# Patient Record
Sex: FEMALE | Race: WHITE | NOT HISPANIC OR LATINO | Employment: OTHER | URBAN - METROPOLITAN AREA
[De-identification: names, ages, dates, MRNs, and addresses within clinical notes are randomized per-mention and may not be internally consistent; named-entity substitution may affect disease eponyms.]

---

## 2024-08-25 ENCOUNTER — HOSPITAL ENCOUNTER (INPATIENT)
Facility: HOSPITAL | Age: 84
LOS: 5 days | Discharge: HOME | DRG: 643 | End: 2024-08-30
Attending: EMERGENCY MEDICINE | Admitting: ANESTHESIOLOGY
Payer: MEDICARE

## 2024-08-25 ENCOUNTER — APPOINTMENT (INPATIENT)
Dept: CT IMAGING | Facility: HOSPITAL | Age: 84
DRG: 643 | End: 2024-08-25
Payer: MEDICARE

## 2024-08-25 ENCOUNTER — APPOINTMENT (EMERGENCY)
Dept: RADIOLOGY | Facility: HOSPITAL | Age: 84
DRG: 643 | End: 2024-08-25
Payer: MEDICARE

## 2024-08-25 ENCOUNTER — APPOINTMENT (EMERGENCY)
Dept: CT IMAGING | Facility: HOSPITAL | Age: 84
DRG: 643 | End: 2024-08-25
Payer: MEDICARE

## 2024-08-25 DIAGNOSIS — M48.061 LUMBAR STENOSIS: ICD-10-CM

## 2024-08-25 DIAGNOSIS — E87.1 HYPONATREMIA: Primary | ICD-10-CM

## 2024-08-25 DIAGNOSIS — D64.9 ANEMIA: ICD-10-CM

## 2024-08-25 DIAGNOSIS — I10 HTN, GOAL BELOW 140/90: ICD-10-CM

## 2024-08-25 PROBLEM — I73.9 PVD (PERIPHERAL VASCULAR DISEASE) (HCC): Chronic | Status: ACTIVE | Noted: 2024-08-25

## 2024-08-25 PROBLEM — K21.9 GERD (GASTROESOPHAGEAL REFLUX DISEASE): Status: ACTIVE | Noted: 2024-08-25

## 2024-08-25 PROBLEM — R39.15 URGENCY OF URINATION: Status: ACTIVE | Noted: 2024-08-25

## 2024-08-25 PROBLEM — F41.9 ANXIETY: Status: ACTIVE | Noted: 2024-08-25

## 2024-08-25 PROBLEM — N39.0 RECURRENT UTI: Status: ACTIVE | Noted: 2024-08-25

## 2024-08-25 LAB
2HR DELTA HS TROPONIN: 0 NG/L
4HR DELTA HS TROPONIN: 0 NG/L
ALBUMIN SERPL BCG-MCNC: 3.7 G/DL (ref 3.5–5)
ALP SERPL-CCNC: 45 U/L (ref 34–104)
ALT SERPL W P-5'-P-CCNC: 11 U/L (ref 7–52)
ANION GAP SERPL CALCULATED.3IONS-SCNC: 8 MMOL/L (ref 4–13)
ANION GAP SERPL CALCULATED.3IONS-SCNC: 9 MMOL/L (ref 4–13)
ANION GAP SERPL CALCULATED.3IONS-SCNC: 9 MMOL/L (ref 4–13)
AST SERPL W P-5'-P-CCNC: 25 U/L (ref 13–39)
BACTERIA UR QL AUTO: ABNORMAL /HPF
BACTERIA UR QL AUTO: ABNORMAL /HPF
BASOPHILS # BLD AUTO: 0.02 THOUSANDS/ÂΜL (ref 0–0.1)
BASOPHILS NFR BLD AUTO: 1 % (ref 0–1)
BILIRUB SERPL-MCNC: 0.5 MG/DL (ref 0.2–1)
BILIRUB UR QL STRIP: NEGATIVE
BILIRUB UR QL STRIP: NEGATIVE
BNP SERPL-MCNC: 88 PG/ML (ref 0–100)
BUN SERPL-MCNC: 10 MG/DL (ref 5–25)
BUN SERPL-MCNC: 8 MG/DL (ref 5–25)
BUN SERPL-MCNC: 9 MG/DL (ref 5–25)
CALCIUM SERPL-MCNC: 8.6 MG/DL (ref 8.4–10.2)
CALCIUM SERPL-MCNC: 9 MG/DL (ref 8.4–10.2)
CALCIUM SERPL-MCNC: 9 MG/DL (ref 8.4–10.2)
CARDIAC TROPONIN I PNL SERPL HS: 4 NG/L
CHLORIDE SERPL-SCNC: 88 MMOL/L (ref 96–108)
CHLORIDE SERPL-SCNC: 89 MMOL/L (ref 96–108)
CHLORIDE SERPL-SCNC: 90 MMOL/L (ref 96–108)
CLARITY UR: ABNORMAL
CLARITY UR: CLEAR
CO2 SERPL-SCNC: 20 MMOL/L (ref 21–32)
CO2 SERPL-SCNC: 21 MMOL/L (ref 21–32)
CO2 SERPL-SCNC: 22 MMOL/L (ref 21–32)
COLOR UR: ABNORMAL
COLOR UR: COLORLESS
CREAT SERPL-MCNC: 0.6 MG/DL (ref 0.6–1.3)
CREAT SERPL-MCNC: 0.65 MG/DL (ref 0.6–1.3)
CREAT SERPL-MCNC: 0.65 MG/DL (ref 0.6–1.3)
EOSINOPHIL # BLD AUTO: 0.05 THOUSAND/ÂΜL (ref 0–0.61)
EOSINOPHIL NFR BLD AUTO: 1 % (ref 0–6)
ERYTHROCYTE [DISTWIDTH] IN BLOOD BY AUTOMATED COUNT: 13.1 % (ref 11.6–15.1)
FLUAV RNA RESP QL NAA+PROBE: NEGATIVE
FLUBV RNA RESP QL NAA+PROBE: NEGATIVE
GFR SERPL CREATININE-BSD FRML MDRD: 83 ML/MIN/1.73SQ M
GFR SERPL CREATININE-BSD FRML MDRD: 83 ML/MIN/1.73SQ M
GFR SERPL CREATININE-BSD FRML MDRD: 85 ML/MIN/1.73SQ M
GLUCOSE SERPL-MCNC: 101 MG/DL (ref 65–140)
GLUCOSE SERPL-MCNC: 107 MG/DL (ref 65–140)
GLUCOSE SERPL-MCNC: 118 MG/DL (ref 65–140)
GLUCOSE SERPL-MCNC: 143 MG/DL (ref 65–140)
GLUCOSE UR STRIP-MCNC: NEGATIVE MG/DL
GLUCOSE UR STRIP-MCNC: NEGATIVE MG/DL
HCT VFR BLD AUTO: 28.8 % (ref 34.8–46.1)
HGB BLD-MCNC: 10.1 G/DL (ref 11.5–15.4)
HGB UR QL STRIP.AUTO: NEGATIVE
HGB UR QL STRIP.AUTO: NEGATIVE
IMM GRANULOCYTES # BLD AUTO: 0.03 THOUSAND/UL (ref 0–0.2)
IMM GRANULOCYTES NFR BLD AUTO: 1 % (ref 0–2)
KETONES UR STRIP-MCNC: NEGATIVE MG/DL
KETONES UR STRIP-MCNC: NEGATIVE MG/DL
LACTATE SERPL-SCNC: 1.3 MMOL/L (ref 0.5–2)
LACTATE SERPL-SCNC: 2.1 MMOL/L (ref 0.5–2)
LEUKOCYTE ESTERASE UR QL STRIP: ABNORMAL
LEUKOCYTE ESTERASE UR QL STRIP: ABNORMAL
LYMPHOCYTES # BLD AUTO: 0.97 THOUSANDS/ÂΜL (ref 0.6–4.47)
LYMPHOCYTES NFR BLD AUTO: 27 % (ref 14–44)
MAGNESIUM SERPL-MCNC: 1.7 MG/DL (ref 1.9–2.7)
MCH RBC QN AUTO: 31.2 PG (ref 26.8–34.3)
MCHC RBC AUTO-ENTMCNC: 35.1 G/DL (ref 31.4–37.4)
MCV RBC AUTO: 89 FL (ref 82–98)
MONOCYTES # BLD AUTO: 0.46 THOUSAND/ÂΜL (ref 0.17–1.22)
MONOCYTES NFR BLD AUTO: 13 % (ref 4–12)
NEUTROPHILS # BLD AUTO: 2.09 THOUSANDS/ÂΜL (ref 1.85–7.62)
NEUTS SEG NFR BLD AUTO: 57 % (ref 43–75)
NITRITE UR QL STRIP: NEGATIVE
NITRITE UR QL STRIP: NEGATIVE
NON-SQ EPI CELLS URNS QL MICRO: ABNORMAL /HPF
NON-SQ EPI CELLS URNS QL MICRO: ABNORMAL /HPF
NRBC BLD AUTO-RTO: 0 /100 WBCS
OSMOLALITY UR: 282 MMOL/KG
PH UR STRIP.AUTO: 6 [PH]
PH UR STRIP.AUTO: 6.5 [PH]
PLATELET # BLD AUTO: 234 THOUSANDS/UL (ref 149–390)
PMV BLD AUTO: 10.4 FL (ref 8.9–12.7)
POTASSIUM SERPL-SCNC: 3.7 MMOL/L (ref 3.5–5.3)
POTASSIUM SERPL-SCNC: 4 MMOL/L (ref 3.5–5.3)
POTASSIUM SERPL-SCNC: 4.4 MMOL/L (ref 3.5–5.3)
PROCALCITONIN SERPL-MCNC: <0.05 NG/ML
PROT SERPL-MCNC: 6.1 G/DL (ref 6.4–8.4)
PROT UR STRIP-MCNC: NEGATIVE MG/DL
PROT UR STRIP-MCNC: NEGATIVE MG/DL
RBC # BLD AUTO: 3.24 MILLION/UL (ref 3.81–5.12)
RBC #/AREA URNS AUTO: ABNORMAL /HPF
RBC #/AREA URNS AUTO: ABNORMAL /HPF
RSV RNA RESP QL NAA+PROBE: NEGATIVE
SARS-COV-2 RNA RESP QL NAA+PROBE: NEGATIVE
SODIUM 24H UR-SCNC: 61 MOL/L
SODIUM SERPL-SCNC: 117 MMOL/L (ref 135–147)
SODIUM SERPL-SCNC: 119 MMOL/L (ref 135–147)
SODIUM SERPL-SCNC: 120 MMOL/L (ref 135–147)
SP GR UR STRIP.AUTO: 1.01 (ref 1–1.03)
SP GR UR STRIP.AUTO: 1.01 (ref 1–1.03)
TSH SERPL DL<=0.05 MIU/L-ACNC: 3.78 UIU/ML (ref 0.45–4.5)
URATE SERPL-MCNC: 2.6 MG/DL (ref 2–7.5)
UROBILINOGEN UR STRIP-ACNC: <2 MG/DL
UROBILINOGEN UR STRIP-ACNC: <2 MG/DL
WBC # BLD AUTO: 3.62 THOUSAND/UL (ref 4.31–10.16)
WBC #/AREA URNS AUTO: ABNORMAL /HPF
WBC #/AREA URNS AUTO: ABNORMAL /HPF

## 2024-08-25 PROCEDURE — 84145 PROCALCITONIN (PCT): CPT

## 2024-08-25 PROCEDURE — 83605 ASSAY OF LACTIC ACID: CPT

## 2024-08-25 PROCEDURE — 83935 ASSAY OF URINE OSMOLALITY: CPT

## 2024-08-25 PROCEDURE — 36415 COLL VENOUS BLD VENIPUNCTURE: CPT | Performed by: EMERGENCY MEDICINE

## 2024-08-25 PROCEDURE — 84443 ASSAY THYROID STIM HORMONE: CPT | Performed by: EMERGENCY MEDICINE

## 2024-08-25 PROCEDURE — 1124F ACP DISCUSS-NO DSCNMKR DOCD: CPT | Performed by: STUDENT IN AN ORGANIZED HEALTH CARE EDUCATION/TRAINING PROGRAM

## 2024-08-25 PROCEDURE — 72131 CT LUMBAR SPINE W/O DYE: CPT

## 2024-08-25 PROCEDURE — 84484 ASSAY OF TROPONIN QUANT: CPT | Performed by: EMERGENCY MEDICINE

## 2024-08-25 PROCEDURE — 83880 ASSAY OF NATRIURETIC PEPTIDE: CPT | Performed by: EMERGENCY MEDICINE

## 2024-08-25 PROCEDURE — 99245 OFF/OP CONSLTJ NEW/EST HI 55: CPT | Performed by: INTERNAL MEDICINE

## 2024-08-25 PROCEDURE — 84300 ASSAY OF URINE SODIUM: CPT

## 2024-08-25 PROCEDURE — 80048 BASIC METABOLIC PNL TOTAL CA: CPT

## 2024-08-25 PROCEDURE — 84484 ASSAY OF TROPONIN QUANT: CPT

## 2024-08-25 PROCEDURE — 70450 CT HEAD/BRAIN W/O DYE: CPT

## 2024-08-25 PROCEDURE — 82948 REAGENT STRIP/BLOOD GLUCOSE: CPT

## 2024-08-25 PROCEDURE — 99285 EMERGENCY DEPT VISIT HI MDM: CPT

## 2024-08-25 PROCEDURE — 80053 COMPREHEN METABOLIC PANEL: CPT | Performed by: EMERGENCY MEDICINE

## 2024-08-25 PROCEDURE — 99285 EMERGENCY DEPT VISIT HI MDM: CPT | Performed by: EMERGENCY MEDICINE

## 2024-08-25 PROCEDURE — 71046 X-RAY EXAM CHEST 2 VIEWS: CPT

## 2024-08-25 PROCEDURE — 99223 1ST HOSP IP/OBS HIGH 75: CPT | Performed by: ANESTHESIOLOGY

## 2024-08-25 PROCEDURE — 87086 URINE CULTURE/COLONY COUNT: CPT

## 2024-08-25 PROCEDURE — 84550 ASSAY OF BLOOD/URIC ACID: CPT

## 2024-08-25 PROCEDURE — 85025 COMPLETE CBC W/AUTO DIFF WBC: CPT | Performed by: EMERGENCY MEDICINE

## 2024-08-25 PROCEDURE — 81001 URINALYSIS AUTO W/SCOPE: CPT

## 2024-08-25 PROCEDURE — 96360 HYDRATION IV INFUSION INIT: CPT

## 2024-08-25 PROCEDURE — 0241U HB NFCT DS VIR RESP RNA 4 TRGT: CPT | Performed by: EMERGENCY MEDICINE

## 2024-08-25 PROCEDURE — 83735 ASSAY OF MAGNESIUM: CPT | Performed by: EMERGENCY MEDICINE

## 2024-08-25 PROCEDURE — 80048 BASIC METABOLIC PNL TOTAL CA: CPT | Performed by: NURSE PRACTITIONER

## 2024-08-25 PROCEDURE — 93005 ELECTROCARDIOGRAM TRACING: CPT

## 2024-08-25 RX ORDER — ATORVASTATIN CALCIUM 40 MG/1
40 TABLET, FILM COATED ORAL
Status: DISCONTINUED | OUTPATIENT
Start: 2024-08-25 | End: 2024-08-30 | Stop reason: HOSPADM

## 2024-08-25 RX ORDER — LISINOPRIL 20 MG/1
20 TABLET ORAL DAILY
Status: DISCONTINUED | OUTPATIENT
Start: 2024-08-25 | End: 2024-08-25

## 2024-08-25 RX ORDER — DROPERIDOL 2.5 MG/ML
1 INJECTION, SOLUTION INTRAMUSCULAR; INTRAVENOUS ONCE
Status: COMPLETED | OUTPATIENT
Start: 2024-08-25 | End: 2024-08-25

## 2024-08-25 RX ORDER — GABAPENTIN 100 MG/1
1 CAPSULE ORAL 3 TIMES DAILY
COMMUNITY
Start: 2024-07-31 | End: 2024-08-30

## 2024-08-25 RX ORDER — ALPRAZOLAM 0.25 MG
1 TABLET ORAL DAILY PRN
COMMUNITY
Start: 2024-08-02

## 2024-08-25 RX ORDER — ASPIRIN 81 MG/1
81 TABLET, CHEWABLE ORAL DAILY
Status: DISCONTINUED | OUTPATIENT
Start: 2024-08-25 | End: 2024-08-30 | Stop reason: HOSPADM

## 2024-08-25 RX ORDER — ONDANSETRON 2 MG/ML
4 INJECTION INTRAMUSCULAR; INTRAVENOUS EVERY 6 HOURS PRN
Status: DISCONTINUED | OUTPATIENT
Start: 2024-08-25 | End: 2024-08-30 | Stop reason: HOSPADM

## 2024-08-25 RX ORDER — LISINOPRIL 20 MG/1
20 TABLET ORAL DAILY
COMMUNITY
Start: 2024-07-09 | End: 2024-08-30

## 2024-08-25 RX ORDER — ATORVASTATIN CALCIUM 40 MG/1
40 TABLET, FILM COATED ORAL DAILY
COMMUNITY
Start: 2024-07-31

## 2024-08-25 RX ORDER — GABAPENTIN 100 MG/1
100 CAPSULE ORAL 3 TIMES DAILY
Status: DISCONTINUED | OUTPATIENT
Start: 2024-08-25 | End: 2024-08-26

## 2024-08-25 RX ORDER — ASPIRIN 81 MG/1
1 TABLET, CHEWABLE ORAL DAILY
COMMUNITY
Start: 2024-07-31

## 2024-08-25 RX ORDER — ONDANSETRON 4 MG/1
1 TABLET, FILM COATED ORAL 3 TIMES DAILY PRN
COMMUNITY
Start: 2024-05-15

## 2024-08-25 RX ORDER — ACETAMINOPHEN 325 MG/1
650 TABLET ORAL EVERY 6 HOURS PRN
Status: DISCONTINUED | OUTPATIENT
Start: 2024-08-25 | End: 2024-08-30 | Stop reason: HOSPADM

## 2024-08-25 RX ORDER — ENOXAPARIN SODIUM 100 MG/ML
40 INJECTION SUBCUTANEOUS DAILY
Status: DISCONTINUED | OUTPATIENT
Start: 2024-08-25 | End: 2024-08-30 | Stop reason: HOSPADM

## 2024-08-25 RX ORDER — CHLORHEXIDINE GLUCONATE ORAL RINSE 1.2 MG/ML
15 SOLUTION DENTAL EVERY 12 HOURS SCHEDULED
Status: DISCONTINUED | OUTPATIENT
Start: 2024-08-25 | End: 2024-08-26

## 2024-08-25 RX ORDER — CLOPIDOGREL BISULFATE 75 MG/1
75 TABLET ORAL DAILY
Status: DISCONTINUED | OUTPATIENT
Start: 2024-08-25 | End: 2024-08-30 | Stop reason: HOSPADM

## 2024-08-25 RX ORDER — HYDROXYZINE HYDROCHLORIDE 25 MG/1
25 TABLET, FILM COATED ORAL EVERY 6 HOURS PRN
Status: DISCONTINUED | OUTPATIENT
Start: 2024-08-25 | End: 2024-08-30 | Stop reason: HOSPADM

## 2024-08-25 RX ORDER — PANTOPRAZOLE SODIUM 40 MG/1
40 TABLET, DELAYED RELEASE ORAL
Status: DISCONTINUED | OUTPATIENT
Start: 2024-08-25 | End: 2024-08-30 | Stop reason: HOSPADM

## 2024-08-25 RX ORDER — CLOPIDOGREL BISULFATE 75 MG/1
75 TABLET ORAL DAILY
COMMUNITY
Start: 2024-07-31

## 2024-08-25 RX ORDER — HYDROXYZINE HYDROCHLORIDE 25 MG/1
1 TABLET, FILM COATED ORAL EVERY 6 HOURS PRN
COMMUNITY
Start: 2024-08-20

## 2024-08-25 RX ORDER — MAGNESIUM SULFATE HEPTAHYDRATE 40 MG/ML
2 INJECTION, SOLUTION INTRAVENOUS ONCE
Status: COMPLETED | OUTPATIENT
Start: 2024-08-25 | End: 2024-08-25

## 2024-08-25 RX ORDER — SODIUM CHLORIDE 1 G/1
3 TABLET ORAL
Status: DISCONTINUED | OUTPATIENT
Start: 2024-08-25 | End: 2024-08-28

## 2024-08-25 RX ADMIN — MAGNESIUM SULFATE HEPTAHYDRATE 2 G: 40 INJECTION, SOLUTION INTRAVENOUS at 11:17

## 2024-08-25 RX ADMIN — B-COMPLEX W/ C & FOLIC ACID TAB 1 TABLET: TAB at 12:13

## 2024-08-25 RX ADMIN — Medication 3 G: at 17:26

## 2024-08-25 RX ADMIN — CLOPIDOGREL 75 MG: 75 TABLET ORAL at 11:17

## 2024-08-25 RX ADMIN — PANTOPRAZOLE SODIUM 40 MG: 40 TABLET, DELAYED RELEASE ORAL at 11:35

## 2024-08-25 RX ADMIN — CHLORHEXIDINE GLUCONATE 0.12% ORAL RINSE 15 ML: 1.2 LIQUID ORAL at 12:13

## 2024-08-25 RX ADMIN — ENOXAPARIN SODIUM 40 MG: 40 INJECTION SUBCUTANEOUS at 12:13

## 2024-08-25 RX ADMIN — CHLORHEXIDINE GLUCONATE 0.12% ORAL RINSE 15 ML: 1.2 LIQUID ORAL at 21:07

## 2024-08-25 RX ADMIN — GABAPENTIN 100 MG: 100 CAPSULE ORAL at 17:23

## 2024-08-25 RX ADMIN — GABAPENTIN 100 MG: 100 CAPSULE ORAL at 21:07

## 2024-08-25 RX ADMIN — ASPIRIN 81 MG: 81 TABLET, CHEWABLE ORAL at 11:17

## 2024-08-25 RX ADMIN — ATORVASTATIN CALCIUM 40 MG: 40 TABLET, FILM COATED ORAL at 17:23

## 2024-08-25 RX ADMIN — SODIUM CHLORIDE 1000 ML: 0.9 INJECTION, SOLUTION INTRAVENOUS at 08:38

## 2024-08-25 RX ADMIN — LISINOPRIL 20 MG: 20 TABLET ORAL at 11:17

## 2024-08-25 RX ADMIN — GABAPENTIN 100 MG: 100 CAPSULE ORAL at 11:17

## 2024-08-25 NOTE — ASSESSMENT & PLAN NOTE
Per daughter patient has severe urgency despite having no evidence of UTI  When she attempts to void, scant to small amount of urine typically comes out  Previously evaluated by urology in [place] and found to have urinary retention  Check bladder scans and PVR  Scheduled for outpatient urology appointment  Consider inpatient urology consult if having high residuals

## 2024-08-25 NOTE — ASSESSMENT & PLAN NOTE
Assess gait/strength when patient more mobile  Consider MRI inpatient versus close outpatient follow-up

## 2024-08-25 NOTE — CASE MANAGEMENT
Case Management Assessment & Discharge Planning Note    Patient name Ofelia Aguilar  Location ICU 02/ICU 02 MRN [id number]  : 1940 Date 2024       Current Admission Date: 2024  Current Admission Diagnosis:Hyponatremia   Patient Active Problem List    Diagnosis Date Noted Date Diagnosed    PVD (peripheral vascular disease) ([facility]) 2024     HTN, goal below 140/90 2024     Anxiety 2024     GERD (gastroesophageal reflux disease) 2024     Hyponatremia 2024     Recurrent UTI 2024     Anemia 2024     Urgency of urination 2024       LOS (days): 0  Geometric Mean LOS (GMLOS) (days):   Days to GMLOS:     OBJECTIVE:    Risk of Unplanned Readmission Score: 12.39         Current admission status: Inpatient       Preferred Pharmacy:   [facility]/pharm[facility] #[id number] - [address] - [address]  [place] [id number]  Phone: [phone] Fax: [phone]    Primary Care Provider: No primary care provider on file.    Primary Insurance: MEDICARE MISC REPLACEMENT  Secondary Insurance:     ASSESSMENT:  Active Health Care Proxies    There are no active Health Care Proxies on file.       Advance Directives  Does patient have a Health Care POA?: No  Was patient offered paperwork?: Yes (CM supplied info)  Does patient currently have a Health Care decision maker?: Yes, please see Health Care Proxy section  Does patient have Advance Directives?: No  Was patient offered paperwork?: Yes (CM supplied info)  Primary Contact: daughter Tena         Readmission Root Cause  30 Day Readmission: No    Patient Information  Admitted from:: Home  Mental Status: Alert  During Assessment patient was accompanied by: Daughter (eunice Madsen)  Assessment information provided by:: Patient, Daughter  Primary Caregiver: Family  Caregiver's Name:: Tena  Caregiver's Relationship to Patient:: Family Member  Caregiver's Telephone Number:: [phone]  Support  Systems: Daughter  County of Residence: [place]  What city do you live in?: [place]  Home entry access options. Select all that apply.: Stairs  Number of steps to enter home.: 5  Do the steps have railings?: Yes  Type of Current Residence: [facility]  Living Arrangements: Lives w/ Daughter  Is patient a ?: No    Activities of Daily Living Prior to Admission  Functional Status: Assistance  Completes ADLs independently?: No  Level of ADL dependence: Assistance  Ambulates independently?: No  Level of ambulatory dependence: Assistance  Does patient use assisted devices?: Yes  Assisted Devices (DME) used: Walker  Does patient currently own DME?: Yes  What DME does the patient currently own?: Walker  Does patient have a history of Outpatient Therapy (PT/OT)?: No  Does the patient have a history of Short-Term Rehab?: No  Does patient have a history of HHC?: No  Does patient currently have HHC?: No         Patient Information Continued  Income Source: Pension/half-way  Does patient have prescription coverage?: Yes  Does patient receive dialysis treatments?: No  Does patient have a history of substance abuse?: No  Does patient have a history of Mental Health Diagnosis?: Yes (depression and anxiety)  Is patient receiving treatment for mental health?: Yes  Has patient received inpatient treatment related to mental health in the last 2 years?: No    PHQ 2/9 Screening   Reviewed PHQ 2/9 Depression Screening Score?: No    Means of Transportation  Means of Transport to Appts:: Family transport      Social Determinants of Health (SDOH)      Flowsheet Row Most Recent Value   Housing Stability    In the last 12 months, was there a time when you were not able to pay the mortgage or rent on time? N   In the past 12 months, how many times have you moved where you were living? 1   At any time in the past 12 months, were you homeless or living in a shelter (including now)? N   Transportation Needs    In the past 12 months, has lack  of transportation kept you from medical appointments or from getting medications? no   In the past 12 months, has lack of transportation kept you from meetings, work, or from getting things needed for daily living? No   Food Insecurity    Within the past 12 months, you worried that your food would run out before you got the money to buy more. Never true   Within the past 12 months, the food you bought just didn't last and you didn't have money to get more. Never true   Utilities    In the past 12 months has the electric, gas, oil, or water company threatened to shut off services in your home? No            DISCHARGE DETAILS:    Discharge planning discussed with:: patient and daughter Tena  Freedom of Choice: Yes  Comments - Freedom of Choice: Pt was living on her own in [place] until approx 2 wks ago when she moved in with her daughter Tena in [place].  Pt was becoming progressively weaker and was having more and more difficulty managing on her own.  Daughter Tnea now assists with ADL's.  CM discussed d/c needs including STR and HHC.  Both pt and daughter Tena are in agreement that STR not be considered, but will accept HHC for nursing/PT and OT.  Referrals placed and will review acceptances with pt and dgt.  CM contacted family/caregiver?: Yes  Were Treatment Team discharge recommendations reviewed with patient/caregiver?: Yes  Did patient/caregiver verbalize understanding of patient care needs?: Yes  Were patient/caregiver advised of the risks associated with not following Treatment Team discharge recommendations?: Yes    Contacts  Patient Contacts: Tena  Relationship to Patient:: Family  Contact Method: In Person  Reason/Outcome: Discharge Planning, Continuity of Care    Requested Home Health Care         Is the patient interested in HHC at discharge?: Yes  Home Health Discipline requested:: Nursing, Occupational Therapy, Physical Therapy  Home Health Follow-Up Provider:: PCP  Home Health Services  Needed:: Gait/ADL Training, Strengthening/Theraputic Exercises to Improve Function, Evaluate Functional Status and Safety  Homebound Criteria Met:: Requires the Assistance of Another Person for Safe Ambulation or to Leave the Home, Uses an Assist Device (i.e. cane, walker, etc)  Supporting Clincal Findings:: Fatigues Easliy in Short Distances, Limited Endurance    DME Referral Provided  Referral made for DME?: No    Other Referral/Resources/Interventions Provided:  Interventions: HHC  Referral Comments: HHC pended/Both pt and daughter Tena are in agreement that STR not be considered.  Two wks ago pt moved in with her daughter Tena and son in law Eamon.  Eamon is self employed and can provide assistance during the day if needed.    Would you like to participate in our [facility] service program?  : No - Declined    Treatment Team Recommendation: Home with Home Health Care  Discharge Destination Plan:: Home with Home Health Care  Transport at Discharge : Family

## 2024-08-25 NOTE — ASSESSMENT & PLAN NOTE
Previously taking xanax PRN  Recently started on zoloft and atarax PRN on 8/20  Zoloft now on hold due to concern for contributing to hyponatremia  Continue atarax

## 2024-08-25 NOTE — ED NOTES
"Pt unable to provide urine sample at this time. Pt's daughter states that pt has trouble urinating \"all the time.\"       Parris Sanchez  08/25/24 0849    "

## 2024-08-25 NOTE — H&P
[facility]  H&P  Name: Ofelia Aguilar 83 y.o. 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  Received 1 L normal saline in the ED prior to labs resulting  Hold further fluids for now  Monitor sodium Q4h  Consult nephrology  Send serum and urine osmolality, serum uric acid and urine sodium  Check UA for evidence of UTI  FR 1200 ml  Zoloft on hold    Urgency of urination  Assessment & Plan  Per daughter patient has severe urgency despite having no evidence of UTI  When she attempts to void, scant to small amount of urine typically comes out  Previously evaluated by urology in [place] and found to have urinary retention  Check bladder scans and PVR  Scheduled for outpatient urology appointment  Consider inpatient urology consult if having high residuals    Anemia  Assessment & Plan  Hemoglobin 10.1  MCV WNL  Would benefit from outpatient workup for anemia  Trend daily    Recurrent UTI  Assessment & Plan  Per daughter, patient has been treated for UTI multiple times with many different antibiotics since April  Recent urine culture from 8/20 without evidence of infection  Check procal, repeat UA    GERD (gastroesophageal reflux disease)  Assessment & Plan  Continue PPI    Anxiety  Assessment & Plan  Previously taking xanax PRN  Recently started on zoloft and atarax PRN on 8/20  Zoloft now on hold due to concern for contributing to hyponatremia  Continue atarax    HTN, goal below 140/90  Assessment & Plan  Continue lisinopril    PVD (peripheral vascular disease) ([facility])  Assessment & Plan  History of PVD with lower extremity bypass in [place]  Unable to view previous records  Continue DAPT, statin  Follow up with vascular  outpatient           History of Present Illness     HPI: Ofelia Aguilar is a 83 y.o. with PMH HTN, HLD, PVD s/p lower extremity bypass, anxiety, pain, tobacco abuse and multiple occurences of UTI and hyponatremia since April of this year who presents with generalized weakness, malaise, confusion, dizziness and nausea. Patient is from [place] and per her daughter, she has been treated with multiple antibiotics for recurrent UTI since April. She has also been admitted for hyponatremia several times of unclear etiology. Previous records are not available in care everywhere and the patient's daughter is unsure what previous workup has been done. Sodium was 117 on arrival today with recent outpatient labs showing sodium of 125. Daughter also endorses that in addition to the above issues, the patient has been experiencing urinary urgency for which a previous urology workup was unable to find an etiology. Recent urine culture without evidence of active infection, however patient continues to have the sensation of urgency. Patient is being referred to critical care for admission.    History obtained from child, chart review, and the patient.  Review of Systems: Review of Systems   Constitutional:  Positive for fatigue. Negative for chills and fever.        Generalized weakness   Respiratory: Negative.     Cardiovascular: Negative.    Gastrointestinal:  Positive for nausea. Negative for diarrhea and vomiting.   Genitourinary:  Positive for dysuria and urgency. Negative for hematuria.   Neurological:  Positive for dizziness. Negative for syncope.     Disposition: Stepdown Level 1  Historical Information   Past Medical History:  No date: Anxiety  No date: GERD (gastroesophageal reflux disease) Past Surgical History:  No date: APPENDECTOMY  No date:  SECTION   Current Outpatient Medications   Medication Instructions    ALPRAZolam (XANAX) 0.25 mg tablet 1 tablet, Oral, Daily PRN    aspirin 81 mg chewable tablet 1 tablet,  Oral, Daily    atorvastatin (LIPITOR) 40 mg, Oral, Daily    clopidogrel (PLAVIX) 75 mg, Oral, Daily    gabapentin (NEURONTIN) 100 mg capsule 1 capsule, Oral, 3 times daily    hydrOXYzine HCL (ATARAX) 25 mg tablet 1 tablet, Oral, Every 6 hours PRN    lisinopril (ZESTRIL) 20 mg, Oral, Daily    omeprazole (PRILOSEC) 20 mg, Oral, Daily    ondansetron (ZOFRAN) 4 mg tablet 1 tablet, Oral, 3 times daily PRN    sertraline (ZOLOFT) 50 mg, Oral, Daily    Allergies   Allergen Reactions    Penicillins Anaphylaxis        No family history on file.       Objective                            Vitals I/O      Most Recent Min/Max in 24hrs   Temp 97.8 °F (36.6 °C) Temp  Min: 97.8 °F (36.6 °C)  Max: 97.8 °F (36.6 °C)   Pulse 65 Pulse  Min: 58  Max: 65   Resp 18 Resp  Min: 18  Max: 18   BP (!) 173/77 BP  Min: 167/71  Max: 173/77   O2 Sat 98 % SpO2  Min: 98 %  Max: 100 %      Intake/Output Summary (Last 24 hours) at 8/25/2024 1059  Last data filed at 8/25/2024 1000  Gross per 24 hour   Intake 1000 ml   Output --   Net 1000 ml       Diet Cardiovascular; Cardiac; Fluid Restriction 1200 ML    Invasive Monitoring           Physical Exam   Physical Exam  Vitals reviewed.   Constitutional:       Appearance: She is ill-appearing.   Neurological:      Mental Status: She is alert.            Diagnostic Studies      EKG: SR with PACs and LBB  Imaging:  I have personally reviewed pertinent reports.       Medications:  Scheduled PRN   aspirin, 81 mg, Daily  atorvastatin, 40 mg, Daily With Dinner  chlorhexidine, 15 mL, Q12H SHANA  clopidogrel, 75 mg, Daily  enoxaparin, 40 mg, Daily  gabapentin, 100 mg, TID  magnesium sulfate, 2 g, Once  pantoprazole, 40 mg, Early Morning      acetaminophen, 650 mg, Q6H PRN  hydrOXYzine HCL, 25 mg, Q6H PRN  ondansetron, 4 mg, Q6H PRN       Continuous          Labs:    CBC    Recent Labs     08/25/24  0756   WBC 3.62*   HGB 10.1*   HCT 28.8*        BMP    Recent Labs     08/25/24  0756   SODIUM 117*   K 4.4   CL  88*   CO2 20*   AGAP 9   BUN 10   CREATININE 0.65   CALCIUM 8.6       Coags    No recent results     Additional Electrolytes  Recent Labs     08/25/24  0756   MG 1.7*          Blood Gas    No recent results  No recent results LFTs  Recent Labs     08/25/24  0756   ALT 11   AST 25   ALKPHOS 45   ALB 3.7   TBILI 0.50       Infectious  Recent Labs     08/25/24  0756   PROCALCITONI <0.05     Glucose  Recent Labs     08/25/24  0756   GLUC 107             Anticipated Length of Stay is > 2 midnights  SARAY Bryan

## 2024-08-25 NOTE — ASSESSMENT & PLAN NOTE
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

## 2024-08-25 NOTE — CONSULTS
NEPHROLOGY CONSULTATION NOTE    Patient: Ofelia Aguilar               Sex: female          DOA: 8/25/2024  7:31 AM   YOB: 1940        Age:  83 y.o.        LOS:  LOS: 0 days     REFERRING PHYSICIAN: Dr. Wetzel    REASON FOR THE REFERRAL / CONSULTATION: Hyponatremia    DATE OF CONSULTATION / SERVICE: 8/25/2024    ADMISSION DIAGNOSIS: Hyponatremia     CHIEF COMPLAINT     Weakness, dizziness    HPI     This is a 83 years old female with past medical history of hyponatremia, hypertension, depression, anxiety, recurrent urinary tract infection, peripheral vascular disease status post lower extremity bypass, urinary incontinence who presents to our facility with complaints listed above.  Patient usually resides in a state of [place] and was living there until recently.  Patient was brought here by her daughter.  Over the past several months patient had numerous hospitalization in [place] for hyponatremia as well as urinary tract infection.  Etiology of hyponatremia had not been explained to the patient or her daughter.  Furthermore patient has been treated for recurrent urinary tract infection with numerous antibiotics.  She was scheduled to have cystoscopy in [place] at the end of August as well.  Patient was seen by a provider in [facility] on Tuesday and lab work revealed sodium of 125 mEq/L.  During that visit the provider wanted the patient to be weaned off Xanax and hence Zoloft was initiated.  Labs obtained today revealed sodium level of 117 mEq/L and low.  In the ED, patient received 1 L of normal saline and nephrology consult was called due to hyponatremia.  Upon talking to the patient, she is noted to be on tea and toast diet while residing in [place] alone.  In the ED patient's blood pressure was noted to be elevated up to 163 mmHg systolic.     Currently patient denies nausea, vomiting, headache, dizziness, abdominal pain, constipation or rash.    PAST MEDICAL HISTORY     Past Medical History:    Diagnosis Date    Anxiety     GERD (gastroesophageal reflux disease)        PAST SURGICAL HISTORY     Past Surgical History:   Procedure Laterality Date    APPENDECTOMY       SECTION         ALLERGIES     Allergies   Allergen Reactions    Penicillins Anaphylaxis       SOCIAL HISTORY     Social History     Substance and Sexual Activity   Alcohol Use Not on file     Social History     Substance and Sexual Activity   Drug Use Not on file     Social History     Tobacco Use   Smoking Status Not on file   Smokeless Tobacco Not on file       FAMILY HISTORY     No family history on file.    CURRENT MEDICATIONS       Current Facility-Administered Medications:     acetaminophen (TYLENOL) tablet 650 mg, 650 mg, Oral, Q6H PRN, SARAY Bryan    aspirin chewable tablet 81 mg, 81 mg, Oral, Daily, SARAY Bryan    atorvastatin (LIPITOR) tablet 40 mg, 40 mg, Oral, Daily With Dinner, SARAY Bryan    chlorhexidine (PERIDEX) 0.12 % oral rinse 15 mL, 15 mL, Mouth/Throat, Q12H SHNAA, SARAY Bryan    clopidogrel (PLAVIX) tablet 75 mg, 75 mg, Oral, Daily, SARAY Bryan    enoxaparin (LOVENOX) subcutaneous injection 40 mg, 40 mg, Subcutaneous, Daily, SARAY Bryan    gabapentin (NEURONTIN) capsule 100 mg, 100 mg, Oral, TID, SARAY Bryan    hydrOXYzine HCL (ATARAX) tablet 25 mg, 25 mg, Oral, Q6H PRN, SARAY Bryan    lisinopril (ZESTRIL) tablet 20 mg, 20 mg, Oral, Daily, SARAY Bryan    magnesium sulfate 2 g/50 mL IVPB (premix) 2 g, 2 g, Intravenous, Once, SARAY Bryan    ondansetron (ZOFRAN) injection 4 mg, 4 mg, Intravenous, Q6H PRN, SARAY Bryan    pantoprazole (PROTONIX) EC tablet 40 mg, 40 mg, Oral, Early Morning, SARAY Bryan    Current Outpatient Medications:     ALPRAZolam (XANAX) 0.25 mg tablet, Take 1 tablet by mouth daily as needed, Disp: , Rfl:     aspirin 81 mg chewable tablet, Chew 1  tablet daily, Disp: , Rfl:     atorvastatin (LIPITOR) 40 mg tablet, Take 40 mg by mouth daily, Disp: , Rfl:     clopidogrel (PLAVIX) 75 mg tablet, Take 75 mg by mouth daily, Disp: , Rfl:     gabapentin (NEURONTIN) 100 mg capsule, Take 1 capsule by mouth 3 (three) times a day, Disp: , Rfl:     hydrOXYzine HCL (ATARAX) 25 mg tablet, Take 1 tablet by mouth every 6 (six) hours as needed, Disp: , Rfl:     lisinopril (ZESTRIL) 20 mg tablet, Take 20 mg by mouth daily, Disp: , Rfl:     omeprazole (PriLOSEC) 20 mg delayed release capsule, Take 20 mg by mouth daily, Disp: , Rfl:     ondansetron (ZOFRAN) 4 mg tablet, Take 1 tablet by mouth 3 (three) times a day as needed, Disp: , Rfl:     sertraline (ZOLOFT) 50 mg tablet, Take 50 mg by mouth daily, Disp: , Rfl:     REVIEW OF SYSTEMS     Review of Systems   Constitutional:  Positive for fatigue.   HENT: Negative.     Eyes: Negative.    Respiratory: Negative.     Cardiovascular: Negative.    Gastrointestinal: Negative.    Endocrine: Negative.    Genitourinary: Negative.    Musculoskeletal: Negative.    Skin: Negative.    Allergic/Immunologic: Negative.    Neurological:  Positive for dizziness, weakness and headaches.   Hematological: Negative.    All other systems reviewed and are negative.        OBJECTIVE     Current Weight: Weight - Scale: 50.6 kg (111 lb 8.8 oz)  Vitals:    08/25/24 0830   BP: (!) 173/77   Pulse: 65   Resp: 18   Temp:    SpO2: 98%     Body mass index is 19.15 kg/m².    Intake/Output Summary (Last 24 hours) at 8/25/2024 1105  Last data filed at 8/25/2024 1000  Gross per 24 hour   Intake 1000 ml   Output --   Net 1000 ml       PHYSICAL EXAMINATION     Physical Exam  Constitutional:       Appearance: She is well-developed. She is ill-appearing.   HENT:      Head: Normocephalic and atraumatic.   Eyes:      Pupils: Pupils are equal, round, and reactive to light.   Cardiovascular:      Rate and Rhythm: Normal rate and regular rhythm.      Heart sounds: Normal  heart sounds.   Pulmonary:      Effort: Pulmonary effort is normal.   Abdominal:      General: Bowel sounds are normal.      Palpations: Abdomen is soft.   Musculoskeletal:         General: Normal range of motion.      Cervical back: Neck supple.   Skin:     General: Skin is warm.   Neurological:      Mental Status: She is alert and oriented to person, place, and time.           LAB RESULTS        Results from last 7 days   Lab Units 08/25/24  0756 08/20/24  1006   WBC Thousand/uL 3.62*  --    HEMOGLOBIN g/dL 10.1*  --    HEMATOCRIT % 28.8*  --    PLATELETS Thousands/uL 234  --    POTASSIUM mmol/L 4.4 5.0   CHLORIDE mmol/L 88* 87*   CO2 mmol/L 20* 22   BUN mg/dL 10 11   CREATININE mg/dL 0.65 0.8   EGFR ml/min/1.73sq m 83 75   CALCIUM mg/dL 8.6 10.1   MAGNESIUM mg/dL 1.7*  --          RADIOLOGY RESULTS       IMPRESSION:     No evidence of acute intracranial process.     Chronic microangiopathy.  PLAN / RECOMMENDATIONS      83 years old female with past medical history of hypertension, anxiety, depression, hyponatremia, recurrent episodes of urinary tract infection, urinary incontinence who presents to our facility brought by daughter due to weakness and dizziness and found to have severely low sodium levels.    1.  Hyponatremia: Noted to have a sodium level of 117 meq/L and low.  This is a drop from outpatient labs on August 28 revealing sodium of 125 mEq/L  Numerous hospitalization in [place] for similar electrolyte abnormality with etiology unknown.  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  Current sodium likely worsened and low due to initiation of Zoloft earlier this week.  Workup including lab studies which include serum osmolality, your osmolality, urine sodium, serum uric acid sent and pending.  Will put patient on fluid restriction up to 1.2 L  Obtain sodium every 3 to every 4 hours  Target sodium of 125 mEq/L by a.m.  If  workup reveals SIADH, will administer tolvaptan 15 mg p.o. x 1 dose.  May consider full body CT scan to evaluate for any malignancy.    #2 recurrent urinary tract infection: Likely etiology could be underlying urine incontinence with probable urinary retention  Given infection can worsen and promote at SIADH, will attempt to find etiology of #2  Obtain bladder scan with PVR.    #3 hypertension: Blood pressure elevated on admission.  Hold off on ACE inhibitor due to anecdotal evidence of SIADH caused by the drug.    4.  Hypomagnesemia: Can order replacement with magnesium oxide 400 mg p.o. twice daily.    Thank you for the consultation to participate in patient's care. I have personally discussed my plan with the referring physician.     Mark Dorman MD    8/25/2024

## 2024-08-25 NOTE — ASSESSMENT & PLAN NOTE
History of PVD with lower extremity bypass in [place]  Unable to view previous records  Continue DAPT, statin  Follow up with vascular outpatient

## 2024-08-25 NOTE — ED PROVIDER NOTES
History  Chief Complaint   Patient presents with   • Weakness - Generalized     Pt arrived via ems from daughter's home (pt visiting from [place]) report of feeling lousy, weakness, HA and lightheadedness as of last night as per ems. +nausea 1.25 mg Droperidol given en route      Patient is an 83-year-old female past medical history of hypertension, hyperlipidemia, peripheral vascular disease presenting with generalized weakness.  Patient notes generalized weakness since yesterday though daughter states that it has been gradually worsening for the last 2 months.  She notes intermittent frontal headache since yesterday but denies any now and states she has not taken any medication for it.  Notes intermittent lightheadedness, nausea but no vomiting.  Denies any diarrhea, cough, respiratory symptoms, leg pain or swelling, chest pain, shortness of breath, rashes, dysuria, head injuries.  Notes decreased p.o. intake, body aches but denies fevers.  Received 1.25 of droperidol en route by EMS.        None       Past Medical History:   Diagnosis Date   • Anxiety    • GERD (gastroesophageal reflux disease)        Past Surgical History:   Procedure Laterality Date   • APPENDECTOMY     •  SECTION         No family history on file.  I have reviewed and agree with the history as documented.    E-Cigarette/Vaping     E-Cigarette/Vaping Substances          Review of Systems   All other systems reviewed and are negative.      Physical Exam  Physical Exam  Vitals reviewed.   Constitutional:       General: She is not in acute distress.     Appearance: Normal appearance. She is not ill-appearing.   HENT:      Mouth/Throat:      Mouth: Mucous membranes are moist.   Eyes:      Extraocular Movements: Extraocular movements intact.      Conjunctiva/sclera: Conjunctivae normal.      Pupils: Pupils are equal, round, and reactive to light.   Cardiovascular:      Rate and Rhythm: Normal rate and regular rhythm.      Pulses: Normal  pulses.      Heart sounds: Normal heart sounds.   Pulmonary:      Effort: Pulmonary effort is normal.      Comments: Small rales at the bases bilaterally  Abdominal:      General: Abdomen is flat.      Palpations: Abdomen is soft.      Tenderness: There is no abdominal tenderness.   Musculoskeletal:         General: No swelling. Normal range of motion.      Cervical back: Neck supple.      Right lower leg: No edema.      Left lower leg: No edema.   Skin:     General: Skin is warm and dry.   Neurological:      General: No focal deficit present.      Mental Status: She is alert.      Cranial Nerves: No cranial nerve deficit.      Motor: No weakness.      Coordination: Coordination normal.   Psychiatric:         Mood and Affect: Mood normal.         Vital Signs  ED Triage Vitals [08/25/24 0736]   Temperature Pulse Respirations Blood Pressure SpO2   97.8 °F (36.6 °C) 58 18 167/71 100 %      Temp Source Heart Rate Source Patient Position - Orthostatic VS BP Location FiO2 (%)   Oral Monitor Sitting Right arm --      Pain Score       --           Vitals:    08/25/24 0736   BP: 167/71   Pulse: 58   Patient Position - Orthostatic VS: Sitting         Visual Acuity      ED Medications  Medications   droperidol (FOR EMS ONLY) (INAPSINE) 2.5 mg/mL injection 2.5 mg (has no administration in time range)   sodium chloride 0.9 % bolus 1,000 mL (has no administration in time range)       Diagnostic Studies  Results Reviewed       None                   No orders to display              Procedures  ECG 12 Lead Documentation Only    Date/Time: 8/25/2024 7:53 AM    Performed by: Shira Edward DO  Authorized by: Shira Edward DO    Patient location:  ED  Previous ECG:     Previous ECG:  Unavailable  Interpretation:     Interpretation: abnormal    Rate:     ECG rate assessment: normal    Rhythm:     Rhythm: sinus rhythm    Ectopy:     Ectopy: PAC    QRS:     QRS axis:  Left    QRS intervals:  Wide  Conduction:      Conduction: abnormal      Abnormal conduction: complete LBBB    ST segments:     ST segments:  Non-specific  T waves:     T waves: normal             ED Course  ED Course as of 08/25/24 1336   Sun Aug 25, 2024   0907 Patient with severe hyponatremia, will discuss with ICU                                               Medical Decision Making  Patient is an 83-year-old female with past medical history of peripheral vascular disease, hypertension, hyperlipidemia presenting for generalized weakness.  Patient is well-appearing at bedside with stable vitals and in no acute distress.  She has no gross abnormalities on exam with the exception of mild pallor and generalized weakness.  Will obtain labs to assess for electrolyte abnormalities, anemia, EKG and troponin to assess for ACS, urinalysis to assess for UTI, chest x-ray to assess for pneumonia, pneumothorax, pulmonary edema as she does have scant rales at the bases and will give small fluids and continue to monitor.    Amount and/or Complexity of Data Reviewed  Labs: ordered.  Radiology: ordered.                 Disposition  Final diagnoses:   None     ED Disposition       None          Follow-up Information    None         Patient's Medications    No medications on file       No discharge procedures on file.    PDMP Review       None            ED Provider  Electronically Signed by             Shira Edward DO  08/25/24 1335

## 2024-08-25 NOTE — ASSESSMENT & PLAN NOTE
Sodium 117 on arrival  Recently 125 on 8/20  History of hyponatremia with hospitalization, unable to view records  Secondary to SIADH vs UTI  Received 1 L normal saline in the ED prior to labs resulting  Hold further fluids for now  Monitor sodium Q4h  Consult nephrology  Send serum and urine osmolality, serum uric acid and urine sodium  Check UA for evidence of UTI  FR 1200 ml  Zoloft on hold

## 2024-08-25 NOTE — ASSESSMENT & PLAN NOTE
Per daughter, patient has been treated for UTI multiple times with many different antibiotics since April  Recent urine culture from 8/20 without evidence of infection  Check procal, repeat UA

## 2024-08-26 ENCOUNTER — APPOINTMENT (INPATIENT)
Dept: RADIOLOGY | Facility: HOSPITAL | Age: 84
DRG: 643 | End: 2024-08-26
Payer: MEDICARE

## 2024-08-26 LAB
ALBUMIN SERPL BCG-MCNC: 3.6 G/DL (ref 3.5–5)
ALP SERPL-CCNC: 45 U/L (ref 34–104)
ALT SERPL W P-5'-P-CCNC: 11 U/L (ref 7–52)
ANION GAP SERPL CALCULATED.3IONS-SCNC: 5 MMOL/L (ref 4–13)
ANION GAP SERPL CALCULATED.3IONS-SCNC: 6 MMOL/L (ref 4–13)
ANION GAP SERPL CALCULATED.3IONS-SCNC: 8 MMOL/L (ref 4–13)
AST SERPL W P-5'-P-CCNC: 17 U/L (ref 13–39)
ATRIAL RATE: 66 BPM
BILIRUB DIRECT SERPL-MCNC: 0.06 MG/DL (ref 0–0.2)
BILIRUB SERPL-MCNC: 0.41 MG/DL (ref 0.2–1)
BUN SERPL-MCNC: 10 MG/DL (ref 5–25)
BUN SERPL-MCNC: 14 MG/DL (ref 5–25)
BUN SERPL-MCNC: 9 MG/DL (ref 5–25)
CA-I BLD-SCNC: 1.14 MMOL/L (ref 1.12–1.32)
CALCIUM SERPL-MCNC: 8.3 MG/DL (ref 8.4–10.2)
CALCIUM SERPL-MCNC: 8.3 MG/DL (ref 8.4–10.2)
CALCIUM SERPL-MCNC: 8.7 MG/DL (ref 8.4–10.2)
CHLORIDE SERPL-SCNC: 93 MMOL/L (ref 96–108)
CHLORIDE SERPL-SCNC: 93 MMOL/L (ref 96–108)
CHLORIDE SERPL-SCNC: 99 MMOL/L (ref 96–108)
CO2 SERPL-SCNC: 22 MMOL/L (ref 21–32)
CO2 SERPL-SCNC: 22 MMOL/L (ref 21–32)
CO2 SERPL-SCNC: 24 MMOL/L (ref 21–32)
CREAT SERPL-MCNC: 0.55 MG/DL (ref 0.6–1.3)
CREAT SERPL-MCNC: 0.64 MG/DL (ref 0.6–1.3)
CREAT SERPL-MCNC: 0.64 MG/DL (ref 0.6–1.3)
ERYTHROCYTE [DISTWIDTH] IN BLOOD BY AUTOMATED COUNT: 13.3 % (ref 11.6–15.1)
GFR SERPL CREATININE-BSD FRML MDRD: 83 ML/MIN/1.73SQ M
GFR SERPL CREATININE-BSD FRML MDRD: 83 ML/MIN/1.73SQ M
GFR SERPL CREATININE-BSD FRML MDRD: 88 ML/MIN/1.73SQ M
GLUCOSE SERPL-MCNC: 92 MG/DL (ref 65–140)
GLUCOSE SERPL-MCNC: 97 MG/DL (ref 65–140)
GLUCOSE SERPL-MCNC: 98 MG/DL (ref 65–140)
HCT VFR BLD AUTO: 26.5 % (ref 34.8–46.1)
HGB BLD-MCNC: 9.2 G/DL (ref 11.5–15.4)
MAGNESIUM SERPL-MCNC: 2.1 MG/DL (ref 1.9–2.7)
MCH RBC QN AUTO: 30.7 PG (ref 26.8–34.3)
MCHC RBC AUTO-ENTMCNC: 34.7 G/DL (ref 31.4–37.4)
MCV RBC AUTO: 88 FL (ref 82–98)
OSMOLALITY UR/SERPL-RTO: 257 MMOL/KG (ref 282–298)
P AXIS: 91 DEGREES
PHOSPHATE SERPL-MCNC: 2.9 MG/DL (ref 2.3–4.1)
PLATELET # BLD AUTO: 278 THOUSANDS/UL (ref 149–390)
PMV BLD AUTO: 9.2 FL (ref 8.9–12.7)
POTASSIUM SERPL-SCNC: 3.9 MMOL/L (ref 3.5–5.3)
POTASSIUM SERPL-SCNC: 3.9 MMOL/L (ref 3.5–5.3)
POTASSIUM SERPL-SCNC: 4.1 MMOL/L (ref 3.5–5.3)
PR INTERVAL: 142 MS
PROCALCITONIN SERPL-MCNC: <0.05 NG/ML
PROT SERPL-MCNC: 5.7 G/DL (ref 6.4–8.4)
QRS AXIS: 23 DEGREES
QRSD INTERVAL: 122 MS
QT INTERVAL: 464 MS
QTC INTERVAL: 486 MS
RBC # BLD AUTO: 3 MILLION/UL (ref 3.81–5.12)
SODIUM SERPL-SCNC: 123 MMOL/L (ref 135–147)
SODIUM SERPL-SCNC: 123 MMOL/L (ref 135–147)
SODIUM SERPL-SCNC: 126 MMOL/L (ref 135–147)
T WAVE AXIS: 101 DEGREES
VENTRICULAR RATE: 66 BPM
WBC # BLD AUTO: 4.54 THOUSAND/UL (ref 4.31–10.16)

## 2024-08-26 PROCEDURE — 82330 ASSAY OF CALCIUM: CPT

## 2024-08-26 PROCEDURE — 83930 ASSAY OF BLOOD OSMOLALITY: CPT

## 2024-08-26 PROCEDURE — 93010 ELECTROCARDIOGRAM REPORT: CPT | Performed by: STUDENT IN AN ORGANIZED HEALTH CARE EDUCATION/TRAINING PROGRAM

## 2024-08-26 PROCEDURE — 99232 SBSQ HOSP IP/OBS MODERATE 35: CPT | Performed by: INTERNAL MEDICINE

## 2024-08-26 PROCEDURE — 80048 BASIC METABOLIC PNL TOTAL CA: CPT | Performed by: NURSE PRACTITIONER

## 2024-08-26 PROCEDURE — 83735 ASSAY OF MAGNESIUM: CPT

## 2024-08-26 PROCEDURE — 97163 PT EVAL HIGH COMPLEX 45 MIN: CPT

## 2024-08-26 PROCEDURE — 85027 COMPLETE CBC AUTOMATED: CPT

## 2024-08-26 PROCEDURE — 97167 OT EVAL HIGH COMPLEX 60 MIN: CPT

## 2024-08-26 PROCEDURE — 80076 HEPATIC FUNCTION PANEL: CPT

## 2024-08-26 PROCEDURE — 74018 RADEX ABDOMEN 1 VIEW: CPT

## 2024-08-26 PROCEDURE — 84100 ASSAY OF PHOSPHORUS: CPT

## 2024-08-26 PROCEDURE — 99233 SBSQ HOSP IP/OBS HIGH 50: CPT | Performed by: STUDENT IN AN ORGANIZED HEALTH CARE EDUCATION/TRAINING PROGRAM

## 2024-08-26 PROCEDURE — 84145 PROCALCITONIN (PCT): CPT

## 2024-08-26 PROCEDURE — 97110 THERAPEUTIC EXERCISES: CPT

## 2024-08-26 RX ORDER — POLYETHYLENE GLYCOL 3350 17 G/17G
17 POWDER, FOR SOLUTION ORAL DAILY PRN
Status: DISCONTINUED | OUTPATIENT
Start: 2024-08-26 | End: 2024-08-30 | Stop reason: HOSPADM

## 2024-08-26 RX ORDER — KETOROLAC TROMETHAMINE 30 MG/ML
15 INJECTION, SOLUTION INTRAMUSCULAR; INTRAVENOUS EVERY 6 HOURS PRN
Status: COMPLETED | OUTPATIENT
Start: 2024-08-26 | End: 2024-08-28

## 2024-08-26 RX ORDER — HYDROMORPHONE HCL/PF 1 MG/ML
0.5 SYRINGE (ML) INJECTION ONCE
Status: COMPLETED | OUTPATIENT
Start: 2024-08-26 | End: 2024-08-27

## 2024-08-26 RX ORDER — AMOXICILLIN 250 MG
1 CAPSULE ORAL
Status: DISCONTINUED | OUTPATIENT
Start: 2024-08-26 | End: 2024-08-30 | Stop reason: HOSPADM

## 2024-08-26 RX ORDER — DOCUSATE SODIUM 100 MG/1
100 CAPSULE, LIQUID FILLED ORAL 2 TIMES DAILY
Status: DISCONTINUED | OUTPATIENT
Start: 2024-08-26 | End: 2024-08-26

## 2024-08-26 RX ORDER — LANOLIN ALCOHOL/MO/W.PET/CERES
3 CREAM (GRAM) TOPICAL
Status: DISCONTINUED | OUTPATIENT
Start: 2024-08-26 | End: 2024-08-30 | Stop reason: HOSPADM

## 2024-08-26 RX ORDER — POTASSIUM CHLORIDE 1500 MG/1
20 TABLET, EXTENDED RELEASE ORAL ONCE
Status: COMPLETED | OUTPATIENT
Start: 2024-08-26 | End: 2024-08-26

## 2024-08-26 RX ORDER — LANOLIN ALCOHOL/MO/W.PET/CERES
3 CREAM (GRAM) TOPICAL
Status: DISCONTINUED | OUTPATIENT
Start: 2024-08-26 | End: 2024-08-26

## 2024-08-26 RX ADMIN — ASPIRIN 81 MG: 81 TABLET, CHEWABLE ORAL at 08:32

## 2024-08-26 RX ADMIN — ONDANSETRON 4 MG: 2 INJECTION INTRAMUSCULAR; INTRAVENOUS at 17:56

## 2024-08-26 RX ADMIN — CLOPIDOGREL 75 MG: 75 TABLET ORAL at 08:32

## 2024-08-26 RX ADMIN — Medication 3 G: at 12:54

## 2024-08-26 RX ADMIN — Medication 3 G: at 17:51

## 2024-08-26 RX ADMIN — CHLORHEXIDINE GLUCONATE 0.12% ORAL RINSE 15 ML: 1.2 LIQUID ORAL at 08:33

## 2024-08-26 RX ADMIN — ATORVASTATIN CALCIUM 40 MG: 40 TABLET, FILM COATED ORAL at 17:51

## 2024-08-26 RX ADMIN — DOCUSATE SODIUM 100 MG: 100 CAPSULE, LIQUID FILLED ORAL at 21:06

## 2024-08-26 RX ADMIN — B-COMPLEX W/ C & FOLIC ACID TAB 1 TABLET: TAB at 08:32

## 2024-08-26 RX ADMIN — PANTOPRAZOLE SODIUM 40 MG: 40 TABLET, DELAYED RELEASE ORAL at 05:25

## 2024-08-26 RX ADMIN — ENOXAPARIN SODIUM 40 MG: 40 INJECTION SUBCUTANEOUS at 08:33

## 2024-08-26 RX ADMIN — Medication 3 G: at 08:32

## 2024-08-26 RX ADMIN — SENNOSIDES AND DOCUSATE SODIUM 1 TABLET: 50; 8.6 TABLET ORAL at 23:33

## 2024-08-26 RX ADMIN — POTASSIUM CHLORIDE 20 MEQ: 1500 TABLET, EXTENDED RELEASE ORAL at 08:32

## 2024-08-26 RX ADMIN — GABAPENTIN 100 MG: 100 CAPSULE ORAL at 08:32

## 2024-08-26 RX ADMIN — Medication 3 MG: at 21:06

## 2024-08-26 NOTE — OCCUPATIONAL THERAPY NOTE
Occupational Therapy Evaluation      Ofelia Aguilar    2024    Principal Problem:    Hyponatremia  Active Problems:    PVD (peripheral vascular disease) ([facility])    HTN, goal below 140/90    Anxiety    GERD (gastroesophageal reflux disease)    Anemia    Urgency of urination    Lumbar stenosis      Past Medical History:   Diagnosis Date    Anxiety     GERD (gastroesophageal reflux disease)        Past Surgical History:   Procedure Laterality Date    APPENDECTOMY       SECTION         24 1306   OT Last Visit   OT Visit Date 24   Note Type   Note type Evaluation   Pain Assessment   Pain Assessment Tool 0-10   Pain Score 7   Pain Location/Orientation Location: Leg   Restrictions/Precautions   Weight Bearing Precautions Per Order No   Other Precautions Chair Alarm;Bed Alarm;Fall Risk;Pain;Impulsive   Home Living   Type of Home House   Home Layout Performs ADLs on one level;Able to live on main level with bedroom/bathroom;Stairs to enter with rails;One level  ([employer])   Bathroom Shower/Tub Tub/shower unit   Bathroom Toilet Standard   Bathroom Equipment Shower chair   Bathroom Accessibility Accessible   Home Equipment Walker   Additional Comments Pt uses RW at baseline; Pt has a mobile home with [employer] in [place]   Prior Function   Level of [place] Independent with ADLs;Independent with functional mobility;Independent with IADLS   Lives With Alone  (currently in [place] with dtr; alone in [place])   Receives Help From Family;Friend(s)   IADLs Independent with medication management;Independent with meal prep;Independent with driving  (cleaning and laundry with Ind)   Falls in the last 6 months 0   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)   Lifestyle   Autonomy Pt reports PLOF was Ind with Ind with ADLs, IADLS, and (+) driving   Reciprocal Relationships dtr (in [place])   ADL   Eating Assistance 6  Modified independent   Eating Deficit Increased time to complete   Grooming Assistance 5  Supervision/Setup    Grooming Deficit Increased time to complete;Supervision/safety;Setup   UB Bathing Assistance 5  Supervision/Setup   UB Bathing Deficit Increased time to complete;Setup;Supervision/safety   LB Bathing Assistance 4  Minimal Assistance   LB Bathing Deficit Supervision/safety;Increased time to complete;Verbal cueing;Steadying;Setup   UB Dressing Assistance 5  Supervision/Setup   UB Dressing Deficit Increased time to complete;Setup;Supervision/safety   LB Dressing Assistance 4  Minimal Assistance   LB Dressing Deficit Supervision/safety;Increased time to complete;Verbal cueing;Steadying;Setup   Toileting Assistance  4  Minimal Assistance   Toileting Deficit Increased time to complete;Supervison/safety;Verbal cueing;Setup;Steadying   Functional Assistance 4  Minimal Assistance   Functional Deficit Supervision/safety;Increased time to complete;Verbal cueing;Steadying;Setup   Bed Mobility   Supine to Sit 4  Minimal assistance   Additional items Assist x 1;Bedrails;Increased time required;Verbal cues   Sit to Supine 4  Minimal assistance   Additional items Assist x 1;Bedrails;Increased time required;Verbal cues   Transfers   Sit to Stand 4  Minimal assistance   Additional items Assist x 1;Increased time required;Verbal cues;Armrests;Impulsive   Stand to Sit 4  Minimal assistance   Additional items Assist x 1;Increased time required;Verbal cues;Armrests;Impulsive   Functional Mobility   Functional Mobility 4  Minimal assistance   Additional items Rolling walker   Balance   Static Sitting Fair +   Dynamic Sitting Fair   Static Standing Fair -   Dynamic Standing Fair -   Ambulatory Poor +   Activity Tolerance   Activity Tolerance Patient limited by fatigue;Patient limited by pain   Medical Staff Made Aware Co-evaluation performed with PT Stephanie secondary to complex medical condition of patient, possible A of 2 required to achieve and maintain transitional movements, and pt's regression of functional status from baseline.  PT/OT goals were addressed separately.   Nurse Made Aware KEELY Blanc   RUE Assessment   RUE Assessment   (functionally assessed: ROM WFL/ MMT 4/5)   LUE Assessment   LUE Assessment   (functionally assessed: ROM WFL/ MMT 4/5)   Hand Function   Gross Motor Coordination Functional   Fine Motor Coordination Functional   Sensation   Light Touch No apparent deficits   Sharp/Dull No apparent deficits   Stereognosis No apparent deficits   Proprioception   Proprioception No apparent deficits   Vision-Basic Assessment   Current Vision Wears glasses all the time   Vision - Complex Assessment   Ocular Range of Motion Intact   Psychosocial   Psychosocial (WDL) WDL   Cognition   Overall Cognitive Status   (questionable)   Arousal/Participation Alert;Cooperative   Attention Within functional limits   Orientation Level Oriented X4   Memory Decreased short term memory   Following Commands Follows one step commands with increased time or repetition   Comments Pt was agreeable to OT eval   Assessment   Limitation Decreased ADL status;Decreased UE strength;Decreased Safe judgement during ADL;Decreased cognition;Decreased endurance;Decreased high-level ADLs;Decreased self-care trans   Prognosis Good   Assessment Pt is a 83 y.o. female seen for OT evaluation s/p admit to [facility] on 8/25/2024 w/ Hyponatremia. Comorbidities affecting pt's functional performance at time of assessment include:anxiety, anemia, HTN, and PVD  . Orders placed for OT evaluation and treatment.  Performed at least two patient identifiers during session including name and wristband. Personal factors affecting pt at time of IE include:steps to enter environment, behavioral pattern, difficulty performing ADLS, difficulty performing IADLS , limited insight into deficits, compliance, decreased initiation and engagement , financial barriers, health management , and environment. Prior to admission, pt reports Ind with ADLs, Ind with IADLs, and (+) driving.  Upon  evaluation: Pt requires S with UB ADLs, min A with LB ADLs, CGA/min A with xfers and min A with functional mobility 2* the following deficits impacting occupational performance: weakness, decreased strength, decreased dynamic sit/ stand balance, decreased activity tolerance, decreased standing tolerance time for self care and functional mobility, impulsivity, environmental deficits, decreased coping skills, decreased mobilty, and requiring external assistance to complete transitional movements. Pt to benefit from continued skilled OT tx while in the hospital to address deficits as defined above and maximize level of functional independence w ADL's and functional mobility. Occupational Performance areas to address include: bathing/shower, toilet hygiene, dressing, medication management, health maintenance, functional mobility, community mobility, clothing management, cleaning, meal prep, money management, and household maintenance. From OT standpoint, recommendation at time of d/c would be Level 2 (Mod Resource Intensity).   Plan   Treatment Interventions ADL retraining;Functional transfer training;UE strengthening/ROM;Endurance training;Patient/family training;Cognitive reorientation;Equipment evaluation/education;Compensatory technique education;Continued evaluation;Energy conservation;Activityengagement;Cardiac education   Goal Expiration Date 09/08/24   OT Frequency 3-5x/wk   Discharge Recommendation   Rehab Resource Intensity Level, OT II (Moderate Resource Intensity)   AM-PAC Daily Activity Inpatient   Lower Body Dressing 3   Bathing 3   Toileting 3   Upper Body Dressing 3   Grooming 3   Eating 4   Daily Activity Raw Score 19   Daily Activity Standardized Score (Calc for Raw Score >=11) 40.22   AM-PAC Applied Cognition Inpatient   Following a Speech/Presentation 4   Understanding Ordinary Conversation 4   Taking Medications 3   Remembering Where Things Are Placed or Put Away 3   Remembering List of 4-5 Errands  3   Taking Care of Complicated Tasks 3   Applied Cognition Raw Score 20   Applied Cognition Standardized Score 41.76     Occupational therapy Goals: In 5-7 days    Patient will verbalize and demonstrate use of energy conservation/ deep breathing technique and work simplification skills during functional activity with no verbal cues.    Patient will verbalize and demonstrate good body mechanics and joint protection techniques during ADLs/ IADLs with no verbal cues     Patient will increase OOB/ sitting tolerance to 4-6 hours per day for increased participation in self care and leisure tasks with no s/s of exertion.    Patient will identify s/s of exertion during ADL and functional mobility with no verbal cues.     Patient will verbalize/ demonstrate compensatory strategies to recover from exertion with no verbal cues.     Patient will increase standing tolerance time to 13-15 minutes with No UE support to complete sink level ADLs at modified independent level.    Patient will increase sitting tolerance at edge of bed to 30-45 minutes to complete UB ADLs at modified independent level.     Patient will demonstrate ability to safely perform LB ADLS with MI with long handled adaptive dressing equipment.    Patient/ Family will demonstrate competency with UE Home Exercise Program.       Gaviota Sawyer MS OTR/L

## 2024-08-26 NOTE — PHYSICAL THERAPY NOTE
Physical Therapy Evaluation     Patient's Name: Ofelia Aguilar    Admitting Diagnosis  Hyponatremia [E87.1]  Weakness [R53.1]    Problem List  Patient Active Problem List   Diagnosis    PVD (peripheral vascular disease) ([facility])    HTN, goal below 140/90    Anxiety    GERD (gastroesophageal reflux disease)    Hyponatremia    Recurrent UTI    Anemia    Urgency of urination    Lumbar stenosis       Past Medical History  Past Medical History:   Diagnosis Date    Anxiety     GERD (gastroesophageal reflux disease)        Past Surgical History  Past Surgical History:   Procedure Laterality Date    APPENDECTOMY       SECTION            24 1307   PT Last Visit   PT Visit Date 24   Note Type   Note type Evaluation   Pain Assessment   Pain Assessment Tool 0-10   Pain Score 7   Pain Location/Orientation Orientation: Left;Location: Leg   Restrictions/Precautions   Weight Bearing Precautions Per Order No   Braces or Orthoses   (pt may benefit from AFO for L Foot drop)   Other Precautions Chair Alarm;Bed Alarm;Fall Risk;Pain;Impulsive   Home Living   Type of Home House   Home Layout Performs ADLs on one level;Able to live on main level with bedroom/bathroom;Stairs to enter with rails;One level  (5 JUSTIN)   Bathroom Shower/Tub Tub/shower unit   Bathroom Toilet Standard   Bathroom Equipment Shower chair   Bathroom Accessibility Accessible   Home Equipment Walker   Additional Comments Pt uses RW at baseline; Pt has a mobile home with [employer] in [place], home s/u above is dtr's where she is currently staying   Prior Function   Level of [place] Independent with ADLs;Independent with functional mobility;Independent with IADLS   Lives With Alone  (currently in [place] with [facility]; alone in [place])   Receives Help From Family;Friend(s)   IADLs Independent with medication management;Independent with meal prep;Independent with driving  (cleaning and laundry with Ind)   Falls in the last 6 months 0   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)   General   Family/Caregiver Present Yes   Cognition   Arousal/Participation Alert   Orientation Level Oriented to person;Oriented to place;Oriented to time;Oriented to situation   Memory Decreased short term memory   Following Commands Follows one step commands with increased time or repetition   Comments pt agreeable to PT eval   RLE Assessment   RLE Assessment   (Grossly 4-/5 observed with functional mobility)   LLE Assessment   LLE Assessment   (Grossly 4-/5 observed with functional mobility, exception of L foot drop 3-/5)   Coordination   Movements are Fluid and Coordinated 1   Sensation X   Light Touch   RLE Light Touch Grossly intact   LLE Light Touch Impaired   LLE Light Touch Comments distal to knee, including lower leg and foot   Bed Mobility   Supine to Sit 4  Minimal assistance   Additional items Assist x 1;Bedrails;Increased time required;Verbal cues   Sit to Supine 4  Minimal assistance   Additional items Assist x 1;Bedrails;Increased time required;Verbal cues   Transfers   Sit to Stand 4  Minimal assistance   Additional items Assist x 1;Increased time required;Verbal cues;Armrests   Stand to Sit 4  Minimal assistance   Additional items Assist x 1;Increased time required;Verbal cues;Armrests   Ambulation/Elevation   Gait pattern Decreased foot clearance;L Foot drag;Short stride;Step to   Gait Assistance 4  Minimal assist   Additional items Assist x 1;Verbal cues   Assistive Device Rolling walker   Distance 20' x 2   Balance   Static Sitting Fair +   Dynamic Sitting Fair   Static Standing Fair -   Dynamic Standing Fair -   Ambulatory Poor +   Endurance Deficit   Endurance Deficit Yes   Activity Tolerance   Activity Tolerance Patient limited by fatigue;Patient limited by pain   Medical Staff Made Aware Pt seen as a co-eval with OT due to the patient's co-morbidities, clinically unstable presentation, and present impairments which are a regression from the patient's  baseline. pt cleared by Dr. Proctor for PT/OT evaluations   Nurse Made Aware RN Guanaco   Assessment   Prognosis Good   Problem List Decreased strength;Decreased endurance;Impaired balance;Decreased mobility;Decreased safety awareness;Impaired sensation;Pain   Assessment Pt is 83 y.o. female seen for PT evaluation on 8/26/2024 s/p admit to [facility] on 8/25/2024 w/ Hyponatremia. PT was consulted to assess pt's functional mobility and d/c needs. Order placed for PT eval and tx. PTA, pt resides with dtr currently in 1SH with 5 JUSTIN, ambulates with walker, + fall history. At time of eval, pt requiring min assist for all phases of mobility. Upon evaluation, pt presenting with impaired functional mobility d/t decreased strength, decreased endurance, impaired balance, decreased mobility, decreased safety awareness, impaired sensation, pain, and activity intolerance. Pertinent PMHx and current co-morbidities affecting pt's physical performance at time of assessment include: hyponatremia, PVD, HTN, anxiety, GERD, anemia, urgency of urination, lumbar stenosis, recurrent UTI. Personal factors affecting pt at time of eval include: inability to navigate community distances and positive fall history. The following objective measures performed on IE also reveal limitations: Barthel Index: 55/100, Modified Orin: 4 (moderate/severe disability), and AM-PAC 6-Clicks: 18/24. Pt's clinical presentation is currently unstable/unpredictable seen in pt's presentation of advanced age, abnormal lab value(s), need for input for task focus and mobility technique, and ongoing medical assessment. Overall, pt's rehab potential and prognosis to return to PLOF is good as impacted by objective findings, warranting pt to receive further skilled PT interventions to address identified impairments, activity limitation(s), and participation restriction(s). Pt to benefit from continued PT tx to address deficits as defined above and maximize level of  functional independent mobility. From PT/mobility standpoint, recommend level 2, moderate resource intensity in order to facilitate return to PLOF.   Barriers to Discharge Inaccessible home environment   Goals   Patient Goals to go home   STG Expiration Date 09/05/24   Short Term Goal #1 In 7-10 days: Increase bilateral LE strength 1/2 grade to facilitate independent mobility, Perform all bed mobility tasks modified independent to decrease caregiver burden, Perform all transfers modified independent to improve independence, Ambulate > 150 ft. with least restrictive assistive device modified independent w/o LOB and w/ normalized gait pattern 100% of the time, Navigate 5 stairs modified independent with unilateral handrail to facilitate return to previous living environment, Increase all balance 1/2 grade to decrease risk for falls, Improve Barthel Index score to 70 or greater to facilitate independence, and PT provider will perform functional balance assessment to determine fall risk   PT Treatment Day 1   Plan   Treatment/Interventions Functional transfer training;LE strengthening/ROM;Elevations;Therapeutic exercise;Endurance training;Patient/family training;Equipment eval/education;Bed mobility;Gait training;Spoke to nursing   PT Frequency 3-5x/wk   Discharge Recommendation   Rehab Resource Intensity Level, PT II (Moderate Resource Intensity)   Equipment Recommended Walker  (RW)   AM-PAC Basic Mobility Inpatient   Turning in Flat Bed Without Bedrails 3   Lying on Back to Sitting on Edge of Flat Bed Without Bedrails 3   Moving Bed to Chair 3   Standing Up From Chair Using Arms 3   Walk in Room 3   Climb 3-5 Stairs With Railing 3   Basic Mobility Inpatient Raw Score 18   Basic Mobility Standardized Score 41.05   [facility] Highest Level Of Mobility   -HLM Goal 6: Walk 10 steps or more   -HLM Achieved 7: Walk 25 feet or more   Modified Orin Scale   Modified Wilbraham Scale 4   Barthel Index   Feeding 10   Bathing  0   Grooming Score 5   Dressing Score 5   Bladder Score 10   Bowels Score 10   Toilet Use Score 5   Transfers (Bed/Chair) Score 10   Mobility (Level Surface) Score 0   Stairs Score 0   Barthel Index Score 55   Additional Treatment Session   Start Time 1320   End Time 1330   Treatment Assessment Pt seen for PT treatment session this date s/p PT eval, consisting of ther ex focused on strengthening. VC for technique. Current goals and POC remain appropriate, pt continues to have rehab potential and is making progress towards STGs. Pt prognosis for achieving goals is good, pending pt progress with hospitalization/medical status improvements, and indicated by Stimulability and ability to follow directions. PT recommends level 2, moderate resource intensity upon discharge. Pt continues to be functioning below baseline level, and remains limited 2* factors listed above. PT will continue to see pt during current hospitalization in order to address the deficits listed above and provide interventions consistent w/ POC in effort to achieve STGs.   Exercises   Heelslides Sitting;10 reps;AROM;Bilateral   Hip Abduction Sitting;10 reps;AROM;Bilateral   Knee AROM Long Arc Quad Sitting;10 reps;AROM;Bilateral   Ankle Pumps Sitting;10 reps;AROM;Bilateral  (decreased range on LLE)   Marching Sitting;10 reps;AROM;Bilateral   End of Consult   Patient Position at End of Consult Bedside chair;Bed/Chair alarm activated;All needs within reach         Stephanie Chino, PT

## 2024-08-26 NOTE — MALNUTRITION/BMI
This medical record reflects one or more clinical indicators suggestive of malnutrition.    Malnutrition Findings:   Adult Malnutrition type: Acute illness  Adult Degree of Malnutrition: Malnutrition of moderate degree  Malnutrition Characteristics: Muscle loss, Inadequate energy      360 Statement: Related to acute illness as evidenced by < 75% of estimated energy requirement for > 7 days and moderate muscle loss of clavicles. Treated with diet and PO supplement.    BMI Findings:  Adult BMI Classifications: Underweight < 18.5    Body mass index is 18.32 kg/m².     See Nutrition note dated 8/26/2024 for additional details.  Completed nutrition assessment is viewable in the nutrition documentation.

## 2024-08-26 NOTE — PROGRESS NOTES
NEPHROLOGY PROGRESS NOTE    Patient: Ofelia Aguilar               Sex: female          DOA: 8/25/2024  7:31 AM   YOB: 1940        Age:  83 y.o.        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    SUBJECTIVE     Overall feeling better    Quite asymptomatic    No chest pain no palpitation    No dizziness    CURRENT MEDICATIONS       Current Facility-Administered Medications:     acetaminophen (TYLENOL) tablet 650 mg, 650 mg, Oral, Q6H PRN, SARAY Carias    aspirin chewable tablet 81 mg, 81 mg, Oral, Daily, SARAY Carias, 81 mg at 08/26/24 0832    atorvastatin (LIPITOR) tablet 40 mg, 40 mg, Oral, Daily With Dinner, SARAY Carias, 40 mg at 08/25/24 1723    clopidogrel (PLAVIX) tablet 75 mg, 75 mg, Oral, Daily, SARAY Carias, 75 mg at 08/26/24 0832    enoxaparin (LOVENOX) subcutaneous injection 40 mg, 40 mg, Subcutaneous, Daily, SARAY Carias, 40 mg at 08/26/24 0833    hydrOXYzine HCL (ATARAX) tablet 25 mg, 25 mg, Oral, Q6H PRN, SARAY Carias    multivitamin stress formula tablet 1 tablet, 1 tablet, Oral, Daily, SARAY Carias, 1 tablet at 08/26/24 0832    ondansetron (ZOFRAN) injection 4 mg, 4 mg, Intravenous, Q6H PRN, SARAY Carias    pantoprazole (PROTONIX) EC tablet 40 mg, 40 mg, Oral, Early Morning, SARAY Carias, 40 mg at 08/26/24 0525    sodium chloride tablet 3 g, 3 g, Oral, TID With Meals, SARAY Carias, 3 g at 08/26/24 1254    OBJECTIVE     Current Weight: Weight - Scale: 48.4 kg (106 lb 11.2 oz)  Vitals:    08/26/24 0510   BP: 159/70   Pulse: 67   Resp: 22   Temp: 99 °F (37.2 °C)   SpO2: 94%       Intake/Output Summary (Last 24 hours) at 8/26/2024 1441  Last data filed at 8/26/2024 1117  Gross per 24 hour   Intake 200 ml   Output 1008 ml   Net -808 ml       PHYSICAL EXAMINATION     Physical Exam  Constitutional:       General: She is not in acute distress.      [facility] Discharge Summary      Attending physician:  Alberta Kowalski MD    Date of hospital admission  5/16/2024    Date of hospital discharge  5/24/2024    Diagnoses:  -- Traumatic left-sided subdural hematoma with midline shift and left-sided uncal herniation             -- S/p ruy hole for evacuation (5/17 Shilpa)             -- S/p MMA embolization (5/19 Avendaño)  -- Hypertension  -- Hx Seizure disorder  -- Tobacco use disorder  -- ETOH   -- Hx Substance abuse  -- Hyponatremia  -- Elevated transaminases      Consultants &/or Consulting Services   Neurosurgery  [facility]  Neuro Interventional  Physical Medicine/Rehab    Discharge condition  Slight disability.  Patient can care for self without daily help but cannot perform activities at the same level as prior to hospitalization.    Disposition  Rehab unit - inpatient hospital    Diet  general diet    Activity  As tolerated    Follow up  See [facility]    Hospital Course    Alissa Humphreys is a 53 year old female with history of seizure disorder, ETOH, tobacco abuse and substance abuse who presented to the ED 5/16/24 with c/o falls and weakness. Patient was reportedly found down by her landlord the day prior to admission, unclear how long she had been down for.  Patient endorsed hitting her head when she fell but is unsure if she lost consciousness. Labs in ED showed Na 126, , ALT 70, , ETOH negative. (Initial K 5.5 but thought to be hemolyzed, on repeat K 3.7.) CT head demonstrated probable subacute left-sided subdural hematoma with associated 15 mm left to right midline shift, some left-sided uncal herniation and left to right subfalcine herniation with effacement of the right lateral ventricle and mass effect on underlying right parenchyma. CT C-spine negative for acute fractures. Patient was loaded with 1 gm Keppra for seizure prophylaxis and started on nicardipine and subsequently transferred to [facility] for further evaluation.  Appearance: She is well-developed.   HENT:      Head: Normocephalic.      Mouth/Throat:      Mouth: Mucous membranes are moist.   Eyes:      General: No scleral icterus.     Conjunctiva/sclera: Conjunctivae normal.   Neck:      Vascular: No JVD.   Cardiovascular:      Rate and Rhythm: Normal rate.      Heart sounds: Normal heart sounds.   Pulmonary:      Effort: Pulmonary effort is normal.      Breath sounds: No wheezing.   Abdominal:      Palpations: Abdomen is soft.      Tenderness: There is no abdominal tenderness.   Musculoskeletal:         General: Normal range of motion.      Cervical back: Neck supple.   Skin:     General: Skin is warm.      Findings: No rash.   Neurological:      Mental Status: She is alert and oriented to person, place, and time.   Psychiatric:         Behavior: Behavior normal.          LAB RESULTS     Results from last 7 days   Lab Units 08/26/24  0508 08/25/24  2349 08/25/24  1735 08/25/24  1234 08/25/24  0756 08/20/24  1006   WBC Thousand/uL 4.54  --   --   --  3.62*  --    HEMOGLOBIN g/dL 9.2*  --   --   --  10.1*  --    HEMATOCRIT % 26.5*  --   --   --  28.8*  --    PLATELETS Thousands/uL 278  --   --   --  234  --    POTASSIUM mmol/L 3.9 3.9 3.7 4.0 4.4 5.0   CHLORIDE mmol/L 93* 93* 90* 89* 88* 87*   CO2 mmol/L 24 22 22 21 20* 22   BUN mg/dL 9 10 8 9 10 11   CREATININE mg/dL 0.64 0.64 0.65 0.60 0.65 0.8   EGFR ml/min/1.73sq m 83 83 83 85 83 75   CALCIUM mg/dL 8.7 8.3* 9.0 9.0 8.6 10.1   MAGNESIUM mg/dL 2.1  --   --   --  1.7*  --    PHOSPHORUS mg/dL 2.9  --   --   --   --   --        RADIOLOGY RESULTS      No results found for this or any previous visit.    Results for orders placed during the hospital encounter of 08/25/24    XR chest 2 views    Narrative  XR CHEST AP AND LATERAL    INDICATION: rales at the bases.    COMPARISON: None    FINDINGS:    Clear lungs. No pneumothorax or pleural effusion.    Normal cardiomediastinal silhouette.    Bones are unremarkable for age.    Normal  NSGY consulted. Underwent left sided ruy hole for evacuation of SDH and placement of subdural drain on 5/17. CT 5/18 with improving left subdural hematoma after ruy hole/drain placement; mass effect improving. Neuro IR consulted for consideration of embolization. On 5/19, patient underwent MMA embolization with Dr. Avendaño. Subdural drain was removed on 5/20. Follow-up CT head stable. Deemed stable for ICU transfer on 5/21. She completed 7 days of Keppra for seizure prophylaxis. No signs of ETOH withdrawal throughout hospitalization. Pain was well controlled. Tolerated oral diet. On 5/24, discharged to [facility] in stable condition.     FIGUEROA Lemus  [facility]    Time spent coordinating hospital discharge  < 30 minutes       "upper abdomen.    Impression  No acute cardiopulmonary disease.        Workstation performed: [id number]    No results found for this or any previous visit.    No results found for this or any previous visit.    No results found for this or any previous visit.    No results found for this or any previous visit.      PLAN / RECOMMENDATIONS      Hyponatremia: Stable at this point.  Dietary treatment with fluid restriction and high-protein diet discussed at length with the patient.  Will continue salt tablet for now.  Will also stop gabapentin as this may be contributing factor for hyponatremia    Hypertension: Reasonable control    Will continue to monitor    Bruno Foss MD  Nephrology  8/26/2024        Portions of the record may have been created with voice recognition software. Occasional wrong word or \"sound a like\" substitutions may have occurred due to the inherent limitations of voice recognition software. Read the chart carefully and recognize, using context, where substitutions have occurred.  "

## 2024-08-26 NOTE — CASE MANAGEMENT
Case Management Discharge Planning Note    Patient name Ofelia Aguilar  Location ICU 02/ICU 02 MRN [id number]  : 1940 Date 2024       Current Admission Date: 2024  Current Admission Diagnosis:Hyponatremia   Patient Active Problem List    Diagnosis Date Noted Date Diagnosed    PVD (peripheral vascular disease) ([facility]) 2024     HTN, goal below 140/90 2024     Anxiety 2024     GERD (gastroesophageal reflux disease) 2024     Hyponatremia 2024     Recurrent UTI 2024     Anemia 2024     Urgency of urination 2024     Lumbar stenosis 2024       LOS (days): 1  Geometric Mean LOS (GMLOS) (days): 3.6  Days to GMLOS:2.3     OBJECTIVE:  Risk of Unplanned Readmission Score: 13.41         Current admission status: Inpatient   Preferred Pharmacy:   [facility]/pharmacy #[id number] - [place] - [address]  [place] [id number]  Phone: [phone] Fax: [phone]    Primary Care Provider: No primary care provider on file.    Primary Insurance: MEDICARE MISC REPLACEMENT  Secondary Insurance:     DISCHARGE DETAILS:                                          Other Referral/Resources/Interventions Provided:  Referral Comments: No accepting home health agency as of yet.  Will need to discuss PCP w daughter.  Deadline extended.         Treatment Team Recommendation: Short Term Rehab, SNF, Home with Home Health Care  Discharge Destination Plan:: Home with Home Health Care  Transport at Discharge : Family

## 2024-08-26 NOTE — UTILIZATION REVIEW
Initial Clinical Review    Admission: Date/Time/Statement:   Admission Orders (From admission, onward)       Ordered        08/25/24 0943  INPATIENT ADMISSION  Once                          Orders Placed This Encounter   Procedures    INPATIENT ADMISSION     Standing Status:   Standing     Number of Occurrences:   1     Order Specific Question:   Level of Care     Answer:   Level 1 Stepdown [13]     Order Specific Question:   Estimated length of stay     Answer:   More than 2 Midnights     Order Specific Question:   Certification     Answer:   I certify that inpatient services are medically necessary for this patient for a duration of greater than two midnights. See H&P and MD Progress Notes for additional information about the patient's course of treatment.     ED Arrival Information       Expected   -    Arrival   8/25/2024 07:30    Acuity   Urgent              Means of arrival   Ambulance    Escorted by   [facility]    Service   Critical Care/ICU    Admission type   Emergency              Arrival complaint   -             Chief Complaint   Patient presents with    Weakness - Generalized     Pt arrived via ems from daughter's home (pt visiting from [place]) report of feeling lousy, weakness, HA and lightheadedness as of last night as per ems. +nausea 1.25 mg Droperidol given en route        Initial Presentation: 83 y.o. female to the ED from home via EMS  with complaints weakness, nausea.  Admitted to CRITICAL CARE Step down for hyponatremia, urgency of urination, recurrent UTi. H/o hypertension, hyperlipidemia, peripheral vascular disease .  Has had decreased po intake. On arrival, sodium 117.  H/O hyponatremia.  SIADH vs UTI. Given 1 liter of iv fluids in the ED.  Hold on further fluids. Sodium every 4 hours.  Nephrology consult. Zoloft on hold. GCS 15.     Nephrology:  Seen at another ED 8/20 with sodium 125.  ON arrival sodium 117.  Elevated bp, appearing ill. Start fluid restriction 1.2 liters,  check sodium every 4 hours. H/O recurrent UTI.  Check BLadder scan, PVR.         Date: 8/26   Day 2:  Sodium has improved to 120.  MOnitor sodium every 6 hours. COntinue with fluid restriction. HOld zoloft, lisinopril. Confused this morning.   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  Feeling better.  . Dietary treatment with fluid restriction and high-protein diet discussed at length with the patient. Will continue salt tablet for now.     Date: 8/27  Day 3: Has surpassed a 2nd midnight with active treatments and services. Initially admitted for hyponatremia. Has been on 1200 ml fluid restriction and salt tabs.  Nephrology following.  REpeat sodium 128. Has poor po intake. Complains of nausea.  Has not yet had bm.         ED Triage Vitals   Temperature Pulse Respirations Blood Pressure SpO2 Pain Score   08/25/24 0736 08/25/24 0736 08/25/24 0736 08/25/24 0736 08/25/24 0736 08/25/24 1301   97.8 °F (36.6 °C) 58 18 167/71 100 % No Pain     Weight (last 2 days)       Date/Time Weight    08/27/24 0600 46.3 (102.07)    08/26/24 0600 48.4 (106.7)    08/25/24 0736 50.6 (111.55)            Vital Signs (last 3 days)       Date/Time Temp Pulse Resp BP MAP (mmHg) SpO2 O2 Device Patient Position - Orthostatic VS [place] Coma Scale Score Pain    08/27/24 1315 -- -- -- -- -- -- -- -- -- No Pain    08/27/24 1300 -- 128 45 -- -- -- -- -- -- --    08/27/24 1213 -- -- -- -- -- -- -- -- -- 5    08/27/24 1100 -- -- -- -- -- -- None (Room air) Lying -- 6    08/27/24 0957 -- 83 20 134/62 89 97 % -- -- -- --    08/27/24 0815 -- 72 -- -- -- -- -- -- 15 --    08/27/24 0700 98.7 °F (37.1 °C) 69 17 137/72 98 95 % None (Room air) -- -- --    08/27/24 0040 -- -- -- -- -- -- -- -- -- No Pain    08/27/24 0015 -- -- -- -- -- -- -- -- -- 8    08/27/24 0000 -- -- -- -- -- -- -- -- 15 --    08/26/24 2015 -- -- -- -- -- -- -- -- 15 --    08/26/24 1307 -- -- -- -- -- -- -- -- -- 7    08/26/24 1306 -- -- -- -- -- -- --  -- -- 7    08/26/24 1158 -- -- -- -- -- -- -- -- 15 --    08/26/24 0800 -- -- -- -- -- -- -- -- 15 --    08/26/24 0514 -- -- -- -- -- -- -- -- 15 --    08/26/24 0510 99 °F (37.2 °C) 67 22 159/70 101 94 % None (Room air) Lying -- No Pain    08/26/24 0052 -- -- -- -- -- -- -- -- 15 --    08/26/24 0000 -- -- -- -- -- -- -- -- 15 --    08/25/24 2000 -- -- -- -- -- -- -- -- 15 --    08/25/24 1936 98.7 °F (37.1 °C) 68 20 117/57 82 95 % None (Room air) Lying -- No Pain    08/25/24 1630 97.8 °F (36.6 °C) 67 24 148/66 95 97 % -- Lying -- --    08/25/24 1600 -- -- -- -- -- -- -- -- 15 --    08/25/24 1301 -- -- -- -- -- -- -- -- -- No Pain    08/25/24 1200 -- -- -- -- -- -- -- -- 15 --    08/25/24 1115 98 °F (36.7 °C) 66 19 139/62 89 98 % -- -- -- --    08/25/24 0830 -- 65 18 173/77 110 98 % None (Room air) Sitting -- --    08/25/24 0804 -- -- -- -- -- -- None (Room air) -- -- --    08/25/24 0736 97.8 °F (36.6 °C) 58 18 167/71 -- 100 % None (Room air) Sitting -- --              Pertinent Labs/Diagnostic Test Results:   Radiology:  XR abdomen 1 view kub   Final Interpretation by Zuhair Mayorga MD (08/27 0903)   Metallic objects in the pelvis appear to be within the sigmoid colon on comparison lumbar spine CT. Recommend attention on follow-up KUB to verify passage.               Workstation performed: [id number]         CT spine lumbar wo contrast   Final Interpretation by Pallav N Shah, MD (08/25 0073)      No acute osseous abnormality.      Advanced degenerative discogenic disease and grade 1 spondylolisthesis with severe L4-5 spinal stenosis. If there is no contraindication, consider follow-up MRI imaging and consultation with the spine surgical service.      Additional degenerative changes as described above.      Extensive aortoiliac atherosclerotic disease. Consider MRA or CTA imaging of the aorta for additional characterization.      Workstation performed: [id number]         XR chest 2 views   ED Interpretation by  Shira Edward DO (08/25 0918)   NAD      Final Interpretation by Mer Rooney MD (08/25 1411)      No acute cardiopulmonary disease.            Workstation performed: [id number]         CT head without contrast   Final Interpretation by Sukhwinder Cotter MD (08/25 0819)      No evidence of acute intracranial process.      Chronic microangiopathy.                  Workstation performed: [id number]           Cardiology:  ECG 12 lead   Final Result by Alison Lloyd MD (08/26 1558)   Sinus rhythm with Premature atrial complexes   Left bundle branch block   Abnormal ECG   No previous ECGs available   Confirmed by Alison Lloyd ([id number]) on 8/26/2024 3:58:36 PM        GI:  No orders to display       Results from last 7 days   Lab Units 08/25/24  0756   SARS-COV-2  Negative     Results from last 7 days   Lab Units 08/27/24  0552 08/26/24  0508 08/25/24  0756   WBC Thousand/uL 4.77 4.54 3.62*   HEMOGLOBIN g/dL 8.4* 9.2* 10.1*   HEMATOCRIT % 24.9* 26.5* 28.8*   PLATELETS Thousands/uL 257 278 234   TOTAL NEUT ABS Thousands/µL 2.58  --  2.09         Results from last 7 days   Lab Units 08/27/24  0957 08/27/24  0552 08/26/24  2040 08/26/24  0508 08/25/24  2349 08/25/24  1234 08/25/24  0756   SODIUM mmol/L 128* 126* 126* 123* 123*   < > 117*   POTASSIUM mmol/L 3.7 4.2 4.1 3.9 3.9   < > 4.4   CHLORIDE mmol/L 99 98 99 93* 93*   < > 88*   CO2 mmol/L 22 23 22 24 22   < > 20*   ANION GAP mmol/L 7 5 5 6 8   < > 9   BUN mg/dL 12 13 14 9 10   < > 10   CREATININE mg/dL 0.62 0.59* 0.55* 0.64 0.64   < > 0.65   EGFR ml/min/1.73sq m 84 86 88 83 83   < > 83   CALCIUM mg/dL 9.0 8.6 8.3* 8.7 8.3*   < > 8.6   CALCIUM, IONIZED mmol/L  --  1.12  --  1.14  --   --   --    MAGNESIUM mg/dL  --  1.9  --  2.1  --   --  1.7*   PHOSPHORUS mg/dL  --  3.0  --  2.9  --   --   --     < > = values in this interval not displayed.     Results from last 7 days   Lab Units 08/26/24  0508 08/25/24  0756   AST U/L 17 25   ALT U/L 11 11    ALK PHOS U/L 45 45   TOTAL PROTEIN g/dL 5.7* 6.1*   ALBUMIN g/dL 3.6 3.7   TOTAL BILIRUBIN mg/dL 0.41 0.50   BILIRUBIN DIRECT mg/dL 0.06  --      Results from last 7 days   Lab Units 08/25/24  0754   POC GLUCOSE mg/dl 101     Results from last 7 days   Lab Units 08/27/24  0957 08/27/24  0552 08/26/24  2040 08/26/24  0508 08/25/24  2349 08/25/24  1735 08/25/24  1234 08/25/24  0756   GLUCOSE RANDOM mg/dL 114 93 92 97 98 143* 118 107     Results from last 7 days   Lab Units 08/26/24  0508   OSMOLALITY, SERUM mmol/*         Results from last 7 days   Lab Units 08/25/24  1234 08/25/24  0957 08/25/24  0756   HS TNI 0HR ng/L  --   --  4   HS TNI 2HR ng/L  --  4  --    HSTNI D2 ng/L  --  0  --    HS TNI 4HR ng/L 4  --   --    HSTNI D4 ng/L 0  --   --              Results from last 7 days   Lab Units 08/25/24  0756   TSH 3RD GENERATON uIU/mL 3.776     Results from last 7 days   Lab Units 08/26/24  0508 08/25/24  0756   PROCALCITONIN ng/ml <0.05 <0.05     Results from last 7 days   Lab Units 08/25/24  1234 08/25/24  0957   LACTIC ACID mmol/L 1.3 2.1*             Results from last 7 days   Lab Units 08/25/24  0756   BNP pg/mL 88     Results from last 7 days   Lab Units 08/26/24  0508 08/25/24  0951   OSMOLALITY, SERUM mmol/*  --    OSMO UR mmol/KG  --  282     Results from last 7 days   Lab Units 08/25/24  1400 08/25/24  0951 08/25/24  0950   CLARITY UA  Clear  --  Turbid   COLOR UA  Colorless  --  Light Yellow   SPEC GRAV UA  1.008  --  1.009   PH UA  6.0  --  6.5   GLUCOSE UA mg/dl Negative  --  Negative   KETONES UA mg/dl Negative  --  Negative   BLOOD UA  Negative  --  Negative   PROTEIN UA mg/dl Negative  --  Negative   NITRITE UA  Negative  --  Negative   BILIRUBIN UA  Negative  --  Negative   UROBILINOGEN UA (BE) mg/dl <2.0  --  <2.0   LEUKOCYTES UA  Small*  --  Moderate*   WBC UA /hpf 4-10*  --  10-20*   RBC UA /hpf 1-2  --  1-2   BACTERIA UA /hpf Occasional  --  Occasional   EPITHELIAL CELLS WET PREP  /hpf Occasional  --  Moderate*   SODIUM UR   --  61  --      Results from last 7 days   Lab Units 08/25/24  0756   INFLUENZA A PCR  Negative   INFLUENZA B PCR  Negative   RSV PCR  Negative       ED Treatment-Medication Administration from 08/25/2024 0730 to 08/25/2024 1155         Date/Time Order Dose Route Action     08/25/2024 0751 droperidol (FOR EMS ONLY) (INAPSINE) 2.5 mg/mL injection 2.5 mg 0 mg Does not apply Given to EMS     08/25/2024 0838 sodium chloride 0.9 % bolus 1,000 mL 1,000 mL Intravenous New Bag     08/25/2024 1117 magnesium sulfate 2 g/50 mL IVPB (premix) 2 g 2 g Intravenous New Bag     08/25/2024 1117 aspirin chewable tablet 81 mg 81 mg Oral Given     08/25/2024 1117 clopidogrel (PLAVIX) tablet 75 mg 75 mg Oral Given     08/25/2024 1135 pantoprazole (PROTONIX) EC tablet 40 mg 40 mg Oral Given     08/25/2024 1117 gabapentin (NEURONTIN) capsule 100 mg 100 mg Oral Given     08/25/2024 1117 lisinopril (ZESTRIL) tablet 20 mg 20 mg Oral Given            Past Medical History:   Diagnosis Date    Anxiety     GERD (gastroesophageal reflux disease)          Admitting Diagnosis: Hyponatremia [E87.1]  Weakness [R53.1]  Age/Sex: 83 y.o. female  Admission Orders:  Scheduled Medications:  aspirin, 81 mg, Oral, Daily  atorvastatin, 40 mg, Oral, Daily With Dinner  clopidogrel, 75 mg, Oral, Daily  enoxaparin, 40 mg, Subcutaneous, Daily  multivitamin stress formula, 1 tablet, Oral, Daily  pantoprazole, 40 mg, Oral, Early Morning  [START ON 8/28/2024] polyethylene glycol, 17 g, Oral, Daily  senna-docusate sodium, 1 tablet, Oral, HS  sodium chloride, 3 g, Oral, TID With Meals      Continuous IV Infusions:     PRN Meds:  acetaminophen, 650 mg, Oral, Q6H PRN  Diclofenac Sodium, 2 g, Topical, 4x Daily PRN  hydrOXYzine HCL, 25 mg, Oral, Q6H PRN  ketorolac, 15 mg, Intravenous, Q6H PRN  melatonin, 3 mg, Oral, HS PRN  ondansetron, 4 mg, Intravenous, Q6H PRN  polyethylene glycol, 17 g, Oral, Daily PRN        IP CONSULT TO  NEPHROLOGY  IP CONSULT TO CASE MANAGEMENT    Network Utilization Review Department  ATTENTION: Please call with any questions or concerns to [phone] and carefully listen to the prompts so that you are directed to the right person. All voicemails are confidential.   For Discharge needs, contact Care Management DC Support Team at [phone] opt. [phone]  Send all requests for admission clinical reviews, approved or denied determinations and any other requests to dedicated fax number below belonging to the campus where the patient is receiving treatment. List of dedicated fax numbers for the Facilities:  FACILITY NAME UR FAX NUMBER   ADMISSION DENIALS (Administrative/Medical Necessity) [phone]   DISCHARGE SUPPORT TEAM (NETWORK) [phone]   PARENT CHILD HEALTH (Maternity/NICU/Pediatrics) [phone]   [facility] [phone]   [facility] [phone]   [facility] [phone]   [facility] [phone]   [facility] [phone]   [facility] [phone]   [facility] [phone]   [facility] [phone]   [facility] [phone]   [facility] [phone]   [facility] [phone]   [facility] [phone]

## 2024-08-26 NOTE — PLAN OF CARE
Problem: OCCUPATIONAL THERAPY ADULT  Goal: Performs self-care activities at highest level of function for planned discharge setting.  See evaluation for individualized goals.  Description: Treatment Interventions: ADL retraining, Functional transfer training, UE strengthening/ROM, Endurance training, Patient/family training, Cognitive reorientation, Equipment evaluation/education, Compensatory technique education, Continued evaluation, Energy conservation, Activityengagement, Cardiac education          See flowsheet documentation for full assessment, interventions and recommendations.   Note: Limitation: Decreased ADL status, Decreased UE strength, Decreased Safe judgement during ADL, Decreased cognition, Decreased endurance, Decreased high-level ADLs, Decreased self-care trans  Prognosis: Good  Assessment: Pt is a 83 y.o. female seen for OT evaluation s/p admit to [facility] on 8/25/2024 w/ Hyponatremia. Comorbidities affecting pt's functional performance at time of assessment include:anxiety, anemia, HTN, and PVD  . Orders placed for OT evaluation and treatment.  Performed at least two patient identifiers during session including name and wristband. Personal factors affecting pt at time of IE include:steps to enter environment, behavioral pattern, difficulty performing ADLS, difficulty performing IADLS , limited insight into deficits, compliance, decreased initiation and engagement , financial barriers, health management , and environment. Prior to admission, pt reports Ind with ADLs, Ind with IADLs, and (+) driving.  Upon evaluation: Pt requires S with UB ADLs, min A with LB ADLs, CGA/min A with xfers and min A with functional mobility 2* the following deficits impacting occupational performance: weakness, decreased strength, decreased dynamic sit/ stand balance, decreased activity tolerance, decreased standing tolerance time for self care and functional mobility, impulsivity, environmental deficits,  decreased coping skills, decreased mobilty, and requiring external assistance to complete transitional movements. Pt to benefit from continued skilled OT tx while in the hospital to address deficits as defined above and maximize level of functional independence w ADL's and functional mobility. Occupational Performance areas to address include: bathing/shower, toilet hygiene, dressing, medication management, health maintenance, functional mobility, community mobility, clothing management, cleaning, meal prep, money management, and household maintenance. From OT standpoint, recommendation at time of d/c would be Level 2 (Mod Resource Intensity).     Rehab Resource Intensity Level, OT: II (Moderate Resource Intensity)

## 2024-08-26 NOTE — PLAN OF CARE
Problem: PHYSICAL THERAPY ADULT  Goal: Performs mobility at highest level of function for planned discharge setting.  See evaluation for individualized goals.  Description: Treatment/Interventions: Functional transfer training, LE strengthening/ROM, Elevations, Therapeutic exercise, Endurance training, Patient/family training, Equipment eval/education, Bed mobility, Gait training, Spoke to nursing  Equipment Recommended: Walker (RW)       See flowsheet documentation for full assessment, interventions and recommendations.  8/26/2024 1419 by Stephanie Chino PT  Note: Prognosis: Good  Problem List: Decreased strength, Decreased endurance, Impaired balance, Decreased mobility, Decreased safety awareness, Impaired sensation, Pain  Assessment: Pt is 83 y.o. female seen for PT evaluation on 8/26/2024 s/p admit to [facility] on 8/25/2024 w/ Hyponatremia. PT was consulted to assess pt's functional mobility and d/c needs. Order placed for PT eval and tx. PTA, pt resides with dtr currently in 1SH with 5 JUSTIN, ambulates with walker, + fall history. At time of eval, pt requiring min assist for all phases of mobility. Upon evaluation, pt presenting with impaired functional mobility d/t decreased strength, decreased endurance, impaired balance, decreased mobility, decreased safety awareness, impaired sensation, pain, and activity intolerance. Pertinent PMHx and current co-morbidities affecting pt's physical performance at time of assessment include: hyponatremia, PVD, HTN, anxiety, GERD, anemia, urgency of urination, lumbar stenosis, recurrent UTI. Personal factors affecting pt at time of eval include: inability to navigate community distances and positive fall history. The following objective measures performed on IE also reveal limitations: Barthel Index: 55/100, Modified [place]: 4 (moderate/severe disability), and AM-PAC 6-Clicks: 18/24. Pt's clinical presentation is currently unstable/unpredictable seen in pt's  presentation of advanced age, abnormal lab value(s), need for input for task focus and mobility technique, and ongoing medical assessment. Overall, pt's rehab potential and prognosis to return to PLOF is good as impacted by objective findings, warranting pt to receive further skilled PT interventions to address identified impairments, activity limitation(s), and participation restriction(s). Pt to benefit from continued PT tx to address deficits as defined above and maximize level of functional independent mobility. From PT/mobility standpoint, recommend level 2, moderate resource intensity in order to facilitate return to PLOF.  Barriers to Discharge: Inaccessible home environment     Rehab Resource Intensity Level, PT: II (Moderate Resource Intensity)    See flowsheet documentation for full assessment.     8/26/2024 1419 by Stephanie Chino PT  Note: Prognosis: Good  Problem List: Decreased strength, Decreased endurance, Impaired balance, Decreased mobility, Decreased safety awareness, Impaired sensation, Pain  Assessment: Pt is 83 y.o. female seen for PT evaluation on 8/26/2024 s/p admit to [facility] on 8/25/2024 w/ Hyponatremia. PT was consulted to assess pt's functional mobility and d/c needs. Order placed for PT eval and tx. PTA, pt resides with dtr currently in 1SH with 5 JUSTIN, ambulates with walker, + fall history. At time of eval, pt requiring min assist for all phases of mobility. Upon evaluation, pt presenting with impaired functional mobility d/t decreased strength, decreased endurance, impaired balance, decreased mobility, decreased safety awareness, impaired sensation, pain, and activity intolerance. Pertinent PMHx and current co-morbidities affecting pt's physical performance at time of assessment include: hyponatremia, PVD, HTN, anxiety, GERD, anemia, urgency of urination, lumbar stenosis, recurrent UTI. Personal factors affecting pt at time of eval include: inability to navigate community  distances and positive fall history. The following objective measures performed on IE also reveal limitations: Barthel Index: 55/100, Modified [place]: 4 (moderate/severe disability), and AM-PAC 6-Clicks: 18/24. Pt's clinical presentation is currently unstable/unpredictable seen in pt's presentation of advanced age, abnormal lab value(s), need for input for task focus and mobility technique, and ongoing medical assessment. Overall, pt's rehab potential and prognosis to return to PLOF is good as impacted by objective findings, warranting pt to receive further skilled PT interventions to address identified impairments, activity limitation(s), and participation restriction(s). Pt to benefit from continued PT tx to address deficits as defined above and maximize level of functional independent mobility. From PT/mobility standpoint, recommend level 2, moderate resource intensity in order to facilitate return to PLOF.  Barriers to Discharge: Inaccessible home environment     Rehab Resource Intensity Level, PT: II (Moderate Resource Intensity)    See flowsheet documentation for full assessment.

## 2024-08-26 NOTE — PROGRESS NOTES
[facility]  Progress Note  Name: Ofelia Aguilar I  MRN: [id number]  Unit/Bed#: ICU 02 I Date of Admission: 8/25/2024   Date of Service: 8/26/2024 I Hospital Day: 1    Assessment & Plan   * Hyponatremia  Assessment & Plan  Sodium 117 on arrival  120 after 12 hours  Recently 125 on 8/20  History of hyponatremia with hospitalization, unable to view records  Secondary to SIADH vs UTI  Received 1 L normal saline in th  Urinary studies obtained after fluid administration  Monitor sodium Q6h  Appreciate nephrology recommendations  FR 1200 ml  Home Zoloft and lisinopril on hold    Lumbar stenosis  Assessment & Plan  Assess gait/strength when patient more mobile  Consider MRI inpatient versus close outpatient follow-up    Urgency of urination  Assessment & Plan  Per daughter patient has severe urgency despite having no evidence of UTI  When she attempts to void, scant to small amount of urine typically comes out  Previously evaluated by urology in [place] and found to have urinary retention  Check bladder scans and PVR  Scheduled for outpatient urology appointment  Consider inpatient urology consult if having high residuals    Anemia  Assessment & Plan  Hemoglobin 10.1  MCV WNL  Would benefit from outpatient workup for anemia  Trend daily    GERD (gastroesophageal reflux disease)  Assessment & Plan  Continue PPI    Anxiety  Assessment & Plan  Previously taking xanax PRN  Recently started on zoloft and atarax PRN on 8/20  Zoloft now on hold due to concern for contributing to hyponatremia  Continue atarax    HTN, goal below 140/90  Assessment & Plan  Review home regimen  Holding home lisinopril    PVD (peripheral vascular disease) (Formerly [facility])  Assessment & Plan  History of PVD with lower extremity bypass in [place]  Unable to view previous records  Continue DAPT, statin  Follow up with vascular outpatient         Disposition: Improving    ICU Core Measures     A: Assess, Prevent, and Manage Pain Has pain been  assessed? Yes  Need for changes to pain regimen? No   B: Both SAT/SAT  N/A   C: Choice of Sedation RASS Goal: N/A patient not on sedation  Need for changes to sedation or analgesia regimen? NA   D: Delirium CAM-ICU: Negative   E: Early Mobility  Plan for early mobility? Yes   F: Family Engagement Plan for family engagement today? Yes         Prophylaxis:  VTE VTE covered by:  enoxaparin, Subcutaneous, 40 mg at 08/25/24 1213       Stress Ulcer  covered byomeprazole (PriLOSEC) 20 mg delayed release capsule [[id number]] (Long-Term Med), pantoprazole (PROTONIX) EC tablet 40 mg [[id number]]         Significant 24hr Events     24hr events: Patient with improving sodium overnight, CT lumbar spine notable for severe stenosis, patient confused this AM     Subjective   Review of Systems: Review of Systems   Unable to perform ROS: Mental status change (Patient answers no to all questions)   All other systems reviewed and are negative.       Objective                            Vitals I/O      Most Recent Min/Max in 24hrs   Temp 98.7 °F (37.1 °C) Temp  Min: 97.8 °F (36.6 °C)  Max: 98.7 °F (37.1 °C)   Pulse 68 Pulse  Min: 58  Max: 68   Resp 20 Resp  Min: 18  Max: 24   /57 BP  Min: 117/57  Max: 173/77   O2 Sat 95 % SpO2  Min: 95 %  Max: 100 %      Intake/Output Summary (Last 24 hours) at 8/26/2024 0340  Last data filed at 8/25/2024 2100  Gross per 24 hour   Intake 1000 ml   Output 1258 ml   Net -258 ml       Diet Cardiovascular; Cardiac; Fluid Restriction 1200 ML    Invasive Monitoring           Physical Exam   Physical Exam  Eyes:      Pupils: Pupils are equal, round, and reactive to light.   Skin:     General: Skin is warm and dry.   HENT:      Head: Normocephalic and atraumatic.      Mouth/Throat:      Mouth: Mucous membranes are dry.   Cardiovascular:      Rate and Rhythm: Normal rate and regular rhythm.      Pulses: Normal pulses.   Musculoskeletal:         General: No tenderness, deformity or signs of injury.      Right  lower leg: No edema.      Left lower leg: No edema.   Abdominal: General: There is no distension.      Palpations: Abdomen is soft.      Tenderness: There is no abdominal tenderness.   Constitutional:       General: She is not in acute distress.     Appearance: She is ill-appearing.   Pulmonary:      Effort: Pulmonary effort is normal.      Breath sounds: Normal breath sounds.   Neurological:      GCS: GCS eye subscore is 3. GCS verbal subscore is 4. GCS motor subscore is 6.      Cranial Nerves: Cranial nerves 2-12 are intact. No cranial nerve deficit.      Sensory: Sensation is intact.      Motor: No weakness.      Comments: Patient reports she doesn't know where she is, why she is here, the year is 1992 and the president is Markos            Diagnostic Studies      EKG: Normal sinus rhythm  Imaging:  I have personally reviewed pertinent reports.   and I have personally reviewed pertinent films in PACS     Medications:  Scheduled PRN   aspirin, 81 mg, Daily  atorvastatin, 40 mg, Daily With Dinner  chlorhexidine, 15 mL, Q12H SHANA  clopidogrel, 75 mg, Daily  enoxaparin, 40 mg, Daily  gabapentin, 100 mg, TID  multivitamin stress formula, 1 tablet, Daily  pantoprazole, 40 mg, Early Morning  sodium chloride, 3 g, TID With Meals      acetaminophen, 650 mg, Q6H PRN  hydrOXYzine HCL, 25 mg, Q6H PRN  ondansetron, 4 mg, Q6H PRN       Continuous          Labs:    CBC    Recent Labs     08/25/24  0756   WBC 3.62*   HGB 10.1*   HCT 28.8*        BMP    Recent Labs     08/25/24  1735 08/25/24  2349   SODIUM 120* 123*   K 3.7 3.9   CL 90* 93*   CO2 22 22   AGAP 8 8   BUN 8 10   CREATININE 0.65 0.64   CALCIUM 9.0 8.3*       Coags    No recent results     Additional Electrolytes  Recent Labs     08/25/24  0756   MG 1.7*          Blood Gas    No recent results  No recent results LFTs  Recent Labs     08/25/24  0756   ALT 11   AST 25   ALKPHOS 45   ALB 3.7   TBILI 0.50       Infectious  Recent Labs     08/25/24  0756    PROCALCITONI <0.05     Glucose  Recent Labs     08/25/24  0756 08/25/24  1234 08/25/24  1735 08/25/24  2349   GLUC 107 118 143* 98               SARAY Pearce

## 2024-08-27 PROBLEM — E44.0 MODERATE PROTEIN-CALORIE MALNUTRITION (HCC): Status: ACTIVE | Noted: 2024-08-27

## 2024-08-27 LAB
ANION GAP SERPL CALCULATED.3IONS-SCNC: 5 MMOL/L (ref 4–13)
ANION GAP SERPL CALCULATED.3IONS-SCNC: 7 MMOL/L (ref 4–13)
ANION GAP SERPL CALCULATED.3IONS-SCNC: 7 MMOL/L (ref 4–13)
BACTERIA UR CULT: NORMAL
BASOPHILS # BLD AUTO: 0.02 THOUSANDS/ÂΜL (ref 0–0.1)
BASOPHILS NFR BLD AUTO: 0 % (ref 0–1)
BUN SERPL-MCNC: 12 MG/DL (ref 5–25)
BUN SERPL-MCNC: 13 MG/DL (ref 5–25)
BUN SERPL-MCNC: 18 MG/DL (ref 5–25)
CA-I BLD-SCNC: 1.12 MMOL/L (ref 1.12–1.32)
CALCIUM SERPL-MCNC: 8.6 MG/DL (ref 8.4–10.2)
CALCIUM SERPL-MCNC: 8.7 MG/DL (ref 8.4–10.2)
CALCIUM SERPL-MCNC: 9 MG/DL (ref 8.4–10.2)
CHLORIDE SERPL-SCNC: 101 MMOL/L (ref 96–108)
CHLORIDE SERPL-SCNC: 98 MMOL/L (ref 96–108)
CHLORIDE SERPL-SCNC: 99 MMOL/L (ref 96–108)
CO2 SERPL-SCNC: 22 MMOL/L (ref 21–32)
CO2 SERPL-SCNC: 23 MMOL/L (ref 21–32)
CO2 SERPL-SCNC: 24 MMOL/L (ref 21–32)
CREAT SERPL-MCNC: 0.59 MG/DL (ref 0.6–1.3)
CREAT SERPL-MCNC: 0.62 MG/DL (ref 0.6–1.3)
CREAT SERPL-MCNC: 0.71 MG/DL (ref 0.6–1.3)
EOSINOPHIL # BLD AUTO: 0.02 THOUSAND/ÂΜL (ref 0–0.61)
EOSINOPHIL NFR BLD AUTO: 0 % (ref 0–6)
ERYTHROCYTE [DISTWIDTH] IN BLOOD BY AUTOMATED COUNT: 13.5 % (ref 11.6–15.1)
GFR SERPL CREATININE-BSD FRML MDRD: 80 ML/MIN/1.73SQ M
GFR SERPL CREATININE-BSD FRML MDRD: 84 ML/MIN/1.73SQ M
GFR SERPL CREATININE-BSD FRML MDRD: 86 ML/MIN/1.73SQ M
GLUCOSE SERPL-MCNC: 114 MG/DL (ref 65–140)
GLUCOSE SERPL-MCNC: 93 MG/DL (ref 65–140)
GLUCOSE SERPL-MCNC: 98 MG/DL (ref 65–140)
HCT VFR BLD AUTO: 24.9 % (ref 34.8–46.1)
HGB BLD-MCNC: 8.4 G/DL (ref 11.5–15.4)
IMM GRANULOCYTES # BLD AUTO: 0.02 THOUSAND/UL (ref 0–0.2)
IMM GRANULOCYTES NFR BLD AUTO: 0 % (ref 0–2)
LYMPHOCYTES # BLD AUTO: 1.47 THOUSANDS/ÂΜL (ref 0.6–4.47)
LYMPHOCYTES NFR BLD AUTO: 31 % (ref 14–44)
MAGNESIUM SERPL-MCNC: 1.9 MG/DL (ref 1.9–2.7)
MCH RBC QN AUTO: 30.2 PG (ref 26.8–34.3)
MCHC RBC AUTO-ENTMCNC: 33.7 G/DL (ref 31.4–37.4)
MCV RBC AUTO: 90 FL (ref 82–98)
MONOCYTES # BLD AUTO: 0.66 THOUSAND/ÂΜL (ref 0.17–1.22)
MONOCYTES NFR BLD AUTO: 14 % (ref 4–12)
NEUTROPHILS # BLD AUTO: 2.58 THOUSANDS/ÂΜL (ref 1.85–7.62)
NEUTS SEG NFR BLD AUTO: 55 % (ref 43–75)
NRBC BLD AUTO-RTO: 0 /100 WBCS
PHOSPHATE SERPL-MCNC: 3 MG/DL (ref 2.3–4.1)
PLATELET # BLD AUTO: 257 THOUSANDS/UL (ref 149–390)
PMV BLD AUTO: 9.3 FL (ref 8.9–12.7)
POTASSIUM SERPL-SCNC: 3.7 MMOL/L (ref 3.5–5.3)
POTASSIUM SERPL-SCNC: 4 MMOL/L (ref 3.5–5.3)
POTASSIUM SERPL-SCNC: 4.2 MMOL/L (ref 3.5–5.3)
RBC # BLD AUTO: 2.78 MILLION/UL (ref 3.81–5.12)
SODIUM SERPL-SCNC: 126 MMOL/L (ref 135–147)
SODIUM SERPL-SCNC: 128 MMOL/L (ref 135–147)
SODIUM SERPL-SCNC: 132 MMOL/L (ref 135–147)
WBC # BLD AUTO: 4.77 THOUSAND/UL (ref 4.31–10.16)

## 2024-08-27 PROCEDURE — 99232 SBSQ HOSP IP/OBS MODERATE 35: CPT | Performed by: STUDENT IN AN ORGANIZED HEALTH CARE EDUCATION/TRAINING PROGRAM

## 2024-08-27 PROCEDURE — 80048 BASIC METABOLIC PNL TOTAL CA: CPT | Performed by: NURSE PRACTITIONER

## 2024-08-27 PROCEDURE — 82330 ASSAY OF CALCIUM: CPT | Performed by: NURSE PRACTITIONER

## 2024-08-27 PROCEDURE — 83735 ASSAY OF MAGNESIUM: CPT | Performed by: NURSE PRACTITIONER

## 2024-08-27 PROCEDURE — 97116 GAIT TRAINING THERAPY: CPT

## 2024-08-27 PROCEDURE — 97110 THERAPEUTIC EXERCISES: CPT

## 2024-08-27 PROCEDURE — 99232 SBSQ HOSP IP/OBS MODERATE 35: CPT | Performed by: INTERNAL MEDICINE

## 2024-08-27 PROCEDURE — 85025 COMPLETE CBC W/AUTO DIFF WBC: CPT | Performed by: NURSE PRACTITIONER

## 2024-08-27 PROCEDURE — 84100 ASSAY OF PHOSPHORUS: CPT | Performed by: NURSE PRACTITIONER

## 2024-08-27 RX ORDER — POLYETHYLENE GLYCOL 3350 17 G/17G
17 POWDER, FOR SOLUTION ORAL DAILY
Status: DISCONTINUED | OUTPATIENT
Start: 2024-08-28 | End: 2024-08-30 | Stop reason: HOSPADM

## 2024-08-27 RX ADMIN — ONDANSETRON 4 MG: 2 INJECTION INTRAMUSCULAR; INTRAVENOUS at 07:50

## 2024-08-27 RX ADMIN — SENNOSIDES AND DOCUSATE SODIUM 1 TABLET: 50; 8.6 TABLET ORAL at 22:55

## 2024-08-27 RX ADMIN — Medication 3 MG: at 22:55

## 2024-08-27 RX ADMIN — ATORVASTATIN CALCIUM 40 MG: 40 TABLET, FILM COATED ORAL at 16:40

## 2024-08-27 RX ADMIN — ASPIRIN 81 MG: 81 TABLET, CHEWABLE ORAL at 09:03

## 2024-08-27 RX ADMIN — CLOPIDOGREL 75 MG: 75 TABLET ORAL at 09:02

## 2024-08-27 RX ADMIN — B-COMPLEX W/ C & FOLIC ACID TAB 1 TABLET: TAB at 09:03

## 2024-08-27 RX ADMIN — Medication 3 G: at 09:06

## 2024-08-27 RX ADMIN — ENOXAPARIN SODIUM 40 MG: 40 INJECTION SUBCUTANEOUS at 09:03

## 2024-08-27 RX ADMIN — KETOROLAC TROMETHAMINE 15 MG: 30 INJECTION, SOLUTION INTRAMUSCULAR; INTRAVENOUS at 20:12

## 2024-08-27 RX ADMIN — MAGNESIUM HYDROXIDE 30 ML: 2400 SUSPENSION ORAL at 11:29

## 2024-08-27 RX ADMIN — PANTOPRAZOLE SODIUM 40 MG: 40 TABLET, DELAYED RELEASE ORAL at 05:56

## 2024-08-27 RX ADMIN — KETOROLAC TROMETHAMINE 15 MG: 30 INJECTION, SOLUTION INTRAMUSCULAR; INTRAVENOUS at 12:13

## 2024-08-27 RX ADMIN — HYDROMORPHONE HYDROCHLORIDE 0.5 MG: 1 INJECTION, SOLUTION INTRAMUSCULAR; INTRAVENOUS; SUBCUTANEOUS at 00:15

## 2024-08-27 RX ADMIN — Medication 3 G: at 12:13

## 2024-08-27 RX ADMIN — Medication 3 G: at 16:40

## 2024-08-27 NOTE — PLAN OF CARE
Problem: PAIN - ADULT  Goal: Verbalizes/displays adequate comfort level or baseline comfort level  Description: Interventions:  - Encourage patient to monitor pain and request assistance  - Assess pain using appropriate pain scale  - Administer analgesics based on type and severity of pain and evaluate response  - Implement non-pharmacological measures as appropriate and evaluate response  - Consider cultural and social influences on pain and pain management  - Notify physician/advanced practitioner if interventions unsuccessful or patient reports new pain  Outcome: Progressing     Problem: SAFETY ADULT  Goal: Patient will remain free of falls  Description: INTERVENTIONS:  - Educate patient/family on patient safety including physical limitations  - Instruct patient to call for assistance with activity   - Consult OT/PT to assist with strengthening/mobility   - Keep Call bell within reach  - Keep bed low and locked with side rails adjusted as appropriate  - Keep care items and personal belongings within reach  - Initiate and maintain comfort rounds  - Make Fall Risk Sign visible to staff  - Offer Toileting every 2 Hours, in advance of need  - Initiate/Maintain bed/chair alarm  - Obtain necessary fall risk management equipment  - Apply yellow socks and bracelet for high fall risk patients  - Consider moving patient to room near nurses station  Outcome: Progressing     Problem: SAFETY ADULT  Goal: Maintain or return to baseline ADL function  Description: INTERVENTIONS:  -  Assess patient's ability to carry out ADLs; assess patient's baseline for ADL function and identify physical deficits which impact ability to perform ADLs (bathing, care of mouth/teeth, toileting, grooming, dressing, etc.)  - Assess/evaluate cause of self-care deficits   - Assess range of motion  - Assess patient's mobility; develop plan if impaired  - Assess patient's need for assistive devices and provide as appropriate  - Encourage maximum  independence but intervene and supervise when necessary  - Involve family in performance of ADLs  - Assess for home care needs following discharge   - Consider OT consult to assist with ADL evaluation and planning for discharge  - Provide patient education as appropriate  Outcome: Progressing     Problem: SAFETY ADULT  Goal: Maintains/Returns to pre admission functional level  Description: INTERVENTIONS:  - Perform AM-PAC 6 Click Basic Mobility/ Daily Activity assessment daily.  - Set and communicate daily mobility goal to care team and patient/family/caregiver.   - Collaborate with rehabilitation services on mobility goals if consulted  - Perform Range of Motion 3 times a day.  - Reposition patient every 2 hours.  - Dangle patient 1 times a day  - Stand patient 2 times a day  - Ambulate patient 2 times a day  - Out of bed to chair 3 times a day   - Out of bed for meals 3 times a day  - Out of bed for toileting  - Record patient progress and toleration of activity level   Outcome: Progressing     Problem: Nutrition/Hydration-ADULT  Goal: Nutrient/Hydration intake appropriate for improving, restoring or maintaining nutritional needs  Description: Monitor and assess patient's nutrition/hydration status for malnutrition. Collaborate with interdisciplinary team and initiate plan and interventions as ordered.  Monitor patient's weight and dietary intake as ordered or per policy. Utilize nutrition screening tool and intervene as necessary. Determine patient's food preferences and provide high-protein, high-caloric foods as appropriate.     INTERVENTIONS:  - Monitor oral intake, urinary output, labs, and treatment plans  - Assess nutrition and hydration status and recommend course of action  - Evaluate amount of meals eaten  - Assist patient with eating if necessary   - Allow adequate time for meals  - Recommend/ encourage appropriate diets, oral nutritional supplements, and vitamin/mineral supplements  - Order, calculate,  and assess calorie counts as needed  - Recommend, monitor, and adjust tube feedings and TPN/PPN based on assessed needs  - Assess need for intravenous fluids  - Provide specific nutrition/hydration education as appropriate  - Include patient/family/caregiver in decisions related to nutrition  Outcome: Progressing

## 2024-08-27 NOTE — ASSESSMENT & PLAN NOTE
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 sodium currently 128

## 2024-08-27 NOTE — ASSESSMENT & PLAN NOTE
CT imaging showing severe degenerative disc disease involving the lumbar spine with varying levels of mild bulging  Unable to obtain MRI imaging due to previous for metallic object in pelvis from prior surgery

## 2024-08-27 NOTE — PROGRESS NOTES
[facility]  Progress Note  Name: Ofelia Aguilar I  MRN: [id number]  Unit/Bed#: ICU 07 I Date of Admission: 8/25/2024   Date of Service: 8/27/2024 I Hospital Day: 2    Assessment & Plan   Moderate protein-calorie malnutrition (HCC)  Assessment & Plan  Malnutrition Findings:   Adult Malnutrition type: Acute illness  Adult Degree of Malnutrition: Malnutrition of moderate degree  Malnutrition Characteristics: Muscle loss, Inadequate energy                  360 Statement: Related to acute illness as evidenced by < 75% of estimated energy requirement for > 7 days and moderate muscle loss of clavicles. Treated with diet and PO supplement.    BMI Findings:  Adult BMI Classifications: Underweight < 18.5        Body mass index is 17.52 kg/m².       Lumbar stenosis  Assessment & Plan  CT imaging showing severe degenerative disc disease involving the lumbar spine with varying levels of mild bulging  Unable to obtain MRI imaging due to previous for metallic object in pelvis from prior surgery    Urgency of urination  Assessment & Plan  Cultures has been negative, monitor off antibiotics  May benefit from outpatient urology evaluation    GERD (gastroesophageal reflux disease)  Assessment & Plan  Continue PPI    Anxiety  Assessment & Plan  Continue sertraline    HTN, goal below 140/90  Assessment & Plan  Blood pressure currently controlled, at goal  Holding lisinopril    PVD (peripheral vascular disease) ([facility]ly [facility])  Assessment & Plan  Continue aspirin, statin and Plavix    * Hyponatremia  Assessment & Plan  Initially monitored in ICU with sodium of 117  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 sodium currently 128             VTE Pharmacologic Prophylaxis:   Pharmacologic: Enoxaparin (Lovenox)  Mechanical VTE Prophylaxis in Place: Yes    Current Length of Stay: 2 day(s)    Current Patient Status: Inpatient    Certification Statement: The patient will continue to require additional inpatient hospital stay due to monitor sodium, may need rehab    Discharge Plan: pending    Code Status: Level 1 - Full Code      Subjective:   No events overnight.  Has poor p.o. intake.  Does report some nausea, has not yet had a bowel movement.    Objective:     Vitals:   Temp (24hrs), Av.7 °F (37.1 °C), Min:98.7 °F (37.1 °C), Max:98.7 °F (37.1 °C)    Temp:  [98.7 °F (37.1 °C)] 98.7 °F (37.1 °C)  HR:  [] 128  Resp:  [17-45] 45  BP: (134-137)/(62-72) 134/62  SpO2:  [95 %-97 %] 97 %  Body mass index is 17.52 kg/m².     Input and Output Summary (last 24 hours):       Intake/Output Summary (Last 24 hours) at 2024 1517  Last data filed at 2024 1201  Gross per 24 hour   Intake 200 ml   Output 525 ml   Net -325 ml       Physical Exam:     Physical Exam  Vitals and nursing note reviewed.   Constitutional:       Comments: Thin, cachectic   HENT:      Head: Normocephalic.   Eyes:      Conjunctiva/sclera: Conjunctivae normal.   Cardiovascular:      Rate and Rhythm: Normal rate.   Pulmonary:      Effort: Pulmonary effort is normal.      Breath sounds: No wheezing.   Abdominal:      Palpations: Abdomen is soft.   Musculoskeletal:         General: No swelling.      Right lower leg: No edema.      Left lower leg: No edema.   Skin:     General: Skin is warm.      Coloration: Skin is pale.   Neurological:      General: No focal deficit present.      Mental Status: She is alert. Mental status is at baseline.           Additional Data:     Labs:    Results from last 7 days   Lab Units 24  0552   WBC Thousand/uL 4.77   HEMOGLOBIN g/dL 8.4*   HEMATOCRIT % 24.9*   PLATELETS Thousands/uL 257   SEGS PCT % 55   LYMPHO PCT % 31   MONO PCT % 14*   EOS PCT % 0     Results from last 7 days   Lab Units 24  0957 24  2040 24  0508   SODIUM mmol/L 128*   < > 123*   POTASSIUM mmol/L 3.7   < > 3.9   CHLORIDE mmol/L 99   < > 93*    CO2 mmol/L 22   < > 24   BUN mg/dL 12   < > 9   CREATININE mg/dL 0.62   < > 0.64   ANION GAP mmol/L 7   < > 6   CALCIUM mg/dL 9.0   < > 8.7   ALBUMIN g/dL  --   --  3.6   TOTAL BILIRUBIN mg/dL  --   --  0.41   ALK PHOS U/L  --   --  45   ALT U/L  --   --  11   AST U/L  --   --  17   GLUCOSE RANDOM mg/dL 114   < > 97    < > = values in this interval not displayed.         Results from last 7 days   Lab Units 08/25/24  0754   POC GLUCOSE mg/dl 101         Results from last 7 days   Lab Units 08/26/24  0508 08/25/24  1234 08/25/24  0957 08/25/24  0756   LACTIC ACID mmol/L  --  1.3 2.1*  --    PROCALCITONIN ng/ml <0.05  --   --  <0.05           * I Have Reviewed All Lab Data Listed Above.  * Additional Pertinent Lab Tests Reviewed: All Labs For Current Hospital Admission Reviewed    Mobility:  Basic Mobility Inpatient Raw Score: 18  -[facility] Goal: 6: Walk 10 steps or more  -[facility] Achieved: 7: Walk 25 feet or more    Lines:     Invasive Devices       Peripheral Intravenous Line  Duration             Peripheral IV 08/25/24 Dorsal (posterior);Right Hand 2 days                       Imaging:    Imaging Reports Reviewed Today Include:     XR abdomen 1 view kub    Result Date: 8/27/2024  Impression: Metallic objects in the pelvis appear to be within the sigmoid colon on comparison lumbar spine CT. Recommend attention on follow-up KUB to verify passage. Workstation performed: [id number]     CT spine lumbar wo contrast    Result Date: 8/25/2024  Impression: No acute osseous abnormality. Advanced degenerative discogenic disease and grade 1 spondylolisthesis with severe L4-5 spinal stenosis. If there is no contraindication, consider follow-up MRI imaging and consultation with the spine surgical service. Additional degenerative changes as described above. Extensive aortoiliac atherosclerotic disease. Consider MRA or CTA imaging of the aorta for additional characterization. Workstation performed: [id number]     XR chest 2  views    Result Date: 8/25/2024  Impression: No acute cardiopulmonary disease. Workstation performed: [id number]     CT head without contrast    Result Date: 8/25/2024  Impression: No evidence of acute intracranial process. Chronic microangiopathy. Workstation performed: [id number]        Recent Cultures (last 7 days):     Results from last 7 days   Lab Units 08/25/24  0950   URINE CULTURE  80,000-89,000 cfu/ml       Last 24 Hours Medication List:   Current Facility-Administered Medications   Medication Dose Route Frequency Provider Last Rate    acetaminophen  650 mg Oral Q6H PRN Apple Stark, BABATUNDENP      aspirin  81 mg Oral Daily Apple Stark, BABATUNDENP      atorvastatin  40 mg Oral Daily With Dinner Apple Stark, CRNP      clopidogrel  75 mg Oral Daily Apple Stark, SARAY      Diclofenac Sodium  2 g Topical 4x Daily PRN Enedina Longoria, SARAY      enoxaparin  40 mg Subcutaneous Daily Apple Stark, SARAY      hydrOXYzine HCL  25 mg Oral Q6H PRN Apple Stark, SARAY      ketorolac  15 mg Intravenous Q6H PRN Enedina Longoria, BABATUNDENP      melatonin  3 mg Oral HS PRN Enedina Longoria, SARAY      multivitamin stress formula  1 tablet Oral Daily Apple Stark, SARAY      ondansetron  4 mg Intravenous Q6H PRN Apple Stark, SARAY      pantoprazole  40 mg Oral Early Morning Apple Stark, SARAY      polyethylene glycol  17 g Oral Daily PRN SARAY Pineda      [START ON 8/28/2024] polyethylene glycol  17 g Oral Daily César Almeida MD      senna-docusate sodium  1 tablet Oral HS Enedina Longoria, SARAY      sodium chloride  3 g Oral TID With Meals SARAY Carias          Today, Patient Was Seen By: César Almeida MD    ** Please Note: Dictation voice to text software may have been used in the creation of this document. **

## 2024-08-27 NOTE — ASSESSMENT & PLAN NOTE
Cultures has been negative, monitor off antibiotics  May benefit from outpatient urology evaluation

## 2024-08-27 NOTE — ASSESSMENT & PLAN NOTE
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 Possible due to recent SSRI vs underlying malignancy  Continue fluid restriction of 1200 cc  Increase sodium tablets 2 g 3 times daily  Nephrology following  Repeat BMP in a.m. sodium currently 130

## 2024-08-27 NOTE — CASE MANAGEMENT
Case Management Discharge Planning Note    Patient name Ofelia Aguilar  Location ICU 07/ICU 07 MRN [id number]  : 1940 Date 2024       Current Admission Date: 2024  Current Admission Diagnosis:Hyponatremia   Patient Active Problem List    Diagnosis Date Noted Date Diagnosed    Moderate protein-calorie malnutrition (HCC) 2024     PVD (peripheral vascular disease) (HCC) 2024     HTN, goal below 140/90 2024     Anxiety 2024     GERD (gastroesophageal reflux disease) 2024     Hyponatremia 2024     Recurrent UTI 2024     Anemia 2024     Urgency of urination 2024     Lumbar stenosis 2024       LOS (days): 2  Geometric Mean LOS (GMLOS) (days): 3.6  Days to GMLOS:1.3     OBJECTIVE:  Risk of Unplanned Readmission Score: 10.78         Current admission status: Inpatient   Preferred Pharmacy:   [facility]pharm[facility] #[id number] - [place] - [address]  [address]  [place] [address]  Phone: [phone] Fax: [phone]    Primary Care Provider: No primary care provider on file.    Primary Insurance: MEDICARE MISC REPLACEMENT  Secondary Insurance:     DISCHARGE DETAILS:                                                    Treatment Team Recommendation: Short Term Rehab, SNF, Home with Home Health Care  Discharge Destination Plan:: Home with Home Health Care  Transport at Discharge : Family

## 2024-08-27 NOTE — PLAN OF CARE
Problem: PHYSICAL THERAPY ADULT  Goal: Performs mobility at highest level of function for planned discharge setting.  See evaluation for individualized goals.  Description: Treatment/Interventions: Functional transfer training, LE strengthening/ROM, Elevations, Therapeutic exercise, Endurance training, Patient/family training, Equipment eval/education, Bed mobility, Gait training, Spoke to nursing  Equipment Recommended: Walker (RW)       See flowsheet documentation for full assessment, interventions and recommendations.  Outcome: Progressing  Note: Prognosis: Good  Problem List: Decreased strength, Decreased endurance, Impaired balance, Decreased mobility, Decreased safety awareness, Impaired sensation, Pain  Assessment: Pt seen for PT treatment session this date, consisting of ther ex focused on strengthening and gt training on level surfaces to improve pt safety in household environment. Pt continues to require min a For all phases of mobility, use of RW for household distance gait trials, vc for technique/RW management/sequencing. Encouragement needed. Current goals and POC established on IE remain appropriate, pt continues to have rehab potential and is making good progress towards STGs. Pt prognosis for achieving goals is good, pending pt progress with hospitalization/medical status improvements, and indicated by Stimulability and ability to follow directions. Pt limited d/t self limiting. Pt continues to be functioning below baseline level, and remains limited 2* factors listed above. PT will continue to see pt during current hospitalization in order to address the deficits listed above and provide interventions consistent w/ POC in effort to achieve STGs. PT recommends level 2, moderate resource intensity upon discharge.  Barriers to Discharge: Inaccessible home environment     Rehab Resource Intensity Level, PT: II (Moderate Resource Intensity)    See flowsheet documentation for full assessment.

## 2024-08-27 NOTE — PHYSICAL THERAPY NOTE
"Physical Therapy Treatment Note       08/27/24 1315   PT Last Visit   PT Visit Date 08/27/24   Note Type   Note Type Treatment   Pain Assessment   Pain Assessment Tool 0-10   Pain Score No Pain   Restrictions/Precautions   Weight Bearing Precautions Per Order No   Braces or Orthoses Other (Comment)  (pt may benefit from AFO for L Foot drop)   Other Precautions Chair Alarm;Bed Alarm;Fall Risk;Pain  (back safety precautions for joint protection)   General   Chart Reviewed Yes   Response to Previous Treatment Patient with no complaints from previous session.   Family/Caregiver Present No   Cognition   Overall Cognitive Status   (questionable)   Arousal/Participation Alert;Cooperative   Attention Within functional limits   Orientation Level Oriented X4   Memory Decreased short term memory   Following Commands Follows one step commands with increased time or repetition   Comments pt agreeable to PT session   Subjective   Subjective \"I can get up\"   Bed Mobility   Supine to Sit 4  Minimal assistance   Additional items Assist x 1;HOB elevated;Increased time required;Verbal cues   Additional Comments log roll technique   Transfers   Sit to Stand 4  Minimal assistance   Additional items Assist x 1;Armrests;Increased time required;Verbal cues   Stand to Sit 4  Minimal assistance   Additional items Assist x 1;Armrests;Increased time required;Verbal cues   Toilet transfer 4  Minimal assistance   Additional items Assist x 1;Increased time required;Verbal cues;Standard toilet  (grab bar)   Ambulation/Elevation   Gait pattern Decreased foot clearance;L Foot drag;Short stride;Step to   Gait Assistance 4  Minimal assist   Additional items Assist x 1;Verbal cues   Assistive Device Rolling walker   Distance 20' x 2   Balance   Static Sitting Fair +   Dynamic Sitting Fair   Static Standing Fair -   Dynamic Standing Poor +   Ambulatory Poor +   Endurance Deficit   Endurance Deficit Yes   Activity Tolerance   Activity Tolerance Patient " limited by fatigue   Medical Staff Made Aware Dr. Almeida made aware of pt's LLE sx   Nurse Made Aware KEELY Sanchez   Exercises   Heelslides Sitting;10 reps;AROM;Bilateral   Hip Abduction Sitting;10 reps;AROM;Bilateral   Knee AROM Long Arc Quad Sitting;10 reps;AROM;Bilateral   Ankle Pumps Sitting;10 reps;AROM;Bilateral   Marching Sitting;10 reps;AROM;Bilateral   Assessment   Prognosis Good   Problem List Decreased strength;Decreased endurance;Impaired balance;Decreased mobility;Decreased safety awareness;Impaired sensation;Pain   Assessment Pt seen for PT treatment session this date, consisting of ther ex focused on strengthening and gt training on level surfaces to improve pt safety in household environment. Pt continues to require min a For all phases of mobility, use of RW for household distance gait trials, vc for technique/RW management/sequencing. Encouragement needed. Current goals and POC established on IE remain appropriate, pt continues to have rehab potential and is making good progress towards STGs. Pt prognosis for achieving goals is good, pending pt progress with hospitalization/medical status improvements, and indicated by Stimulability and ability to follow directions. Pt limited d/t self limiting. Pt continues to be functioning below baseline level, and remains limited 2* factors listed above. PT will continue to see pt during current hospitalization in order to address the deficits listed above and provide interventions consistent w/ POC in effort to achieve STGs. PT recommends level 2, moderate resource intensity upon discharge.   Barriers to Discharge Inaccessible home environment   Goals   Patient Goals to go home   STG Expiration Date 09/05/24   PT Treatment Day 2   Plan   Treatment/Interventions Functional transfer training;LE strengthening/ROM;Elevations;Therapeutic exercise;Endurance training;Patient/family training;Equipment eval/education;Bed mobility;Gait training;Spoke to nursing    Progress Progressing toward goals   PT Frequency 3-5x/wk   Discharge Recommendation   Rehab Resource Intensity Level, PT II (Moderate Resource Intensity)   AM-PAC Basic Mobility Inpatient   Turning in Flat Bed Without Bedrails 3   Lying on Back to Sitting on Edge of Flat Bed Without Bedrails 3   Moving Bed to Chair 3   Standing Up From Chair Using Arms 3   Walk in Room 3   Climb 3-5 Stairs With Railing 3   Basic Mobility Inpatient Raw Score 18   Basic Mobility Standardized Score 41.05   [facility] Level Of Mobility   -HL Goal 6: Walk 10 steps or more   -HLM Achieved 7: Walk 25 feet or more   Education   Education Provided Mobility training;Home exercise program;Assistive device   Patient Demonstrates acceptance/verbal understanding;Reinforcement needed   End of Consult   Patient Position at End of Consult Bedside chair;Bed/Chair alarm activated;All needs within reach       Stephanie Chino, PT, DPT

## 2024-08-27 NOTE — PROGRESS NOTES
NEPHROLOGY PROGRESS NOTE    Patient: Ofelia Aguilar               Sex: female          DOA: 8/25/2024  7:31 AM   YOB: 1940        Age:  83 y.o.        LOS:  LOS: 2 days       HPI     Patient with hyponatremia    SUBJECTIVE     Overall feeling better    Eating better    No acute complaint    CURRENT MEDICATIONS       Current Facility-Administered Medications:     acetaminophen (TYLENOL) tablet 650 mg, 650 mg, Oral, Q6H PRN, SARAY Carias    aspirin chewable tablet 81 mg, 81 mg, Oral, Daily, SARAY Carias, 81 mg at 08/27/24 0903    atorvastatin (LIPITOR) tablet 40 mg, 40 mg, Oral, Daily With Dinner, SARAY Carias, 40 mg at 08/26/24 1751    clopidogrel (PLAVIX) tablet 75 mg, 75 mg, Oral, Daily, SARAY Carias, 75 mg at 08/27/24 0902    Diclofenac Sodium (VOLTAREN) 1 % topical gel 2 g, 2 g, Topical, 4x Daily PRN, SARAY Pineda    enoxaparin (LOVENOX) subcutaneous injection 40 mg, 40 mg, Subcutaneous, Daily, SARAY Carias, 40 mg at 08/27/24 0903    hydrOXYzine HCL (ATARAX) tablet 25 mg, 25 mg, Oral, Q6H PRN, SARAY Carias    ketorolac (TORADOL) injection 15 mg, 15 mg, Intravenous, Q6H PRN, SARAY Pineda, 15 mg at 08/27/24 1213    melatonin tablet 3 mg, 3 mg, Oral, HS PRN, SARAY Pineda    multivitamin stress formula tablet 1 tablet, 1 tablet, Oral, Daily, SARAY Carias, 1 tablet at 08/27/24 0903    ondansetron (ZOFRAN) injection 4 mg, 4 mg, Intravenous, Q6H PRN, SARAY Carias, 4 mg at 08/27/24 0750    pantoprazole (PROTONIX) EC tablet 40 mg, 40 mg, Oral, Early Morning, SARAY Carias, 40 mg at 08/27/24 0556    polyethylene glycol (MIRALAX) packet 17 g, 17 g, Oral, Daily PRN, SARAY Pineda    [START ON 8/28/2024] polyethylene glycol (MIRALAX) packet 17 g, 17 g, Oral, Daily, César Almeida MD    senna-docusate sodium (SENOKOT S) 8.6-50 mg per tablet 1 tablet, 1 tablet, Oral, HS, SARAY Pineda, 1 tablet at 08/26/24 3262     sodium chloride tablet 3 g, 3 g, Oral, TID With Meals, SARAY Carias, 3 g at 08/27/24 1213    OBJECTIVE     Current Weight: Weight - Scale: 46.3 kg (102 lb 1.2 oz)  Vitals:    08/27/24 1300   BP:    Pulse: (!) 128   Resp: (!) 45   Temp:    SpO2:        Intake/Output Summary (Last 24 hours) at 8/27/2024 1357  Last data filed at 8/27/2024 1201  Gross per 24 hour   Intake 200 ml   Output 525 ml   Net -325 ml       PHYSICAL EXAMINATION     Physical Exam  Constitutional:       General: She is not in acute distress.     Appearance: She is well-developed.   HENT:      Head: Normocephalic.      Mouth/Throat:      Mouth: Mucous membranes are moist.   Eyes:      General: No scleral icterus.     Conjunctiva/sclera: Conjunctivae normal.   Neck:      Vascular: No JVD.   Cardiovascular:      Rate and Rhythm: Normal rate.      Heart sounds: Normal heart sounds.   Pulmonary:      Effort: Pulmonary effort is normal.      Breath sounds: No wheezing.   Abdominal:      Palpations: Abdomen is soft.      Tenderness: There is no abdominal tenderness.   Musculoskeletal:         General: Normal range of motion.      Cervical back: Neck supple.   Skin:     General: Skin is warm.      Findings: No rash.   Neurological:      Mental Status: She is alert and oriented to person, place, and time.   Psychiatric:         Behavior: Behavior normal.          LAB RESULTS     Results from last 7 days   Lab Units 08/27/24  0957 08/27/24  0552 08/26/24  2040 08/26/24  0508 08/25/24  2349 08/25/24  1735 08/25/24  1234 08/25/24  0756   WBC Thousand/uL  --  4.77  --  4.54  --   --   --  3.62*   HEMOGLOBIN g/dL  --  8.4*  --  9.2*  --   --   --  10.1*   HEMATOCRIT %  --  24.9*  --  26.5*  --   --   --  28.8*   PLATELETS Thousands/uL  --  257  --  278  --   --   --  234   POTASSIUM mmol/L 3.7 4.2 4.1 3.9 3.9 3.7 4.0 4.4   CHLORIDE mmol/L 99 98 99 93* 93* 90* 89* 88*   CO2 mmol/L 22 23 22 24 22 22 21 20*   BUN mg/dL 12 13 14 9 10 8 9 10   CREATININE  "mg/dL 0.62 0.59* 0.55* 0.64 0.64 0.65 0.60 0.65   EGFR ml/min/1.73sq m 84 86 88 83 83 83 85 83   CALCIUM mg/dL 9.0 8.6 8.3* 8.7 8.3* 9.0 9.0 8.6   MAGNESIUM mg/dL  --  1.9  --  2.1  --   --   --  1.7*   PHOSPHORUS mg/dL  --  3.0  --  2.9  --   --   --   --        RADIOLOGY RESULTS      No results found for this or any previous visit.    Results for orders placed during the hospital encounter of 08/25/24    XR chest 2 views    Narrative  XR CHEST AP AND LATERAL    INDICATION: rales at the bases.    COMPARISON: None    FINDINGS:    Clear lungs. No pneumothorax or pleural effusion.    Normal cardiomediastinal silhouette.    Bones are unremarkable for age.    Normal upper abdomen.    Impression  No acute cardiopulmonary disease.        Workstation performed: [id number]    No results found for this or any previous visit.    No results found for this or any previous visit.    No results found for this or any previous visit.    No results found for this or any previous visit.      PLAN / RECOMMENDATIONS      Hyponatremia: Improving with fluid restriction and salt tablets    Hypertension: Reasonably well-controlled    Advise continued diet.  Will monitor sodium closely once he get better we reduce the salt tablet    Bruno Foss MD  Nephrology  8/27/2024        Portions of the record may have been created with voice recognition software. Occasional wrong word or \"sound a like\" substitutions may have occurred due to the inherent limitations of voice recognition software. Read the chart carefully and recognize, using context, where substitutions have occurred.  "

## 2024-08-28 LAB
ANION GAP SERPL CALCULATED.3IONS-SCNC: 5 MMOL/L (ref 4–13)
ANION GAP SERPL CALCULATED.3IONS-SCNC: 7 MMOL/L (ref 4–13)
BUN SERPL-MCNC: 16 MG/DL (ref 5–25)
BUN SERPL-MCNC: 16 MG/DL (ref 5–25)
CALCIUM SERPL-MCNC: 8.4 MG/DL (ref 8.4–10.2)
CALCIUM SERPL-MCNC: 8.9 MG/DL (ref 8.4–10.2)
CHLORIDE SERPL-SCNC: 100 MMOL/L (ref 96–108)
CHLORIDE SERPL-SCNC: 99 MMOL/L (ref 96–108)
CO2 SERPL-SCNC: 24 MMOL/L (ref 21–32)
CO2 SERPL-SCNC: 24 MMOL/L (ref 21–32)
CREAT SERPL-MCNC: 0.59 MG/DL (ref 0.6–1.3)
CREAT SERPL-MCNC: 0.69 MG/DL (ref 0.6–1.3)
ERYTHROCYTE [DISTWIDTH] IN BLOOD BY AUTOMATED COUNT: 13.9 % (ref 11.6–15.1)
GFR SERPL CREATININE-BSD FRML MDRD: 81 ML/MIN/1.73SQ M
GFR SERPL CREATININE-BSD FRML MDRD: 86 ML/MIN/1.73SQ M
GLUCOSE SERPL-MCNC: 88 MG/DL (ref 65–140)
GLUCOSE SERPL-MCNC: 96 MG/DL (ref 65–140)
GLUCOSE SERPL-MCNC: 97 MG/DL (ref 65–140)
HCT VFR BLD AUTO: 24.7 % (ref 34.8–46.1)
HGB BLD-MCNC: 8.4 G/DL (ref 11.5–15.4)
MCH RBC QN AUTO: 30.5 PG (ref 26.8–34.3)
MCHC RBC AUTO-ENTMCNC: 34 G/DL (ref 31.4–37.4)
MCV RBC AUTO: 90 FL (ref 82–98)
PLATELET # BLD AUTO: 268 THOUSANDS/UL (ref 149–390)
PMV BLD AUTO: 9.2 FL (ref 8.9–12.7)
POTASSIUM SERPL-SCNC: 3.8 MMOL/L (ref 3.5–5.3)
POTASSIUM SERPL-SCNC: 3.9 MMOL/L (ref 3.5–5.3)
RBC # BLD AUTO: 2.75 MILLION/UL (ref 3.81–5.12)
SODIUM SERPL-SCNC: 129 MMOL/L (ref 135–147)
SODIUM SERPL-SCNC: 130 MMOL/L (ref 135–147)
WBC # BLD AUTO: 6.34 THOUSAND/UL (ref 4.31–10.16)

## 2024-08-28 PROCEDURE — 99232 SBSQ HOSP IP/OBS MODERATE 35: CPT | Performed by: STUDENT IN AN ORGANIZED HEALTH CARE EDUCATION/TRAINING PROGRAM

## 2024-08-28 PROCEDURE — 80048 BASIC METABOLIC PNL TOTAL CA: CPT | Performed by: NURSE PRACTITIONER

## 2024-08-28 PROCEDURE — 97110 THERAPEUTIC EXERCISES: CPT

## 2024-08-28 PROCEDURE — 99232 SBSQ HOSP IP/OBS MODERATE 35: CPT | Performed by: INTERNAL MEDICINE

## 2024-08-28 PROCEDURE — 82948 REAGENT STRIP/BLOOD GLUCOSE: CPT

## 2024-08-28 PROCEDURE — 85027 COMPLETE CBC AUTOMATED: CPT | Performed by: STUDENT IN AN ORGANIZED HEALTH CARE EDUCATION/TRAINING PROGRAM

## 2024-08-28 PROCEDURE — 97116 GAIT TRAINING THERAPY: CPT

## 2024-08-28 RX ORDER — SODIUM CHLORIDE 1 G/1
2 TABLET ORAL
Status: DISCONTINUED | OUTPATIENT
Start: 2024-08-28 | End: 2024-08-30 | Stop reason: HOSPADM

## 2024-08-28 RX ADMIN — Medication 2 G: at 18:46

## 2024-08-28 RX ADMIN — ACETAMINOPHEN 650 MG: 325 TABLET, FILM COATED ORAL at 20:52

## 2024-08-28 RX ADMIN — ENOXAPARIN SODIUM 40 MG: 40 INJECTION SUBCUTANEOUS at 09:06

## 2024-08-28 RX ADMIN — ASPIRIN 81 MG: 81 TABLET, CHEWABLE ORAL at 09:06

## 2024-08-28 RX ADMIN — B-COMPLEX W/ C & FOLIC ACID TAB 1 TABLET: TAB at 09:06

## 2024-08-28 RX ADMIN — PANTOPRAZOLE SODIUM 40 MG: 40 TABLET, DELAYED RELEASE ORAL at 06:47

## 2024-08-28 RX ADMIN — KETOROLAC TROMETHAMINE 15 MG: 30 INJECTION, SOLUTION INTRAMUSCULAR; INTRAVENOUS at 22:01

## 2024-08-28 RX ADMIN — ATORVASTATIN CALCIUM 40 MG: 40 TABLET, FILM COATED ORAL at 15:59

## 2024-08-28 RX ADMIN — Medication 2 G: at 13:55

## 2024-08-28 RX ADMIN — ONDANSETRON 4 MG: 2 INJECTION INTRAMUSCULAR; INTRAVENOUS at 06:57

## 2024-08-28 RX ADMIN — POLYETHYLENE GLYCOL 3350 17 G: 17 POWDER, FOR SOLUTION ORAL at 09:06

## 2024-08-28 RX ADMIN — KETOROLAC TROMETHAMINE 15 MG: 30 INJECTION, SOLUTION INTRAMUSCULAR; INTRAVENOUS at 15:59

## 2024-08-28 RX ADMIN — CLOPIDOGREL 75 MG: 75 TABLET ORAL at 09:06

## 2024-08-28 NOTE — PROGRESS NOTES
[facility]  Progress Note  Name: Ofelia Aguilar I  MRN: [id number]  Unit/Bed#: ICU 07 I Date of Admission: 8/25/2024   Date of Service: 8/28/2024 I Hospital Day: 3    Assessment & Plan   Moderate protein-calorie malnutrition (HCC)  Assessment & Plan  Malnutrition Findings:   Adult Malnutrition type: Acute illness  Adult Degree of Malnutrition: Malnutrition of moderate degree  Malnutrition Characteristics: Muscle loss, Inadequate energy                  360 Statement: Related to acute illness as evidenced by < 75% of estimated energy requirement for > 7 days and moderate muscle loss of clavicles. Treated with diet and PO supplement.    BMI Findings:  Adult BMI Classifications: Underweight < 18.5        Body mass index is 16.65 kg/m².       Lumbar stenosis  Assessment & Plan  CT imaging showing severe degenerative disc disease involving the lumbar spine with varying levels of mild bulging  Unable to obtain MRI imaging due to previous for metallic object in pelvis from prior surgery    Urgency of urination  Assessment & Plan  Cultures has been negative, monitor off antibiotics  May benefit from outpatient urology evaluation    Anemia  Assessment & Plan  No evidence of bleeding  Check anemia labs, b12, folate, tsh and iron panel    GERD (gastroesophageal reflux disease)  Assessment & Plan  Continue PPI    Anxiety  Assessment & Plan  Continue sertraline    HTN, goal below 140/90  Assessment & Plan  Blood pressure currently controlled, at goal  Holding lisinopril    PVD (peripheral vascular disease) ([facility])  Assessment & Plan  Continue aspirin, statin and Plavix    * Hyponatremia  Assessment & Plan  Initially monitored in ICU with sodium of 117  Suspect SIADH with urine sodium 61. Possible due to recent SSRI vs underlying malignancy  Continue fluid restriction of 1200 cc  Increase sodium tablets 2 g 3 times daily  Nephrology following  Repeat BMP in a.m. sodium currently 130             VTE  Pharmacologic Prophylaxis:   Pharmacologic: Enoxaparin (Lovenox)  Mechanical VTE Prophylaxis in Place: Yes    Current Length of Stay: 3 day(s)    Current Patient Status: Inpatient   Certification Statement: The patient will continue to require additional inpatient hospital stay due to monitor sodium levels    Discharge Plan: 24 hours    Code Status: Level 1 - Full Code      Subjective:   Overall feeling well. Tolerating diet. Nausea improving.     Objective:     Vitals:   Temp (24hrs), Av.5 °F (36.9 °C), Min:97.8 °F (36.6 °C), Max:98.8 °F (37.1 °C)    Temp:  [97.8 °F (36.6 °C)-98.8 °F (37.1 °C)] 98.8 °F (37.1 °C)  HR:  [68-89] 81  Resp:  [15-18] 15  BP: (145-160)/(66-98) 146/68  SpO2:  [95 %-98 %] 96 %  Body mass index is 16.65 kg/m².     Input and Output Summary (last 24 hours):       Intake/Output Summary (Last 24 hours) at 2024 1357  Last data filed at 2024 1330  Gross per 24 hour   Intake --   Output 900 ml   Net -900 ml       Physical Exam:     Physical Exam  Vitals and nursing note reviewed.   Constitutional:       Comments: Thin, cachectic   HENT:      Head: Normocephalic.   Eyes:      Conjunctiva/sclera: Conjunctivae normal.   Cardiovascular:      Rate and Rhythm: Normal rate.   Pulmonary:      Effort: Pulmonary effort is normal.      Breath sounds: No wheezing.   Abdominal:      Palpations: Abdomen is soft.   Musculoskeletal:         General: No swelling.      Right lower leg: No edema.      Left lower leg: No edema.   Skin:     General: Skin is warm.      Coloration: Skin is pale.   Neurological:      General: No focal deficit present.      Mental Status: She is alert. Mental status is at baseline.         Additional Data:     Labs:    Results from last 7 days   Lab Units 24  0500 24  0552   WBC Thousand/uL 6.34 4.77   HEMOGLOBIN g/dL 8.4* 8.4*   HEMATOCRIT % 24.7* 24.9*   PLATELETS Thousands/uL 268 257   SEGS PCT %  --  55   LYMPHO PCT %  --  31   MONO PCT %  --  14*   EOS PCT %   --  0     Results from last 7 days   Lab Units 08/28/24  0943 08/26/24  2040 08/26/24  0508   SODIUM mmol/L 130*   < > 123*   POTASSIUM mmol/L 3.9   < > 3.9   CHLORIDE mmol/L 99   < > 93*   CO2 mmol/L 24   < > 24   BUN mg/dL 16   < > 9   CREATININE mg/dL 0.69   < > 0.64   ANION GAP mmol/L 7   < > 6   CALCIUM mg/dL 8.9   < > 8.7   ALBUMIN g/dL  --   --  3.6   TOTAL BILIRUBIN mg/dL  --   --  0.41   ALK PHOS U/L  --   --  45   ALT U/L  --   --  11   AST U/L  --   --  17   GLUCOSE RANDOM mg/dL 96   < > 97    < > = values in this interval not displayed.         Results from last 7 days   Lab Units 08/28/24  1141 08/25/24  0754   POC GLUCOSE mg/dl 97 101         Results from last 7 days   Lab Units 08/26/24  0508 08/25/24  1234 08/25/24  0957 08/25/24  0756   LACTIC ACID mmol/L  --  1.3 2.1*  --    PROCALCITONIN ng/ml <0.05  --   --  <0.05           * I Have Reviewed All Lab Data Listed Above.  * Additional Pertinent Lab Tests Reviewed: All Labs For Current Hospital Admission Reviewed    Mobility:  Basic Mobility Inpatient Raw Score: 18  -HL Goal: 6: Walk 10 steps or more  -HL Achieved: 7: Walk 25 feet or more    Lines:     Invasive Devices       Peripheral Intravenous Line  Duration             Peripheral IV 08/25/24 Dorsal (posterior);Right Hand 3 days                       Imaging:    Imaging Reports Reviewed Today Include:     XR abdomen 1 view kub    Result Date: 8/27/2024  Impression: Metallic objects in the pelvis appear to be within the sigmoid colon on comparison lumbar spine CT. Recommend attention on follow-up KUB to verify passage. Workstation performed: [id number]     CT spine lumbar wo contrast    Result Date: 8/25/2024  Impression: No acute osseous abnormality. Advanced degenerative discogenic disease and grade 1 spondylolisthesis with severe L4-5 spinal stenosis. If there is no contraindication, consider follow-up MRI imaging and consultation with the spine surgical service. Additional degenerative  changes as described above. Extensive aortoiliac atherosclerotic disease. Consider MRA or CTA imaging of the aorta for additional characterization. Workstation performed: [id number]     XR chest 2 views    Result Date: 8/25/2024  Impression: No acute cardiopulmonary disease. Workstation performed: [id number]     CT head without contrast    Result Date: 8/25/2024  Impression: No evidence of acute intracranial process. Chronic microangiopathy. Workstation performed: [id number]        Recent Cultures (last 7 days):     Results from last 7 days   Lab Units 08/25/24  0950   URINE CULTURE  80,000-89,000 cfu/ml       Last 24 Hours Medication List:   Current Facility-Administered Medications   Medication Dose Route Frequency Provider Last Rate    acetaminophen  650 mg Oral Q6H PRN Apple Stark, CRNP      aspirin  81 mg Oral Daily Apple Stark, CRNP      atorvastatin  40 mg Oral Daily With Dinner Apple Stark, CRNP      clopidogrel  75 mg Oral Daily Apple Stark, CRNP      Diclofenac Sodium  2 g Topical 4x Daily PRN Enedina Longoria, CRNP      enoxaparin  40 mg Subcutaneous Daily Apple Stark, CRNP      hydrOXYzine HCL  25 mg Oral Q6H PRN Apple Stark, CRNP      ketorolac  15 mg Intravenous Q6H PRN Enedina Longoria, CRNP      melatonin  3 mg Oral HS PRN Enedina Longoria, CRNP      multivitamin stress formula  1 tablet Oral Daily Apple Stark, CRNP      ondansetron  4 mg Intravenous Q6H PRN Apple Stark, CRNP      pantoprazole  40 mg Oral Early Morning Apple Stark, CRNP      polyethylene glycol  17 g Oral Daily PRN Enedina Longoria, CRNP      polyethylene glycol  17 g Oral Daily César Almeida MD      senna-docusate sodium  1 tablet Oral HS Enedina Longoria, CRNP      sodium chloride  2 g Oral TID With Meals Bruno Foss MD          Today, Patient Was Seen By: César Almeida MD    ** Please Note: Dictation voice to text software may have been used in the creation of this document. **

## 2024-08-28 NOTE — PROGRESS NOTES
NEPHROLOGY PROGRESS NOTE    Patient: Ofelia Aguilar               Sex: female          DOA: 8/25/2024  7:31 AM   YOB: 1940        Age:  83 y.o.        LOS:  LOS: 3 days       HPI     Patient with hyponatremia    SUBJECTIVE     Overall feeling better    No acute complaint    No chest pain no palpitation    CURRENT MEDICATIONS       Current Facility-Administered Medications:     acetaminophen (TYLENOL) tablet 650 mg, 650 mg, Oral, Q6H PRN, SARAY Carias    aspirin chewable tablet 81 mg, 81 mg, Oral, Daily, SARAY Carias, 81 mg at 08/28/24 0906    atorvastatin (LIPITOR) tablet 40 mg, 40 mg, Oral, Daily With Dinner, SARAY Carias, 40 mg at 08/27/24 1640    clopidogrel (PLAVIX) tablet 75 mg, 75 mg, Oral, Daily, SARAY Carias, 75 mg at 08/28/24 0906    Diclofenac Sodium (VOLTAREN) 1 % topical gel 2 g, 2 g, Topical, 4x Daily PRN, SARAY Pineda    enoxaparin (LOVENOX) subcutaneous injection 40 mg, 40 mg, Subcutaneous, Daily, SARAY Carias, 40 mg at 08/28/24 0906    hydrOXYzine HCL (ATARAX) tablet 25 mg, 25 mg, Oral, Q6H PRN, SARAY Carias    ketorolac (TORADOL) injection 15 mg, 15 mg, Intravenous, Q6H PRN, SARAY Pineda, 15 mg at 08/27/24 2012    melatonin tablet 3 mg, 3 mg, Oral, HS PRN, SARAY Pineda, 3 mg at 08/27/24 2255    multivitamin stress formula tablet 1 tablet, 1 tablet, Oral, Daily, SARAY Carias, 1 tablet at 08/28/24 0906    ondansetron (ZOFRAN) injection 4 mg, 4 mg, Intravenous, Q6H PRN, SARAY Carias, 4 mg at 08/28/24 0657    pantoprazole (PROTONIX) EC tablet 40 mg, 40 mg, Oral, Early Morning, SARAY Carias, 40 mg at 08/28/24 0647    polyethylene glycol (MIRALAX) packet 17 g, 17 g, Oral, Daily PRN, SARAY Pineda    polyethylene glycol (MIRALAX) packet 17 g, 17 g, Oral, Daily, César Almeida MD, 17 g at 08/28/24 0906    senna-docusate sodium (SENOKOT S) 8.6-50 mg per tablet 1 tablet, 1 tablet, Oral, Enedina MARR  SARAY Longoria, 1 tablet at 08/27/24 4208    sodium chloride tablet 2 g, 2 g, Oral, TID With Meals, Bruno Foss MD    OBJECTIVE     Current Weight: Weight - Scale: 44 kg (97 lb)  Vitals:    08/28/24 1100   BP: 146/68   Pulse: 81   Resp: 15   Temp: 98.8 °F (37.1 °C)   SpO2: 96%       Intake/Output Summary (Last 24 hours) at 8/28/2024 1416  Last data filed at 8/28/2024 1330  Gross per 24 hour   Intake --   Output 900 ml   Net -900 ml       PHYSICAL EXAMINATION     Physical Exam  Constitutional:       General: She is not in acute distress.     Appearance: She is well-developed.   HENT:      Head: Normocephalic.      Mouth/Throat:      Mouth: Mucous membranes are moist.   Eyes:      General: No scleral icterus.     Conjunctiva/sclera: Conjunctivae normal.   Neck:      Vascular: No JVD.   Cardiovascular:      Rate and Rhythm: Normal rate.      Heart sounds: Normal heart sounds.   Pulmonary:      Effort: Pulmonary effort is normal.      Breath sounds: No wheezing.   Abdominal:      Palpations: Abdomen is soft.      Tenderness: There is no abdominal tenderness.   Musculoskeletal:         General: Normal range of motion.      Cervical back: Neck supple.   Skin:     General: Skin is warm.      Findings: No rash.   Neurological:      Mental Status: She is alert and oriented to person, place, and time.   Psychiatric:         Behavior: Behavior normal.          LAB RESULTS     Results from last 7 days   Lab Units 08/28/24  0943 08/28/24  0500 08/27/24  2059 08/27/24  0957 08/27/24  0552 08/26/24  2040 08/26/24  0508 08/25/24  2349 08/25/24  1234 08/25/24  0756   WBC Thousand/uL  --  6.34  --   --  4.77  --  4.54  --   --  3.62*   HEMOGLOBIN g/dL  --  8.4*  --   --  8.4*  --  9.2*  --   --  10.1*   HEMATOCRIT %  --  24.7*  --   --  24.9*  --  26.5*  --   --  28.8*   PLATELETS Thousands/uL  --  268  --   --  257  --  278  --   --  234   POTASSIUM mmol/L 3.9  --  4.0 3.7 4.2 4.1 3.9 3.9   < > 4.4   CHLORIDE mmol/L 99  --  101 99 98  "99 93* 93*   < > 88*   CO2 mmol/L 24  --  24 22 23 22 24 22   < > 20*   BUN mg/dL 16  --  18 12 13 14 9 10   < > 10   CREATININE mg/dL 0.69  --  0.71 0.62 0.59* 0.55* 0.64 0.64   < > 0.65   EGFR ml/min/1.73sq m 81  --  80 84 86 88 83 83   < > 83   CALCIUM mg/dL 8.9  --  8.7 9.0 8.6 8.3* 8.7 8.3*   < > 8.6   MAGNESIUM mg/dL  --   --   --   --  1.9  --  2.1  --   --  1.7*   PHOSPHORUS mg/dL  --   --   --   --  3.0  --  2.9  --   --   --     < > = values in this interval not displayed.       RADIOLOGY RESULTS      No results found for this or any previous visit.    Results for orders placed during the hospital encounter of 08/25/24    XR chest 2 views    Narrative  XR CHEST AP AND LATERAL    INDICATION: rales at the bases.    COMPARISON: None    FINDINGS:    Clear lungs. No pneumothorax or pleural effusion.    Normal cardiomediastinal silhouette.    Bones are unremarkable for age.    Normal upper abdomen.    Impression  No acute cardiopulmonary disease.        Workstation performed: [id number]    No results found for this or any previous visit.    No results found for this or any previous visit.    No results found for this or any previous visit.    No results found for this or any previous visit.      PLAN / RECOMMENDATIONS      Hyponatremia: Improving with salt tablet and off medication which can cause increased ADH secretion.  Patient also on fluid restriction    Hypertension: Reasonable control.    Will reduce salt tablet to 2  3 times a day and if the sodium continues get better we will change to 1  3 times a day    Bruno Foss MD  Nephrology  8/28/2024        Portions of the record may have been created with voice recognition software. Occasional wrong word or \"sound a like\" substitutions may have occurred due to the inherent limitations of voice recognition software. Read the chart carefully and recognize, using context, where substitutions have occurred.  "

## 2024-08-28 NOTE — ASSESSMENT & PLAN NOTE
Malnutrition Findings:   Adult Malnutrition type: Acute illness  Adult Degree of Malnutrition: Malnutrition of moderate degree  Malnutrition Characteristics: Muscle loss, Inadequate energy                  360 Statement: Related to acute illness as evidenced by < 75% of estimated energy requirement for > 7 days and moderate muscle loss of clavicles. Treated with diet and PO supplement.    BMI Findings:  Adult BMI Classifications: Underweight < 18.5        Body mass index is 16.65 kg/m².

## 2024-08-28 NOTE — PLAN OF CARE
Problem: PAIN - ADULT  Goal: Verbalizes/displays adequate comfort level or baseline comfort level  Description: Interventions:  - Encourage patient to monitor pain and request assistance  - Assess pain using appropriate pain scale  - Administer analgesics based on type and severity of pain and evaluate response  - Implement non-pharmacological measures as appropriate and evaluate response  - Consider cultural and social influences on pain and pain management  - Notify physician/advanced practitioner if interventions unsuccessful or patient reports new pain  Outcome: Progressing     Problem: INFECTION - ADULT  Goal: Absence or prevention of progression during hospitalization  Description: INTERVENTIONS:  - Assess and monitor for signs and symptoms of infection  - Monitor lab/diagnostic results  - Monitor all insertion sites, i.e. indwelling lines, tubes, and drains  - Monitor endotracheal if appropriate and nasal secretions for changes in amount and color  - [place] appropriate cooling/warming therapies per order  - Administer medications as ordered  - Instruct and encourage patient and family to use good hand hygiene technique  - Identify and instruct in appropriate isolation precautions for identified infection/condition  Outcome: Progressing  Goal: Absence of fever/infection during neutropenic period  Description: INTERVENTIONS:  - Monitor WBC    Outcome: Progressing     Problem: SAFETY ADULT  Goal: Patient will remain free of falls  Description: INTERVENTIONS:  - Educate patient/family on patient safety including physical limitations  - Instruct patient to call for assistance with activity   - Consult OT/PT to assist with strengthening/mobility   - Keep Call bell within reach  - Keep bed low and locked with side rails adjusted as appropriate  - Keep care items and personal belongings within reach  - Initiate and maintain comfort rounds  - Make Fall Risk Sign visible to staff  - Offer Toileting every  Hours,  in advance of need  - Initiate/Maintain alarm  - Obtain necessary fall risk management equipment:   - Apply yellow socks and bracelet for high fall risk patients  - Consider moving patient to room near nurses station  Outcome: Progressing  Goal: Maintain or return to baseline ADL function  Description: INTERVENTIONS:  -  Assess patient's ability to carry out ADLs; assess patient's baseline for ADL function and identify physical deficits which impact ability to perform ADLs (bathing, care of mouth/teeth, toileting, grooming, dressing, etc.)  - Assess/evaluate cause of self-care deficits   - Assess range of motion  - Assess patient's mobility; develop plan if impaired  - Assess patient's need for assistive devices and provide as appropriate  - Encourage maximum independence but intervene and supervise when necessary  - Involve family in performance of ADLs  - Assess for home care needs following discharge   - Consider OT consult to assist with ADL evaluation and planning for discharge  - Provide patient education as appropriate  Outcome: Progressing  Goal: Maintains/Returns to pre admission functional level  Description: INTERVENTIONS:  - Perform AM-PAC 6 Click Basic Mobility/ Daily Activity assessment daily.  - Set and communicate daily mobility goal to care team and patient/family/caregiver.   - Collaborate with rehabilitation services on mobility goals if consulted  - Perform Range of Motion  times a day.  - Reposition patient every  hours.  - Dangle patient  times a day  - Stand patient  times a day  - Ambulate patient  times a day  - Out of bed to chair  times a day   - Out of bed for meals  times a day  - Out of bed for toileting  - Record patient progress and toleration of activity level   Outcome: Progressing     Problem: DISCHARGE PLANNING  Goal: Discharge to home or other facility with appropriate resources  Description: INTERVENTIONS:  - Identify barriers to discharge w/patient and caregiver  - Arrange for  needed discharge resources and transportation as appropriate  - Identify discharge learning needs (meds, wound care, etc.)  - Arrange for interpretive services to assist at discharge as needed  - Refer to Case Management Department for coordinating discharge planning if the patient needs post-hospital services based on physician/advanced practitioner order or complex needs related to functional status, cognitive ability, or social support system  Outcome: Progressing     Problem: Knowledge Deficit  Goal: Patient/family/caregiver demonstrates understanding of disease process, treatment plan, medications, and discharge instructions  Description: Complete learning assessment and assess knowledge base.  Interventions:  - Provide teaching at level of understanding  - Provide teaching via preferred learning methods  Outcome: Progressing     Problem: Prexisting or High Potential for Compromised Skin Integrity  Goal: Skin integrity is maintained or improved  Description: INTERVENTIONS:  - Identify patients at risk for skin breakdown  - Assess and monitor skin integrity  - Assess and monitor nutrition and hydration status  - Monitor labs   - Assess for incontinence   - Turn and reposition patient  - Assist with mobility/ambulation  - Relieve pressure over bony prominences  - Avoid friction and shearing  - Provide appropriate hygiene as needed including keeping skin clean and dry  - Evaluate need for skin moisturizer/barrier cream  - Collaborate with interdisciplinary team   - Patient/family teaching  - Consider wound care consult   Outcome: Progressing     Problem: Nutrition/Hydration-ADULT  Goal: Nutrient/Hydration intake appropriate for improving, restoring or maintaining nutritional needs  Description: Monitor and assess patient's nutrition/hydration status for malnutrition. Collaborate with interdisciplinary team and initiate plan and interventions as ordered.  Monitor patient's weight and dietary intake as ordered or  per policy. Utilize nutrition screening tool and intervene as necessary. Determine patient's food preferences and provide high-protein, high-caloric foods as appropriate.     INTERVENTIONS:  - Monitor oral intake, urinary output, labs, and treatment plans  - Assess nutrition and hydration status and recommend course of action  - Evaluate amount of meals eaten  - Assist patient with eating if necessary   - Allow adequate time for meals  - Recommend/ encourage appropriate diets, oral nutritional supplements, and vitamin/mineral supplements  - Order, calculate, and assess calorie counts as needed  - Recommend, monitor, and adjust tube feedings and TPN/PPN based on assessed needs  - Assess need for intravenous fluids  - Provide specific nutrition/hydration education as appropriate  - Include patient/family/caregiver in decisions related to nutrition  Outcome: Progressing

## 2024-08-28 NOTE — PHYSICAL THERAPY NOTE
PHYSICAL THERAPY NOTE          Patient Name: Ofelia Aguilar  Today's Date: 8/28/2024 08/28/24 1426   PT Last Visit   PT Visit Date 08/28/24   Note Type   Note Type Treatment   Pain Assessment   Pain Assessment Tool 0-10   Pain Score 6   Pain Location/Orientation Orientation: Left;Location: Leg  (foot)   Patient's Stated Pain Goal No pain   Hospital Pain Intervention(s) Repositioned;Ambulation/increased activity   Restrictions/Precautions   Weight Bearing Precautions Per Order No   Other Precautions Fall Risk;Pain;Bed Alarm;Chair Alarm   General   Chart Reviewed Yes   Response to Previous Treatment Patient with no complaints from previous session.   Family/Caregiver Present No   Cognition   Overall Cognitive Status   (questionable)   Arousal/Participation Alert;Cooperative   Attention Within functional limits   Orientation Level Oriented X4   Following Commands Follows multistep commands without difficulty   Comments Pt agreeable to PT session   Bed Mobility   Supine to Sit 5  Supervision   Additional items Bedrails;Increased time required   Sit to Supine 5  Supervision   Additional items Bedrails;Increased time required   Transfers   Sit to Stand   (CGA)   Additional items Assist x 1;Increased time required;Verbal cues   Stand to Sit   (CGA)   Additional items Assist x 1;Armrests;Increased time required   Toilet transfer 6  Modified independent   Additional items Increased time required;Standard toilet   Additional Comments with RW, pt denied any lightheadedness/dizziness with position change   Ambulation/Elevation   Gait pattern L Foot drag;Decreased foot clearance;Step through pattern;Short stride   Gait Assistance   (CGA)   Additional items Assist x 1;Verbal cues   Assistive Device Rolling walker   Distance 40', 10' ,10'   Balance   Static Sitting Fair +   Dynamic Sitting Fair   Static Standing Fair   Dynamic Standing Fair -    Ambulatory Fair -   Endurance Deficit   Endurance Deficit Yes   Activity Tolerance   Activity Tolerance Patient limited by fatigue   Nurse Made Aware Spoke to RN   Exercises   Quad Sets Sitting;20 reps;AROM;Bilateral   Glute Sets Sitting;20 reps;AROM;Bilateral   Hip Abduction Sitting;20 reps;AROM;Bilateral   Hip Adduction Sitting;20 reps;AROM;Bilateral   Knee AROM Long Arc Quad Sitting;20 reps;AROM;Bilateral   Ankle Pumps Sitting;20 reps;AROM;Bilateral   Marching Sitting;20 reps;AROM;Bilateral   Assessment   Prognosis Good   Problem List Decreased strength;Decreased endurance;Impaired balance;Decreased mobility;Pain;Impaired sensation   Assessment Chart review and two person identifiers were completed. Pt seen for PT treatment session this date, consisting of ther ex focused on strengthening and gt training on level surfaces to improve pt safety in household environment. Since previous session, pt has made good progress in terms of increased distance ambulated. Pt greeted supine in bed and agreeable to PT session. Pt completed supine to sit with supervision. Pt completed STS with CGA and RW and ambulated 40' with no LOB. Pt completed stand to sit with CGA and completed sitting LE exercises well. Pt completed STS from recliner and ambulated 10' with RW and CGA. Pt completed toilet transfers and bathroom hygiene at a mod I level. Pt ambulated 10' with RW and CGA to bed. Pt completed stand to sit and sit to supine with supervision. Pt ended session seated in recliner with alarm activated and all needs within reach. Spoke to RN about session outcomes. Pertinent barriers during this session include pain. Current goals and POC established on IE remain appropriate. Pt prognosis for achieving goals is good, pending pt progress with hospitalization/medical status improvements, and indicated by ability to follow directions, responsive to cues/strategies, and previous response to intervention. Pt limited d/t the presence of  intractable pain. Pt continues to be functioning below baseline level, and remains limited due to factors listed above. PT will continue to see pt during current hospitalization in order to address the deficits listed above and provide interventions consistent w/ POC in effort to achieve STGs. PT recommends level 2, moderate resource intensity upon discharge.   Barriers to Discharge Inaccessible home environment   Goals   Patient Goals to go home   STG Expiration Date 09/05/24   PT Treatment Day 3   Plan   Treatment/Interventions Functional transfer training;LE strengthening/ROM;Elevations;Therapeutic exercise;Endurance training;Patient/family training;Equipment eval/education;Bed mobility;Gait training;Continued evaluation;OT   Progress Progressing toward goals   PT Frequency 3-5x/wk   Discharge Recommendation   Rehab Resource Intensity Level, PT II (Moderate Resource Intensity)   Equipment Recommended Walker   AM-PAC Basic Mobility Inpatient   Turning in Flat Bed Without Bedrails 3   Lying on Back to Sitting on Edge of Flat Bed Without Bedrails 3   Moving Bed to Chair 3   Standing Up From Chair Using Arms 3   Walk in Room 3   Climb 3-5 Stairs With Railing 2   Basic Mobility Inpatient Raw Score 17   Basic Mobility Standardized Score 39.67   [facility] Highest Level Of Mobility   -HLM Goal 5: Stand one or more mins   -HLM Achieved 7: Walk 25 feet or more   Education   Education Provided Mobility training;Home exercise program;Assistive device   Patient Demonstrates acceptance/verbal understanding;Reinforcement needed   End of Consult   Patient Position at End of Consult Supine;Bed/Chair alarm activated;All needs within reach     Ronnie Bunch, PT, DPT

## 2024-08-28 NOTE — PLAN OF CARE
Problem: PHYSICAL THERAPY ADULT  Goal: Performs mobility at highest level of function for planned discharge setting.  See evaluation for individualized goals.  Description: Treatment/Interventions: Functional transfer training, LE strengthening/ROM, Elevations, Therapeutic exercise, Endurance training, Patient/family training, Equipment eval/education, Bed mobility, Gait training, Spoke to nursing  Equipment Recommended: Walker (RW)       See flowsheet documentation for full assessment, interventions and recommendations.  Outcome: Progressing  Note: Prognosis: Good  Problem List: Decreased strength, Decreased endurance, Impaired balance, Decreased mobility, Pain, Impaired sensation  Assessment: Chart review and two person identifiers were completed. Pt seen for PT treatment session this date, consisting of ther ex focused on strengthening and gt training on level surfaces to improve pt safety in household environment. Since previous session, pt has made good progress in terms of increased distance ambulated. Pt greeted supine in bed and agreeable to PT session. Pt completed supine to sit with supervision. Pt completed STS with CGA and RW and ambulated 40' with no LOB. Pt completed stand to sit with CGA and completed sitting LE exercises well. Pt completed STS from recliner and ambulated 10' with RW and CGA. Pt completed toilet transfers and bathroom hygiene at a mod I level. Pt ambulated 10' with RW and CGA to bed. Pt completed stand to sit and sit to supine with supervision. Pt ended session seated in recliner with alarm activated and all needs within reach. Spoke to RN about session outcomes. Pertinent barriers during this session include pain. Current goals and POC established on IE remain appropriate. Pt prognosis for achieving goals is good, pending pt progress with hospitalization/medical status improvements, and indicated by ability to follow directions, responsive to cues/strategies, and previous response  to intervention. Pt limited d/t the presence of intractable pain. Pt continues to be functioning below baseline level, and remains limited due to factors listed above. PT will continue to see pt during current hospitalization in order to address the deficits listed above and provide interventions consistent w/ POC in effort to achieve STGs. PT recommends level 2, moderate resource intensity upon discharge.  Barriers to Discharge: Inaccessible home environment     Rehab Resource Intensity Level, PT: II (Moderate Resource Intensity)    See flowsheet documentation for full assessment.

## 2024-08-28 NOTE — UTILIZATION REVIEW
NOTIFICATION OF INPATIENT ADMISSION   AUTHORIZATION REQUEST   SERVICING FACILITY:   [place] [address]  Tax ID: [id number]  NPI: [id number] ATTENDING PROVIDER:  Attending Name and NPI#: César Almeida Md [[id number]]  Address: [address] [place] [address]  Phone: [phone]     ADMISSION INFORMATION:  Place of Service: Inpatient [facility]  Place of Service Code: 21  Inpatient Admission Date/Time: 8/25/24  9:36 AM  Discharge Date/Time: No discharge date for patient encounter.  Admitting Diagnosis Code/Description:  Hyponatremia [E87.1]  Weakness [R53.1]     UTILIZATION REVIEW CONTACT:  Mellissa Alvarez Utilization   Network Utilization Review Department  Phone: [phone]  Fax [phone]  Email: [email or web address]  Contact for approvals/pending authorizations, clinical reviews, and discharge.     PHYSICIAN ADVISORY SERVICES:  Medical Necessity Denial & Wzbs-jz-Ajra Review  Phone: [phone]  Fax: [phone]  Email: [email or web address]     DISCHARGE SUPPORT TEAM:  For Patients Discharge Needs & Updates  Phone: [phone] opt. 2 Fax: [phone]  Email: [email or web address]

## 2024-08-28 NOTE — CASE MANAGEMENT
Case Management Discharge Planning Note    Patient name Ofelia Aguilar  Location ICU 07/ICU 07 MRN [id number]  : 1940 Date 2024       Current Admission Date: 2024  Current Admission Diagnosis:Hyponatremia   Patient Active Problem List    Diagnosis Date Noted Date Diagnosed    Moderate protein-calorie malnutrition (HCC) 2024     PVD (peripheral vascular disease) (HCC) 2024     HTN, goal below 140/90 2024     Anxiety 2024     GERD (gastroesophageal reflux disease) 2024     Hyponatremia 2024     Recurrent UTI 2024     Anemia 2024     Urgency of urination 2024     Lumbar stenosis 2024       LOS (days): 3  Geometric Mean LOS (GMLOS) (days): 3.6  Days to GMLOS:0.2     OBJECTIVE:  Risk of Unplanned Readmission Score: 10.71         Current admission status: Inpatient   Preferred Pharmacy:   [facility]/pharmacy #[id number] - [place] - [address]  [place] [address]  Phone: [phone] Fax: [phone]    Primary Care Provider: No primary care provider on file.    Primary Insurance: MEDICARE MISC REPLACEMENT  Secondary Insurance:     DISCHARGE DETAILS:                                          Other Referral/Resources/Interventions Provided:  Referral Comments: S/W daughter re: d/c plan.  Daughter reports that pt does have a PCP in the area, a Dr. Hernandez at [facility].  All home health referrals made aware, also sent copies of insurance cards.  Awaiting any accepting providers.  Daughter reports she has not spoken w any physician during pt's hospital stay;  text sent to , asking him to call daughter.         Treatment Team Recommendation: Home with Home Health Care, Short Term Rehab, SNF  Discharge Destination Plan:: Home with Home Health Care  Transport at Discharge : Family

## 2024-08-29 LAB
ANION GAP SERPL CALCULATED.3IONS-SCNC: 5 MMOL/L (ref 4–13)
ANION GAP SERPL CALCULATED.3IONS-SCNC: 8 MMOL/L (ref 4–13)
BUN SERPL-MCNC: 15 MG/DL (ref 5–25)
BUN SERPL-MCNC: 18 MG/DL (ref 5–25)
CALCIUM SERPL-MCNC: 8.2 MG/DL (ref 8.4–10.2)
CALCIUM SERPL-MCNC: 8.4 MG/DL (ref 8.4–10.2)
CHLORIDE SERPL-SCNC: 101 MMOL/L (ref 96–108)
CHLORIDE SERPL-SCNC: 98 MMOL/L (ref 96–108)
CO2 SERPL-SCNC: 20 MMOL/L (ref 21–32)
CO2 SERPL-SCNC: 24 MMOL/L (ref 21–32)
CREAT SERPL-MCNC: 0.67 MG/DL (ref 0.6–1.3)
CREAT SERPL-MCNC: 0.83 MG/DL (ref 0.6–1.3)
ERYTHROCYTE [DISTWIDTH] IN BLOOD BY AUTOMATED COUNT: 14 % (ref 11.6–15.1)
FERRITIN SERPL-MCNC: 138 NG/ML (ref 11–307)
FOLATE SERPL-MCNC: 12.5 NG/ML
GFR SERPL CREATININE-BSD FRML MDRD: 66 ML/MIN/1.73SQ M
GFR SERPL CREATININE-BSD FRML MDRD: 82 ML/MIN/1.73SQ M
GLUCOSE SERPL-MCNC: 96 MG/DL (ref 65–140)
GLUCOSE SERPL-MCNC: 96 MG/DL (ref 65–140)
HCT VFR BLD AUTO: 23.2 % (ref 34.8–46.1)
HGB BLD-MCNC: 8.1 G/DL (ref 11.5–15.4)
IRON SATN MFR SERPL: 24 % (ref 15–50)
IRON SERPL-MCNC: 51 UG/DL (ref 50–212)
MCH RBC QN AUTO: 31.3 PG (ref 26.8–34.3)
MCHC RBC AUTO-ENTMCNC: 34.9 G/DL (ref 31.4–37.4)
MCV RBC AUTO: 90 FL (ref 82–98)
PLATELET # BLD AUTO: 240 THOUSANDS/UL (ref 149–390)
PMV BLD AUTO: 9.1 FL (ref 8.9–12.7)
POTASSIUM SERPL-SCNC: 3.8 MMOL/L (ref 3.5–5.3)
POTASSIUM SERPL-SCNC: 3.9 MMOL/L (ref 3.5–5.3)
RBC # BLD AUTO: 2.59 MILLION/UL (ref 3.81–5.12)
SODIUM SERPL-SCNC: 126 MMOL/L (ref 135–147)
SODIUM SERPL-SCNC: 130 MMOL/L (ref 135–147)
TIBC SERPL-MCNC: 217 UG/DL (ref 250–450)
TSH SERPL DL<=0.05 MIU/L-ACNC: 2.03 UIU/ML (ref 0.45–4.5)
UIBC SERPL-MCNC: 166 UG/DL (ref 155–355)
VIT B12 SERPL-MCNC: 237 PG/ML (ref 180–914)
WBC # BLD AUTO: 7.23 THOUSAND/UL (ref 4.31–10.16)

## 2024-08-29 PROCEDURE — 99232 SBSQ HOSP IP/OBS MODERATE 35: CPT | Performed by: INTERNAL MEDICINE

## 2024-08-29 PROCEDURE — 83540 ASSAY OF IRON: CPT | Performed by: STUDENT IN AN ORGANIZED HEALTH CARE EDUCATION/TRAINING PROGRAM

## 2024-08-29 PROCEDURE — 82746 ASSAY OF FOLIC ACID SERUM: CPT | Performed by: STUDENT IN AN ORGANIZED HEALTH CARE EDUCATION/TRAINING PROGRAM

## 2024-08-29 PROCEDURE — 83550 IRON BINDING TEST: CPT | Performed by: STUDENT IN AN ORGANIZED HEALTH CARE EDUCATION/TRAINING PROGRAM

## 2024-08-29 PROCEDURE — 85027 COMPLETE CBC AUTOMATED: CPT | Performed by: STUDENT IN AN ORGANIZED HEALTH CARE EDUCATION/TRAINING PROGRAM

## 2024-08-29 PROCEDURE — 84443 ASSAY THYROID STIM HORMONE: CPT | Performed by: STUDENT IN AN ORGANIZED HEALTH CARE EDUCATION/TRAINING PROGRAM

## 2024-08-29 PROCEDURE — 82728 ASSAY OF FERRITIN: CPT | Performed by: STUDENT IN AN ORGANIZED HEALTH CARE EDUCATION/TRAINING PROGRAM

## 2024-08-29 PROCEDURE — 99232 SBSQ HOSP IP/OBS MODERATE 35: CPT | Performed by: STUDENT IN AN ORGANIZED HEALTH CARE EDUCATION/TRAINING PROGRAM

## 2024-08-29 PROCEDURE — 80048 BASIC METABOLIC PNL TOTAL CA: CPT | Performed by: NURSE PRACTITIONER

## 2024-08-29 PROCEDURE — 82607 VITAMIN B-12: CPT | Performed by: STUDENT IN AN ORGANIZED HEALTH CARE EDUCATION/TRAINING PROGRAM

## 2024-08-29 RX ORDER — KETOROLAC TROMETHAMINE 30 MG/ML
15 INJECTION, SOLUTION INTRAMUSCULAR; INTRAVENOUS EVERY 6 HOURS PRN
Status: COMPLETED | OUTPATIENT
Start: 2024-08-29 | End: 2024-08-30

## 2024-08-29 RX ADMIN — Medication 2 G: at 17:16

## 2024-08-29 RX ADMIN — ENOXAPARIN SODIUM 40 MG: 40 INJECTION SUBCUTANEOUS at 09:06

## 2024-08-29 RX ADMIN — ASPIRIN 81 MG: 81 TABLET, CHEWABLE ORAL at 09:06

## 2024-08-29 RX ADMIN — KETOROLAC TROMETHAMINE 15 MG: 30 INJECTION, SOLUTION INTRAMUSCULAR at 18:02

## 2024-08-29 RX ADMIN — PANTOPRAZOLE SODIUM 40 MG: 40 TABLET, DELAYED RELEASE ORAL at 05:50

## 2024-08-29 RX ADMIN — ATORVASTATIN CALCIUM 40 MG: 40 TABLET, FILM COATED ORAL at 17:16

## 2024-08-29 RX ADMIN — Medication 2 G: at 09:07

## 2024-08-29 RX ADMIN — KETOROLAC TROMETHAMINE 15 MG: 30 INJECTION, SOLUTION INTRAMUSCULAR at 11:49

## 2024-08-29 RX ADMIN — Medication 2 G: at 13:10

## 2024-08-29 RX ADMIN — Medication 7.5 MG: at 17:16

## 2024-08-29 RX ADMIN — CLOPIDOGREL 75 MG: 75 TABLET ORAL at 09:06

## 2024-08-29 RX ADMIN — HYDROXYZINE HYDROCHLORIDE 25 MG: 25 TABLET ORAL at 09:06

## 2024-08-29 RX ADMIN — ACETAMINOPHEN 650 MG: 325 TABLET, FILM COATED ORAL at 06:04

## 2024-08-29 RX ADMIN — B-COMPLEX W/ C & FOLIC ACID TAB 1 TABLET: TAB at 09:06

## 2024-08-29 RX ADMIN — ACETAMINOPHEN 650 MG: 325 TABLET, FILM COATED ORAL at 21:45

## 2024-08-29 NOTE — PROGRESS NOTES
[facility]  Progress Note  Name: Ofelia Aguilar I  MRN: [id number]  Unit/Bed#: ICU 07 I Date of Admission: 8/25/2024   Date of Service: 8/29/2024 I Hospital Day: 4    Assessment & Plan   Moderate protein-calorie malnutrition (HCC)  Assessment & Plan  Malnutrition Findings:   Adult Malnutrition type: Acute illness  Adult Degree of Malnutrition: Malnutrition of moderate degree  Malnutrition Characteristics: Muscle loss, Inadequate energy                  360 Statement: Related to acute illness as evidenced by < 75% of estimated energy requirement for > 7 days and moderate muscle loss of clavicles. Treated with diet and PO supplement.    BMI Findings:  Adult BMI Classifications: Underweight < 18.5        Body mass index is 17.26 kg/m².       Lumbar stenosis  Assessment & Plan  CT imaging showing severe degenerative disc disease involving the lumbar spine with varying levels of mild bulging  Unable to obtain MRI imaging due to previous for metallic object in pelvis from prior surgery  PT and OT recommending rehab, patient and family declining    Urgency of urination  Assessment & Plan  Cultures has been negative, monitor off antibiotics  Patient does have urology referral from PCP    Anemia  Assessment & Plan  No evidence of bleeding  Folate WNL, B12 subtherapeutic, Iron panel pending and TSH WNL  Give b12 shot    GERD (gastroesophageal reflux disease)  Assessment & Plan  Continue PPI    Anxiety  Assessment & Plan  Continue sertraline    HTN, goal below 140/90  Assessment & Plan  Blood pressure currently controlled, at goal  Holding lisinopril    PVD (peripheral vascular disease) ([facility])  Assessment & Plan  Continue aspirin, statin and Plavix    * Hyponatremia  Assessment & Plan  Initially monitored in ICU with sodium of 117  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 Possible due to recent SSRI and gapabentin  Continue fluid restriction of 1200 cc  Continue sodium tablets 2 g 3 times daily  Nephrology  following  Discuss with nephrology, with sodium going from 129 to 126, likely due to poor po intake, will plan to give tolvaptan x1  Monitor BMP             VTE Pharmacologic Prophylaxis:   Pharmacologic: Enoxaparin (Lovenox)  Mechanical VTE Prophylaxis in Place: Yes    Current Length of Stay: 4 day(s)    Current Patient Status: Inpatient   Certification Statement: The patient will continue to require additional inpatient hospital stay due to monitor sodium levels    Discharge Plan: 24 hours    Code Status: Level 1 - Full Code      Subjective:   No events overnight. Reports doing well. Some nausea with sodium tabs. Reports po intake. Denies fevers, chills, CP or SOB.    Objective:     Vitals:   Temp (24hrs), Av.2 °F (36.8 °C), Min:98.1 °F (36.7 °C), Max:98.3 °F (36.8 °C)    Temp:  [98.1 °F (36.7 °C)-98.3 °F (36.8 °C)] 98.1 °F (36.7 °C)  HR:  [63-82] 63  Resp:  [16-18] 16  BP: (122-162)/(56-71) 162/71  SpO2:  [96 %-97 %] 96 %  Body mass index is 17.26 kg/m².     Input and Output Summary (last 24 hours):       Intake/Output Summary (Last 24 hours) at 2024 1320  Last data filed at 2024 0625  Gross per 24 hour   Intake 240 ml   Output 1150 ml   Net -910 ml       Physical Exam:     Physical Exam  Vitals and nursing note reviewed.   Constitutional:       Comments: Thin, cachectic   HENT:      Head: Normocephalic.   Eyes:      Conjunctiva/sclera: Conjunctivae normal.   Cardiovascular:      Rate and Rhythm: Normal rate.   Pulmonary:      Effort: Pulmonary effort is normal.      Breath sounds: No wheezing.   Abdominal:      Palpations: Abdomen is soft.   Musculoskeletal:         General: No swelling.      Right lower leg: No edema.      Left lower leg: No edema.   Skin:     General: Skin is warm.      Coloration: Skin is pale.   Neurological:      General: No focal deficit present.      Mental Status: She is alert. Mental status is at baseline.         Additional Data:     Labs:    Results from last 7 days    Lab Units 08/29/24  0558 08/28/24  0500 08/27/24  0552   WBC Thousand/uL 7.23   < > 4.77   HEMOGLOBIN g/dL 8.1*   < > 8.4*   HEMATOCRIT % 23.2*   < > 24.9*   PLATELETS Thousands/uL 240   < > 257   SEGS PCT %  --   --  55   LYMPHO PCT %  --   --  31   MONO PCT %  --   --  14*   EOS PCT %  --   --  0    < > = values in this interval not displayed.     Results from last 7 days   Lab Units 08/29/24  0807 08/26/24  2040 08/26/24  0508   SODIUM mmol/L 126*   < > 123*   POTASSIUM mmol/L 3.8   < > 3.9   CHLORIDE mmol/L 98   < > 93*   CO2 mmol/L 20*   < > 24   BUN mg/dL 15   < > 9   CREATININE mg/dL 0.67   < > 0.64   ANION GAP mmol/L 8   < > 6   CALCIUM mg/dL 8.4   < > 8.7   ALBUMIN g/dL  --   --  3.6   TOTAL BILIRUBIN mg/dL  --   --  0.41   ALK PHOS U/L  --   --  45   ALT U/L  --   --  11   AST U/L  --   --  17   GLUCOSE RANDOM mg/dL 96   < > 97    < > = values in this interval not displayed.         Results from last 7 days   Lab Units 08/28/24  1141 08/25/24  0754   POC GLUCOSE mg/dl 97 101         Results from last 7 days   Lab Units 08/26/24  0508 08/25/24  1234 08/25/24  0957 08/25/24  0756   LACTIC ACID mmol/L  --  1.3 2.1*  --    PROCALCITONIN ng/ml <0.05  --   --  <0.05           * I Have Reviewed All Lab Data Listed Above.  * Additional Pertinent Lab Tests Reviewed: All Labs For Current Hospital Admission Reviewed    Mobility:  Basic Mobility Inpatient Raw Score: 17  [facility] Goal: 5: Stand one or more mins  [facility] Achieved: 7: Walk 25 feet or more    Lines:     Invasive Devices       Peripheral Intravenous Line  Duration             Peripheral IV 08/29/24 Dorsal (posterior);Right Forearm <1 day                       Imaging:    Imaging Reports Reviewed Today Include:     XR abdomen 1 view kub    Result Date: 8/27/2024  Impression: Metallic objects in the pelvis appear to be within the sigmoid colon on comparison lumbar spine CT. Recommend attention on follow-up KUB to verify passage. Workstation performed:  [id number]     CT spine lumbar wo contrast    Result Date: 8/25/2024  Impression: No acute osseous abnormality. Advanced degenerative discogenic disease and grade 1 spondylolisthesis with severe L4-5 spinal stenosis. If there is no contraindication, consider follow-up MRI imaging and consultation with the spine surgical service. Additional degenerative changes as described above. Extensive aortoiliac atherosclerotic disease. Consider MRA or CTA imaging of the aorta for additional characterization. Workstation performed: [id number]     XR chest 2 views    Result Date: 8/25/2024  Impression: No acute cardiopulmonary disease. Workstation performed: [id number]     CT head without contrast    Result Date: 8/25/2024  Impression: No evidence of acute intracranial process. Chronic microangiopathy. Workstation performed: [id number]        Recent Cultures (last 7 days):     Results from last 7 days   Lab Units 08/25/24  0950   URINE CULTURE  80,000-89,000 cfu/ml       Last 24 Hours Medication List:   Current Facility-Administered Medications   Medication Dose Route Frequency Provider Last Rate    acetaminophen  650 mg Oral Q6H PRN SARAY Carias      aspirin  81 mg Oral Daily SARAY Carias      atorvastatin  40 mg Oral Daily With Dinner SARAY Carias      clopidogrel  75 mg Oral Daily SARAY Carias      Diclofenac Sodium  2 g Topical 4x Daily PRN SARAY Pineda      enoxaparin  40 mg Subcutaneous Daily SARAY Carias      hydrOXYzine HCL  25 mg Oral Q6H PRN SARAY Carias      ketorolac  15 mg Intravenous Q6H PRN César Almeida MD      melatonin  3 mg Oral HS PRN SARAY Pineda      multivitamin stress formula  1 tablet Oral Daily SARAY Carias      ondansetron  4 mg Intravenous Q6H PRN SARAY Carias      pantoprazole  40 mg Oral Early Morning SARAY Carias      polyethylene glycol  17 g Oral Daily PRN SAARY Pineda      polyethylene glycol  17 g Oral  Daily César Almeida MD      senna-docusate sodium  1 tablet Oral HS SARAY Pineda      sodium chloride  2 g Oral TID With Meals Bruno Foss MD      tolvaptan  7.5 mg Oral Once Bruno Foss MD          Today, Patient Was Seen By: César Almeida MD    ** Please Note: Dictation voice to text software may have been used in the creation of this document. **

## 2024-08-29 NOTE — ASSESSMENT & PLAN NOTE
No evidence of bleeding  Folate WNL, B12 subtherapeutic, Iron panel pending and TSH WNL  Give b12 shot

## 2024-08-29 NOTE — PROGRESS NOTES
NEPHROLOGY PROGRESS NOTE    Patient: Ofelia Aguilar               Sex: female          DOA: 8/25/2024  7:31 AM   YOB: 1940        Age:  83 y.o.        LOS:  LOS: 4 days       HPI     Patient with hyponatremia    SUBJECTIVE     Overall feeling well    No acute complaint    Still not eating well    No chest pain no palpitation    Continue with her nausea    CURRENT MEDICATIONS       Current Facility-Administered Medications:     acetaminophen (TYLENOL) tablet 650 mg, 650 mg, Oral, Q6H PRN, SARAY Carias, 650 mg at 08/29/24 0604    aspirin chewable tablet 81 mg, 81 mg, Oral, Daily, SARAY Carias, 81 mg at 08/29/24 0906    atorvastatin (LIPITOR) tablet 40 mg, 40 mg, Oral, Daily With Dinner, SARAY Carias, 40 mg at 08/28/24 1559    clopidogrel (PLAVIX) tablet 75 mg, 75 mg, Oral, Daily, SARAY Carias, 75 mg at 08/29/24 0906    Diclofenac Sodium (VOLTAREN) 1 % topical gel 2 g, 2 g, Topical, 4x Daily PRN, SARAY Pineda    enoxaparin (LOVENOX) subcutaneous injection 40 mg, 40 mg, Subcutaneous, Daily, SARAY Carias, 40 mg at 08/29/24 0906    hydrOXYzine HCL (ATARAX) tablet 25 mg, 25 mg, Oral, Q6H PRN, SARAY Carias, 25 mg at 08/29/24 0906    ketorolac (TORADOL) injection 15 mg, 15 mg, Intravenous, Q6H PRN, César Almeida MD, 15 mg at 08/29/24 1149    melatonin tablet 3 mg, 3 mg, Oral, HS PRN, SARAY Pineda, 3 mg at 08/27/24 2255    multivitamin stress formula tablet 1 tablet, 1 tablet, Oral, Daily, SARAY Carias, 1 tablet at 08/29/24 0906    ondansetron (ZOFRAN) injection 4 mg, 4 mg, Intravenous, Q6H PRN, SARAY Carias, 4 mg at 08/28/24 0657    pantoprazole (PROTONIX) EC tablet 40 mg, 40 mg, Oral, Early Morning, SARAY Carias, 40 mg at 08/29/24 0550    polyethylene glycol (MIRALAX) packet 17 g, 17 g, Oral, Daily PRN, SARAY Pineda    polyethylene glycol (MIRALAX) packet 17 g, 17 g, Oral, Daily, César Almeida MD, 17 g at 08/28/24  0906    senna-docusate sodium (SENOKOT S) 8.6-50 mg per tablet 1 tablet, 1 tablet, Oral, HS, SARAY Pineda, 1 tablet at 08/27/24 2255    sodium chloride tablet 2 g, 2 g, Oral, TID With Meals, Bruno Foss MD, 2 g at 08/29/24 0907    tolvaptan (Samsca) split tablet 7.5 mg, 7.5 mg, Oral, Once, Bruno Foss MD    OBJECTIVE     Current Weight: Weight - Scale: 45.6 kg (100 lb 8.5 oz)  Vitals:    08/29/24 0700   BP: 162/71   Pulse: 63   Resp: 16   Temp: 98.1 °F (36.7 °C)   SpO2: 96%       Intake/Output Summary (Last 24 hours) at 8/29/2024 1440  Last data filed at 8/29/2024 0625  Gross per 24 hour   Intake 240 ml   Output 750 ml   Net -510 ml       PHYSICAL EXAMINATION     Physical Exam  Constitutional:       General: She is not in acute distress.     Appearance: She is well-developed.   HENT:      Head: Normocephalic.      Mouth/Throat:      Mouth: Mucous membranes are moist.   Eyes:      General: No scleral icterus.     Conjunctiva/sclera: Conjunctivae normal.   Neck:      Vascular: No JVD.   Cardiovascular:      Rate and Rhythm: Normal rate.      Heart sounds: Normal heart sounds.   Pulmonary:      Effort: Pulmonary effort is normal.      Breath sounds: No wheezing.   Abdominal:      Palpations: Abdomen is soft.      Tenderness: There is no abdominal tenderness.   Musculoskeletal:         General: Normal range of motion.      Cervical back: Neck supple.   Skin:     General: Skin is warm.      Findings: No rash.   Neurological:      Mental Status: She is alert and oriented to person, place, and time.   Psychiatric:         Behavior: Behavior normal.          LAB RESULTS     Results from last 7 days   Lab Units 08/29/24  0807 08/29/24  0558 08/28/24  2000 08/28/24  0943 08/28/24  0500 08/27/24  2059 08/27/24  0957 08/27/24  0552 08/26/24  2040 08/26/24  0508 08/25/24  1234 08/25/24  0756   WBC Thousand/uL  --  7.23  --   --  6.34  --   --  4.77  --  4.54  --  3.62*   HEMOGLOBIN g/dL  --  8.1*  --   --  8.4*  --   --   8.4*  --  9.2*  --  10.1*   HEMATOCRIT %  --  23.2*  --   --  24.7*  --   --  24.9*  --  26.5*  --  28.8*   PLATELETS Thousands/uL  --  240  --   --  268  --   --  257  --  278  --  234   POTASSIUM mmol/L 3.8  --  3.8 3.9  --  4.0 3.7 4.2 4.1 3.9   < > 4.4   CHLORIDE mmol/L 98  --  100 99  --  101 99 98 99 93*   < > 88*   CO2 mmol/L 20*  --  24 24  --  24 22 23 22 24   < > 20*   BUN mg/dL 15  --  16 16  --  18 12 13 14 9   < > 10   CREATININE mg/dL 0.67  --  0.59* 0.69  --  0.71 0.62 0.59* 0.55* 0.64   < > 0.65   EGFR ml/min/1.73sq m 82  --  86 81  --  80 84 86 88 83   < > 83   CALCIUM mg/dL 8.4  --  8.4 8.9  --  8.7 9.0 8.6 8.3* 8.7   < > 8.6   MAGNESIUM mg/dL  --   --   --   --   --   --   --  1.9  --  2.1  --  1.7*   PHOSPHORUS mg/dL  --   --   --   --   --   --   --  3.0  --  2.9  --   --     < > = values in this interval not displayed.       RADIOLOGY RESULTS      No results found for this or any previous visit.    Results for orders placed during the hospital encounter of 08/25/24    XR chest 2 views    Narrative  XR CHEST AP AND LATERAL    INDICATION: rales at the bases.    COMPARISON: None    FINDINGS:    Clear lungs. No pneumothorax or pleural effusion.    Normal cardiomediastinal silhouette.    Bones are unremarkable for age.    Normal upper abdomen.    Impression  No acute cardiopulmonary disease.        Workstation performed: [id number]    No results found for this or any previous visit.    No results found for this or any previous visit.    No results found for this or any previous visit.    No results found for this or any previous visit.      PLAN / RECOMMENDATIONS      Hyponatremia: Fluctuating sodium.  I reduce salt tablet to 2 tablets 3 times a day because of persistent nausea.  Patient not eating well.  I will try to use tolvaptan as part of the treatment and monitor sodium closely    Hypertension: Reasonable well-controlled    Will monitor    Bruno Foss MD  Nephrology  8/29/2024        Portions  "of the record may have been created with voice recognition software. Occasional wrong word or \"sound a like\" substitutions may have occurred due to the inherent limitations of voice recognition software. Read the chart carefully and recognize, using context, where substitutions have occurred.  "

## 2024-08-29 NOTE — ASSESSMENT & PLAN NOTE
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 Possible due to recent SSRI and gapabentin  Continue fluid restriction of 1200 cc  Continue sodium tablets 2 g 3 times daily  Nephrology following  Discuss with nephrology, with sodium going from 129 to 126, likely due to poor po intake, will plan to give tolvaptan x1  Monitor BMP

## 2024-08-29 NOTE — ASSESSMENT & PLAN NOTE
Malnutrition Findings:   Adult Malnutrition type: Acute illness  Adult Degree of Malnutrition: Malnutrition of moderate degree  Malnutrition Characteristics: Muscle loss, Inadequate energy                  360 Statement: Related to acute illness as evidenced by < 75% of estimated energy requirement for > 7 days and moderate muscle loss of clavicles. Treated with diet and PO supplement.    BMI Findings:  Adult BMI Classifications: Underweight < 18.5        Body mass index is 17.26 kg/m².

## 2024-08-29 NOTE — CASE MANAGEMENT
"   Case Management Discharge Planning Note    Patient name Ofelia Aguilar  Location ICU 07/ICU 07 MRN [id number]  : 1940 Date 2024       Current Admission Date: 2024  Current Admission Diagnosis:Hyponatremia   Patient Active Problem List    Diagnosis Date Noted Date Diagnosed    Moderate protein-calorie malnutrition (HCC) 2024     PVD (peripheral vascular disease) (HCC) 2024     HTN, goal below 140/90 2024     Anxiety 2024     GERD (gastroesophageal reflux disease) 2024     Hyponatremia 2024     Recurrent UTI 2024     Anemia 2024     Urgency of urination 2024     Lumbar stenosis 2024       LOS (days): 4  Geometric Mean LOS (GMLOS) (days): 5.1  Days to GMLOS:0.8     OBJECTIVE:  Risk of Unplanned Readmission Score: 11.12         Current admission status: Inpatient   Preferred Pharmacy:   [facility]/pharmacy #[id number] - [place] - [address]  [place] [id number]  Phone: [phone] Fax: [phone]    Primary Care Provider: No primary care provider on file.    Primary Insurance: MEDICARE MISC REPLACEMENT  Secondary Insurance:     DISCHARGE DETAILS:    Discharge planning discussed with:: patient at bedside and patient's daughter over the phone  Freedom of Choice: Yes  Comments - Freedom of Choice: CM maintained freedom of choice as it pertains to discharge planning. Patient continues to refuse STR. CM advised of no available C agencies. Patient states \"I'm not going to worry about that.\" Patient reports she plans to go home w her daughter at discharge and reports she may participate in outpatient PT. Patient reports her daughter will pick her up at discharge. Patient's daughter corroborated these plans. Patient's daughter reports they would be agreeable to an outpatient order for PT at discharge.  CM contacted family/caregiver?: Yes  Were Treatment Team discharge recommendations reviewed with " patient/caregiver?: Yes  Did patient/caregiver verbalize understanding of patient care needs?: Yes  Were patient/caregiver advised of the risks associated with not following Treatment Team discharge recommendations?: Yes    Contacts  Patient Contacts: Tena  Relationship to Patient:: Family  Contact Method: Phone  Phone Number: [phone]  Reason/Outcome: Continuity of Care, Emergency Contact, Discharge Planning    Requested Home Health Care         Is the patient interested in HHC at discharge?: No    DME Referral Provided  Referral made for DME?: No    Other Referral/Resources/Interventions Provided:  Interventions: HHC  Referral Comments: HHC referral expanded today due to no accepting providers, still no accepting providers available.    Would you like to participate in our [facility] service program?  : No - Declined    Treatment Team Recommendation: Short Term Rehab  Discharge Destination Plan:: Home  Transport at Discharge : Family    IMM Given (Date):: 08/29/24  IMM Given to:: Family     Additional Comments: CM reviewed IMM with patient at bedside and patient's daughter over the phone who verbalized understanding of rights and provided verbal acknowledgement of IMM. Copy of IMM left with patient at bedside. IMM returned to medical records.

## 2024-08-29 NOTE — ASSESSMENT & PLAN NOTE
CT imaging showing severe degenerative disc disease involving the lumbar spine with varying levels of mild bulging  Unable to obtain MRI imaging due to previous for metallic object in pelvis from prior surgery  PT and OT recommending rehab, patient and family declining

## 2024-08-29 NOTE — PLAN OF CARE
Problem: PAIN - ADULT  Goal: Verbalizes/displays adequate comfort level or baseline comfort level  Description: Interventions:  - Encourage patient to monitor pain and request assistance  - Assess pain using appropriate pain scale  - Administer analgesics based on type and severity of pain and evaluate response  - Implement non-pharmacological measures as appropriate and evaluate response  - Consider cultural and social influences on pain and pain management  - Notify physician/advanced practitioner if interventions unsuccessful or patient reports new pain  Outcome: Progressing     Problem: INFECTION - ADULT  Goal: Absence or prevention of progression during hospitalization  Description: INTERVENTIONS:  - Assess and monitor for signs and symptoms of infection  - Monitor lab/diagnostic results  - Monitor all insertion sites, i.e. indwelling lines, tubes, and drains  - Monitor endotracheal if appropriate and nasal secretions for changes in amount and color  - [place] appropriate cooling/warming therapies per order  - Administer medications as ordered  - Instruct and encourage patient and family to use good hand hygiene technique  - Identify and instruct in appropriate isolation precautions for identified infection/condition  Outcome: Progressing  Goal: Absence of fever/infection during neutropenic period  Description: INTERVENTIONS:  - Monitor WBC    Outcome: Progressing     Problem: SAFETY ADULT  Goal: Patient will remain free of falls  Description: INTERVENTIONS:  - Educate patient/family on patient safety including physical limitations  - Instruct patient to call for assistance with activity   - Consult OT/PT to assist with strengthening/mobility   - Keep Call bell within reach  - Keep bed low and locked with side rails adjusted as appropriate  - Keep care items and personal belongings within reach  - Initiate and maintain comfort rounds  - Make Fall Risk Sign visible to staff  - Apply yellow socks and bracelet  for high fall risk patients  - Consider moving patient to room near nurses station  Outcome: Progressing  Goal: Maintain or return to baseline ADL function  Description: INTERVENTIONS:  -  Assess patient's ability to carry out ADLs; assess patient's baseline for ADL function and identify physical deficits which impact ability to perform ADLs (bathing, care of mouth/teeth, toileting, grooming, dressing, etc.)  - Assess/evaluate cause of self-care deficits   - Assess range of motion  - Assess patient's mobility; develop plan if impaired  - Assess patient's need for assistive devices and provide as appropriate  - Encourage maximum independence but intervene and supervise when necessary  - Involve family in performance of ADLs  - Assess for home care needs following discharge   - Consider OT consult to assist with ADL evaluation and planning for discharge  - Provide patient education as appropriate  Outcome: Progressing  Goal: Maintains/Returns to pre admission functional level  Description: INTERVENTIONS:  - Perform AM-PAC 6 Click Basic Mobility/ Daily Activity assessment daily.  - Set and communicate daily mobility goal to care team and patient/family/caregiver.   - Collaborate with rehabilitation services on mobility goals if consulted  - Out of bed for toileting  - Record patient progress and toleration of activity level   Outcome: Progressing     Problem: DISCHARGE PLANNING  Goal: Discharge to home or other facility with appropriate resources  Description: INTERVENTIONS:  - Identify barriers to discharge w/patient and caregiver  - Arrange for needed discharge resources and transportation as appropriate  - Identify discharge learning needs (meds, wound care, etc.)  - Arrange for interpretive services to assist at discharge as needed  - Refer to Case Management Department for coordinating discharge planning if the patient needs post-hospital services based on physician/advanced practitioner order or complex needs  related to functional status, cognitive ability, or social support system  Outcome: Progressing     Problem: Knowledge Deficit  Goal: Patient/family/caregiver demonstrates understanding of disease process, treatment plan, medications, and discharge instructions  Description: Complete learning assessment and assess knowledge base.  Interventions:  - Provide teaching at level of understanding  - Provide teaching via preferred learning methods  Outcome: Progressing     Problem: Prexisting or High Potential for Compromised Skin Integrity  Goal: Skin integrity is maintained or improved  Description: INTERVENTIONS:  - Identify patients at risk for skin breakdown  - Assess and monitor skin integrity  - Assess and monitor nutrition and hydration status  - Monitor labs   - Assess for incontinence   - Turn and reposition patient  - Assist with mobility/ambulation  - Relieve pressure over bony prominences  - Avoid friction and shearing  - Provide appropriate hygiene as needed including keeping skin clean and dry  - Evaluate need for skin moisturizer/barrier cream  - Collaborate with interdisciplinary team   - Patient/family teaching  - Consider wound care consult   Outcome: Progressing     Problem: Nutrition/Hydration-ADULT  Goal: Nutrient/Hydration intake appropriate for improving, restoring or maintaining nutritional needs  Description: Monitor and assess patient's nutrition/hydration status for malnutrition. Collaborate with interdisciplinary team and initiate plan and interventions as ordered.  Monitor patient's weight and dietary intake as ordered or per policy. Utilize nutrition screening tool and intervene as necessary. Determine patient's food preferences and provide high-protein, high-caloric foods as appropriate.     INTERVENTIONS:  - Monitor oral intake, urinary output, labs, and treatment plans  - Assess nutrition and hydration status and recommend course of action  - Evaluate amount of meals eaten  - Assist  patient with eating if necessary   - Allow adequate time for meals  - Recommend/ encourage appropriate diets, oral nutritional supplements, and vitamin/mineral supplements  - Order, calculate, and assess calorie counts as needed  - Recommend, monitor, and adjust tube feedings and TPN/PPN based on assessed needs  - Assess need for intravenous fluids  - Provide specific nutrition/hydration education as appropriate  - Include patient/family/caregiver in decisions related to nutrition  Outcome: Progressing

## 2024-08-29 NOTE — UTILIZATION REVIEW
Continued Stay Review    Date: 8/29/24 for  8/28/24                           Current Patient Class: Inpatient  Current Level of Care: Critical care / ICU     HPI:83 y.o. female initially admitted on 8/25/24      Assessment/Plan: 8/28/24 Hyponatremia of 117 on admission . 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 Possible due to recent SSRI vs underlying malignancy . Pt on FR 1200 ml . Decreased  na tabs to 2 G TID  from 3 G TID today. Current na 130. . Per nephrology, if the sodium continues get better , will change salt tabs to 1 G  3 times a day  Overall feeling well. Tolerating diet. Nausea improving.   Will continue to require additional inpatient hospital stay due to monitor sodium levels . Anemia . Hgb stable 8.4 No evidence bleeding. Check anemia labs, b12, folate, tsh and iron panel .   PT-Rehab Resource Intensity Level: II (Moderate ) . Pt completed supine to sit with supervision. Pt completed STS with CGA and RW and ambulated 40' with no LOB. Pt completed stand to sit with CGA and completed sitting LE exercises well. Pt completed STS from recliner and ambulated 10' with RW and CGA. Pt completed toilet transfers and bathroom hygiene at a mod I level. Pt ambulated 10' with RW and CGA to bed. Pt completed stand to sit and sit to supine with supervision     Vital Signs (last 3 days)       Date/Time Temp Pulse Resp BP MAP (mmHg) SpO2 O2 Device Patient Position - Orthostatic VS [place] Com[place] Scale Score Pain    08/29/24 1149 -- -- -- -- -- -- -- -- -- 9    08/29/24 0800 -- -- -- -- -- -- -- -- 15 3    08/29/24 0700 98.1 °F (36.7 °C) 63 16 162/71 102 96 % -- Lying -- --    08/29/24 0604 -- -- -- -- -- -- -- -- -- 8 08/28/24 2201 -- -- -- -- -- -- -- -- -- 8    08/28/24 2052 -- -- -- -- -- -- -- -- -- 7    08/28/24 2000 -- -- -- -- -- -- -- -- 15 No Pain    08/28/24 1840 98.2 °F (36.8 °C) 78 18 122/56 81 96 % None (Room air) Lying -- 8 08/28/24 1659 -- -- -- -- -- -- -- -- -- 2    08/28/24 1559 -- -- -- -- --  -- -- -- -- 9    08/28/24 1515 98.3 °F (36.8 °C) 82 17 140/62 89 97 % None (Room air) Lying -- 6    08/28/24 1426 -- -- -- -- -- -- -- -- -- 6    08/28/24 1100 98.8 °F (37.1 °C) 81 15 146/68 98 96 % None (Room air) Lying -- --    08/28/24 0842 -- 87 -- 148/98 117 98 % -- -- -- --    08/28/24 0800 -- -- -- -- -- -- -- -- 15 3    08/28/24 0700 98.2 °F (36.8 °C) 72 16 160/74 107 97 % -- Lying -- --    08/28/24 0221 97.8 °F (36.6 °C) 69 16 148/66 95 96 % None (Room air) Lying -- --    08/28/24 0203 -- 68 -- -- -- 95 % -- -- -- --    08/27/24 2300 98.8 °F (37.1 °C) 70 18 149/66 95 97 % None (Room air) Lying -- --    08/27/24 2100 -- 89 -- -- -- 95 % -- -- -- 3    08/27/24 2012 -- -- -- -- -- -- -- -- -- 7    08/27/24 1900 98.7 °F (37.1 °C) 81 15 145/67 97 98 % -- Lying -- --    08/27/24 1315 -- -- -- -- -- -- -- -- -- No Pain    08/27/24 1300 -- 128 45 -- -- -- -- -- -- --    08/27/24 1213 -- -- -- -- -- -- -- -- -- 5    08/27/24 1100 -- -- -- -- -- -- None (Room air) Lying -- 6    08/27/24 0957 -- 83 20 134/62 89 97 % -- -- -- --    08/27/24 0815 -- 72 -- -- -- -- -- -- 15 No Pain    08/27/24 0700 98.7 °F (37.1 °C) 69 17 137/72 98 95 % None (Room air) -- -- --    08/27/24 0040 -- -- -- -- -- -- -- -- -- No Pain    08/27/24 0015 -- -- -- -- -- -- -- -- -- 8    08/27/24 0000 -- -- -- -- -- -- -- -- 15 --    08/26/24 2015 -- -- -- -- -- -- -- -- 15 --    08/26/24 1307 -- -- -- -- -- -- -- -- -- 7    08/26/24 1306 -- -- -- -- -- -- -- -- -- 7    08/26/24 1158 -- -- -- -- -- -- -- -- 15 --    08/26/24 0800 -- -- -- -- -- -- -- -- 15 --    08/26/24 0514 -- -- -- -- -- -- -- -- 15 --    08/26/24 0510 99 °F (37.2 °C) 67 22 159/70 101 94 % None (Room air) Lying -- No Pain    08/26/24 0052 -- -- -- -- -- -- -- -- 15 --    08/26/24 0000 -- -- -- -- -- -- -- -- 15 --          Weight (last 2 days)       Date/Time Weight    08/29/24 0600 45.6 (100.53)    08/28/24 0600 44 (97)    08/27/24 0600 46.3 (102.07)              Pertinent  Labs/Diagnostic Results:   Radiology:  XR abdomen 1 view kub   Final Interpretation by Zuhair Mayorga MD (08/27 0903)   Metallic objects in the pelvis appear to be within the sigmoid colon on comparison lumbar spine CT. Recommend attention on follow-up KUB to verify passage.               Workstation performed: [id number]         CT spine lumbar wo contrast   Final Interpretation by Pallav N Shah, MD (08/25 1453)      No acute osseous abnormality.      Advanced degenerative discogenic disease and grade 1 spondylolisthesis with severe L4-5 spinal stenosis. If there is no contraindication, consider follow-up MRI imaging and consultation with the spine surgical service.      Additional degenerative changes as described above.      Extensive aortoiliac atherosclerotic disease. Consider MRA or CTA imaging of the aorta for additional characterization.      Workstation performed: [id number]         XR chest 2 views   ED Interpretation by Shira Edward DO (08/25 0918)   NAD      Final Interpretation by Mer Rooney MD (08/25 1411)      No acute cardiopulmonary disease.            Workstation performed: [id number]         CT head without contrast   Final Interpretation by Sukhwinder Cotter MD (08/25 0819)      No evidence of acute intracranial process.      Chronic microangiopathy.                  Workstation performed: [id number]           Cardiology:  ECG 12 lead   Final Result by Alison Lloyd MD (08/26 0089)   Sinus rhythm with Premature atrial complexes   Left bundle branch block   Abnormal ECG   No previous ECGs available   Confirmed by Alison Lloyd ([id number]) on 8/26/2024 3:58:36 PM        GI:  No orders to display       Results from last 7 days   Lab Units 08/25/24  0756   SARS-COV-2  Negative     Results from last 7 days   Lab Units 08/29/24  0558 08/28/24  0500 08/27/24  0552 08/26/24  0508 08/25/24  0756   WBC Thousand/uL 7.23 6.34 4.77 4.54 3.62*   HEMOGLOBIN g/dL 8.1* 8.4*  8.4* 9.2* 10.1*   HEMATOCRIT % 23.2* 24.7* 24.9* 26.5* 28.8*   PLATELETS Thousands/uL 240 268 257 278 234   TOTAL NEUT ABS Thousands/µL  --   --  2.58  --  2.09         Results from last 7 days   Lab Units 08/29/24  0807 08/28/24 2000 08/28/24  0943 08/27/24 2059 08/27/24  0957 08/27/24  0552 08/26/24 2040 08/26/24  0508 08/25/24  1234 08/25/24  0756   SODIUM mmol/L 126* 129* 130* 132* 128* 126*   < > 123*   < > 117*   POTASSIUM mmol/L 3.8 3.8 3.9 4.0 3.7 4.2   < > 3.9   < > 4.4   CHLORIDE mmol/L 98 100 99 101 99 98   < > 93*   < > 88*   CO2 mmol/L 20* 24 24 24 22 23   < > 24   < > 20*   ANION GAP mmol/L 8 5 7 7 7 5   < > 6   < > 9   BUN mg/dL 15 16 16 18 12 13   < > 9   < > 10   CREATININE mg/dL 0.67 0.59* 0.69 0.71 0.62 0.59*   < > 0.64   < > 0.65   EGFR ml/min/1.73sq m 82 86 81 80 84 86   < > 83   < > 83   CALCIUM mg/dL 8.4 8.4 8.9 8.7 9.0 8.6   < > 8.7   < > 8.6   CALCIUM, IONIZED mmol/L  --   --   --   --   --  1.12  --  1.14  --   --    MAGNESIUM mg/dL  --   --   --   --   --  1.9  --  2.1  --  1.7*   PHOSPHORUS mg/dL  --   --   --   --   --  3.0  --  2.9  --   --     < > = values in this interval not displayed.     Results from last 7 days   Lab Units 08/26/24 0508 08/25/24  0756   AST U/L 17 25   ALT U/L 11 11   ALK PHOS U/L 45 45   TOTAL PROTEIN g/dL 5.7* 6.1*   ALBUMIN g/dL 3.6 3.7   TOTAL BILIRUBIN mg/dL 0.41 0.50   BILIRUBIN DIRECT mg/dL 0.06  --      Results from last 7 days   Lab Units 08/28/24  1141 08/25/24  0754   POC GLUCOSE mg/dl 97 101     Results from last 7 days   Lab Units 08/29/24  0807 08/28/24  2000 08/28/24  0943 08/27/24  2059 08/27/24  0957 08/27/24  0552 08/26/24  2040 08/26/24  0508 08/25/24  2349 08/25/24  1735 08/25/24  1234 08/25/24  0756   GLUCOSE RANDOM mg/dL 96 88 96 98 114 93 92 97 98 143* 118 107     Results from last 7 days   Lab Units 08/26/24  0508   OSMOLALITY, SERUM mmol/*           Results from last 7 days   Lab Units 08/25/24  1234 08/25/24  0957 08/25/24  0756    HS TNI 0HR ng/L  --   --  4   HS TNI 2HR ng/L  --  4  --    HSTNI D2 ng/L  --  0  --    HS TNI 4HR ng/L 4  --   --    HSTNI D4 ng/L 0  --   --              Results from last 7 days   Lab Units 08/29/24  0558 08/25/24  0756   TSH 3RD GENERATON uIU/mL 2.028 3.776     Results from last 7 days   Lab Units 08/26/24  0508 08/25/24  0756   PROCALCITONIN ng/ml <0.05 <0.05     Results from last 7 days   Lab Units 08/25/24  1234 08/25/24  0957   LACTIC ACID mmol/L 1.3 2.1*             Results from last 7 days   Lab Units 08/25/24  0756   BNP pg/mL 88     Results from last 7 days   Lab Units 08/29/24  0558   FERRITIN ng/mL 138           Results from last 7 days   Lab Units 08/26/24  0508 08/25/24  0951   OSMOLALITY, SERUM mmol/*  --    OSMO UR mmol/KG  --  282     Results from last 7 days   Lab Units 08/25/24  1400 08/25/24  0951 08/25/24  0950   CLARITY UA  Clear  --  Turbid   COLOR UA  Colorless  --  Light Yellow   SPEC GRAV UA  1.008  --  1.009   PH UA  6.0  --  6.5   GLUCOSE UA mg/dl Negative  --  Negative   KETONES UA mg/dl Negative  --  Negative   BLOOD UA  Negative  --  Negative   PROTEIN UA mg/dl Negative  --  Negative   NITRITE UA  Negative  --  Negative   BILIRUBIN UA  Negative  --  Negative   UROBILINOGEN UA (BE) mg/dl <2.0  --  <2.0   LEUKOCYTES UA  Small*  --  Moderate*   WBC UA /hpf 4-10*  --  10-20*   RBC UA /hpf 1-2  --  1-2   BACTERIA UA /hpf Occasional  --  Occasional   EPITHELIAL CELLS WET PREP /hpf Occasional  --  Moderate*   SODIUM UR   --  61  --      Results from last 7 days   Lab Units 08/25/24  0756   INFLUENZA A PCR  Negative   INFLUENZA B PCR  Negative   RSV PCR  Negative           Results from last 7 days   Lab Units 08/25/24  0950   URINE CULTURE  80,000-89,000 cfu/ml                   Medications:   Scheduled Medications:  aspirin, 81 mg, Oral, Daily  atorvastatin, 40 mg, Oral, Daily With Dinner  clopidogrel, 75 mg, Oral, Daily  enoxaparin, 40 mg, Subcutaneous, Daily  multivitamin stress  formula, 1 tablet, Oral, Daily  pantoprazole, 40 mg, Oral, Early Morning  polyethylene glycol, 17 g, Oral, Daily  senna-docusate sodium, 1 tablet, Oral, HS  sodium chloride, 2 g, Oral, TID With Meals  sodium chloride tablet 3 g  Dose: 3 g  Freq: 3 times daily with meals Route: PO  Start: 08/25/24 1630 End: 08/28/24 0831      Continuous IV Infusions:     PRN Meds:  acetaminophen, 650 mg, Oral, Q6H PRN x1 8/28, x1 8/29   Diclofenac Sodium, 2 g, Topical, 4x Daily PRN  hydrOXYzine HCL, 25 mg, Oral, Q6H PRN x1 8/29   ketorolac, 15 mg, Intravenous, Q6H PRN x2 8/28, x1 8/29   melatonin, 3 mg, Oral, HS PRN  ondansetron, 4 mg, Intravenous, Q6H PRN x1 8/28   polyethylene glycol, 17 g, Oral, Daily PRN        Discharge Plan: TBD    Network Utilization Review Department  ATTENTION: Please call with any questions or concerns to [phone] and carefully listen to the prompts so that you are directed to the right person. All voicemails are confidential.   For Discharge needs, contact Care Management DC Support Team at [phone] opt. [phone]  Send all requests for admission clinical reviews, approved or denied determinations and any other requests to dedicated fax number below belonging to the campus where the patient is receiving treatment. List of dedicated fax numbers for the Facilities:  FACILITY NAME UR FAX NUMBER   ADMISSION DENIALS (Administrative/Medical Necessity) [phone]   DISCHARGE SUPPORT TEAM (NETWORK) [phone]   PARENT CHILD HEALTH (Maternity/NICU/Pediatrics) [phone]   [facility] [phone]   [facility] [phone]   [facility] [phone]   [facility] [phone]   [facility] [phone]   [facility] [phone]   [facility] [phone]   [facility] [phone]   [facility]  [facility] [phone]   [facility] [phone]   [facility] [phone]   [facility] [phone]

## 2024-08-30 VITALS
SYSTOLIC BLOOD PRESSURE: 157 MMHG | BODY MASS INDEX: 17.28 KG/M2 | TEMPERATURE: 98.4 F | WEIGHT: 101.19 LBS | HEIGHT: 64 IN | OXYGEN SATURATION: 95 % | RESPIRATION RATE: 18 BRPM | DIASTOLIC BLOOD PRESSURE: 71 MMHG | HEART RATE: 65 BPM

## 2024-08-30 LAB
ANION GAP SERPL CALCULATED.3IONS-SCNC: 8 MMOL/L (ref 4–13)
BUN SERPL-MCNC: 14 MG/DL (ref 5–25)
CALCIUM SERPL-MCNC: 8.9 MG/DL (ref 8.4–10.2)
CHLORIDE SERPL-SCNC: 101 MMOL/L (ref 96–108)
CO2 SERPL-SCNC: 25 MMOL/L (ref 21–32)
CREAT SERPL-MCNC: 0.84 MG/DL (ref 0.6–1.3)
GFR SERPL CREATININE-BSD FRML MDRD: 65 ML/MIN/1.73SQ M
GLUCOSE SERPL-MCNC: 84 MG/DL (ref 65–140)
POTASSIUM SERPL-SCNC: 3.6 MMOL/L (ref 3.5–5.3)
SODIUM SERPL-SCNC: 134 MMOL/L (ref 135–147)

## 2024-08-30 PROCEDURE — 99239 HOSP IP/OBS DSCHRG MGMT >30: CPT | Performed by: STUDENT IN AN ORGANIZED HEALTH CARE EDUCATION/TRAINING PROGRAM

## 2024-08-30 PROCEDURE — 99232 SBSQ HOSP IP/OBS MODERATE 35: CPT | Performed by: INTERNAL MEDICINE

## 2024-08-30 PROCEDURE — 80048 BASIC METABOLIC PNL TOTAL CA: CPT | Performed by: NURSE PRACTITIONER

## 2024-08-30 RX ORDER — KETOROLAC TROMETHAMINE 10 MG/1
10 TABLET, FILM COATED ORAL 2 TIMES DAILY PRN
Qty: 10 TABLET | Refills: 0 | Status: SHIPPED | OUTPATIENT
Start: 2024-08-30

## 2024-08-30 RX ORDER — KETOROLAC TROMETHAMINE 10 MG/1
10 TABLET, FILM COATED ORAL 2 TIMES DAILY PRN
Qty: 60 TABLET | Refills: 0 | Status: SHIPPED | OUTPATIENT
Start: 2024-08-30 | End: 2024-08-30

## 2024-08-30 RX ORDER — SODIUM CHLORIDE 1 G/1
2 TABLET ORAL
Qty: 180 TABLET | Refills: 0 | Status: SHIPPED | OUTPATIENT
Start: 2024-08-30 | End: 2024-09-29

## 2024-08-30 RX ORDER — CYANOCOBALAMIN 1000 UG/ML
1000 INJECTION, SOLUTION INTRAMUSCULAR; SUBCUTANEOUS
Status: DISCONTINUED | OUTPATIENT
Start: 2024-08-30 | End: 2024-08-30 | Stop reason: HOSPADM

## 2024-08-30 RX ORDER — AMLODIPINE BESYLATE 5 MG/1
5 TABLET ORAL DAILY
Status: DISCONTINUED | OUTPATIENT
Start: 2024-08-30 | End: 2024-08-30 | Stop reason: HOSPADM

## 2024-08-30 RX ORDER — LANOLIN ALCOHOL/MO/W.PET/CERES
1000 CREAM (GRAM) TOPICAL DAILY
Qty: 30 TABLET | Refills: 0 | Status: SHIPPED | OUTPATIENT
Start: 2024-08-30

## 2024-08-30 RX ORDER — AMLODIPINE BESYLATE 5 MG/1
5 TABLET ORAL DAILY
Qty: 30 TABLET | Refills: 0 | Status: SHIPPED | OUTPATIENT
Start: 2024-08-30 | End: 2024-09-29

## 2024-08-30 RX ADMIN — KETOROLAC TROMETHAMINE 15 MG: 30 INJECTION, SOLUTION INTRAMUSCULAR at 00:03

## 2024-08-30 RX ADMIN — CYANOCOBALAMIN 1000 MCG: 1000 INJECTION, SOLUTION INTRAMUSCULAR; SUBCUTANEOUS at 12:02

## 2024-08-30 RX ADMIN — ENOXAPARIN SODIUM 40 MG: 40 INJECTION SUBCUTANEOUS at 08:30

## 2024-08-30 RX ADMIN — AMLODIPINE BESYLATE 5 MG: 5 TABLET ORAL at 12:02

## 2024-08-30 RX ADMIN — Medication 2 G: at 12:02

## 2024-08-30 RX ADMIN — Medication 2 G: at 08:30

## 2024-08-30 RX ADMIN — Medication 2 G: at 06:05

## 2024-08-30 RX ADMIN — PANTOPRAZOLE SODIUM 40 MG: 40 TABLET, DELAYED RELEASE ORAL at 06:04

## 2024-08-30 RX ADMIN — KETOROLAC TROMETHAMINE 15 MG: 30 INJECTION, SOLUTION INTRAMUSCULAR at 06:06

## 2024-08-30 RX ADMIN — B-COMPLEX W/ C & FOLIC ACID TAB 1 TABLET: TAB at 08:30

## 2024-08-30 RX ADMIN — ASPIRIN 81 MG: 81 TABLET, CHEWABLE ORAL at 08:30

## 2024-08-30 RX ADMIN — CLOPIDOGREL 75 MG: 75 TABLET ORAL at 08:30

## 2024-08-30 NOTE — DISCHARGE SUMMARY
Formerly [facility]  Discharge- Ofelia Aguilar 1940, 83 y.o. female MRN: [id number]  Unit/Bed#: ICU 07 Encounter: [id number]  Primary Care Provider: No primary care provider on file.   Date and time admitted to hospital: 8/25/2024  7:31 AM    Moderate protein-calorie malnutrition (HCC)  Assessment & Plan  Malnutrition Findings:   Adult Malnutrition type: Acute illness  Adult Degree of Malnutrition: Malnutrition of moderate degree  Malnutrition Characteristics: Muscle loss, Inadequate energy                  360 Statement: Related to acute illness as evidenced by < 75% of estimated energy requirement for > 7 days and moderate muscle loss of clavicles. Treated with diet and PO supplement.    BMI Findings:  Adult BMI Classifications: Underweight < 18.5        Body mass index is 17.37 kg/m².       Lumbar stenosis  Assessment & Plan  CT imaging showing severe degenerative disc disease involving the lumbar spine with varying levels of mild bulging  Unable to obtain MRI imaging due to previous for metallic object in pelvis from prior surgery  PT and OT recommending rehab, patient and family declining  Does report lower extremity pain, involving her left foot.  Had improvement with Toradol.  Recommend short course of NSAIDs.  Caution with history of PAD on aspirin and Plavix    Urgency of urination  Assessment & Plan  Cultures has been negative, monitor off antibiotics  Patient does have urology referral from PCP    Anemia  Assessment & Plan  No evidence of bleeding  Folate WNL, B12 subtherapeutic, Iron panel WNL and TSH WNL  Give b12 shot, then recommend 1000mcg daily thereafter  Check B12 levels in 3 months    GERD (gastroesophageal reflux disease)  Assessment & Plan  Continue PPI    Anxiety  Assessment & Plan  Continue sertraline    HTN, goal below 140/90  Assessment & Plan  Blood pressure currently controlled, at goal  Discontinue lisinopril and added amlodipine 5 mg once daily    PVD (peripheral  vascular disease) (HCC)  Assessment & Plan  Continue aspirin, statin and Plavix    * Hyponatremia  Assessment & Plan  Initially monitored in ICU with sodium of 117  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 Possible due to recent SSRI and gapabentin  Fluid restriction of 1200 cc  Continue sodium tablets 2 g 3 times daily  S/p tolvaptan x 1  Sodium improved to 134 on discharge.  Nephrology recommending sodium tablets 2 g 3 times daily  Follow-up with nephrology in 3 weeks with repeat BMP        Discharging Physician / Practitioner: César Almeida MD  PCP: No primary care provider on file.  Admission Date:   Admission Orders (From admission, onward)       Ordered        08/25/24 0943  INPATIENT ADMISSION  Once                          Discharge Date: 08/30/24    Medical Problems       Resolved Problems  Date Reviewed: 8/29/2024   None         Consultations During Hospital Stay:  Nephrology    Procedures Performed:   none    Significant Findings / Test Results:   XR abdomen 1 view kub    Result Date: 8/27/2024  Impression: Metallic objects in the pelvis appear to be within the sigmoid colon on comparison lumbar spine CT. Recommend attention on follow-up KUB to verify passage. Workstation performed: [id number]     CT spine lumbar wo contrast    Result Date: 8/25/2024  Impression: No acute osseous abnormality. Advanced degenerative discogenic disease and grade 1 spondylolisthesis with severe L4-5 spinal stenosis. If there is no contraindication, consider follow-up MRI imaging and consultation with the spine surgical service. Additional degenerative changes as described above. Extensive aortoiliac atherosclerotic disease. Consider MRA or CTA imaging of the aorta for additional characterization. Workstation performed: [id number]     XR chest 2 views    Result Date: 8/25/2024  Impression: No acute cardiopulmonary disease. Workstation performed: [id number]     CT head without contrast    Result Date: 8/25/2024  Impression: No  "evidence of acute intracranial process. Chronic microangiopathy. Workstation performed: [id number]        Incidental Findings:   none     Test Results Pending at Discharge (will require follow up):   none     Outpatient Tests Requested:  Repeat BMP in 2-3 weeks, follow up with nephrology    Complications:  none    Reason for Admission: Hyponatremia    Hospital Course:     Ofelia Aguilar is a 83 y.o. female patient who originally presented to the hospital on 8/25/2024 due to generalized weakness.  She was initially admitted to the ICU where she was monitored due to severe hyponatremia with 117 on admission.  She had symptoms including fatigue, confusion, dizziness and persistent nausea.  She was placed on fluid restriction, and given fluids.  Her urine sodium was elevated at 61 suggesting SIADH.  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  Patient denies drinking excessive amounts of fluids.  Her sodium did improve, though she did not tolerate sodium tablets while at high doses.  Nephrology did recommend giving her a dose of tolvaptan which her sodium improving from 126-134 the following day.  As patient symptomatically improved, she was discharged home to follow-up with nephrology outpatient in 3 weeks with repeat BMP in 2 weeks.    Please see above list of diagnoses and related plan for additional information.     Condition at Discharge: fair     Discharge Day Visit / Exam:     Subjective:    Reports doing well today.  Denies any dizziness, nausea, chest pain or shortness of breath.  Ambulating without difficulty.  Has tolerated diet, with improved appetite.  Vitals: Blood Pressure: 157/71 (08/30/24 0700)  Pulse: 65 (08/30/24 0700)  Temperature: 98.4 °F (36.9 °C) (08/30/24 0700)  Temp Source: Oral (08/30/24 0700)  Respirations: 18 (08/30/24 0700)  Height: 5' 4\" (162.6 cm) (08/26/24 0600)  Weight - Scale: 45.9 kg (101 lb 3.1 oz) (08/30/24 0550)  SpO2: 95 % (08/30/24 0700)  Exam: "   Physical Exam  Vitals and nursing note reviewed.   Constitutional:       Comments: Thin, cachectic   HENT:      Head: Normocephalic.   Eyes:      Conjunctiva/sclera: Conjunctivae normal.   Cardiovascular:      Rate and Rhythm: Normal rate.   Pulmonary:      Effort: Pulmonary effort is normal.      Breath sounds: No wheezing.   Abdominal:      Palpations: Abdomen is soft.   Musculoskeletal:         General: No swelling.      Right lower leg: No edema.      Left lower leg: No edema.   Skin:     General: Skin is warm.      Coloration: Skin is pale.   Neurological:      General: No focal deficit present.      Mental Status: She is alert. Mental status is at baseline.         Discharge instructions/Information to patient and family:   See after visit summary for information provided to patient and family.      Provisions for Follow-Up Care:  See after visit summary for information related to follow-up care and any pertinent home health orders.      Disposition:     Home     Discharge Statement:  I spent 40 minutes discharging the patient. This time was spent on the day of discharge. I had direct contact with the patient on the day of discharge. Greater than 50% of the total time was spent examining patient, answering all patient questions, arranging and discussing plan of care with patient as well as directly providing post-discharge instructions.  Additional time then spent on discharge activities.    Discharge Medications:  See after visit summary for reconciled discharge medications provided to patient and family.      ** Please Note: This note has been constructed using a voice recognition system **

## 2024-08-30 NOTE — ASSESSMENT & PLAN NOTE
No evidence of bleeding  Folate WNL, B12 subtherapeutic, Iron panel WNL and TSH WNL  Give b12 shot, then recommend 1000mcg daily thereafter  Check B12 levels in 3 months

## 2024-08-30 NOTE — UTILIZATION REVIEW
NOTIFICATION OF ADMISSION DISCHARGE   This is a Notification of Discharge from [facility]. Please be advised that this patient has been discharge from our facility. Below you will find the admission and discharge date and time including the patient’s disposition.   UTILIZATION REVIEW CONTACT:  Jasmina Alvarez  Utilization   Network Utilization Review Department  Phone: [phone] x carefully listen to the prompts. All voicemails are confidential.  Email: [email or web address]     ADMISSION INFORMATION  PRESENTATION DATE: 8/25/2024  7:31 AM  OBERVATION ADMISSION DATE: N/A  INPATIENT ADMISSION DATE: 8/25/24  9:36 AM   DISCHARGE DATE: 8/30/2024 12:28 PM   DISPOSITION:Home/Self Care    Network Utilization Review Department  ATTENTION: Please call with any questions or concerns to [phone] and carefully listen to the prompts so that you are directed to the right person. All voicemails are confidential.   For Discharge needs, contact Care Management DC Support Team at [phone] opt. 2  Send all requests for admission clinical reviews, approved or denied determinations and any other requests to dedicated fax number below belonging to the campus where the patient is receiving treatment. List of dedicated fax numbers for the Facilities:  FACILITY NAME UR FAX NUMBER   ADMISSION DENIALS (Administrative/Medical Necessity) [phone]   DISCHARGE SUPPORT TEAM ([facility]) [phone]   PARENT CHILD HEALTH (Maternity/NICU/Pediatrics) [phone]   [facility] [phone]   [facility] [phone]   [facility] [phone]   [facility] [phone]   [facility] [phone]   [facility] [phone]   [facility] [phone]   [facility]  [phone]   [facility] [phone]   [facility] [phone]   [facility] [phone]   [facility] [phone]

## 2024-08-30 NOTE — ASSESSMENT & PLAN NOTE
CT imaging showing severe degenerative disc disease involving the lumbar spine with varying levels of mild bulging  Unable to obtain MRI imaging due to previous for metallic object in pelvis from prior surgery  PT and OT recommending rehab, patient and family declining  Does report lower extremity pain, involving her left foot.  Had improvement with Toradol.  Recommend short course of NSAIDs.  Caution with history of PAD on aspirin and Plavix

## 2024-08-30 NOTE — ASSESSMENT & PLAN NOTE
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 Possible due to recent SSRI and gapabentin  Fluid restriction of 1200 cc  Continue sodium tablets 2 g 3 times daily  S/p tolvaptan x 1  Sodium improved to 134 on discharge.  Nephrology recommending sodium tablets 2 g 3 times daily  Follow-up with nephrology in 3 weeks with repeat BMP

## 2024-08-30 NOTE — ASSESSMENT & PLAN NOTE
Malnutrition Findings:   Adult Malnutrition type: Acute illness  Adult Degree of Malnutrition: Malnutrition of moderate degree  Malnutrition Characteristics: Muscle loss, Inadequate energy                  360 Statement: Related to acute illness as evidenced by < 75% of estimated energy requirement for > 7 days and moderate muscle loss of clavicles. Treated with diet and PO supplement.    BMI Findings:  Adult BMI Classifications: Underweight < 18.5        Body mass index is 17.37 kg/m².

## 2024-08-30 NOTE — PROGRESS NOTES
NEPHROLOGY PROGRESS NOTE    Patient: Ofelia Aguilar               Sex: female          DOA: 8/25/2024  7:31 AM   YOB: 1940        Age:  83 y.o.        LOS:  LOS: 5 days   8/30/2024    REASON FOR THE CONSULTATION:      Hyponatremia    SUBJECTIVE     Patient sitting up in ICU and feels well.  Denies any dizziness, nausea, vomiting, diarrhea    CURRENT MEDICATIONS       Current Facility-Administered Medications:     acetaminophen (TYLENOL) tablet 650 mg, 650 mg, Oral, Q6H PRN, SARAY Carias, 650 mg at 08/29/24 2145    aspirin chewable tablet 81 mg, 81 mg, Oral, Daily, SARAY Carias, 81 mg at 08/30/24 0830    atorvastatin (LIPITOR) tablet 40 mg, 40 mg, Oral, Daily With Dinner, SARAY Carias, 40 mg at 08/29/24 1716    clopidogrel (PLAVIX) tablet 75 mg, 75 mg, Oral, Daily, SARAY Carias, 75 mg at 08/30/24 0830    Diclofenac Sodium (VOLTAREN) 1 % topical gel 2 g, 2 g, Topical, 4x Daily PRN, SARAY Pineda, 2 g at 08/30/24 0605    enoxaparin (LOVENOX) subcutaneous injection 40 mg, 40 mg, Subcutaneous, Daily, SARAY Carias, 40 mg at 08/30/24 0830    hydrOXYzine HCL (ATARAX) tablet 25 mg, 25 mg, Oral, Q6H PRN, SARAY Carias, 25 mg at 08/29/24 0906    melatonin tablet 3 mg, 3 mg, Oral, HS PRN, SARAY Pineda, 3 mg at 08/27/24 2255    multivitamin stress formula tablet 1 tablet, 1 tablet, Oral, Daily, SARAY Carias, 1 tablet at 08/30/24 0830    ondansetron (ZOFRAN) injection 4 mg, 4 mg, Intravenous, Q6H PRN, SARAY Carias, 4 mg at 08/28/24 0657    pantoprazole (PROTONIX) EC tablet 40 mg, 40 mg, Oral, Early Morning, SARAY Carias, 40 mg at 08/30/24 0604    polyethylene glycol (MIRALAX) packet 17 g, 17 g, Oral, Daily PRN, SARAY Pineda    polyethylene glycol (MIRALAX) packet 17 g, 17 g, Oral, Daily, César Almeida MD, 17 g at 08/28/24 0906    senna-docusate sodium (SENOKOT S) 8.6-50 mg per tablet 1 tablet, 1 tablet, Oral, HS, Enedina Longoria,  BABATUNDENP, 1 tablet at 08/27/24 7228    sodium chloride tablet 2 g, 2 g, Oral, TID With Meals, Bruno Foss MD, 2 g at 08/30/24 0830    REVIEW OF SYSTEMS     Review of Systems   Constitutional: Negative.    HENT: Negative.     Eyes: Negative.    Respiratory: Negative.     Cardiovascular: Negative.    Gastrointestinal: Negative.    Endocrine: Negative.    Genitourinary: Negative.    Musculoskeletal: Negative.    Skin: Negative.    Allergic/Immunologic: Negative.    Neurological: Negative.    Hematological: Negative.    All other systems reviewed and are negative.      OBJECTIVE     Current Weight: Weight - Scale: 45.9 kg (101 lb 3.1 oz)  Vitals:    08/30/24 0700   BP: 157/71   Pulse: 65   Resp: 18   Temp: 98.4 °F (36.9 °C)   SpO2: 95%     Body mass index is 17.37 kg/m².    Intake/Output Summary (Last 24 hours) at 8/30/2024 1106  Last data filed at 8/29/2024 1200  Gross per 24 hour   Intake 240 ml   Output --   Net 240 ml       PHYSICAL EXAMINATION     Physical Exam  Constitutional:       Appearance: She is well-developed.   HENT:      Head: Normocephalic and atraumatic.   Eyes:      Pupils: Pupils are equal, round, and reactive to light.   Cardiovascular:      Rate and Rhythm: Normal rate and regular rhythm.      Heart sounds: Murmur heard.   Pulmonary:      Effort: Pulmonary effort is normal.   Abdominal:      General: Bowel sounds are normal.      Palpations: Abdomen is soft.   Musculoskeletal:         General: Normal range of motion.      Cervical back: Neck supple.   Skin:     General: Skin is warm.   Neurological:      Mental Status: She is alert and oriented to person, place, and time.           LAB RESULTS     Results from last 7 days   Lab Units 08/30/24  0835 08/29/24  2139 08/29/24  0807 08/29/24  0558 08/28/24  2000 08/28/24  0943 08/28/24  0500 08/27/24  2059 08/27/24  0957 08/27/24  0552 08/26/24  2040 08/26/24  0508 08/25/24  1234 08/25/24  0756   WBC Thousand/uL  --   --   --  7.23  --   --  6.34  --   --   4.77  --  4.54  --  3.62*   HEMOGLOBIN g/dL  --   --   --  8.1*  --   --  8.4*  --   --  8.4*  --  9.2*  --  10.1*   HEMATOCRIT %  --   --   --  23.2*  --   --  24.7*  --   --  24.9*  --  26.5*  --  28.8*   PLATELETS Thousands/uL  --   --   --  240  --   --  268  --   --  257  --  278  --  234   POTASSIUM mmol/L 3.6 3.9 3.8  --  3.8 3.9  --  4.0 3.7 4.2   < > 3.9   < > 4.4   CHLORIDE mmol/L 101 101 98  --  100 99  --  101 99 98   < > 93*   < > 88*   CO2 mmol/L 25 24 20*  --  24 24  --  24 22 23   < > 24   < > 20*   BUN mg/dL 14 18 15  --  16 16  --  18 12 13   < > 9   < > 10   CREATININE mg/dL 0.84 0.83 0.67  --  0.59* 0.69  --  0.71 0.62 0.59*   < > 0.64   < > 0.65   EGFR ml/min/1.73sq m 65 66 82  --  86 81  --  80 84 86   < > 83   < > 83   CALCIUM mg/dL 8.9 8.2* 8.4  --  8.4 8.9  --  8.7 9.0 8.6   < > 8.7   < > 8.6   MAGNESIUM mg/dL  --   --   --   --   --   --   --   --   --  1.9  --  2.1  --  1.7*   PHOSPHORUS mg/dL  --   --   --   --   --   --   --   --   --  3.0  --  2.9  --   --     < > = values in this interval not displayed.           RADIOLOGY RESULTS        IMPRESSION:  Metallic objects in the pelvis appear to be within the sigmoid colon on comparison lumbar spine CT. Recommend attention on follow-up KUB to verify passage.       ASSESSMENT/PLAN     83 years old female with past medical history of hyponatremia, hypertension, depression, anxiety, recurrent urinary tract infection, peripheral vascular disease status post lower extremity bypass, urinary incontinence who presents to our facility with weakness, dizziness and found to have a severely low sodium levels.    1.  Hyponatremia: Etiology thought to be secondary to SIADH per workup.  Initiated on salt tablets.  Sodium had dropped to 126 mEq/L and hence tolvaptan 7.5 mg was given  Sodium has improved to 130 mEq/L  Etiology of SIADH remains unknown however patient will need salt tablet at a dose of 2 g p.o. 3 times daily to sustain relatively normal  sodium levels.  Recommended high-protein meals 3 times daily.    2.  Hypertension: Patient was on lisinopril upon arrival to hospital and which now has been discontinued.  May introduce patient on amlodipine 5 mg daily as blood pressure started to rise              Mark Dorman MD  Nephrology  8/30/2024

## 2024-08-30 NOTE — ASSESSMENT & PLAN NOTE
Blood pressure currently controlled, at goal  Discontinue lisinopril and added amlodipine 5 mg once daily

## 2024-09-13 ENCOUNTER — TELEPHONE (OUTPATIENT)
Dept: NEPHROLOGY | Facility: CLINIC | Age: 84
End: 2024-09-13

## 2024-09-24 PROBLEM — N39.0 RECURRENT UTI: Status: RESOLVED | Noted: 2024-08-25 | Resolved: 2024-09-24

## 2024-10-07 ENCOUNTER — OFFICE VISIT (OUTPATIENT)
Dept: FAMILY MEDICINE CLINIC | Facility: CLINIC | Age: 84
End: 2024-10-07
Payer: COMMERCIAL

## 2024-10-07 ENCOUNTER — HOSPITAL ENCOUNTER (EMERGENCY)
Facility: HOSPITAL | Age: 84
Discharge: HOME | End: 2024-10-07
Attending: EMERGENCY MEDICINE
Payer: COMMERCIAL

## 2024-10-07 ENCOUNTER — APPOINTMENT (EMERGENCY)
Dept: CT IMAGING | Facility: HOSPITAL | Age: 84
End: 2024-10-07
Payer: COMMERCIAL

## 2024-10-07 VITALS
WEIGHT: 96.8 LBS | OXYGEN SATURATION: 98 % | DIASTOLIC BLOOD PRESSURE: 70 MMHG | BODY MASS INDEX: 16.53 KG/M2 | HEART RATE: 94 BPM | SYSTOLIC BLOOD PRESSURE: 138 MMHG | HEIGHT: 64 IN | TEMPERATURE: 101.4 F

## 2024-10-07 VITALS
SYSTOLIC BLOOD PRESSURE: 130 MMHG | RESPIRATION RATE: 20 BRPM | TEMPERATURE: 99.5 F | DIASTOLIC BLOOD PRESSURE: 64 MMHG | HEART RATE: 90 BPM | OXYGEN SATURATION: 97 %

## 2024-10-07 DIAGNOSIS — R63.4 UNINTENTIONAL WEIGHT LOSS: ICD-10-CM

## 2024-10-07 DIAGNOSIS — M48.061 SPINAL STENOSIS OF LUMBAR REGION, UNSPECIFIED WHETHER NEUROGENIC CLAUDICATION PRESENT: ICD-10-CM

## 2024-10-07 DIAGNOSIS — I73.9 PVD (PERIPHERAL VASCULAR DISEASE) (HCC): Chronic | ICD-10-CM

## 2024-10-07 DIAGNOSIS — K57.92 DIVERTICULITIS: Primary | ICD-10-CM

## 2024-10-07 DIAGNOSIS — M79.604 PAIN IN BOTH LOWER EXTREMITIES: ICD-10-CM

## 2024-10-07 DIAGNOSIS — R10.9 INTRACTABLE ABDOMINAL PAIN: Primary | ICD-10-CM

## 2024-10-07 DIAGNOSIS — E78.5 HYPERLIPIDEMIA, UNSPECIFIED HYPERLIPIDEMIA TYPE: ICD-10-CM

## 2024-10-07 DIAGNOSIS — G47.00 INSOMNIA, UNSPECIFIED TYPE: ICD-10-CM

## 2024-10-07 DIAGNOSIS — E87.1 HYPONATREMIA: ICD-10-CM

## 2024-10-07 DIAGNOSIS — R50.9 FEVER AND CHILLS: ICD-10-CM

## 2024-10-07 DIAGNOSIS — M79.605 PAIN IN BOTH LOWER EXTREMITIES: ICD-10-CM

## 2024-10-07 DIAGNOSIS — E22.2 SIADH (SYNDROME OF INAPPROPRIATE ADH PRODUCTION) (HCC): ICD-10-CM

## 2024-10-07 DIAGNOSIS — I10 HTN, GOAL BELOW 140/90: ICD-10-CM

## 2024-10-07 DIAGNOSIS — D50.9 IRON DEFICIENCY ANEMIA, UNSPECIFIED IRON DEFICIENCY ANEMIA TYPE: ICD-10-CM

## 2024-10-07 DIAGNOSIS — R11.2 NAUSEA AND VOMITING, UNSPECIFIED VOMITING TYPE: ICD-10-CM

## 2024-10-07 DIAGNOSIS — Z11.59 NEED FOR HEPATITIS C SCREENING TEST: ICD-10-CM

## 2024-10-07 DIAGNOSIS — Z00.00 MEDICARE ANNUAL WELLNESS VISIT, INITIAL: ICD-10-CM

## 2024-10-07 DIAGNOSIS — F32.A DEPRESSION, UNSPECIFIED DEPRESSION TYPE: ICD-10-CM

## 2024-10-07 LAB
ALBUMIN SERPL BCG-MCNC: 4 G/DL (ref 3.5–5)
ALP SERPL-CCNC: 57 U/L (ref 34–104)
ALT SERPL W P-5'-P-CCNC: 11 U/L (ref 7–52)
ANION GAP SERPL CALCULATED.3IONS-SCNC: 11 MMOL/L (ref 4–13)
AST SERPL W P-5'-P-CCNC: 17 U/L (ref 13–39)
BACTERIA UR QL AUTO: ABNORMAL /HPF
BASOPHILS # BLD AUTO: 0.03 THOUSANDS/ΜL (ref 0–0.1)
BASOPHILS NFR BLD AUTO: 0 % (ref 0–1)
BILIRUB SERPL-MCNC: 0.63 MG/DL (ref 0.2–1)
BILIRUB UR QL STRIP: NEGATIVE
BUDDING YEAST: PRESENT
BUN SERPL-MCNC: 19 MG/DL (ref 5–25)
CALCIUM SERPL-MCNC: 9.1 MG/DL (ref 8.4–10.2)
CHLORIDE SERPL-SCNC: 99 MMOL/L (ref 96–108)
CLARITY UR: CLEAR
CO2 SERPL-SCNC: 23 MMOL/L (ref 21–32)
COLOR UR: YELLOW
CREAT SERPL-MCNC: 0.65 MG/DL (ref 0.6–1.3)
EOSINOPHIL # BLD AUTO: 0.01 THOUSAND/ΜL (ref 0–0.61)
EOSINOPHIL NFR BLD AUTO: 0 % (ref 0–6)
ERYTHROCYTE [DISTWIDTH] IN BLOOD BY AUTOMATED COUNT: 14.4 % (ref 11.6–15.1)
GFR SERPL CREATININE-BSD FRML MDRD: 83 ML/MIN/1.73SQ M
GLUCOSE SERPL-MCNC: 117 MG/DL (ref 65–140)
GLUCOSE UR STRIP-MCNC: NEGATIVE MG/DL
HCT VFR BLD AUTO: 33 % (ref 34.8–46.1)
HGB BLD-MCNC: 11.1 G/DL (ref 11.5–15.4)
HGB UR QL STRIP.AUTO: NEGATIVE
HYPHAE YEAST: PRESENT
IMM GRANULOCYTES # BLD AUTO: 0.1 THOUSAND/UL (ref 0–0.2)
IMM GRANULOCYTES NFR BLD AUTO: 1 % (ref 0–2)
KETONES UR STRIP-MCNC: NEGATIVE MG/DL
LACTATE SERPL-SCNC: 1.6 MMOL/L (ref 0.5–2)
LEUKOCYTE ESTERASE UR QL STRIP: ABNORMAL
LIPASE SERPL-CCNC: 15 U/L (ref 11–82)
LYMPHOCYTES # BLD AUTO: 0.6 THOUSANDS/ΜL (ref 0.6–4.47)
LYMPHOCYTES NFR BLD AUTO: 3 % (ref 14–44)
MCH RBC QN AUTO: 31.4 PG (ref 26.8–34.3)
MCHC RBC AUTO-ENTMCNC: 33.6 G/DL (ref 31.4–37.4)
MCV RBC AUTO: 93 FL (ref 82–98)
MONOCYTES # BLD AUTO: 0.75 THOUSAND/ΜL (ref 0.17–1.22)
MONOCYTES NFR BLD AUTO: 4 % (ref 4–12)
MUCOUS THREADS UR QL AUTO: ABNORMAL
NEUTROPHILS # BLD AUTO: 18.03 THOUSANDS/ΜL (ref 1.85–7.62)
NEUTS SEG NFR BLD AUTO: 92 % (ref 43–75)
NITRITE UR QL STRIP: NEGATIVE
NON-SQ EPI CELLS URNS QL MICRO: ABNORMAL /HPF
NRBC BLD AUTO-RTO: 0 /100 WBCS
PH UR STRIP.AUTO: 5 [PH]
PLATELET # BLD AUTO: 319 THOUSANDS/UL (ref 149–390)
PMV BLD AUTO: 9.5 FL (ref 8.9–12.7)
POTASSIUM SERPL-SCNC: 4.3 MMOL/L (ref 3.5–5.3)
PROCALCITONIN SERPL-MCNC: 0.21 NG/ML
PROT SERPL-MCNC: 6.7 G/DL (ref 6.4–8.4)
PROT UR STRIP-MCNC: ABNORMAL MG/DL
RBC # BLD AUTO: 3.54 MILLION/UL (ref 3.81–5.12)
RBC #/AREA URNS AUTO: ABNORMAL /HPF
SODIUM SERPL-SCNC: 133 MMOL/L (ref 135–147)
SP GR UR STRIP.AUTO: 1.03 (ref 1–1.03)
UROBILINOGEN UR STRIP-ACNC: <2 MG/DL
WBC # BLD AUTO: 19.52 THOUSAND/UL (ref 4.31–10.16)
WBC #/AREA URNS AUTO: ABNORMAL /HPF

## 2024-10-07 PROCEDURE — 85025 COMPLETE CBC W/AUTO DIFF WBC: CPT | Performed by: EMERGENCY MEDICINE

## 2024-10-07 PROCEDURE — 87040 BLOOD CULTURE FOR BACTERIA: CPT | Performed by: EMERGENCY MEDICINE

## 2024-10-07 PROCEDURE — 96375 TX/PRO/DX INJ NEW DRUG ADDON: CPT

## 2024-10-07 PROCEDURE — 99284 EMERGENCY DEPT VISIT MOD MDM: CPT

## 2024-10-07 PROCEDURE — G0438 PPPS, INITIAL VISIT: HCPCS

## 2024-10-07 PROCEDURE — 99214 OFFICE O/P EST MOD 30 MIN: CPT

## 2024-10-07 PROCEDURE — 80053 COMPREHEN METABOLIC PANEL: CPT | Performed by: EMERGENCY MEDICINE

## 2024-10-07 PROCEDURE — 96365 THER/PROPH/DIAG IV INF INIT: CPT

## 2024-10-07 PROCEDURE — 81001 URINALYSIS AUTO W/SCOPE: CPT | Performed by: EMERGENCY MEDICINE

## 2024-10-07 PROCEDURE — 99285 EMERGENCY DEPT VISIT HI MDM: CPT | Performed by: EMERGENCY MEDICINE

## 2024-10-07 PROCEDURE — 83605 ASSAY OF LACTIC ACID: CPT | Performed by: EMERGENCY MEDICINE

## 2024-10-07 PROCEDURE — 84145 PROCALCITONIN (PCT): CPT | Performed by: EMERGENCY MEDICINE

## 2024-10-07 PROCEDURE — 74177 CT ABD & PELVIS W/CONTRAST: CPT

## 2024-10-07 PROCEDURE — 36415 COLL VENOUS BLD VENIPUNCTURE: CPT | Performed by: EMERGENCY MEDICINE

## 2024-10-07 PROCEDURE — 83690 ASSAY OF LIPASE: CPT | Performed by: EMERGENCY MEDICINE

## 2024-10-07 RX ORDER — MIRTAZAPINE 7.5 MG/1
7.5 TABLET, FILM COATED ORAL
Qty: 30 TABLET | Refills: 0 | Status: SHIPPED | OUTPATIENT
Start: 2024-10-07

## 2024-10-07 RX ORDER — SODIUM CHLORIDE 1 G/1
2 TABLET ORAL
Qty: 180 TABLET | Refills: 0 | Status: SHIPPED | OUTPATIENT
Start: 2024-10-07 | End: 2024-11-06

## 2024-10-07 RX ORDER — CIPROFLOXACIN 500 MG/1
500 TABLET, FILM COATED ORAL ONCE
Status: COMPLETED | OUTPATIENT
Start: 2024-10-07 | End: 2024-10-07

## 2024-10-07 RX ORDER — MULTIVITAMIN
1 TABLET ORAL DAILY
COMMUNITY

## 2024-10-07 RX ORDER — ONDANSETRON 4 MG/1
4 TABLET, FILM COATED ORAL 3 TIMES DAILY PRN
Qty: 20 TABLET | Refills: 0 | Status: SHIPPED | OUTPATIENT
Start: 2024-10-07

## 2024-10-07 RX ORDER — METRONIDAZOLE 500 MG/1
500 TABLET ORAL EVERY 8 HOURS SCHEDULED
Qty: 21 TABLET | Refills: 0 | Status: SHIPPED | OUTPATIENT
Start: 2024-10-07 | End: 2024-10-14

## 2024-10-07 RX ORDER — CIPROFLOXACIN 500 MG/1
500 TABLET, FILM COATED ORAL 2 TIMES DAILY
Qty: 14 TABLET | Refills: 0 | Status: SHIPPED | OUTPATIENT
Start: 2024-10-07 | End: 2024-10-14

## 2024-10-07 RX ORDER — METRONIDAZOLE 500 MG/1
500 TABLET ORAL ONCE
Status: COMPLETED | OUTPATIENT
Start: 2024-10-07 | End: 2024-10-07

## 2024-10-07 RX ORDER — SODIUM CHLORIDE, SODIUM GLUCONATE, SODIUM ACETATE, POTASSIUM CHLORIDE, MAGNESIUM CHLORIDE, SODIUM PHOSPHATE, DIBASIC, AND POTASSIUM PHOSPHATE .53; .5; .37; .037; .03; .012; .00082 G/100ML; G/100ML; G/100ML; G/100ML; G/100ML; G/100ML; G/100ML
1000 INJECTION, SOLUTION INTRAVENOUS ONCE
Status: COMPLETED | OUTPATIENT
Start: 2024-10-07 | End: 2024-10-07

## 2024-10-07 RX ADMIN — IOHEXOL 100 ML: 350 INJECTION, SOLUTION INTRAVENOUS at 17:48

## 2024-10-07 RX ADMIN — SODIUM CHLORIDE, SODIUM GLUCONATE, SODIUM ACETATE, POTASSIUM CHLORIDE, MAGNESIUM CHLORIDE, SODIUM PHOSPHATE, DIBASIC, AND POTASSIUM PHOSPHATE 1000 ML: .53; .5; .37; .037; .03; .012; .00082 INJECTION, SOLUTION INTRAVENOUS at 16:13

## 2024-10-07 RX ADMIN — METRONIDAZOLE 500 MG: 500 TABLET ORAL at 19:20

## 2024-10-07 RX ADMIN — Medication 660 MG: at 16:51

## 2024-10-07 RX ADMIN — CIPROFLOXACIN 500 MG: 500 TABLET ORAL at 19:20

## 2024-10-07 NOTE — ASSESSMENT & PLAN NOTE
- daughter reports that patient has severe PAD with hx of bypass, no prior record to review  - placed referral to vascular for further evaluation   - suspect it is contributing to pain   - continue current statin and ASA    Orders:    Ambulatory Referral to Vascular Surgery; Future

## 2024-10-07 NOTE — ASSESSMENT & PLAN NOTE
- see plan for depression, prescribing Remeron which I suspect will help with sleep     Orders:    mirtazapine (REMERON) 7.5 MG tablet; Take 1 tablet (7.5 mg total) by mouth daily at bedtime

## 2024-10-07 NOTE — ASSESSMENT & PLAN NOTE
- check iron panel and CBC     Orders:    CBC and differential; Future    Iron Panel (Includes Ferritin, Iron Sat%, Iron, and TIBC); Future

## 2024-10-07 NOTE — ED PROVIDER NOTES
Final diagnoses:   None     ED Disposition       None          Assessment & Plan   {Hyperlinks  Risk Stratification - NIHSS - HEART SCORE - Fill out sepsis note and make sure you call [id number] if severe or septic shock:[id number]}    Medical Decision Making  Amount and/or Complexity of Data Reviewed  Labs: ordered. Decision-making details documented in ED Course.  Radiology: ordered.    Risk  Prescription drug management.        ED Course as of 10/07/24 1627   Mon Oct 07, 2024   1616 WBC(!): 19.52       Medications   acetaminophen (Ofirmev) IVPB 660 mg (has no administration in time range)   multi-electrolyte (ISOLYTE-S PH 7.4) bolus 1,000 mL (has no administration in time range)       ED Risk Strat Scores                           SBIRT 22yo+      Flowsheet Row Most Recent Value   Initial Alcohol Screen: US AUDIT-C     1. How often do you have a drink containing alcohol? 0 Filed at: 10/07/2024 1534   2. How many drinks containing alcohol do you have on a typical day you are drinking?  0 Filed at: 10/07/2024 1534   3a. Male UNDER 65: How often do you have five or more drinks on one occasion? 0 Filed at: 10/07/2024 1534   3b. FEMALE Any Age, or MALE 65+: How often do you have 4 or more drinks on one occassion? 0 Filed at: 10/07/2024 1534   Audit-C Score 0 Filed at: 10/07/2024 1534   YINA: How many times in the past year have you...    Used an illegal drug or used a prescription medication for non-medical reasons? Never Filed at: 10/07/2024 1533                            History of Present Illness   {Hyperlinks  History (Med, Surg, Fam, Social) - Current Medications - Allergies  :[id number]}    Chief Complaint   Patient presents with    Abdominal Pain     Pt c/o diffuse abdominal pain and nausea that started today. Pt went to PCP's office and had a fever, referred to the ED.        Past Medical History:   Diagnosis Date    Anxiety     GERD (gastroesophageal reflux disease)       Past Surgical History:   Procedure  Laterality Date    APPENDECTOMY       SECTION        History reviewed. No pertinent family history.   Social History     Tobacco Use    Smoking status: Former     Current packs/day: 1.00     Average packs/day: 1 pack/day for 20.0 years (20.0 ttl pk-yrs)     Types: Cigarettes    Smokeless tobacco: Never   Substance Use Topics    Alcohol use: Never      E-Cigarette/Vaping      E-Cigarette/Vaping Substances      I have reviewed and agree with the history as documented.     83-year-old female with a past medical history of hypertension, hyperlipidemia, PAD, GERD, and recent issues with hyponatremia presents for evaluation with lower abdominal pain.  Patient states that pain began earlier today.  She endorses associated nausea, but denies any episodes of vomiting.  She was seen by her PCP earlier today and was found to be febrile, so she was sent in for further evaluation.  Per patient's daughter, the patient has been having progressive weight loss over the past few months despite her trying to eat more.  Patient denies any chest pain, shortness of breath, or other concerning symptoms at this time.        Review of Systems   Constitutional:  Positive for fever and unexpected weight change.   Respiratory:  Negative for shortness of breath.    Cardiovascular:  Negative for chest pain.   Gastrointestinal:  Positive for abdominal pain and nausea. Negative for vomiting.   All other systems reviewed and are negative.          Objective   {Hyperlinks  Historical Vitals - Historical Labs - Chart Review/Microbiology - Last Echo - Code Status  :[id number]}    ED Triage Vitals [10/07/24 1531]   Temperature Pulse Blood Pressure Respirations SpO2 Patient Position - Orthostatic VS   100 °F (37.8 °C) (!) 107 144/65 16 99 % Sitting      Temp Source Heart Rate Source BP Location FiO2 (%) Pain Score    Oral Monitor Left arm -- --      Vitals      Date and Time Temp Pulse SpO2 Resp BP Pain Score FACES Pain Rating User   10/07/24  1531 100 °F (37.8 °C) 107 99 % 16 144/65 -- -- ASM            Physical Exam  Vitals and nursing note reviewed.   Constitutional:       General: She is awake. She is not in acute distress.     Appearance: She is underweight. She is ill-appearing. She is not toxic-appearing.   HENT:      Head: Normocephalic and atraumatic.      Mouth/Throat:      Lips: Pink.      Mouth: Mucous membranes are moist.   Eyes:      General: Vision grossly intact. Gaze aligned appropriately.   Cardiovascular:      Rate and Rhythm: Regular rhythm. Tachycardia present.      Heart sounds: Normal heart sounds.   Pulmonary:      Effort: Pulmonary effort is normal. No respiratory distress.      Breath sounds: Normal breath sounds.   Abdominal:      General: There is no distension.      Palpations: Abdomen is soft.      Tenderness: There is abdominal tenderness (lower abdomen). There is guarding. There is no rebound.   Musculoskeletal:      Cervical back: Full passive range of motion without pain and neck supple.   Skin:     General: Skin is warm and dry.   Neurological:      General: No focal deficit present.      Mental Status: She is alert and oriented to person, place, and time.         Results Reviewed       None            No orders to display       Procedures    ED Medication and Procedure Management   Prior to Admission Medications   Prescriptions Last Dose Informant Patient Reported? Taking?   Multiple Vitamin (multivitamin) tablet   Yes No   Sig: Take 1 tablet by mouth daily   amLODIPine (NORVASC) 5 mg tablet   No No   Sig: Take 1 tablet (5 mg total) by mouth daily   aspirin 81 mg chewable tablet   Yes No   Sig: Chew 1 tablet daily   atorvastatin (LIPITOR) 40 mg tablet   Yes No   Sig: Take 40 mg by mouth daily   clopidogrel (PLAVIX) 75 mg tablet   Yes No   Sig: Take 75 mg by mouth daily   hydrOXYzine HCL (ATARAX) 25 mg tablet   Yes No   Sig: Take 1 tablet by mouth every 6 (six) hours as needed   mirtazapine (REMERON) 7.5 MG tablet   No  No   Sig: Take 1 tablet (7.5 mg total) by mouth daily at bedtime   ondansetron (ZOFRAN) 4 mg tablet   No No   Sig: Take 1 tablet (4 mg total) by mouth 3 (three) times a day as needed for nausea or vomiting   sodium chloride 1 g tablet   No No   Sig: Take 2 tablets (2 g total) by mouth 3 (three) times a day with meals      Facility-Administered Medications: None     Patient's Medications   Discharge Prescriptions    No medications on file     No discharge procedures on file.  ED SEPSIS DOCUMENTATION          Specimen: Blood from Arm, Left Updated: 10/07/24 1732     Procalcitonin 0.21 ng/ml     Lipase [[id number]]  (Normal) Collected: 10/07/24 1650    Lab Status: Final result Specimen: Blood from Arm, Right Updated: 10/07/24 1720     Lipase 15 u/L     Comprehensive metabolic panel [[id number]]  (Abnormal) Collected: 10/07/24 1650    Lab Status: Final result Specimen: Blood from Arm, Right Updated: 10/07/24 1720     Sodium 133 mmol/L      Potassium 4.3 mmol/L      Chloride 99 mmol/L      CO2 23 mmol/L      ANION GAP 11 mmol/L      BUN 19 mg/dL      Creatinine 0.65 mg/dL      Glucose 117 mg/dL      Calcium 9.1 mg/dL      AST 17 U/L      ALT 11 U/L      Alkaline Phosphatase 57 U/L      Total Protein 6.7 g/dL      Albumin 4.0 g/dL      Total Bilirubin 0.63 mg/dL      eGFR 83 ml/min/1.73sq m     Narrative:      [employer] guidelines for Chronic Kidney Disease (CKD):     Stage 1 with normal or high GFR (GFR > 90 mL/min/1.73 square meters)    Stage 2 Mild CKD (GFR = 60-89 mL/min/1.73 square meters)    Stage 3A Moderate CKD (GFR = 45-59 mL/min/1.73 square meters)    Stage 3B Moderate CKD (GFR = 30-44 mL/min/1.73 square meters)    Stage 4 Severe CKD (GFR = 15-29 mL/min/1.73 square meters)    Stage 5 End Stage CKD (GFR <15 mL/min/1.73 square meters)  Note: GFR calculation is accurate only with a steady state creatinine    Lactic acid, plasma (w/reflex if result > 2.0) [[id number]]  (Normal) Collected: 10/07/24 1650    Lab Status: Final result Specimen: Blood from Arm, Left Updated: 10/07/24 1719     LACTIC ACID 1.6 mmol/L     Narrative:      Result may be elevated if tourniquet was used during collection.    Urine Microscopic [[id number]]  (Abnormal) Collected: 10/07/24 1657    Lab Status: Final result Specimen: Urine, Other Updated: 10/07/24 1710     RBC, UA 2-4 /hpf      WBC, UA 4-10 /hpf      Epithelial Cells Occasional /hpf      Bacteria, UA None Seen /hpf      MUCUS THREADS Occasional     Budding Yeast  Present     Hyphae Yeast Present    UA w Reflex to Microscopic w Reflex to Culture [[id number]]  (Abnormal) Collected: 10/07/24 1657    Lab Status: Final result Specimen: Urine, Other Updated: 10/07/24 1707     Color, UA Yellow     Clarity, UA Clear     Specific Gravity, UA 1.026     pH, UA 5.0     Leukocytes, UA Moderate     Nitrite, UA Negative     Protein, UA Trace mg/dl      Glucose, UA Negative mg/dl      Ketones, UA Negative mg/dl      Urobilinogen, UA <2.0 mg/dl      Bilirubin, UA Negative     Occult Blood, UA Negative    CBC and differential [[id number]]  (Abnormal) Collected: 10/07/24 1557    Lab Status: Final result Specimen: Blood from Arm, Right Updated: 10/07/24 1633     WBC 19.52 Thousand/uL      RBC 3.54 Million/uL      Hemoglobin 11.1 g/dL      Hematocrit 33.0 %      MCV 93 fL      MCH 31.4 pg      MCHC 33.6 g/dL      RDW 14.4 %      MPV 9.5 fL      Platelets 319 Thousands/uL      nRBC 0 /100 WBCs      Segmented % 92 %      Immature Grans % 1 %      Lymphocytes % 3 %      Monocytes % 4 %      Eosinophils Relative 0 %      Basophils Relative 0 %      Absolute Neutrophils 18.03 Thousands/µL      Absolute Immature Grans 0.10 Thousand/uL      Absolute Lymphocytes 0.60 Thousands/µL      Absolute Monocytes 0.75 Thousand/µL      Eosinophils Absolute 0.01 Thousand/µL      Basophils Absolute 0.03 Thousands/µL     Narrative:      This is an appended report.  These results have been appended to a previously verified report.            CT abdomen pelvis with contrast   Final Interpretation by Aneesh Arizmendi MD (10/07 1856)      1.  There is extensive sigmoid diverticulosis, with a large amount of stool throughout this region. There is associated wall thickening, which may be due to chronic muscular wall hypertrophy. However, there is possible surrounding stranding which could    suggest mild acute diverticulitis.   2.  Extensive atherosclerotic disease involving the distal abdominal aorta, and iliac  arteries. 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 These findings are incompletely assessed, by non-CTA protocol.   3.  Nonspecific 9 mm enhancing focus within the right hepatic lobe. This may represent a flash filling hemangioma, which could be followed up by nonemergent MRI of the abdomen, with intravenous gadolinium.      4.  The study was marked in EPIC for significant notification.         Workstation performed: [id number]             Procedures    ED Medication and Procedure Management   Prior to Admission Medications   Prescriptions Last Dose Informant Patient Reported? Taking?   Multiple Vitamin (multivitamin) tablet  Self, Child Yes No   Sig: Take 1 tablet by mouth daily   aspirin 81 mg chewable tablet  Self, Child Yes No   Sig: Chew 1 tablet daily   clopidogrel (PLAVIX) 75 mg tablet  Self, Child Yes No   Sig: Take 75 mg by mouth daily   hydrOXYzine HCL (ATARAX) 25 mg tablet  Self, Child Yes No   Sig: Take 1 tablet by mouth every 6 (six) hours as needed   Patient not taking: Reported on 10/29/2024   ondansetron (ZOFRAN) 4 mg tablet  Self, Child No No   Sig: Take 1 tablet (4 mg total) by mouth 3 (three) times a day as needed for nausea or vomiting   sodium chloride 1 g tablet  Self, Child No No   Sig: Take 2 tablets (2 g total) by mouth 3 (three) times a day with meals      Facility-Administered Medications: None     Discharge Medication List as of 10/7/2024  7:20 PM        START taking these medications    Details   ciprofloxacin (CIPRO) 500 mg tablet Take 1 tablet (500 mg total) by mouth 2 (two) times a day for 7 days, Starting Mon 10/7/2024, Until Mon 10/14/2024, Normal      metroNIDAZOLE (FLAGYL) 500 mg tablet Take 1 tablet (500 mg total) by mouth every 8 (eight) hours for 7 days, Starting Mon 10/7/2024, Until Mon 10/14/2024, Normal           CONTINUE these medications which have NOT CHANGED    Details   aspirin 81 mg chewable tablet Chew 1 tablet  daily, Starting Wed 7/31/2024, Historical Med      clopidogrel (PLAVIX) 75 mg tablet Take 75 mg by mouth daily, Starting Wed 7/31/2024, Historical Med      hydrOXYzine HCL (ATARAX) 25 mg tablet Take 1 tablet by mouth every 6 (six) hours as needed, Starting Tue 8/20/2024, Historical Med      Multiple Vitamin (multivitamin) tablet Take 1 tablet by mouth daily, Historical Med      ondansetron (ZOFRAN) 4 mg tablet Take 1 tablet (4 mg total) by mouth 3 (three) times a day as needed for nausea or vomiting, Starting Mon 10/7/2024, Normal      sodium chloride 1 g tablet Take 2 tablets (2 g total) by mouth 3 (three) times a day with meals, Starting Mon 10/7/2024, Until Wed 11/6/2024, Normal      amLODIPine (NORVASC) 5 mg tablet Take 1 tablet (5 mg total) by mouth daily, Starting Fri 8/30/2024, Until Sun 9/29/2024, Normal      atorvastatin (LIPITOR) 40 mg tablet Take 40 mg by mouth daily, Starting Wed 7/31/2024, Historical Med      mirtazapine (REMERON) 7.5 MG tablet Take 1 tablet (7.5 mg total) by mouth daily at bedtime, Starting Mon 10/7/2024, Normal           No discharge procedures on file.  ED SEPSIS DOCUMENTATION   Time reflects when diagnosis was documented in both MDM as applicable and the Disposition within this note       Time User Action Codes Description Comment    10/7/2024  7:05 PM Lucila Cohen [K57.92] Diverticulitis                  Lucila Cohen, DO  11/03/24 0297

## 2024-10-07 NOTE — PROGRESS NOTES
Ambulatory Visit  Name: Ofelia Aguilar      : 1940      MRN: [id number]  Encounter Provider: Madalyn Zendejas PA-C  Encounter Date: 10/7/2024   Encounter department: [facility]    Assessment & Plan  Intractable abdominal pain  - patient reports 10/10 severe abdominal pain that started this morning, unable to eat/drink due to pain and associated with chills/sweats with a 101.4F temperature in office today   - when asked, pt reports she does have a hx of diverticulitis and complicated UTI   - given high fever and intractable pain along with her being high risk due to her age, HTN, HLD, and SIADH did recommend she go to ER for further evaluation and to get stat labs, imaging, and urine testing (did offer to complete UA in office today however they report they will wait and get it completed in ED)   - patient and daughter are agreeable; daughter reports she will take her to [place] ED now -- ADT order placed. They decline EMS.     Orders:    Transfer to other facility    Fever and chills  - see plan for abdominal pain; going to ER for stat labs to rule out sepsis    Orders:    Transfer to other facility    Unintentional weight loss  - ongoing for the last few months, daughter reports pt is unable to maintain weight regardless of how much she eats  - patient presents today with 10/10 intractable abdominal pain with fever of 101.4F and chills/sweats -- given high risk patient with red flag symptoms, did send to ER for further evaluation   - will follow up with me outpatient, if nothing found to explain her symptoms consider CT chest/abdomen/pelvis for further evaluation     Orders:    mirtazapine (REMERON) 7.5 MG tablet; Take 1 tablet (7.5 mg total) by mouth daily at bedtime    Transfer to other facility    Depression, unspecified depression type  Depression Screening Follow-up Plan: Patient's depression screening was positive with a PHQ-2 score of 5. Their PHQ-9 score was 14. Patient assessed  "for underlying major depression. They have no active suicidal ideations. Brief counseling provided and recommend additional follow-up/re-evaluation next office visit.  - ongoing depression, daughter reports pt is \"not herself\" and is very depressed, no energy and experiencing weight loss as well   - we will trial remeron 7.5 mg HS to help with insomnia, depression, and weight loss -- discussed side effects of medication   - remeron is also one of the anti-depressant medications that shows the least effect on sodium levels which is important given recent dx of SIADH   - we will follow up in two weeks for medication check, to call sooner with any questions or concerns     Orders:    mirtazapine (REMERON) 7.5 MG tablet; Take 1 tablet (7.5 mg total) by mouth daily at bedtime    Insomnia, unspecified type  - see plan for depression, prescribing Remeron which I suspect will help with sleep     Orders:    mirtazapine (REMERON) 7.5 MG tablet; Take 1 tablet (7.5 mg total) by mouth daily at bedtime    Hyperlipidemia, unspecified hyperlipidemia type  - check lipid panel   - continue current atorvastatin 40 mg QD    Orders:    Lipid panel; Future    HTN, goal below 140/90  - BP at goal today, 138/70 mmHg  - continue current amlodipine 5 mg QD which she has been tolerating well     Orders:    CBC and differential; Future    Comprehensive metabolic panel; Future    PVD (peripheral vascular disease) (HCC)  - daughter reports that patient has severe PAD with hx of bypass, no prior record to review  - placed referral to vascular for further evaluation   - suspect it is contributing to pain   - continue current statin and ASA    Orders:    Ambulatory Referral to Vascular Surgery; Future    Iron deficiency anemia, unspecified iron deficiency anemia type  - check iron panel and CBC     Orders:    CBC and differential; Future    Iron Panel (Includes Ferritin, Iron Sat%, Iron, and TIBC); Future    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)  - recent admission to [facility] for hyponatremia secondary to zoloft and gabapentin   - both medications have been discontinued, she is on sodium chloride tablets 1 g TID through nephrology   - she saw outpatient nephrology through [facility] on 9/8 -- last sodium on 9/14 was stable at 133  - will recheck CMP       Hyponatremia  - see plan for SIADH above    Orders:    sodium chloride 1 g tablet; Take 2 tablets (2 g total) by mouth 3 (three) times a day with meals    Spinal stenosis of lumbar region, unspecified whether neurogenic claudication present  - chronic spinal stenosis which causes radicular lower back pain, no relief with PRN oxycodone or gabapentin   - referral to spine/pain management placed for further treatment of her pain     Orders:    Ambulatory referral to Spine & Pain Management; Future    Need for hepatitis C screening test    Orders:    Hepatitis C antibody; Future    Nausea and vomiting, unspecified vomiting type  - takes zofran PRN which helps, refilled    Orders:    ondansetron (ZOFRAN) 4 mg tablet; Take 1 tablet (4 mg total) by mouth 3 (three) times a day as needed for nausea or vomiting    Pain in both lower extremities  - see plan for spinal stenosis, suspect LE pain is radicular from spinal stenosis and severe PVD likely contributing as well  - needs to see spine management and vascular for further evaluation     Orders:    Ambulatory referral to Spine & Pain Management; Future    Medicare annual wellness visit, initial  - medicare wellness visit completed today   - ordered routine labs to be completed once patient is feeling better  - she declines DXA scan and lung cancer screening; not interested in screenings at this time  - she will follow up in two weeks           Preventive health issues were discussed with patient, and age appropriate screening tests were ordered as noted in patient's After Visit Summary. Personalized health advice and appropriate referrals for  "health education or preventive services given if needed, as noted in patient's After Visit Summary.    History of Present Illness     CC: establish care, medicare wellness, 10/10 abdominal pain     Patient presents today to establish care. She is accompanied by her daughter. Per her daughter, patient has started to significantly decline within the last year. She was living in [facility] on her own, however daughter moved her here so she can take care of her.     Patient was recently admitted at [facility] from 8/25-8/30/34 for hyponatremia which they believed was secondary to her zoloft and gabapentin. Both medications were stopped however daughter reports patient has been very depressed and not herself. She is also not sleeping at night and losing weight unintentionally despite eating. She did see nephrology through [facility] outpatient on 9/9 who recommended continuing NaCl tablets TID and close monitoring. Patient's last labs on 9/14 showed sodium at 133.     Patient has spinal stenosis which she reports causes \"very bad leg pain\" she has been taking left over oxycodone she got from the hospital however it has not been helping. She was previously on gabapentin however felt that did not help either. They are interested in seeing spine and pain management for further treatment.     She has HTN -- when in hospital was switched from lisinopril to amlodipine. She has been doing well on it.     Patient reports she has 10/10 abdominal pain that started this morning - has not been able to eat or drink anything all day due to the extreme pain and nausea. She reports chills and sweats and feels like she has a fever. Does report one bowel movement today which was normal. Patient is hunched over in her wheel chair due to abdominal pain. She does have a fever in office, 101.7F -- rechecked about 10 mins after arrival and was 101.4F.   She denies any urinary symptoms. No dysuria, frequency, urgency.     Depression  Associated " symptoms include abdominal pain (severe, 10/10), chills, diaphoresis, fatigue, a fever and nausea. Pertinent negatives include no chest pain, congestion, coughing, headaches, sore throat or vomiting.      Patient Care Team:  Madalyn Zendejas PA-C as PCP - General (Family Medicine)    Review of Systems   Constitutional:  Positive for appetite change, chills, diaphoresis, fatigue, fever and unexpected weight change.   HENT:  Negative for congestion, ear pain, rhinorrhea, sinus pressure, sinus pain and sore throat.    Respiratory:  Negative for cough, chest tightness, shortness of breath and wheezing.    Cardiovascular:  Negative for chest pain and palpitations.   Gastrointestinal:  Positive for abdominal pain (severe, 10/10) and nausea. Negative for blood in stool, constipation, diarrhea and vomiting.   Genitourinary:  Negative for difficulty urinating, dysuria, flank pain, frequency, hematuria and urgency.   Neurological:  Negative for dizziness, light-headedness and headaches.   Psychiatric/Behavioral:  Positive for depression. Negative for self-injury and suicidal ideas.      Medical History Reviewed by provider this encounter:       Annual Wellness Visit Questionnaire   Ofelia is here for her Initial Wellness visit.     Health Risk Assessment:   Patient rates overall health as poor. Patient feels that their physical health rating is slightly worse. Patient is satisfied with their life. Eyesight was rated as slightly worse. Hearing was rated as slightly worse. Patient feels that their emotional and mental health rating is much worse. Patients states they are never, rarely angry. Patient states they are always unusually tired/fatigued. Pain experienced in the last 7 days has been a lot. Patient's pain rating has been 10/10. Patient states that she has experienced weight loss or gain in last 6 months.     Depression Screening:   PHQ-2 Score: 5  PHQ-9 Score: 14      Fall Risk Screening:   In the past year, patient has  experienced: no history of falling in past year      Urinary Incontinence Screening:   Patient has not leaked urine accidently in the last six months.     Home Safety:  Patient has trouble with stairs inside or outside of their home. Patient has working smoke alarms and has working carbon monoxide detector. Home safety hazards include: none.     Nutrition:   Current diet is Regular and No Added Salt.     Medications:   Patient is currently taking over-the-counter supplements. OTC medications include: see medication list. Patient is not able to manage medications.     Activities of Daily Living (ADLs)/Instrumental Activities of Daily Living (IADLs):   Walk and transfer into and out of bed and chair?: Yes  Dress and groom yourself?: Yes    Bathe or shower yourself?: No    Feed yourself? No  Do your laundry/housekeeping?: No  Manage your money, pay your bills and track your expenses?: No  Make your own meals?: No    Do your own shopping?: No    Previous Hospitalizations:   Any hospitalizations or ED visits within the last 12 months?: Yes    How many hospitalizations have you had in the last year?: 3-4    Advance Care Planning:   Living will: No    Durable POA for healthcare: No    Advanced directive: No    Advanced directive counseling given: Yes    ACP document given: Yes    Patient declined ACP directive: No      Cognitive Screening:   Provider or family/friend/caregiver concerned regarding cognition?: No    PREVENTIVE SCREENINGS      Cardiovascular Screening:    General: Risks and Benefits Discussed    Due for: Lipid Panel      Diabetes Screening:     General: Screening Current      Colorectal Cancer Screening:     General: Risks and Benefits Discussed and Screening Not Indicated      Breast Cancer Screening:     General: Risks and Benefits Discussed and Screening Not Indicated      Cervical Cancer Screening:    General: Screening Not Indicated and Risks and Benefits Discussed      Osteoporosis Screening:     General: Risks and Benefits Discussed and Patient Declines      Abdominal Aortic Aneurysm (AAA) Screening:        General: Risks and Benefits Discussed and Screening Not Indicated      Lung Cancer Screening:     General: Patient Declines and Risks and Benefits Discussed      Hepatitis C Screening:    General: Risks and Benefits Discussed and Screening Current    Hep C Screening Accepted: Yes      Screening, Brief Intervention, and Referral to Treatment (SBIRT)    Screening  Typical number of drinks in a day: 0  Typical number of drinks in a week: 0  Interpretation: Low risk drinking behavior.    Single Item Drug Screening:  How often have you used an illegal drug (including marijuana) or a prescription medication for non-medical reasons in the past year? never    Single Item Drug Screen Score: 0  Interpretation: Negative screen for possible drug use disorder    SDOH Risk Assessment  Social determinants of health (SDOH) risk assesment tool was completed. The tool at a minimum covered housing stability, food insecurity, transportation needs, and utility difficulty. Patient had at risk responses for the following SDOH domains: housing stability.     Social Determinants of Health     Food Insecurity: No Food Insecurity (10/7/2024)    Hunger Vital Sign     Worried About Running Out of Food in the Last Year: Never true     Ran Out of Food in the Last Year: Never true   Transportation Needs: No Transportation Needs (10/7/2024)    PRAPARE - Transportation     Lack of Transportation (Medical): No     Lack of Transportation (Non-Medical): No   Housing Stability: High Risk (10/7/2024)    Housing Stability Vital Sign     Unable to Pay for Housing in the Last Year: No     Number of Times Moved in the Last Year: 2     Homeless in the Last Year: No   Utilities: Not At Risk (10/7/2024)    [facility] Utilities     Threatened with loss of utilities: No     No results found.    Objective     /70   Pulse 94   Temp (!) 101.4 °F (38.6  "°C)   Ht 5' 4\" (1.626 m)   Wt 43.9 kg (96 lb 12.8 oz)   LMP  (LMP Unknown)   SpO2 98%   BMI 16.62 kg/m²     Physical Exam  Vitals reviewed.   Constitutional:       General: She is not in acute distress.     Appearance: Normal appearance. She is well-developed. She is ill-appearing (hunched over in wheelchair holding abdomen). She is not diaphoretic.   HENT:      Head: Normocephalic and atraumatic.      Right Ear: Tympanic membrane, ear canal and external ear normal. There is no impacted cerumen.      Left Ear: Tympanic membrane, ear canal and external ear normal. There is no impacted cerumen.      Nose: Nose normal. No congestion or rhinorrhea.      Mouth/Throat:      Mouth: Mucous membranes are moist.      Pharynx: Oropharynx is clear. No oropharyngeal exudate.   Eyes:      General:         Right eye: No discharge.         Left eye: No discharge.      Conjunctiva/sclera: Conjunctivae normal.   Cardiovascular:      Rate and Rhythm: Normal rate and regular rhythm.      Pulses: Normal pulses.      Heart sounds: Normal heart sounds. No murmur heard.  Pulmonary:      Effort: Pulmonary effort is normal. No respiratory distress.      Breath sounds: Normal breath sounds. No wheezing, rhonchi or rales.   Abdominal:      General: Bowel sounds are normal. There is no distension.      Palpations: Abdomen is soft.      Tenderness: There is abdominal tenderness (severe pain to palpation of lower abdominal region).   Musculoskeletal:      Cervical back: Normal range of motion and neck supple.      Right lower leg: No edema.      Left lower leg: No edema.   Skin:     General: Skin is warm.   Neurological:      General: No focal deficit present.      Mental Status: She is alert.      Gait: Gait abnormal (in wheelchair).   Psychiatric:         Mood and Affect: Mood is depressed.         "

## 2024-10-07 NOTE — ASSESSMENT & PLAN NOTE
- recent admission to [facility] for hyponatremia secondary to zoloft and gabapentin   - both medications have been discontinued, she is on sodium chloride tablets 1 g TID through nephrology   - she saw outpatient nephrology through [facility] on 9/8 -- last sodium on 9/14 was stable at 133  - will recheck CMP

## 2024-10-07 NOTE — ASSESSMENT & PLAN NOTE
- chronic spinal stenosis which causes radicular lower back pain, no relief with PRN oxycodone or gabapentin   - referral to spine/pain management placed for further treatment of her pain     Orders:    Ambulatory referral to Spine & Pain Management; Future

## 2024-10-07 NOTE — ASSESSMENT & PLAN NOTE
- see plan for SIADH above    Orders:    sodium chloride 1 g tablet; Take 2 tablets (2 g total) by mouth 3 (three) times a day with meals

## 2024-10-07 NOTE — ASSESSMENT & PLAN NOTE
- check lipid panel   - continue current atorvastatin 40 mg QD    Orders:    Lipid panel; Future

## 2024-10-07 NOTE — ASSESSMENT & PLAN NOTE
"Depression Screening Follow-up Plan: Patient's depression screening was positive with a PHQ-2 score of 5. Their PHQ-9 score was 14. Patient assessed for underlying major depression. They have no active suicidal ideations. Brief counseling provided and recommend additional follow-up/re-evaluation next office visit.  - ongoing depression, daughter reports pt is \"not herself\" and is very depressed, no energy and experiencing weight loss as well   - we will trial remeron 7.5 mg HS to help with insomnia, depression, and weight loss -- discussed side effects of medication   - remeron is also one of the anti-depressant medications that shows the least effect on sodium levels which is important given recent dx of SIADH   - we will follow up in two weeks for medication check, to call sooner with any questions or concerns     Orders:    mirtazapine (REMERON) 7.5 MG tablet; Take 1 tablet (7.5 mg total) by mouth daily at bedtime    "

## 2024-10-07 NOTE — ASSESSMENT & PLAN NOTE
- see plan for spinal stenosis, suspect LE pain is radicular from spinal stenosis and severe PVD likely contributing as well  - needs to see spine management and vascular for further evaluation     Orders:    Ambulatory referral to Spine & Pain Management; Future

## 2024-10-07 NOTE — PATIENT INSTRUCTIONS
Medicare Preventive Visit Patient Instructions  Thank you for completing your Welcome to Medicare Visit or Medicare Annual Wellness Visit today. Your next wellness visit will be due in one year (10/8/2025).  The screening/preventive services that you may require over the next 5-10 years are detailed below. Some tests may not apply to you based off risk factors and/or age. Screening tests ordered at today's visit but not completed yet may show as past due. Also, please note that scanned in results may not display below.  Preventive Screenings:  Service Recommendations Previous Testing/Comments   Colorectal Cancer Screening  * Colonoscopy    * Fecal Occult Blood Test (FOBT)/Fecal Immunochemical Test (FIT)  * Fecal DNA/Cologuard Test  * Flexible Sigmoidoscopy Age: 45-75 years old   Colonoscopy: every 10 years (may be performed more frequently if at higher risk)  OR  FOBT/FIT: every 1 year  OR  Cologuard: every 3 years  OR  Sigmoidoscopy: every 5 years  Screening may be recommended earlier than age 45 if at higher risk for colorectal cancer. Also, an individualized decision between you and your healthcare provider will decide whether screening between the ages of 76-85 would be appropriate. Colonoscopy: Not on file  FOBT/FIT: Not on file  Cologuard: Not on file  Sigmoidoscopy: Not on file          Breast Cancer Screening Age: 40+ years old  Frequency: every 1-2 years  Not required if history of left and right mastectomy Mammogram: Not on file        Cervical Cancer Screening Between the ages of 21-29, pap smear recommended once every 3 years.   Between the ages of 30-65, can perform pap smear with HPV co-testing every 5 years.   Recommendations may differ for women with a history of total hysterectomy, cervical cancer, or abnormal pap smears in past. Pap Smear: Not on file    Screening Not Indicated   Hepatitis C Screening Once for adults born between 1945 and 1965  More frequently in patients at high risk for Hepatitis  C Hep C Antibody: Not on file        Diabetes Screening 1-2 times per year if you're at risk for diabetes or have pre-diabetes Fasting glucose: No results in last 5 years (No results in last 5 years)  A1C: No results in last 5 years (No results in last 5 years)  Screening Current   Cholesterol Screening Once every 5 years if you don't have a lipid disorder. May order more often based on risk factors. Lipid panel: Not on file          Other Preventive Screenings Covered by Medicare:  Abdominal Aortic Aneurysm (AAA) Screening: covered once if your at risk. You're considered to be at risk if you have a family history of AAA.  Lung Cancer Screening: covers low dose CT scan once per year if you meet all of the following conditions: (1) Age 55-77; (2) No signs or symptoms of lung cancer; (3) Current smoker or have quit smoking within the last 15 years; (4) You have a tobacco smoking history of at least 20 pack years (packs per day multiplied by number of years you smoked); (5) You get a written order from a healthcare provider.  Glaucoma Screening: covered annually if you're considered high risk: (1) You have diabetes OR (2) Family history of glaucoma OR (3)  aged 50 and older OR (4)  American aged 65 and older  Osteoporosis Screening: covered every 2 years if you meet one of the following conditions: (1) You're estrogen deficient and at risk for osteoporosis based off medical history and other findings; (2) Have a vertebral abnormality; (3) On glucocorticoid therapy for more than 3 months; (4) Have primary hyperparathyroidism; (5) On osteoporosis medications and need to assess response to drug therapy.   Last bone density test (DXA Scan): Not on file.  HIV Screening: covered annually if you're between the age of 15-65. Also covered annually if you are younger than 15 and older than 65 with risk factors for HIV infection. For pregnant patients, it is covered up to 3 times per  pregnancy.    Immunizations:  Immunization Recommendations   Influenza Vaccine Annual influenza vaccination during flu season is recommended for all persons aged >= 6 months who do not have contraindications   Pneumococcal Vaccine   * Pneumococcal conjugate vaccine = PCV13 (Prevnar 13), PCV15 (Vaxneuvance), PCV20 (Prevnar 20)  * Pneumococcal polysaccharide vaccine = PPSV23 (Pneumovax) Adults 19-65 yo with certain risk factors or if 65+ yo  If never received any pneumonia vaccine: recommend Prevnar 20 (PCV20)  Give PCV20 if previously received 1 dose of PCV13 or PPSV23   Hepatitis B Vaccine 3 dose series if at intermediate or high risk (ex: diabetes, end stage renal disease, liver disease)   Respiratory syncytial virus (RSV) Vaccine - COVERED BY MEDICARE PART D  * RSVPreF3 (Arexvy) CDC recommends that adults 60 years of age and older may receive a single dose of RSV vaccine using shared clinical decision-making (SCDM)   Tetanus (Td) Vaccine - COST NOT COVERED BY MEDICARE PART B Following completion of primary series, a booster dose should be given every 10 years to maintain immunity against tetanus. Td may also be given as tetanus wound prophylaxis.   Tdap Vaccine - COST NOT COVERED BY MEDICARE PART B Recommended at least once for all adults. For pregnant patients, recommended with each pregnancy.   Shingles Vaccine (Shingrix) - COST NOT COVERED BY MEDICARE PART B  2 shot series recommended in those 19 years and older who have or will have weakened immune systems or those 50 years and older     Health Maintenance Due:  There are no preventive care reminders to display for this patient.  Immunizations Due:      Topic Date Due   • Pneumococcal Vaccine: 65+ Years (1 of 1 - PCV) Never done   • Influenza Vaccine (1) Never done   • COVID-19 Vaccine (1 - 2023-24 season) Never done     Advance Directives   What are advance directives?  Advance directives are legal documents that state your wishes and plans for medical care.  These plans are made ahead of time in case you lose your ability to make decisions for yourself. Advance directives can apply to any medical decision, such as the treatments you want, and if you want to donate organs.   What are the types of advance directives?  There are many types of advance directives, and each state has rules about how to use them. You may choose a combination of any of the following:  Living will:  This is a written record of the treatment you want. You can also choose which treatments you do not want, which to limit, and which to stop at a certain time. This includes surgery, medicine, IV fluid, and tube feedings.   Durable power of  for healthcare (DPAHC):  This is a written record that states who you want to make healthcare choices for you when you are unable to make them for yourself. This person, called a proxy, is usually a family member or a friend. You may choose more than 1 proxy.  Do not resuscitate (DNR) order:  A DNR order is used in case your heart stops beating or you stop breathing. It is a request not to have certain forms of treatment, such as CPR. A DNR order may be included in other types of advance directives.  Medical directive:  This covers the care that you want if you are in a coma, near death, or unable to make decisions for yourself. You can list the treatments you want for each condition. Treatment may include pain medicine, surgery, blood transfusions, dialysis, IV or tube feedings, and a ventilator (breathing machine).  Values history:  This document has questions about your views, beliefs, and how you feel and think about life. This information can help others choose the care that you would choose.  Why are advance directives important?  An advance directive helps you control your care. Although spoken wishes may be used, it is better to have your wishes written down. Spoken wishes can be misunderstood, or not followed. Treatments may be given even if you  do not want them. An advance directive may make it easier for your family to make difficult choices about your care.   Depression   Depression  is a medical condition that causes feelings of sadness or hopelessness that do not go away. Depression may cause you to lose interest in things you used to enjoy. These feelings may interfere with your daily life.  Call your local emergency number (911 in the ) if:   You think about harming yourself or someone else.  You have done something on purpose to hurt yourself.  The following resources are available at any time to help you, if needed:   National Suicide Prevention Lifeline: 1-390.751.7291 (9-760-326-TALK)   Suicide Hotline: 1-767.785.9924 (2-117-CJQAVLN)   For a list of international numbers: https://save.org/find-help/international-resources/  Treatment for depression may include medicine to relieve depression. Medicine is often used together with therapy. Therapy is a way for you to talk about your feelings and anything that may be causing depression. Therapy can be done alone or in a group. It may also be done with family members or a significant other.  Get regular physical activity.    Create a regular sleep schedule.    Eat a variety of healthy foods.    Do not drink alcohol or use drugs.     Underweight  Underweight is defined as having a body mass index (BMI) of less than 18.5 kg/m2   Anorexia  is a loss of appetite, decreased food intake, or both. Your appetite naturally decreases as you get older. You also get full faster than you used to. This occurs because your body needs less energy. Other body changes can also lead to a decreased appetite. Even though some appetite loss is normal, you still need to get enough calories and nutrients to keep you healthy. You can start to lose too much weight if you do not eat as much food as your body needs. Unwanted weight loss can cause health problems, or worsen health problems you already have. You can also become  dehydrated if you do not drink enough liquid.  How to eat healthy and get enough nutrients:   Choose healthy foods.  Eat a variety of fruits, vegetables, whole grains, low-fat dairy foods, lean meats, and other protein foods. Limit foods high in fat, sugar, and salt. Limit or avoid alcohol as directed. Work with a dietitian to help you plan your meals if you need to follow a special diet. A dietitian can also teach you how to modify foods if you have trouble chewing or swallowing.   Snack on healthy foods between meals  if you only eat a small amount during meals. Snacks provide extra healthy nutrients and calories between meals. Examples include fruit, cheese, and whole grain crackers.   Drink liquids as directed  to avoid dehydration. Drink liquids between meals if they cause you to get full too quickly during meals. Ask how much liquid to drink each day and which liquids are best for you.   Use herbs, spices, and flavor enhancers to add flavor to foods.  Avoid using herbs and spice blends that also contain sodium. Ask your healthcare provider or dietitian about flavor enhancers. Flavor enhancers with ham, natural moreira, and roast beef flavors can also be sprinkled on food to add flavor.   Share meals with others as often as you can.  Eating with others may help you to eat better during meal time. Ask family members, neighbors, or friends to join you for lunch. There are also senior centers where you can meet people, and share meals with them.   Ask family and friends for help  with shopping or preparing foods. Ask for a ride to the grocery store, if needed.       © Copyright [employer] 2018 Information is for End User's use only and may not be sold, redistributed or otherwise used for commercial purposes. All illustrations and images included in CareNotes® are the copyrighted property of [employer] or [employer]

## 2024-10-07 NOTE — ASSESSMENT & PLAN NOTE
- BP at goal today, 138/70 mmHg  - continue current amlodipine 5 mg QD which she has been tolerating well     Orders:    CBC and differential; Future    Comprehensive metabolic panel; Future

## 2024-10-07 NOTE — DISCHARGE INSTRUCTIONS
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  Also showed a nonspecific area in your liver which may be a hemangioma, it is recommended that you follow-up with an MRI of the abdomen with contrast.  Please follow-up with your vascular surgeon as well as with your PCP regarding these findings.    May take ibuprofen and Tylenol as needed for your abdominal pain.  Return to the emergency department for any new or worsening symptoms including worsening pain, bloody bowel movements, persistent fever, or other concerning symptoms.

## 2024-10-09 DIAGNOSIS — I10 HTN, GOAL BELOW 140/90: ICD-10-CM

## 2024-10-10 DIAGNOSIS — E78.5 HYPERLIPIDEMIA, UNSPECIFIED HYPERLIPIDEMIA TYPE: Primary | ICD-10-CM

## 2024-10-10 DIAGNOSIS — K57.92 DIVERTICULITIS: Primary | ICD-10-CM

## 2024-10-10 RX ORDER — SENNOSIDES 8.6 MG
650 CAPSULE ORAL EVERY 8 HOURS PRN
Qty: 30 TABLET | Refills: 0 | Status: SHIPPED | OUTPATIENT
Start: 2024-10-10

## 2024-10-10 RX ORDER — AMLODIPINE BESYLATE 5 MG/1
5 TABLET ORAL DAILY
Qty: 30 TABLET | Refills: 0 | Status: SHIPPED | OUTPATIENT
Start: 2024-10-10 | End: 2024-11-09

## 2024-10-10 RX ORDER — ATORVASTATIN CALCIUM 40 MG/1
40 TABLET, FILM COATED ORAL DAILY
Qty: 90 TABLET | Refills: 0 | Status: SHIPPED | OUTPATIENT
Start: 2024-10-10

## 2024-10-10 NOTE — TELEPHONE ENCOUNTER
Medication: Atorvastatin 40mg    Dose/Frequency: 1 tablet by mouth daily    Quantity: Historical Provider    Pharmacy: [facility]    Office:   [x] PCP/Provider -   [] Speciality/Provider -     Does the patient have enough for 3 days?   [x] Yes   [] No - Send as HP to POD

## 2024-10-11 RX ORDER — ATORVASTATIN CALCIUM 40 MG/1
40 TABLET, FILM COATED ORAL DAILY
OUTPATIENT
Start: 2024-10-11

## 2024-10-13 LAB
BACTERIA BLD CULT: NORMAL
BACTERIA BLD CULT: NORMAL

## 2024-10-14 ENCOUNTER — CONSULT (OUTPATIENT)
Dept: PAIN MEDICINE | Facility: CLINIC | Age: 84
End: 2024-10-14
Payer: COMMERCIAL

## 2024-10-14 VITALS
HEART RATE: 93 BPM | SYSTOLIC BLOOD PRESSURE: 150 MMHG | DIASTOLIC BLOOD PRESSURE: 70 MMHG | HEIGHT: 64 IN | WEIGHT: 96.78 LBS | BODY MASS INDEX: 16.52 KG/M2

## 2024-10-14 DIAGNOSIS — I73.9 PVD (PERIPHERAL VASCULAR DISEASE) (HCC): Chronic | ICD-10-CM

## 2024-10-14 DIAGNOSIS — M79.605 PAIN IN BOTH LOWER EXTREMITIES: ICD-10-CM

## 2024-10-14 DIAGNOSIS — M79.604 PAIN IN BOTH LOWER EXTREMITIES: ICD-10-CM

## 2024-10-14 DIAGNOSIS — M48.061 SPINAL STENOSIS OF LUMBAR REGION, UNSPECIFIED WHETHER NEUROGENIC CLAUDICATION PRESENT: ICD-10-CM

## 2024-10-14 DIAGNOSIS — Z95.828 STATUS POST FEMOROFEMORAL BYPASS SURGERY: Primary | ICD-10-CM

## 2024-10-14 LAB
BACTERIA BLD CULT: NORMAL
BACTERIA BLD CULT: NORMAL

## 2024-10-14 PROCEDURE — 99204 OFFICE O/P NEW MOD 45 MIN: CPT | Performed by: STUDENT IN AN ORGANIZED HEALTH CARE EDUCATION/TRAINING PROGRAM

## 2024-10-14 RX ORDER — PREGABALIN 25 MG/1
25 CAPSULE ORAL 2 TIMES DAILY
Qty: 60 CAPSULE | Refills: 0 | Status: SHIPPED | OUTPATIENT
Start: 2024-10-14

## 2024-10-14 NOTE — PATIENT INSTRUCTIONS
Patient Education     Pregabalin (pre RAMÓN a lisa)   Brand Names: [place] Lyrica; Lyrica CR   Brand Names: [place] ACH-Pregabalin; AG-Pregabalin; APO-Pregabalin; Auro-Pregabalin; DOM-Pregabalin; JAMP-Pregabalin; Lyrica; M-Pregabalin; Mar-Pregabalin; MINT-Pregabalin; MOON-Pregabalin; NRA-Pregabalin; PMS-Pregabalin; JOSÉ-Pregabalin; SANDOZ Pregabalin; TARO-Pregabalin; TEVA-Pregabalin   What is this drug used for?   It is used to help control certain kinds of seizures.  It is used to treat painful nerve diseases.  It is used to treat fibromyalgia.  It may be given to you for other reasons. Talk with the doctor.  What do I need to tell my doctor BEFORE I take this drug?   If you are allergic to this drug; any part of this drug; or any other drugs, foods, or substances. Tell your doctor about the allergy and what signs you had.  If you have kidney disease.  If you are breast-feeding. Do not breast-feed while you take this drug.  This is not a list of all drugs or health problems that interact with this drug.  Tell your doctor and pharmacist about all of your drugs (prescription or OTC, natural products, vitamins) and health problems. You must check to make sure that it is safe for you to take this drug with all of your drugs and health problems. Do not start, stop, or change the dose of any drug without checking with your doctor.  What are some things I need to know or do while I take this drug?   Tell all of your health care providers that you take this drug. This includes your doctors, nurses, pharmacists, and dentists.  Avoid driving and doing other tasks or actions that call for you to be alert or have clear eyesight until you see how this drug affects you.  If seizures are different or worse after starting this drug, talk with the doctor.  Do not stop taking this drug all of a sudden without calling your doctor. You may have a greater risk of side effects. If you need to stop this drug, you will want to slowly stop it as  ordered by your doctor.  Avoid drinking alcohol while taking this drug.  Talk with your doctor before you use marijuana, other forms of cannabis, or prescription or OTC drugs that may slow your actions.  A very bad reaction called angioedema has happened with this drug. Sometimes, this may be life-threatening. Signs may include swelling of the hands, face, lips, eyes, tongue, or throat; trouble breathing; trouble swallowing; or unusual hoarseness. Get medical help right away if you have any of these signs.  Severe breathing problems have happened with this drug in people taking certain other drugs (like opioid pain drugs). This has also happened in people who already have lung or breathing problems. The risk may also be greater in people who are older than 65. Sometimes, breathing problems have been deadly. If you have questions, talk with the doctor.  If you are 65 or older, use this drug with care. You could have more side effects.  Talk with your doctor if you plan to father a child. This drug made male animals less fertile and caused sperm changes. Birth defects also happened in the young of male animals treated with this drug. It is not known if these problems happen in humans.  Tell your doctor if you are pregnant or plan on getting pregnant. You will need to talk about the benefits and risks of using this drug while you are pregnant.  What are some side effects that I need to call my doctor about right away?   WARNING/CAUTION: Even though it may be rare, some people may have very bad and sometimes deadly side effects when taking a drug. Tell your doctor or get medical help right away if you have any of the following signs or symptoms that may be related to a very bad side effect:  Signs of an allergic reaction, like rash; hives; itching; red, swollen, blistered, or peeling skin with or without fever; wheezing; tightness in the chest or throat; trouble breathing, swallowing, or talking; unusual hoarseness; or  swelling of the mouth, face, lips, tongue, or throat.  Change in eyesight.  Muscle pain or weakness.  Change in balance.  Feeling confused.  Shakiness.  Trouble breathing, slow breathing, or shallow breathing.  Blue or gray color of the skin, lips, nail beds, fingers, or toes.  Memory problems or loss.  Shortness of breath, a big weight gain, or swelling in the arms or legs.  Fast or abnormal heartbeat.  Fever, chills, or sore throat.  Skin sores.  Any skin change.  Trouble speaking.  Trouble sleeping.  Trouble walking.  Feeling high (easy laughing and feeling good).  Twitching.  Get medical help right away if you feel very sleepy, very dizzy, or if you pass out. Caregivers or others need to get medical help right away if the patient does not respond, does not answer or react like normal, or will not wake up.  Like other drugs that may be used for seizures, this drug may rarely raise the risk of suicidal thoughts or actions. The risk may be higher in people who have had suicidal thoughts or actions in the past. Call the doctor right away about any new or worse signs like depression; feeling nervous, restless, or grouchy; panic attacks; or other changes in mood or behavior. Call the doctor right away if any suicidal thoughts or actions occur.  Low platelet counts have rarely happened with this drug. This may lead to a higher chance of bleeding. Call your doctor right away if you have any unexplained bruising or bleeding.  What are some other side effects of this drug?   All drugs may cause side effects. However, many people have no side effects or only have minor side effects. Call your doctor or get medical help if any of these side effects or any other side effects bother you or do not go away:  Feeling dizzy, sleepy, tired, or weak.  Weight gain.  Not able to focus.  Headache.  Dry mouth.  Constipation.  Increased appetite.  Upset stomach.  Joint pain.  Nose or throat irritation.  These are not all of the side  effects that may occur. If you have questions about side effects, call your doctor. Call your doctor for medical advice about side effects.  You may report side effects to your national health agency.  You may report side effects to the FDA at 1-139.565.7294. You may also report side effects at https://www.fda.gov/medwatch.  How is this drug best taken?   Use this drug as ordered by your doctor. Read all information given to you. Follow all instructions closely.  Extended-release tablets:   Take after the evening meal if taking once daily.  Swallow whole. Do not chew, break, or crush.  Keep taking this drug as you have been told by your doctor or other health care provider, even if you feel well.  Capsules and oral solution:   Take with or without food.  Keep taking this drug as you have been told by your doctor or other health care provider, even if you feel well.  Oral solution:   Measure liquid doses carefully. Use the measuring device that comes with this drug. If there is none, ask the pharmacist for a device to measure this drug.  What do I do if I miss a dose?   Extended-release tablets:   Take a missed dose just before bedtime after eating a snack or after the next day's morning meal.  If you miss taking the missed dose after the next day's morning meal, skip the missed dose and go back to your normal time.  Do not take 2 doses at the same time or extra doses.  Capsules and oral solution:   Take a missed dose as soon as you think about it.  If it is close to the time for your next dose, skip the missed dose and go back to your normal time.  Do not take 2 doses at the same time or extra doses.  How do I store and/or throw out this drug?   Store in the original container at room temperature.  Store in a dry place. Do not store in a bathroom.  Store this drug in a safe place where children cannot see or reach it, and where other people cannot get to it. A locked box or area may help keep this drug safe. Keep  all drugs away from pets.  Throw away unused or  drugs. Do not flush down a toilet or pour down a drain unless you are told to do so. Check with your pharmacist if you have questions about the best way to throw out drugs. There may be drug take-back programs in your area.  General drug facts   If your symptoms or health problems do not get better or if they become worse, call your doctor.  Do not share your drugs with others and do not take anyone else's drugs.  Some drugs may have another patient information leaflet. If you have any questions about this drug, please talk with your doctor, nurse, pharmacist, or other health care provider.  This drug comes with an extra patient fact sheet called a Medication Guide. Read it with care. Read it again each time this drug is refilled. If you have any questions about this drug, please talk with the doctor, pharmacist, or other health care provider.  If you think there has been an overdose, call your poison control center or get medical care right away. Be ready to tell or show what was taken, how much, and when it happened.  Consumer Information Use and Disclaimer   This generalized information is a limited summary of diagnosis, treatment, and/or medication information. It is not meant to be comprehensive and should be used as a tool to help the user understand and/or assess potential diagnostic and treatment options. It does NOT include all information about conditions, treatments, medications, side effects, or risks that may apply to a specific patient. It is not intended to be medical advice or a substitute for the medical advice, diagnosis, or treatment of a health care provider based on the health care provider's examination and assessment of a patient's specific and unique circumstances. Patients must speak with a health care provider for complete information about their health, medical questions, and treatment options, including any risks or benefits  "regarding use of medications. This information does not endorse any treatments or medications as safe, effective, or approved for treating a specific patient. Up[employer] Inc[employer] and its affiliates disclaim any warranty or liability relating to this information or the use thereof. The use of this information is governed by the Terms of Use, available at https://www.[employer].com/en/know/clinical-effectiveness-terms.  Last Reviewed Date   2023-12-21  Copyright   © 2024 UpTo[employer], Inc. and its affiliates and/or licensors. All rights reserved.    Patient Education     Epidural injection   The Basics   Written by the doctors and editors at [employer]   What is an epidural injection? -- An epidural injection can be used to treat a condition called \"radiculopathy.\" This is the medical term for the pain, weakness, numbness, or tingling that happens when nerves coming from the spinal cord get pinched or damaged.  The doctor injects medicines into the space outside the covering of the spinal cord (figure 1). This is similar to an \"epidural\" that is used for pain relief during labor and childbirth.  Epidural injections can be given into different parts of your back:   Cervical epidural injection - Used to help with pain in the head or arms.   Thoracic epidural injection - Used to help with pain in the upper or middle back.   Lumbar epidural injection - Used to help with pain in the lower back or legs.  How do I prepare for an epidural injection? -- The doctor or nurse will tell you if you need to do anything special to prepare. Before your procedure, your doctor will do an exam. They might send you to get tests, such as:   X-ray, ultrasound, or other imaging tests - Imaging tests create pictures of the inside of the body.  Your doctor will also ask you about your \"health history.\" This involves asking you questions about any health problems you have or had in the past, past surgeries, and any medicines you take. Tell them " "about:   Any medicines you are taking - This includes any prescription or \"over-the-counter\" medicines you use, plus any herbal supplements you take. It helps to write down and bring a list of any medicines you take, or bring a bag with all of your medicines with you.   Any allergies you have   Any bleeding problems you have - Certain medicines, including some herbs and supplements, can increase the risk of bleeding. Some health conditions also increase this risk.  You will also get information about:   Eating and drinking before your procedure - In some cases, you might need to \"fast\" before surgery. This means not eating or drinking anything for a period of time. In other cases, you might be allowed to have liquids until a short time before the procedure. Whether you need to fast, and for how long, depends on the procedure you are having.   What help you will need when you go home - For example, you might need to have someone else bring you home or stay with you for some time while you recover.  Ask the doctor or nurse if you have questions or if there is anything you do not understand.  What happens during an epidural injection? -- When it is time for the procedure:   You might get an \"IV,\" which is a thin tube that goes into a vein. This can be used to give you fluids and medicines.   You will get anesthesia medicines to numb the area where the doctor will give the injection. This is to make sure that you do not feel pain during the procedure. You might also get medicines to make you relax and feel sleepy, called \"sedatives.\"   The doctors and nurses will monitor your breathing, blood pressure, and heart rate during the procedure.   The doctor might use a continuous X-ray called \"fluoroscopy.\" This is to help make sure that the medicines are injected into the right place. The doctor might also inject a dye to see where to give the medicine.   The doctor will place a needle through your skin and inject the " medicine into a space near your spine. Then, they will remove the needle and cover the area with a clean bandage.   The procedure takes 15 to 30 minutes.  What happens after an epidural injection? -- After your procedure, the staff will watch you closely for a short time. It might take a few days before you feel the effects of the epidural injection.  Before you go home, make sure that you know what problems to look out for and when to call the doctor. Make sure that you understand your doctor's or nurse's instructions. Ask questions about anything you do not understand.  For the rest of the day after your procedure:   Try to rest. Limit activities like exercise or driving.   The doctor might recommend an over-the-counter pain medicine. These include acetaminophen (sample brand name: Tylenol), ibuprofen (sample brand names: Advil, Motrin), and naproxen (sample brand name: Aleve).   Ice can help with pain and swelling. Put a cold gel pack, bag of ice, or bag of frozen vegetables on the injection site area every 1 to 2 hours, for 15 minutes each time. Put a thin towel between the ice (or other cold object) and the skin.  What are the risks of an epidural injection? -- Your doctor will talk to you about all of the possible risks, and answer your questions. Possible risks include:   Bleeding   Infection   Headache   Nerve injury  When should I call the doctor? -- Call for emergency help right away (in the [place] and [place], call 9-1-1) if:   You can't move your arms or legs.  Call for advice if:   You have a fever of 100.4°F (38°C) or higher, or chills.   You have redness or swelling around the injection site.   You have a headache.   Your arms or legs are numb, weak, or tingly.  All topics are updated as new evidence becomes available and our peer review process is complete.  This topic retrieved from [facility] on: May 15, 2024.  Topic 741928 Version 1.0  Release: 32.4.3 - C32.134  © 2024 UpToDate, Inc. and/or its  "affiliates. All rights reserved.  figure 1: Epidural injection     Duringan epidural injection, the doctor inserts a needle between 2 of the bones thatmake up the spine. Then, they inject medicines into the area around the spinalcord. This is an illustration of a \"lumbar\" epidural injection, which is given into the low back. The doctor can place the needle in other areas to treat other types of pain.  Graphic 279766 Version 1.0  Consumer Information Use and Disclaimer   Disclaimer: This generalized information is a limited summary of diagnosis, treatment, and/or medication information. It is not meant to be comprehensive and should be used as a tool to help the user understand and/or assess potential diagnostic and treatment options. It does NOT include all information about conditions, treatments, medications, side effects, or risks that may apply to a specific patient. It is not intended to be medical advice or a substitute for the medical advice, diagnosis, or treatment of a health care provider based on the health care provider's examination and assessment of a patient's specific and unique circumstances. Patients must speak with a health care provider for complete information about their health, medical questions, and treatment options, including any risks or benefits regarding use of medications. This information does not endorse any treatments or medications as safe, effective, or approved for treating a specific patient. Up[employer], Inc. and its affiliates disclaim any warranty or liability relating to this information or the use thereof.The use of this information is governed by the Terms of Use, available at https://www.[employer].com/en/know/clinical-effectiveness-terms. 2024© UpToDate, Inc. and its affiliates and/or licensors. All rights reserved.  Copyright   © 2024 Up[employer]Date, Inc. and/or its affiliates. All rights reserved.    "

## 2024-10-14 NOTE — PROGRESS NOTES
Assessment:  1. Status post femorofemoral bypass surgery    2. Spinal stenosis of lumbar region, unspecified whether neurogenic claudication present    3. Pain in both lower extremities    4. PVD (peripheral vascular disease) ([facility])        Plan:  No orders of the defined types were placed in this encounter.      New Medications Ordered This Visit   Medications    pregabalin (LYRICA) 25 mg capsule     Sig: Take 1 capsule (25 mg total) by mouth 2 (two) times a day Start by taking 1 pill at bedtime for 2 days. Then increase to 1 pill twice a day thereafter.     Dispense:  60 capsule     Refill:  0       My impressions and treatment recommendations were discussed in detail with the patient, who verbalized understanding and had no further questions.    83-year-old female presents her office chief complaint of left leg pain.  More specifically, most of her complaints are in the left foot where she reports numbness and electrical type pain.  She denies significant back pain.  She does not have any symptoms in the right lower extremity.  On exam, there is diffuse weakness in left lower extremity.  The leg is warm to touch.  Pulses are difficult to palpate in the dorsalis pedis.  She reports pain with sitting and at rest no significant exacerbation with ambulation, though she is relatively wheelchair-bound.    Differential diagnosis does include lumbar radiculopathy, though most of her stenosis at the L4-5 level appears to be central in nature and she does not have symptoms bilaterally.  We discussed left L5-S1 lumbar epidural steroid injection under fluoroscopic guidance similar symptoms.  This would be diagnostic and potentially therapeutic measure to help with her symptoms.  Patient would like to hold off for now until she sees how she does with new medication.    She was previously on gabapentin zoloft which was stopped due to being a possible cause for SIADH. Zoloft has been stopped so we will try low dose Lyrica as an  "alternative at 25mg BID on a dose titration schedule.    [place] Prescription Drug Monitoring Program report was reviewed and was appropriate     Complete risks and benefits including bleeding, infection, tissue reaction, nerve injury and allergic reaction were discussed. The approach was demonstrated using models and literature was provided. Verbal and written consent was obtained.    Discharge instructions were provided. I personally saw and examined the patient and I agree with the above discussed plan of care.    History of Present Illness:    Ofelia Aguilar is a 83 y.o. female who presents to [facility] for initial evaluation of the above stated pain complaints. The patient has a past medical and chronic pain history as outlined in the assessment section. She was referred by Madalyn Zendejas PA-C  [address]   [address]  [place]  [place] [id number] .    Patient is here with chief complaint of left leg pain for over 4 months.  Severe over the past 1.  10 out of 10.  Constant.  Burning, cramping, shooting, numbness, sharp, pins-and-needles, pressure-like, throbbing nature.  There is subjective weakness of lower extremities.  Denies bowel bladder incontinence or saddle anesthesia.    Pain is increased with coughing, sneezing.  No change with standing, bending, sitting, walking, relaxation.    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    Currently using acetaminophen.  In the past does use naproxen and Toradol.    Patient reports vast majority of pain In the entire left foot. Numbness and pain like she \"stepped on a live wire\". Denies increased pain with ambulation.  Pain is constant with both sitting and walking.  Not exacerbated with ambulation.  Denies significant back pain. She also reports increased weakness in the left leg.     eview " of Systems:    Review of Systems   Eyes:  Positive for visual disturbance.   Gastrointestinal:  Positive for nausea.   Neurological:  Positive for numbness.   Psychiatric/Behavioral:  Positive for decreased concentration. The patient is nervous/anxious.         Depression           Past Medical History:   Diagnosis Date    Anxiety     GERD (gastroesophageal reflux disease)        Past Surgical History:   Procedure Laterality Date    APPENDECTOMY       SECTION         History reviewed. No pertinent family history.    Social History     Occupational History    Not on file   Tobacco Use    Smoking status: Former     Current packs/day: 1.00     Average packs/day: 1 pack/day for 20.0 years (20.0 ttl pk-yrs)     Types: Cigarettes    Smokeless tobacco: Never   Substance and Sexual Activity    Alcohol use: Never    Drug use: Not on file    Sexual activity: Not on file         Current Outpatient Medications:     acetaminophen (TYLENOL) 650 mg CR tablet, Take 1 tablet (650 mg total) by mouth every 8 (eight) hours as needed for mild pain, Disp: 30 tablet, Rfl: 0    amLODIPine (NORVASC) 5 mg tablet, Take 1 tablet (5 mg total) by mouth daily, Disp: 30 tablet, Rfl: 0    aspirin 81 mg chewable tablet, Chew 1 tablet daily, Disp: , Rfl:     atorvastatin (LIPITOR) 40 mg tablet, Take 1 tablet (40 mg total) by mouth daily, Disp: 90 tablet, Rfl: 0    ciprofloxacin (CIPRO) 500 mg tablet, Take 1 tablet (500 mg total) by mouth 2 (two) times a day for 7 days, Disp: 14 tablet, Rfl: 0    clopidogrel (PLAVIX) 75 mg tablet, Take 75 mg by mouth daily, Disp: , Rfl:     hydrOXYzine HCL (ATARAX) 25 mg tablet, Take 1 tablet by mouth every 6 (six) hours as needed, Disp: , Rfl:     metroNIDAZOLE (FLAGYL) 500 mg tablet, Take 1 tablet (500 mg total) by mouth every 8 (eight) hours for 7 days, Disp: 21 tablet, Rfl: 0    mirtazapine (REMERON) 7.5 MG tablet, Take 1 tablet (7.5 mg total) by mouth daily at bedtime, Disp: 30 tablet, Rfl: 0    Multiple  "Vitamin (multivitamin) tablet, Take 1 tablet by mouth daily, Disp: , Rfl:     ondansetron (ZOFRAN) 4 mg tablet, Take 1 tablet (4 mg total) by mouth 3 (three) times a day as needed for nausea or vomiting, Disp: 20 tablet, Rfl: 0    pregabalin (LYRICA) 25 mg capsule, Take 1 capsule (25 mg total) by mouth 2 (two) times a day Start by taking 1 pill at bedtime for 2 days. Then increase to 1 pill twice a day thereafter., Disp: 60 capsule, Rfl: 0    sodium chloride 1 g tablet, Take 2 tablets (2 g total) by mouth 3 (three) times a day with meals, Disp: 180 tablet, Rfl: 0    Allergies   Allergen Reactions    Penicillins Anaphylaxis    Aspirin GI Intolerance       Physical Exam:    /70   Pulse 93   Ht 5' 4\" (1.626 m)   Wt 43.9 kg (96 lb 12.5 oz)   LMP  (LMP Unknown)   BMI 16.61 kg/m²     Constitutional: normal, well developed, well nourished, alert, in no distress and non-toxic and no overt pain behavior.  Eyes: anicteric  HEENT: grossly intact  Neck: supple, symmetric, trachea midline and no masses   Pulmonary:even and unlabored  Cardiovascular:No edema or pitting edema present left leg warm to touch. Delayed cap refill on left. DP pulse difficult to palpate.   Skin:Normal without rashes or lesions and well hydrated  Psychiatric:Mood and affect appropriate  Neurologic:Cranial Nerves II-XII grossly intact  Musculoskeletal:normal    Lumbar Spine Exam    Appearance:  Normal lordosis  Palpation/Tenderness:  no tenderness or spasm  Sensory:  no sensory deficits noted  Motor Strength:  Left hip flexion:  4/5  Left hip extension:  5/5  Right hip flexion:  5/5  Right hip extension:  5/5  Left knee flexion:  4/5  Left knee extension:  4/5  Right knee flexion:  5/5  Right knee extension:  5/5  Left foot dorsiflexion:  4/5  Left foot plantar flexion:  4/5  Right foot dorsiflexion:  5/5  Right foot plantar flexion:  5/5  Reflexes:  Left Patellar:  2+   Right Patellar:  2+   Left Achilles:  2+   Right Achilles:  2+   Special " Tests:  Seated slump Test left:  positive  Seated slump  Test right:  negative        Imaging    CT LUMBAR SPINE WITHOUT CONTRAST  8/25/24     INDICATION:   left leg numbness.     COMPARISON: None.     TECHNIQUE:  Contiguous axial images through the lumbar spine were obtained. Sagittal and coronal reconstructions were performed.     IV Contrast: None     Radiation dose length product (DLP) for this visit:  558 mGy-cm .  This examination, like all CT scans performed in the [facility], was performed utilizing techniques to minimize radiation dose exposure, including the use of iterative   reconstruction and automated exposure control.     IMAGE QUALITY:  Diagnostic.     FINDINGS:     ALIGNMENT:  There are 5 lumbar type vertebral bodies. Mild levoscoliotic curvature of the lumbar spine. Grade 1 anterolisthesis of L4 and L5.     VERTEBRAE: Suggestion of osteopenia/osteoporosis. No acute fracture.     DEGENERATIVE CHANGES:     Lower Thoracic spine:  Schmorl's node deformities at the T11-12 endplates. No compressive degenerative discogenic disease in the visualized lower thoracic spine.     Lumbar Spine:  L1-2: No central canal stenosis or foraminal stenosis.     L2-3: Circumferential disc bulge, slightly eccentric to the right. Centrally, on the disc bulges partially contained by the PLL.  Mild right greater than left subarticular recess encroachment. Mild disc height loss and endplate degenerative changes with   mild anterior marginal osteophytosis. Tiny Schmorl's node deformity in the superior endplate of L3. No foraminal stenosis.     L3-4: Disc height loss and mild endplate sclerosis with small Schmorl's node deformities and osteophytosis. Circumferential disc bulge, eccentric to the right with mild bilateral facet arthropathy and buckling of the ligamentum flavum. Mild spinal   stenosis. No foraminal nerve root impingement     L4-5: Severe disc height loss with a small amount of intravertebral  degenerative discal gas. Grade 1 anterolisthesis with uncovering of the posterior disc margin. Disc bulge, slightly more pronounced on the right than the left with severe bilateral facet   arthropathy and buckling of the partially calcified ligamentum flavum. Together these findings result in severe spinal stenosis and mild bilateral inferior foraminal encroachment without nerve root impingement.     L5-S1: Mild disc bulge without spinal canal or foraminal stenosis. Moderate bilateral facet arthropathy.     PARASPINAL SOFT TISSUES: No prevertebral or paravertebral soft tissue edema. Dorsal paraspinal muscular atrophy, most notably in the lower lumbar and upper sacral regions.     OTHER: Heavily calcified aorta and iliac vasculature with severe multifocal atherosclerotic luminal narrowing. Additional finding of a punctate nonobstructing 2 mm calculus within the left kidney.     IMPRESSION:     No acute osseous abnormality.     Advanced degenerative discogenic disease and grade 1 spondylolisthesis with severe L4-5 spinal stenosis. If there is no contraindication, consider follow-up MRI imaging and consultation with the spine surgical service.     Additional degenerative changes as described above.     Extensive aortoiliac atherosclerotic disease. Consider MRA or CTA imaging of the aorta for additional characterization.  No orders to display       No orders of the defined types were placed in this encounter.

## 2024-10-15 ENCOUNTER — RA CDI HCC (OUTPATIENT)
Dept: OTHER | Facility: HOSPITAL | Age: 84
End: 2024-10-15

## 2024-10-15 NOTE — PROGRESS NOTES
HCC coding opportunities       Chart reviewed, no opportunity found: CHART REVIEWED, NO OPPORTUNITY FOUND        Patients Insurance        Commercial Insurance: [employer] Commercial

## 2024-10-22 ENCOUNTER — OFFICE VISIT (OUTPATIENT)
Dept: FAMILY MEDICINE CLINIC | Facility: CLINIC | Age: 84
End: 2024-10-22
Payer: COMMERCIAL

## 2024-10-22 VITALS
TEMPERATURE: 98.9 F | SYSTOLIC BLOOD PRESSURE: 134 MMHG | HEIGHT: 64 IN | BODY MASS INDEX: 16.9 KG/M2 | OXYGEN SATURATION: 98 % | DIASTOLIC BLOOD PRESSURE: 86 MMHG | WEIGHT: 99 LBS | HEART RATE: 101 BPM

## 2024-10-22 DIAGNOSIS — I10 HTN, GOAL BELOW 140/90: ICD-10-CM

## 2024-10-22 DIAGNOSIS — E87.1 HYPONATREMIA: ICD-10-CM

## 2024-10-22 DIAGNOSIS — G47.00 INSOMNIA, UNSPECIFIED TYPE: Primary | ICD-10-CM

## 2024-10-22 DIAGNOSIS — K76.9 LIVER LESION, RIGHT LOBE: ICD-10-CM

## 2024-10-22 DIAGNOSIS — B37.0 ORAL THRUSH: ICD-10-CM

## 2024-10-22 DIAGNOSIS — F32.A DEPRESSION, UNSPECIFIED DEPRESSION TYPE: ICD-10-CM

## 2024-10-22 DIAGNOSIS — I73.9 PVD (PERIPHERAL VASCULAR DISEASE) (HCC): Chronic | ICD-10-CM

## 2024-10-22 DIAGNOSIS — E44.0 MODERATE PROTEIN-CALORIE MALNUTRITION (HCC): ICD-10-CM

## 2024-10-22 PROCEDURE — 99214 OFFICE O/P EST MOD 30 MIN: CPT

## 2024-10-22 RX ORDER — NYSTATIN 100000 [USP'U]/ML
500000 SUSPENSION ORAL 4 TIMES DAILY
Qty: 140 ML | Refills: 0 | Status: SHIPPED | OUTPATIENT
Start: 2024-10-22 | End: 2024-10-29

## 2024-10-22 RX ORDER — MIRTAZAPINE 15 MG/1
15 TABLET, FILM COATED ORAL
Qty: 30 TABLET | Refills: 0 | Status: SHIPPED | OUTPATIENT
Start: 2024-10-22

## 2024-10-22 NOTE — PROGRESS NOTES
Ambulatory Visit  Name: Ofelia Aguilar      : 1940      MRN: [id number]  Encounter Provider: Madalyn Zendejas PA-C  Encounter Date: 10/22/2024   Encounter department: [facility]    Assessment & Plan  Insomnia, unspecified type  - improvement since starting remeron however still having issues waking up throughout the night; doing very well on the remeron with no side effects - will increase to 15 mg HS; re-discussed potential side effects  - will follow up for med check in four weeks, to call sooner with any questions/concerns    Orders:    mirtazapine (REMERON) 15 mg tablet; Take 1 tablet (15 mg total) by mouth daily at bedtime    Depression, unspecified depression type  - significant improvement in mood and anxiety since starting the remeron, both patient and her daughter have noticed positive improvement  - will increase remeron to 15 mg HS as she is tolerating the medication well with no side effects  - follow up in four weeks for medication check, sooner as needed  - no current thoughts/plans to harm self or others    Orders:    mirtazapine (REMERON) 15 mg tablet; Take 1 tablet (15 mg total) by mouth daily at bedtime    Oral thrush  - evidence of oral thrush on exam, white film coating tongue -- likely related to recent antibiotic use for treatment of diverticulitis  - will treat with nystatin mouthwash  - advised to call if symptoms persist despite treatment    Orders:    nystatin (MYCOSTATIN) 500,000 units/5 mL suspension; Apply 5 mL (500,000 Units total) to the mouth or throat 4 (four) times a day for 7 days    Liver lesion, right lobe  - incidental 9 mm liver lesion found on recent CT abdomen, pt reports she is unable to get MRIs so will get liver US for further evaluation of lesion     Orders:    US right upper quadrant; Future    PVD (peripheral vascular disease) (HCC)  - has appointment with vascular next week to establish, 10/29  - continue current atorvastatin 40 mg QD and  "daily ASA       HTN, goal below 140/90  - BP within goal today, 134/86 mmHg  - continue current amlodipine 5 mg QD       Hyponatremia  - 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 Patient was started on Remeron 7.5 mg HS at last visit for depression, insomnia, and inability to gain weight. Patient reports she has been doing very well on medication -- feels happier, less anxious and has gained 3 lbs since our last visit. Daughter reports she has noticed positive improvement in patient's mood as well and her appetite has been better. However, she is still having trouble sleeping. They are wondering if they can increase dose. She is tolerating it well with  no side effects.     Patient was sent to ED at our last visit two weeks ago due to high fever and severe abdominal pain. Septic work up was negative, however CT revealed acute diverticulitis. She was put on cipro and flagyl which she has finished. Reports abdominal pain has resolved completely, feels back to normal and no longer having a fever.   Incidentally, CT also showed \"Extensive atherosclerotic disease involving the distal abdominal aorta, and iliac arteries. 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 These findings are incompletely assessed, by non-CTA protocol\" and  \"3.  Nonspecific 9 mm enhancing focus within the right hepatic lobe. This may represent a flash filling hemangioma, which " "could be followed up by nonemergent MRI of the abdomen, with intravenous gadolinium.\"    Patient reports she was aware of the occlusions of both of her iliac arteries; diagnosed when she lived in [place]. She has apt to establish with  Vascular next week, 10/29.     Since our last visit, she established with Dr. Mejia with pain management for chronic pain; he started her on Lyrica. She has been on it about a week and reports her pain is significantly improved already. Has follow up with him coming up.    Patient reports yesterday noticed \"white film\" on her tongue as well.         History obtained from : patient  Review of Systems   Constitutional:  Negative for chills, diaphoresis and fever.   Respiratory:  Negative for cough, chest tightness, shortness of breath and wheezing.    Cardiovascular:  Negative for chest pain and palpitations.   Gastrointestinal:  Negative for abdominal distention, abdominal pain, blood in stool, constipation, diarrhea, nausea and vomiting.   Neurological:  Negative for dizziness, light-headedness and headaches.   Psychiatric/Behavioral:  Positive for sleep disturbance. Negative for self-injury and suicidal ideas.      Medical History Reviewed by provider this encounter:  Tobacco  Allergies  Meds  Problems  Med Hx  Surg Hx  Fam Hx       Past Medical History   Past Medical History:   Diagnosis Date    Anxiety     GERD (gastroesophageal reflux disease)      Past Surgical History:   Procedure Laterality Date    APPENDECTOMY       SECTION       History reviewed. No pertinent family history.  Current Outpatient Medications on File Prior to Visit   Medication Sig Dispense Refill    acetaminophen (TYLENOL) 650 mg CR tablet Take 1 tablet (650 mg total) by mouth every 8 (eight) hours as needed for mild pain 30 tablet 0    amLODIPine (NORVASC) 5 mg tablet Take 1 tablet (5 mg total) by mouth daily 30 tablet 0    aspirin 81 mg chewable tablet Chew 1 tablet daily      atorvastatin " (LIPITOR) 40 mg tablet Take 1 tablet (40 mg total) by mouth daily 90 tablet 0    clopidogrel (PLAVIX) 75 mg tablet Take 75 mg by mouth daily      hydrOXYzine HCL (ATARAX) 25 mg tablet Take 1 tablet by mouth every 6 (six) hours as needed      Multiple Vitamin (multivitamin) tablet Take 1 tablet by mouth daily      ondansetron (ZOFRAN) 4 mg tablet Take 1 tablet (4 mg total) by mouth 3 (three) times a day as needed for nausea or vomiting 20 tablet 0    pregabalin (LYRICA) 25 mg capsule Take 1 capsule (25 mg total) by mouth 2 (two) times a day Start by taking 1 pill at bedtime for 2 days. Then increase to 1 pill twice a day thereafter. 60 capsule 0    sodium chloride 1 g tablet Take 2 tablets (2 g total) by mouth 3 (three) times a day with meals 180 tablet 0    [DISCONTINUED] mirtazapine (REMERON) 7.5 MG tablet Take 1 tablet (7.5 mg total) by mouth daily at bedtime 30 tablet 0     No current facility-administered medications on file prior to visit.     Allergies   Allergen Reactions    Penicillins Anaphylaxis    Aspirin GI Intolerance      Current Outpatient Medications on File Prior to Visit   Medication Sig Dispense Refill    acetaminophen (TYLENOL) 650 mg CR tablet Take 1 tablet (650 mg total) by mouth every 8 (eight) hours as needed for mild pain 30 tablet 0    amLODIPine (NORVASC) 5 mg tablet Take 1 tablet (5 mg total) by mouth daily 30 tablet 0    aspirin 81 mg chewable tablet Chew 1 tablet daily      atorvastatin (LIPITOR) 40 mg tablet Take 1 tablet (40 mg total) by mouth daily 90 tablet 0    clopidogrel (PLAVIX) 75 mg tablet Take 75 mg by mouth daily      hydrOXYzine HCL (ATARAX) 25 mg tablet Take 1 tablet by mouth every 6 (six) hours as needed      Multiple Vitamin (multivitamin) tablet Take 1 tablet by mouth daily      ondansetron (ZOFRAN) 4 mg tablet Take 1 tablet (4 mg total) by mouth 3 (three) times a day as needed for nausea or vomiting 20 tablet 0    pregabalin (LYRICA) 25 mg capsule Take 1 capsule (25 mg  "total) by mouth 2 (two) times a day Start by taking 1 pill at bedtime for 2 days. Then increase to 1 pill twice a day thereafter. 60 capsule 0    sodium chloride 1 g tablet Take 2 tablets (2 g total) by mouth 3 (three) times a day with meals 180 tablet 0    [DISCONTINUED] mirtazapine (REMERON) 7.5 MG tablet Take 1 tablet (7.5 mg total) by mouth daily at bedtime 30 tablet 0     No current facility-administered medications on file prior to visit.      Social History     Tobacco Use    Smoking status: Former     Current packs/day: 1.00     Average packs/day: 1 pack/day for 20.0 years (20.0 ttl pk-yrs)     Types: Cigarettes     Passive exposure: Never    Smokeless tobacco: Never   Vaping Use    Vaping status: Never Used   Substance and Sexual Activity    Alcohol use: Never    Drug use: Not on file    Sexual activity: Not on file     Objective     /86   Pulse 101   Temp 98.9 °F (37.2 °C)   Ht 5' 4\" (1.626 m)   Wt 44.9 kg (99 lb)   LMP  (LMP Unknown)   SpO2 98%   BMI 16.99 kg/m²     Physical Exam  Vitals reviewed.   Constitutional:       General: She is not in acute distress.     Appearance: Normal appearance. She is not ill-appearing or diaphoretic.   HENT:      Head: Normocephalic and atraumatic.      Right Ear: External ear normal.      Left Ear: External ear normal.      Nose: Nose normal.      Mouth/Throat:      Mouth: Mucous membranes are moist.      Tongue: Lesions (white \"film\" like lesions throughout top of tongue) present.   Eyes:      General:         Right eye: No discharge.         Left eye: No discharge.      Conjunctiva/sclera: Conjunctivae normal.   Cardiovascular:      Rate and Rhythm: Normal rate and regular rhythm.      Pulses: Normal pulses.      Heart sounds: Normal heart sounds. No murmur heard.  Pulmonary:      Effort: Pulmonary effort is normal. No respiratory distress.      Breath sounds: Normal breath sounds. No wheezing, rhonchi or rales.   Musculoskeletal:      Cervical back: " Normal range of motion.   Skin:     General: Skin is warm.   Neurological:      General: No focal deficit present.      Mental Status: She is alert.      Gait: Gait abnormal (in wheelchair).   Psychiatric:         Mood and Affect: Mood normal.

## 2024-10-22 NOTE — ASSESSMENT & PLAN NOTE
- has appointment with vascular next week to establish, 10/29  - continue current atorvastatin 40 mg QD and daily ASA

## 2024-10-22 NOTE — ASSESSMENT & PLAN NOTE
- significant improvement in mood and anxiety since starting the remeron, both patient and her daughter have noticed positive improvement  - will increase remeron to 15 mg HS as she is tolerating the medication well with no side effects  - follow up in four weeks for medication check, sooner as needed  - no current thoughts/plans to harm self or others    Orders:    mirtazapine (REMERON) 15 mg tablet; Take 1 tablet (15 mg total) by mouth daily at bedtime

## 2024-10-22 NOTE — ASSESSMENT & PLAN NOTE
- improvement since starting remeron however still having issues waking up throughout the night; doing very well on the remeron with no side effects - will increase to 15 mg HS; re-discussed potential side effects  - will follow up for med check in four weeks, to call sooner with any questions/concerns    Orders:    mirtazapine (REMERON) 15 mg tablet; Take 1 tablet (15 mg total) by mouth daily at bedtime

## 2024-10-22 NOTE — ASSESSMENT & PLAN NOTE
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

## 2024-10-22 NOTE — ASSESSMENT & PLAN NOTE
- increased appetite since starting the Remeron, has gained three lbs since last visit, daughter reports started boost supplement which has been helping as well

## 2024-10-26 ENCOUNTER — APPOINTMENT (OUTPATIENT)
Dept: LAB | Facility: CLINIC | Age: 84
End: 2024-10-26
Payer: COMMERCIAL

## 2024-10-26 DIAGNOSIS — E87.1 HYPONATREMIA: ICD-10-CM

## 2024-10-26 DIAGNOSIS — D50.9 IRON DEFICIENCY ANEMIA, UNSPECIFIED IRON DEFICIENCY ANEMIA TYPE: ICD-10-CM

## 2024-10-26 DIAGNOSIS — Z11.59 NEED FOR HEPATITIS C SCREENING TEST: ICD-10-CM

## 2024-10-26 DIAGNOSIS — E78.5 HYPERLIPIDEMIA, UNSPECIFIED HYPERLIPIDEMIA TYPE: ICD-10-CM

## 2024-10-26 DIAGNOSIS — I10 HTN, GOAL BELOW 140/90: ICD-10-CM

## 2024-10-26 LAB
ALBUMIN SERPL BCG-MCNC: 4 G/DL (ref 3.5–5)
ALP SERPL-CCNC: 54 U/L (ref 34–104)
ALT SERPL W P-5'-P-CCNC: 18 U/L (ref 7–52)
ANION GAP SERPL CALCULATED.3IONS-SCNC: 12 MMOL/L (ref 4–13)
AST SERPL W P-5'-P-CCNC: 25 U/L (ref 13–39)
BASOPHILS # BLD AUTO: 0.04 THOUSANDS/ΜL (ref 0–0.1)
BASOPHILS NFR BLD AUTO: 1 % (ref 0–1)
BILIRUB SERPL-MCNC: 0.33 MG/DL (ref 0.2–1)
BUN SERPL-MCNC: 18 MG/DL (ref 5–25)
CALCIUM SERPL-MCNC: 9.9 MG/DL (ref 8.4–10.2)
CHLORIDE SERPL-SCNC: 100 MMOL/L (ref 96–108)
CHOLEST SERPL-MCNC: 177 MG/DL
CO2 SERPL-SCNC: 25 MMOL/L (ref 21–32)
CREAT SERPL-MCNC: 0.65 MG/DL (ref 0.6–1.3)
EOSINOPHIL # BLD AUTO: 0.33 THOUSAND/ΜL (ref 0–0.61)
EOSINOPHIL NFR BLD AUTO: 4 % (ref 0–6)
ERYTHROCYTE [DISTWIDTH] IN BLOOD BY AUTOMATED COUNT: 15.1 % (ref 11.6–15.1)
FERRITIN SERPL-MCNC: 94 NG/ML (ref 11–307)
GFR SERPL CREATININE-BSD FRML MDRD: 82 ML/MIN/1.73SQ M
GLUCOSE P FAST SERPL-MCNC: 90 MG/DL (ref 65–99)
HCT VFR BLD AUTO: 34.9 % (ref 34.8–46.1)
HDLC SERPL-MCNC: 54 MG/DL
HGB BLD-MCNC: 10.8 G/DL (ref 11.5–15.4)
IMM GRANULOCYTES # BLD AUTO: 0.03 THOUSAND/UL (ref 0–0.2)
IMM GRANULOCYTES NFR BLD AUTO: 0 % (ref 0–2)
IRON SATN MFR SERPL: 17 % (ref 15–50)
IRON SERPL-MCNC: 58 UG/DL (ref 50–212)
LDLC SERPL CALC-MCNC: 89 MG/DL (ref 0–100)
LYMPHOCYTES # BLD AUTO: 1.84 THOUSANDS/ΜL (ref 0.6–4.47)
LYMPHOCYTES NFR BLD AUTO: 24 % (ref 14–44)
MCH RBC QN AUTO: 30.3 PG (ref 26.8–34.3)
MCHC RBC AUTO-ENTMCNC: 30.9 G/DL (ref 31.4–37.4)
MCV RBC AUTO: 98 FL (ref 82–98)
MONOCYTES # BLD AUTO: 0.97 THOUSAND/ΜL (ref 0.17–1.22)
MONOCYTES NFR BLD AUTO: 13 % (ref 4–12)
NEUTROPHILS # BLD AUTO: 4.5 THOUSANDS/ΜL (ref 1.85–7.62)
NEUTS SEG NFR BLD AUTO: 58 % (ref 43–75)
NONHDLC SERPL-MCNC: 123 MG/DL
NRBC BLD AUTO-RTO: 0 /100 WBCS
PLATELET # BLD AUTO: 463 THOUSANDS/UL (ref 149–390)
PMV BLD AUTO: 10.3 FL (ref 8.9–12.7)
POTASSIUM SERPL-SCNC: 4.7 MMOL/L (ref 3.5–5.3)
PROT SERPL-MCNC: 7 G/DL (ref 6.4–8.4)
RBC # BLD AUTO: 3.56 MILLION/UL (ref 3.81–5.12)
SODIUM SERPL-SCNC: 137 MMOL/L (ref 135–147)
TIBC SERPL-MCNC: 342 UG/DL (ref 250–450)
TRIGL SERPL-MCNC: 169 MG/DL
UIBC SERPL-MCNC: 284 UG/DL (ref 155–355)
WBC # BLD AUTO: 7.71 THOUSAND/UL (ref 4.31–10.16)

## 2024-10-26 PROCEDURE — 82728 ASSAY OF FERRITIN: CPT

## 2024-10-26 PROCEDURE — 83550 IRON BINDING TEST: CPT

## 2024-10-26 PROCEDURE — 86803 HEPATITIS C AB TEST: CPT

## 2024-10-26 PROCEDURE — 85025 COMPLETE CBC W/AUTO DIFF WBC: CPT

## 2024-10-26 PROCEDURE — 36415 COLL VENOUS BLD VENIPUNCTURE: CPT

## 2024-10-26 PROCEDURE — 80061 LIPID PANEL: CPT

## 2024-10-26 PROCEDURE — 80053 COMPREHEN METABOLIC PANEL: CPT

## 2024-10-26 PROCEDURE — 83540 ASSAY OF IRON: CPT

## 2024-10-27 LAB — HCV AB SER QL: NORMAL

## 2024-10-28 ENCOUNTER — TELEPHONE (OUTPATIENT)
Dept: FAMILY MEDICINE CLINIC | Facility: CLINIC | Age: 84
End: 2024-10-28

## 2024-10-28 NOTE — PROGRESS NOTES
Ambulatory Visit  Name: Ofelia Aguilar      : 1940      MRN: [id number]  Encounter Provider: Meghan Diaz MD  Encounter Date: 10/29/2024   Encounter department: [facility]    Assessment & Plan  PVD (peripheral vascular disease) (Formerly [facility])  Prior h/o fem fem cross over and iliac stenting in Dr. Lama Jamison in [place], [place] 10 years ago.    She has been on aspirin, plavix and statin.  She is not able to handle statin due to aches and pain.    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  There are stents in the left iliac system there are also occluded.  There is a chronic occlusion of bilateral iliac systems and the femorofemoral crossover bypass.  This is likely the cause of your leg symptoms and weakness.  There is also spinal stenosis that is causing some of these symptoms.  The symptoms are complex with both an element of spinal stenosis and atherosclerotic arterial occlusive disease.  We will start a workup with BARRON and toe pressures bilaterally.  Will also get a CT angiogram with runoff to determine targets for potential bypass in the future.  She will also get an upper extremity arterial Doppler on the right to make sure there is adequate inflow for a potential right ax bifemoral bypass.  Due to her significant medical comorbidities such as malnutrition and advanced peripheral artery disease with aortoiliac occlusions she is in a high risk candidate for aortobifemoral bypass.    In order to treat this we need to make sure you completely stop smoking.  Improve the nutrition status with protein.    Then we will plan on likely axillary bifemoral bypass   Need to get CT angiogram of both legs to plan for this   Also need to check pressures in both legs and right arm.    Orders:    Ambulatory Referral to Vascular Surgery    VAS  upper limb arterial duplex, unilateral/limited, graft; Future    VAS BARRON and waveform analysis, multiple levels; Future    Atherosclerosis of native arteries of extremities with rest pain, bilateral legs (HCC)    Orders:    CTA abdominal w run off w wo contrast; Future    VAS upper limb arterial duplex, unilateral/limited, graft; Future    VAS BARRON and waveform analysis, multiple levels; Future    Ambulatory Referral to Cardiology; Future    Moderate protein-calorie malnutrition (HCC)  Malnutrition Findings:       Temporal wasting, BMI of 16.99  Patient is increasing her protein intake.  Will need to be optimized prior to any surgical intervention                          BMI Findings:           Body mass index is 16.99 kg/m².            Hyponatremia  Improved with salt tabs.       Cigarette nicotine dependence with nicotine-induced disorder  Tobacco use is a significant patient-modifiable risk factor for this patient’s vascular disease with multiple vascular comorbidities, and a significant risk factor for failure of and complications from any endovascular or surgical interventions.    I explained to the patient the effects of smoking including peripheral artery disease, coronary artery disease, cerebrovascular disease as well as cancer and chronic obstructive pulmonary disease. I asked the patient to stop smoking immediately. It is never too late to quit, and many studies show significant health benefits as well as economical savings after smoking cessation. I offered to the patient nicotine replacement therapy as well as referral to the smoking cessation program and access to the quit line 3-839-TKIYJDW or ambulatory referral to our network smoking cessation program.    Based on our conversation, this patient appears motivated to quit  And declined my offer of nicotine replacement or tobacco cessation medications    The patient set a quit date of 2 weeks . I will continue to  follow up on this issue at our next  scheduled visit.     I spent approximately 10 minutes on tobacco cessation counseling with this patient.             History of Present Illness     Ofelia Aguilar is a 84 y.o. female who presents for evaluation of leg pain, left is worse than right.  He was also seen in the pain and spine specialist for lumbar spine disease.  Extensive prior vascular history from [place] reviewed    New patient, presents to review CT abd/ pelvis for incidental findings.     History obtained from : patient and patient's daughter  Review of Systems   Constitutional: Negative.    HENT: Negative.     Eyes: Negative.    Respiratory: Negative.     Cardiovascular: Negative.    Gastrointestinal: Negative.    Endocrine: Negative.    Genitourinary: Negative.    Musculoskeletal:  Positive for gait problem.   Skin: Negative.    Allergic/Immunologic: Negative.    Hematological: Negative.    Psychiatric/Behavioral: Negative.     I have reviewed the review of systems as entered and made appropriate changes as necessary  Past Medical History   Past Medical History:   Diagnosis Date    Anxiety     Atherosclerosis of native arteries of extremities with rest pain, bilateral legs (HCC) 2024    GERD (gastroesophageal reflux disease)      Past Surgical History:   Procedure Laterality Date    APPENDECTOMY       SECTION       No family history on file.  Current Outpatient Medications on File Prior to Visit   Medication Sig Dispense Refill    acetaminophen (TYLENOL) 650 mg CR tablet Take 1 tablet (650 mg total) by mouth every 8 (eight) hours as needed for mild pain 30 tablet 0    amLODIPine (NORVASC) 5 mg tablet Take 1 tablet (5 mg total) by mouth daily 30 tablet 0    aspirin 81 mg chewable tablet Chew 1 tablet daily      atorvastatin (LIPITOR) 40 mg tablet Take 1 tablet (40 mg total) by mouth daily 90 tablet 0    clopidogrel (PLAVIX) 75 mg tablet Take 75 mg by mouth daily      mirtazapine (REMERON) 15 mg tablet Take 1 tablet (15 mg total) by  mouth daily at bedtime 30 tablet 0    Multiple Vitamin (multivitamin) tablet Take 1 tablet by mouth daily      ondansetron (ZOFRAN) 4 mg tablet Take 1 tablet (4 mg total) by mouth 3 (three) times a day as needed for nausea or vomiting 20 tablet 0    pregabalin (LYRICA) 25 mg capsule Take 1 capsule (25 mg total) by mouth 2 (two) times a day Start by taking 1 pill at bedtime for 2 days. Then increase to 1 pill twice a day thereafter. 60 capsule 0    sodium chloride 1 g tablet Take 2 tablets (2 g total) by mouth 3 (three) times a day with meals 180 tablet 0    hydrOXYzine HCL (ATARAX) 25 mg tablet Take 1 tablet by mouth every 6 (six) hours as needed (Patient not taking: Reported on 10/29/2024)       No current facility-administered medications on file prior to visit.     Allergies   Allergen Reactions    Penicillins Anaphylaxis    Aspirin GI Intolerance          Objective     LMP  (LMP Unknown)     Physical Exam  Vitals reviewed.   Constitutional:       Appearance: She is cachectic.   HENT:      Head: Normocephalic and atraumatic.   Cardiovascular:      Rate and Rhythm: Normal rate and regular rhythm.      Heart sounds: Normal heart sounds.      Comments: Monophasic tibial signals  Pulmonary:      Effort: Pulmonary effort is normal.   Abdominal:      General: Abdomen is flat.      Palpations: Abdomen is soft.      Comments: VS femorofemoral bypass palpated.  Previous vertical groin incisions noted   Musculoskeletal:      Right lower leg: No edema.      Left lower leg: No edema.   Skin:     General: Skin is warm.      Capillary Refill: Capillary refill takes more than 3 seconds.   Neurological:      General: No focal deficit present.      Mental Status: She is alert and oriented to person, place, and time.   Psychiatric:         Mood and Affect: Mood normal.         Behavior: Behavior normal.

## 2024-10-28 NOTE — TELEPHONE ENCOUNTER
----- Message from Madalyn Zendejas PA-C sent at 10/27/2024 12:33 PM EDT -----  Total cholesterol and LDL are normal range -- continue current atorvastatin  Triglycerides are a bit high; work on low fat diet, increase fruits/veggies  RBC/Hgb are still low with normal iron panel -- I'd like her to see a GI provider given her recent diverticulitis to rule out GI bleed  Kidney function is good for her age  Liver function normal   Hep C screening negative

## 2024-10-29 ENCOUNTER — OFFICE VISIT (OUTPATIENT)
Dept: VASCULAR SURGERY | Facility: CLINIC | Age: 84
End: 2024-10-29
Payer: COMMERCIAL

## 2024-10-29 VITALS
BODY MASS INDEX: 16.99 KG/M2 | DIASTOLIC BLOOD PRESSURE: 80 MMHG | HEART RATE: 100 BPM | HEIGHT: 64 IN | SYSTOLIC BLOOD PRESSURE: 142 MMHG

## 2024-10-29 DIAGNOSIS — I70.223 ATHEROSCLEROSIS OF NATIVE ARTERIES OF EXTREMITIES WITH REST PAIN, BILATERAL LEGS (HCC): Primary | ICD-10-CM

## 2024-10-29 DIAGNOSIS — E44.0 MODERATE PROTEIN-CALORIE MALNUTRITION (HCC): ICD-10-CM

## 2024-10-29 DIAGNOSIS — F17.219 CIGARETTE NICOTINE DEPENDENCE WITH NICOTINE-INDUCED DISORDER: ICD-10-CM

## 2024-10-29 DIAGNOSIS — K57.92 DIVERTICULITIS: Primary | ICD-10-CM

## 2024-10-29 DIAGNOSIS — E87.1 HYPONATREMIA: ICD-10-CM

## 2024-10-29 DIAGNOSIS — D50.9 IRON DEFICIENCY ANEMIA, UNSPECIFIED IRON DEFICIENCY ANEMIA TYPE: ICD-10-CM

## 2024-10-29 DIAGNOSIS — I73.9 PVD (PERIPHERAL VASCULAR DISEASE) (HCC): Chronic | ICD-10-CM

## 2024-10-29 PROCEDURE — 99204 OFFICE O/P NEW MOD 45 MIN: CPT | Performed by: SURGERY

## 2024-10-29 NOTE — TELEPHONE ENCOUNTER
Tena, daughter of pt, on [employer] form, called in returning missed phone call about results.     Relayed the following results note to Tena:  ----- Message from Madalyn Zendejas PA-C sent at 10/27/2024 12:33 PM EDT -----  Total cholesterol and LDL are normal range -- continue current atorvastatin  Triglycerides are a bit high; work on low fat diet, increase fruits/veggies  RBC/Hgb are still low with normal iron panel -- I'd like her to see a GI provider given her recent diverticulitis to rule out GI bleed  Kidney function is good for her age  Liver function normal   Hep C screening negative     Tena understood & inquired if pt referral to GI has been placed & which GI doctor should pt see?    Please advise & call Tena back with update on GI referral & doctor. Thank you!    JacindaTena: [phone]

## 2024-10-29 NOTE — ASSESSMENT & PLAN NOTE
Tobacco use is a significant patient-modifiable risk factor for this patient’s vascular disease with multiple vascular comorbidities, and a significant risk factor for failure of and complications from any endovascular or surgical interventions.    I explained to the patient the effects of smoking including peripheral artery disease, coronary artery disease, cerebrovascular disease as well as cancer and chronic obstructive pulmonary disease. I asked the patient to stop smoking immediately. It is never too late to quit, and many studies show significant health benefits as well as economical savings after smoking cessation. I offered to the patient nicotine replacement therapy as well as referral to the smoking cessation program and access to the quit line 9-997-HYQJAYN or ambulatory referral to our network smoking cessation program.    Based on our conversation, this patient appears motivated to quit  And declined my offer of nicotine replacement or tobacco cessation medications    The patient set a quit date of 2 weeks. I will continue to  follow up on this issue at our next scheduled visit.     I spent approximately 10 minutes on tobacco cessation counseling with this patient.

## 2024-10-29 NOTE — ASSESSMENT & PLAN NOTE
Orders:    CTA abdominal w run off w wo contrast; Future    VAS upper limb arterial duplex, unilateral/limited, graft; Future    VAS BARRON and waveform analysis, multiple levels; Future    Ambulatory Referral to Cardiology; Future

## 2024-10-29 NOTE — ASSESSMENT & PLAN NOTE
Malnutrition Findings:       Temporal wasting, BMI of 16.99  Patient is increasing her protein intake.  Will need to be optimized prior to any surgical intervention                          BMI Findings:           Body mass index is 16.99 kg/m².

## 2024-10-29 NOTE — PATIENT INSTRUCTIONS
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 There is also blockage of both iliac arteries and prior bypass.  This is likely the cause of your leg symptoms and weakness.  There is also spinal stenosis that is causing some of these symptoms.    In order to treat this we need to make sure you completely stop smoking.  Improve the nutrition status with protein.    Then we will plan on likely axillary bifemoral bypass   Need to get CT angiogram of both legs to plan for this   Also need to check pressures in both legs and right arm.

## 2024-10-29 NOTE — ASSESSMENT & PLAN NOTE
Prior h/o fem fem cross over and iliac stenting in Dr. Lama Jamison in [place], [place] 10 years ago.    She has been on aspirin, plavix and statin.  She is not able to handle statin due to aches and pain.    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  There are stents in the left iliac system there are also occluded.  There is a chronic occlusion of bilateral iliac systems and the femorofemoral crossover bypass.  This is likely the cause of your leg symptoms and weakness.  There is also spinal stenosis that is causing some of these symptoms.  The symptoms are complex with both an element of spinal stenosis and atherosclerotic arterial occlusive disease.  We will start a workup with BARRON and toe pressures bilaterally.  Will also get a CT angiogram with runoff to determine targets for potential bypass in the future.  She will also get an upper extremity arterial Doppler on the right to make sure there is adequate inflow for a potential right ax bifemoral bypass.  Due to her significant medical comorbidities such as malnutrition and advanced peripheral artery disease with aortoiliac occlusions she is in a high risk candidate for aortobifemoral bypass.    In order to treat this we need to make sure you completely stop smoking.  Improve the nutrition status with protein.    Then we will plan on likely axillary bifemoral bypass   Need to get CT angiogram of both legs to plan for this   Also need to check pressures in both legs and right arm.    Orders:    Ambulatory Referral to Vascular Surgery    VAS upper limb arterial duplex, unilateral/limited, graft; Future    VAS BARRON and waveform analysis, multiple levels; Future

## 2024-11-07 DIAGNOSIS — M79.604 PAIN IN BOTH LOWER EXTREMITIES: ICD-10-CM

## 2024-11-07 DIAGNOSIS — M48.061 SPINAL STENOSIS OF LUMBAR REGION, UNSPECIFIED WHETHER NEUROGENIC CLAUDICATION PRESENT: ICD-10-CM

## 2024-11-07 DIAGNOSIS — M79.605 PAIN IN BOTH LOWER EXTREMITIES: ICD-10-CM

## 2024-11-07 RX ORDER — PREGABALIN 25 MG/1
CAPSULE ORAL
Qty: 90 CAPSULE | Refills: 1 | Status: SHIPPED | OUTPATIENT
Start: 2024-11-07

## 2024-11-07 RX ORDER — PREGABALIN 25 MG/1
CAPSULE ORAL
Qty: 90 CAPSULE | Refills: 1 | Status: SHIPPED | OUTPATIENT
Start: 2024-11-07 | End: 2024-11-07

## 2024-11-09 DIAGNOSIS — I10 HTN, GOAL BELOW 140/90: ICD-10-CM

## 2024-11-11 RX ORDER — AMLODIPINE BESYLATE 5 MG/1
5 TABLET ORAL DAILY
Qty: 30 TABLET | Refills: 5 | Status: SHIPPED | OUTPATIENT
Start: 2024-11-11 | End: 2024-11-19 | Stop reason: SDUPTHER

## 2024-11-19 ENCOUNTER — OFFICE VISIT (OUTPATIENT)
Dept: FAMILY MEDICINE CLINIC | Facility: CLINIC | Age: 84
End: 2024-11-19
Payer: COMMERCIAL

## 2024-11-19 ENCOUNTER — APPOINTMENT (OUTPATIENT)
Dept: RADIOLOGY | Facility: CLINIC | Age: 84
End: 2024-11-19
Payer: COMMERCIAL

## 2024-11-19 VITALS
BODY MASS INDEX: 16.94 KG/M2 | HEART RATE: 84 BPM | WEIGHT: 99.2 LBS | DIASTOLIC BLOOD PRESSURE: 80 MMHG | TEMPERATURE: 97 F | HEIGHT: 64 IN | SYSTOLIC BLOOD PRESSURE: 142 MMHG | OXYGEN SATURATION: 97 %

## 2024-11-19 DIAGNOSIS — L03.039 PARONYCHIA OF GREAT TOE: ICD-10-CM

## 2024-11-19 DIAGNOSIS — G47.00 INSOMNIA, UNSPECIFIED TYPE: ICD-10-CM

## 2024-11-19 DIAGNOSIS — R53.83 OTHER FATIGUE: ICD-10-CM

## 2024-11-19 DIAGNOSIS — E78.5 HYPERLIPIDEMIA, UNSPECIFIED HYPERLIPIDEMIA TYPE: ICD-10-CM

## 2024-11-19 DIAGNOSIS — F32.A DEPRESSION, UNSPECIFIED DEPRESSION TYPE: ICD-10-CM

## 2024-11-19 DIAGNOSIS — I10 HTN, GOAL BELOW 140/90: ICD-10-CM

## 2024-11-19 DIAGNOSIS — K57.92 DIVERTICULITIS: Primary | ICD-10-CM

## 2024-11-19 DIAGNOSIS — I70.223 ATHEROSCLEROSIS OF NATIVE ARTERIES OF EXTREMITIES WITH REST PAIN, BILATERAL LEGS (HCC): ICD-10-CM

## 2024-11-19 DIAGNOSIS — F33.9 RECURRENT MAJOR DEPRESSIVE DISORDER, REMISSION STATUS UNSPECIFIED (HCC): ICD-10-CM

## 2024-11-19 DIAGNOSIS — Z79.899 LONG TERM USE OF DRUG: ICD-10-CM

## 2024-11-19 DIAGNOSIS — R11.2 NAUSEA AND VOMITING, UNSPECIFIED VOMITING TYPE: ICD-10-CM

## 2024-11-19 PROCEDURE — 73660 X-RAY EXAM OF TOE(S): CPT

## 2024-11-19 PROCEDURE — G2211 COMPLEX E/M VISIT ADD ON: HCPCS

## 2024-11-19 PROCEDURE — 99214 OFFICE O/P EST MOD 30 MIN: CPT

## 2024-11-19 RX ORDER — AMLODIPINE BESYLATE 5 MG/1
5 TABLET ORAL DAILY
Qty: 90 TABLET | Refills: 0 | Status: SHIPPED | OUTPATIENT
Start: 2024-11-19 | End: 2025-02-17

## 2024-11-19 RX ORDER — MIRTAZAPINE 15 MG/1
15 TABLET, FILM COATED ORAL
Qty: 90 TABLET | Refills: 0 | Status: SHIPPED | OUTPATIENT
Start: 2024-11-19

## 2024-11-19 RX ORDER — SULFAMETHOXAZOLE AND TRIMETHOPRIM 800; 160 MG/1; MG/1
1 TABLET ORAL 2 TIMES DAILY
Qty: 14 TABLET | Refills: 0 | Status: SHIPPED | OUTPATIENT
Start: 2024-11-19 | End: 2024-11-26

## 2024-11-19 RX ORDER — ONDANSETRON 4 MG/1
4 TABLET, FILM COATED ORAL 3 TIMES DAILY PRN
Qty: 20 TABLET | Refills: 0 | Status: SHIPPED | OUTPATIENT
Start: 2024-11-19

## 2024-11-19 RX ORDER — METRONIDAZOLE 500 MG/1
500 TABLET ORAL EVERY 8 HOURS SCHEDULED
Qty: 21 TABLET | Refills: 0 | Status: SHIPPED | OUTPATIENT
Start: 2024-11-19 | End: 2024-11-26

## 2024-11-19 RX ORDER — CLOPIDOGREL BISULFATE 75 MG/1
75 TABLET ORAL DAILY
Qty: 90 TABLET | Refills: 0 | Status: SHIPPED | OUTPATIENT
Start: 2024-11-19

## 2024-11-19 RX ORDER — ATORVASTATIN CALCIUM 40 MG/1
40 TABLET, FILM COATED ORAL DAILY
Qty: 90 TABLET | Refills: 0 | Status: SHIPPED | OUTPATIENT
Start: 2024-11-19

## 2024-11-19 NOTE — ASSESSMENT & PLAN NOTE
- BP appropriate for her age, 142/80 mmHg   - continue current amlodipine     Orders:    amLODIPine (NORVASC) 5 mg tablet; Take 1 tablet (5 mg total) by mouth daily

## 2024-11-19 NOTE — PROGRESS NOTES
Name: Ofelia Aguilar      : 1940      MRN: [id number]  Encounter Provider: Madalyn Zendejas PA-C  Encounter Date: 2024   Encounter department: [facility]  :  Assessment & Plan  Diverticulitis  - abdominal pain and diarrhea x 4 days with hx of recent diverticulitis about 1.5 months ago that presented similarly   - on exam, normal active bowel sounds however tenderness to palpation of LLQ  - symptoms are suspicious for diverticulitis flare, I did recommend stat CT abdomen for further evaluation however patient declines -- reports she'd rather just start treatment and does not want to do CT -- we did discuss possibility of abscess/rupture however she would still rather just start antibiotic treatment  - therefore, we will start on bactrim BID x 7 days and flagyl TID x 7 days to cover for acute diverticulitis -- discussed side effects, advised to take with food and to start a daily probiotic to try to decrease some of the GI side effects and encouraged a low fiber diet while symptomatic which she was agreeable with   - I placed referral to GI at her last visit, encouraged to schedule  - advised immediate ER if abdominal pain worsens, or if she experience any blood in the stool, fevers, or chills -- patient/daughter are agreeable and will monitor closely     Orders:    sulfamethoxazole-trimethoprim (BACTRIM DS) 800-160 mg per tablet; Take 1 tablet by mouth 2 (two) times a day for 7 days    metroNIDAZOLE (FLAGYL) 500 mg tablet; Take 1 tablet (500 mg total) by mouth every 8 (eight) hours for 7 days    Paronychia of great toe  - left great big toe with ingrown toe nail laterally and surrounding erythema and swelling -- photo attached to physical exam documentation   - concerned for surrounding cellulitis; therefore treating with bactrim BID x 7 days -- discussed side effects; pt is high risk given severe PVD -- +1 pulses felt on exam today with gross sensation intact, advised ER if she  develops decreased sensation/coldness to the extremity which she was agreeable with   - will obtain STAT XR great toe to rule out osteomyelitis  - advised to stop using hydrogen peroxide as it is likely making worse   - keep area clean and dry   - advised IMMEDIATE ER if redness/swelling/pain increases or if she develop any fevers or chills -- patient and daughter agreeable and will monitor closely  - placed referral to podiatry and advised to schedule ASAP    Orders:    sulfamethoxazole-trimethoprim (BACTRIM DS) 800-160 mg per tablet; Take 1 tablet by mouth 2 (two) times a day for 7 days    Ambulatory Referral to Podiatry; Future    XR toe left great min 2 views; Future    Other fatigue  - continues to have day time fatigue, we will check vitamin levels, CBC, CMP, and TSH to rule out underlying cause    Orders:    CBC and differential; Future    TSH, 3rd generation with Free T4 reflex; Future    Vitamin B12; Future    Vitamin D 25 hydroxy; Future    Comprehensive metabolic panel; Future    Recurrent major depressive disorder, remission status unspecified (HCC)  - improvement since increasing the remeron to 15 mg HS -- sleeping better and more positive mood; will continue current dosage   - no current thoughts of harming self/others          Depression, unspecified depression type  - see plan for depression above    Orders:    mirtazapine (REMERON) 15 mg tablet; Take 1 tablet (15 mg total) by mouth daily at bedtime    Insomnia, unspecified type  - improvement in sleep since increasing Remeron to 15 mg QD, continue current dosage     Orders:    mirtazapine (REMERON) 15 mg tablet; Take 1 tablet (15 mg total) by mouth daily at bedtime    Atherosclerosis of native arteries of extremities with rest pain, bilateral legs (HCC)  - continue following with vascular  - continue daily plavix, ASA, and statin     Orders:    clopidogrel (PLAVIX) 75 mg tablet; Take 1 tablet (75 mg total) by mouth daily    HTN, goal below 140/90  -  "BP appropriate for her age, 142/80 mmHg   - continue current amlodipine     Orders:    amLODIPine (NORVASC) 5 mg tablet; Take 1 tablet (5 mg total) by mouth daily    Hyperlipidemia, unspecified hyperlipidemia type  - continue current atorvastatin     Orders:    atorvastatin (LIPITOR) 40 mg tablet; Take 1 tablet (40 mg total) by mouth daily    Nausea and vomiting, unspecified vomiting type  - refilled her zofran for PRN nausea    Orders:    ondansetron (ZOFRAN) 4 mg tablet; Take 1 tablet (4 mg total) by mouth 3 (three) times a day as needed for nausea or vomiting    Long term use of drug    Orders:    CBC and differential; Future    TSH, 3rd generation with Free T4 reflex; Future    Vitamin B12; Future    Vitamin D 25 hydroxy; Future    Comprehensive metabolic panel; Future           History of Present Illness     CC: four week follow up     Patient presents with her daughter for four week follow up after increasing her remeron to 15 mg HS for insomnia, appetite increase, and depression. Patient's daughter reports since increasing the dose she has been sleeping great -- sleeps through the entire night and her mood has seemed more positive. She has also been eating more and doing daily boost shakes which help. They would like to stay on current dose. No side effects that they have noticed since increasing the dose.     They are concerned she has diverticulitis again -- reports for the last four days stomach ache and diarrhea, pain is mostly on left side of abdomen. She denies any fevers or vomiting. She has chronic nausea. Her appetite has not changed. She was referred to GI at last visit but has not scheduled yet.     She also has an \"ingrown\" toe nail of her great toe of the left foot -- noticed it about one week. Daughter reports they have been cleaning it daily with hydrogen peroxide, however painful and red. 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   Since our last visit, patient was able to see vascular and they have a CTA scheduled.       Review of Systems   Constitutional:  Positive for fatigue. Negative for chills, diaphoresis and fever.   Respiratory:  Negative for chest tightness, shortness of breath and wheezing.    Cardiovascular:  Negative for chest pain and palpitations.   Gastrointestinal:  Positive for abdominal pain, diarrhea and nausea. Negative for blood in stool, constipation and vomiting.   Neurological:  Negative for dizziness, light-headedness and headaches.   Psychiatric/Behavioral:  Negative for self-injury and suicidal ideas. The patient is not nervous/anxious.      Medical History Reviewed by provider this encounter:     .  Past Medical History   Past Medical History:   Diagnosis Date    Anxiety     Atherosclerosis of native arteries of extremities with rest pain, bilateral legs (HCC) 2024    GERD (gastroesophageal reflux disease)      Past Surgical History:   Procedure Laterality Date    APPENDECTOMY       SECTION       No family history on file.   reports that she has quit smoking. Her smoking use included cigarettes. She has a 20 pack-year smoking history. She has never been exposed to tobacco smoke. She has never used smokeless tobacco. She reports that she does not drink alcohol.  Current Outpatient Medications on File Prior to Visit   Medication Sig Dispense Refill    acetaminophen (TYLENOL) 650 mg CR tablet Take 1 tablet (650 mg total) by mouth every 8 (eight) hours as needed for mild pain 30 tablet 0    aspirin 81 mg chewable tablet Chew 1 tablet daily      hydrOXYzine HCL (ATARAX) 25 mg tablet Take 1 tablet by mouth every 6 (six) hours as needed (Patient not taking: Reported on 10/29/2024)      Multiple Vitamin (multivitamin) tablet Take 1 tablet by mouth daily      pregabalin (LYRICA) 25 mg capsule Take 1 capsule (25 mg total) by mouth daily AND 2 capsules (50 mg total) daily  at bedtime. 90 capsule 1    sodium chloride 1 g tablet Take 2 tablets (2 g total) by mouth 3 (three) times a day with meals 180 tablet 0    [DISCONTINUED] amLODIPine (NORVASC) 5 mg tablet TAKE 1 TABLET (5 MG TOTAL) BY MOUTH DAILY. 30 tablet 5    [DISCONTINUED] atorvastatin (LIPITOR) 40 mg tablet Take 1 tablet (40 mg total) by mouth daily 90 tablet 0    [DISCONTINUED] clopidogrel (PLAVIX) 75 mg tablet Take 75 mg by mouth daily      [DISCONTINUED] mirtazapine (REMERON) 15 mg tablet Take 1 tablet (15 mg total) by mouth daily at bedtime 30 tablet 0    [DISCONTINUED] ondansetron (ZOFRAN) 4 mg tablet Take 1 tablet (4 mg total) by mouth 3 (three) times a day as needed for nausea or vomiting 20 tablet 0     No current facility-administered medications on file prior to visit.     Allergies   Allergen Reactions    Penicillins Anaphylaxis    Aspirin GI Intolerance      Current Outpatient Medications on File Prior to Visit   Medication Sig Dispense Refill    acetaminophen (TYLENOL) 650 mg CR tablet Take 1 tablet (650 mg total) by mouth every 8 (eight) hours as needed for mild pain 30 tablet 0    aspirin 81 mg chewable tablet Chew 1 tablet daily      hydrOXYzine HCL (ATARAX) 25 mg tablet Take 1 tablet by mouth every 6 (six) hours as needed (Patient not taking: Reported on 10/29/2024)      Multiple Vitamin (multivitamin) tablet Take 1 tablet by mouth daily      pregabalin (LYRICA) 25 mg capsule Take 1 capsule (25 mg total) by mouth daily AND 2 capsules (50 mg total) daily at bedtime. 90 capsule 1    sodium chloride 1 g tablet Take 2 tablets (2 g total) by mouth 3 (three) times a day with meals 180 tablet 0    [DISCONTINUED] amLODIPine (NORVASC) 5 mg tablet TAKE 1 TABLET (5 MG TOTAL) BY MOUTH DAILY. 30 tablet 5    [DISCONTINUED] atorvastatin (LIPITOR) 40 mg tablet Take 1 tablet (40 mg total) by mouth daily 90 tablet 0    [DISCONTINUED] clopidogrel (PLAVIX) 75 mg tablet Take 75 mg by mouth daily      [DISCONTINUED] mirtazapine  "(REMERON) 15 mg tablet Take 1 tablet (15 mg total) by mouth daily at bedtime 30 tablet 0    [DISCONTINUED] ondansetron (ZOFRAN) 4 mg tablet Take 1 tablet (4 mg total) by mouth 3 (three) times a day as needed for nausea or vomiting 20 tablet 0     No current facility-administered medications on file prior to visit.      Social History     Tobacco Use    Smoking status: Former     Current packs/day: 1.00     Average packs/day: 1 pack/day for 20.0 years (20.0 ttl pk-yrs)     Types: Cigarettes     Passive exposure: Never    Smokeless tobacco: Never   Vaping Use    Vaping status: Never Used   Substance and Sexual Activity    Alcohol use: Never    Drug use: Not on file    Sexual activity: Not on file        Objective   /80   Pulse 84   Temp (!) 97 °F (36.1 °C)   Ht 5' 4\" (1.626 m)   Wt 45 kg (99 lb 3.2 oz)   LMP  (LMP Unknown)   SpO2 97%   BMI 17.03 kg/m²      Physical Exam  Vitals reviewed.   Constitutional:       General: She is not in acute distress.     Appearance: Normal appearance. She is not ill-appearing or diaphoretic.   HENT:      Head: Normocephalic and atraumatic.      Right Ear: External ear normal.      Left Ear: External ear normal.      Nose: Nose normal.      Mouth/Throat:      Mouth: Mucous membranes are moist.   Eyes:      General:         Right eye: No discharge.         Left eye: No discharge.      Conjunctiva/sclera: Conjunctivae normal.   Cardiovascular:      Rate and Rhythm: Normal rate and regular rhythm.      Pulses:           Dorsalis pedis pulses are 1+ on the left side.        Posterior tibial pulses are 1+ on the left side.      Heart sounds: Normal heart sounds. No murmur heard.  Pulmonary:      Effort: Pulmonary effort is normal. No respiratory distress.      Breath sounds: Normal breath sounds. No wheezing, rhonchi or rales.   Abdominal:      General: Bowel sounds are normal. There is no distension.      Palpations: Abdomen is soft.      Tenderness: There is abdominal " tenderness (LLQ).   Musculoskeletal:      Cervical back: Normal range of motion.      Right lower leg: No edema.      Left lower leg: No edema.        Feet:    Feet:      Comments: 1+ dorsal pedis and posterior tibial, chronic vascular disease   Capillary refill of toe < 3 seconds   Skin:     General: Skin is warm.   Neurological:      General: No focal deficit present.      Mental Status: She is alert.      Gait: Gait abnormal (in wheelchair).   Psychiatric:         Mood and Affect: Mood normal.

## 2024-11-19 NOTE — ASSESSMENT & PLAN NOTE
- continue current atorvastatin     Orders:    atorvastatin (LIPITOR) 40 mg tablet; Take 1 tablet (40 mg total) by mouth daily

## 2024-11-19 NOTE — ASSESSMENT & PLAN NOTE
- see plan for depression above    Orders:    mirtazapine (REMERON) 15 mg tablet; Take 1 tablet (15 mg total) by mouth daily at bedtime

## 2024-11-19 NOTE — ASSESSMENT & PLAN NOTE
- improvement since increasing the remeron to 15 mg HS -- sleeping better and more positive mood; will continue current dosage   - no current thoughts of harming self/others

## 2024-11-19 NOTE — ASSESSMENT & PLAN NOTE
- continue following with vascular  - continue daily plavix, ASA, and statin     Orders:    clopidogrel (PLAVIX) 75 mg tablet; Take 1 tablet (75 mg total) by mouth daily

## 2024-11-19 NOTE — ASSESSMENT & PLAN NOTE
- improvement in sleep since increasing Remeron to 15 mg QD, continue current dosage     Orders:    mirtazapine (REMERON) 15 mg tablet; Take 1 tablet (15 mg total) by mouth daily at bedtime

## 2024-11-20 ENCOUNTER — RESULTS FOLLOW-UP (OUTPATIENT)
Dept: FAMILY MEDICINE CLINIC | Facility: CLINIC | Age: 84
End: 2024-11-20

## 2024-11-25 ENCOUNTER — HOSPITAL ENCOUNTER (INPATIENT)
Facility: HOSPITAL | Age: 84
LOS: 4 days | Discharge: HOME | DRG: 392 | End: 2024-11-29
Attending: EMERGENCY MEDICINE | Admitting: STUDENT IN AN ORGANIZED HEALTH CARE EDUCATION/TRAINING PROGRAM
Payer: COMMERCIAL

## 2024-11-25 ENCOUNTER — APPOINTMENT (EMERGENCY)
Dept: CT IMAGING | Facility: HOSPITAL | Age: 84
DRG: 392 | End: 2024-11-25
Payer: COMMERCIAL

## 2024-11-25 DIAGNOSIS — K21.9 GASTROESOPHAGEAL REFLUX DISEASE, UNSPECIFIED WHETHER ESOPHAGITIS PRESENT: ICD-10-CM

## 2024-11-25 DIAGNOSIS — E46 MALNUTRITION, UNSPECIFIED TYPE (HCC): ICD-10-CM

## 2024-11-25 DIAGNOSIS — R93.5 ABNORMAL CT OF THE ABDOMEN: ICD-10-CM

## 2024-11-25 DIAGNOSIS — R10.9 ABDOMINAL PAIN: Primary | ICD-10-CM

## 2024-11-25 DIAGNOSIS — D59.13 MIXED TYPE AUTOIMMUNE HEMOLYTIC ANEMIA (HCC): ICD-10-CM

## 2024-11-25 DIAGNOSIS — I70.223 ATHEROSCLEROSIS OF NATIVE ARTERIES OF EXTREMITIES WITH REST PAIN, BILATERAL LEGS (HCC): ICD-10-CM

## 2024-11-25 DIAGNOSIS — K57.92 DIVERTICULITIS: ICD-10-CM

## 2024-11-25 PROBLEM — L03.039 PARONYCHIA OF GREAT TOE: Status: ACTIVE | Noted: 2024-11-25

## 2024-11-25 PROBLEM — M48.00 SPINAL STENOSIS: Status: ACTIVE | Noted: 2024-08-25

## 2024-11-25 LAB
2HR DELTA HS TROPONIN: -1 NG/L
4HR DELTA HS TROPONIN: 0 NG/L
ALBUMIN SERPL BCG-MCNC: 4 G/DL (ref 3.5–5)
ALP SERPL-CCNC: 53 U/L (ref 34–104)
ALT SERPL W P-5'-P-CCNC: 13 U/L (ref 7–52)
ANION GAP SERPL CALCULATED.3IONS-SCNC: 8 MMOL/L (ref 4–13)
AST SERPL W P-5'-P-CCNC: 26 U/L (ref 13–39)
ATRIAL RATE: 80 BPM
ATRIAL RATE: 81 BPM
BASOPHILS # BLD AUTO: 0.06 THOUSANDS/ΜL (ref 0–0.1)
BASOPHILS NFR BLD AUTO: 0 % (ref 0–1)
BILIRUB SERPL-MCNC: 0.32 MG/DL (ref 0.2–1)
BILIRUB UR QL STRIP: NEGATIVE
BUN SERPL-MCNC: 19 MG/DL (ref 5–25)
CALCIUM SERPL-MCNC: 9.6 MG/DL (ref 8.4–10.2)
CARDIAC TROPONIN I PNL SERPL HS: 6 NG/L (ref ?–50)
CARDIAC TROPONIN I PNL SERPL HS: 7 NG/L (ref ?–50)
CARDIAC TROPONIN I PNL SERPL HS: 7 NG/L (ref ?–50)
CHLORIDE SERPL-SCNC: 105 MMOL/L (ref 96–108)
CLARITY UR: CLEAR
CO2 SERPL-SCNC: 23 MMOL/L (ref 21–32)
COLOR UR: NORMAL
CREAT SERPL-MCNC: 0.8 MG/DL (ref 0.6–1.3)
EOSINOPHIL # BLD AUTO: 0.05 THOUSAND/ΜL (ref 0–0.61)
EOSINOPHIL NFR BLD AUTO: 0 % (ref 0–6)
ERYTHROCYTE [DISTWIDTH] IN BLOOD BY AUTOMATED COUNT: 14.3 % (ref 11.6–15.1)
GFR SERPL CREATININE-BSD FRML MDRD: 68 ML/MIN/1.73SQ M
GLUCOSE SERPL-MCNC: 99 MG/DL (ref 65–140)
GLUCOSE UR STRIP-MCNC: NEGATIVE MG/DL
HCT VFR BLD AUTO: 36.6 % (ref 34.8–46.1)
HGB BLD-MCNC: 11.4 G/DL (ref 11.5–15.4)
HGB UR QL STRIP.AUTO: NEGATIVE
IMM GRANULOCYTES # BLD AUTO: 0.07 THOUSAND/UL (ref 0–0.2)
IMM GRANULOCYTES NFR BLD AUTO: 1 % (ref 0–2)
KETONES UR STRIP-MCNC: NEGATIVE MG/DL
LEUKOCYTE ESTERASE UR QL STRIP: NEGATIVE
LIPASE SERPL-CCNC: 34 U/L (ref 11–82)
LYMPHOCYTES # BLD AUTO: 1.35 THOUSANDS/ΜL (ref 0.6–4.47)
LYMPHOCYTES NFR BLD AUTO: 9 % (ref 14–44)
MCH RBC QN AUTO: 29.7 PG (ref 26.8–34.3)
MCHC RBC AUTO-ENTMCNC: 31.1 G/DL (ref 31.4–37.4)
MCV RBC AUTO: 95 FL (ref 82–98)
MONOCYTES # BLD AUTO: 1.2 THOUSAND/ΜL (ref 0.17–1.22)
MONOCYTES NFR BLD AUTO: 8 % (ref 4–12)
NEUTROPHILS # BLD AUTO: 11.94 THOUSANDS/ΜL (ref 1.85–7.62)
NEUTS SEG NFR BLD AUTO: 82 % (ref 43–75)
NITRITE UR QL STRIP: NEGATIVE
NRBC BLD AUTO-RTO: 0 /100 WBCS
P AXIS: 71 DEGREES
P AXIS: 75 DEGREES
PH UR STRIP.AUTO: 5.5 [PH]
PLATELET # BLD AUTO: 332 THOUSANDS/UL (ref 149–390)
PLATELET # BLD AUTO: 387 THOUSANDS/UL (ref 149–390)
PMV BLD AUTO: 10 FL (ref 8.9–12.7)
PMV BLD AUTO: 10.2 FL (ref 8.9–12.7)
POTASSIUM SERPL-SCNC: 4.3 MMOL/L (ref 3.5–5.3)
PR INTERVAL: 140 MS
PR INTERVAL: 144 MS
PROT SERPL-MCNC: 7.1 G/DL (ref 6.4–8.4)
PROT UR STRIP-MCNC: NEGATIVE MG/DL
QRS AXIS: -44 DEGREES
QRS AXIS: -49 DEGREES
QRSD INTERVAL: 112 MS
QRSD INTERVAL: 114 MS
QT INTERVAL: 386 MS
QT INTERVAL: 398 MS
QTC INTERVAL: 445 MS
QTC INTERVAL: 462 MS
RBC # BLD AUTO: 3.84 MILLION/UL (ref 3.81–5.12)
SODIUM SERPL-SCNC: 136 MMOL/L (ref 135–147)
SP GR UR STRIP.AUTO: 1.03 (ref 1–1.03)
T WAVE AXIS: 107 DEGREES
T WAVE AXIS: 99 DEGREES
UROBILINOGEN UR STRIP-ACNC: <2 MG/DL
VENTRICULAR RATE: 80 BPM
VENTRICULAR RATE: 81 BPM
WBC # BLD AUTO: 14.67 THOUSAND/UL (ref 4.31–10.16)

## 2024-11-25 PROCEDURE — 80053 COMPREHEN METABOLIC PANEL: CPT | Performed by: EMERGENCY MEDICINE

## 2024-11-25 PROCEDURE — 99285 EMERGENCY DEPT VISIT HI MDM: CPT

## 2024-11-25 PROCEDURE — 96365 THER/PROPH/DIAG IV INF INIT: CPT

## 2024-11-25 PROCEDURE — 96375 TX/PRO/DX INJ NEW DRUG ADDON: CPT

## 2024-11-25 PROCEDURE — 93010 ELECTROCARDIOGRAM REPORT: CPT | Performed by: INTERNAL MEDICINE

## 2024-11-25 PROCEDURE — 99285 EMERGENCY DEPT VISIT HI MDM: CPT | Performed by: EMERGENCY MEDICINE

## 2024-11-25 PROCEDURE — 93005 ELECTROCARDIOGRAM TRACING: CPT

## 2024-11-25 PROCEDURE — 87040 BLOOD CULTURE FOR BACTERIA: CPT | Performed by: EMERGENCY MEDICINE

## 2024-11-25 PROCEDURE — 74177 CT ABD & PELVIS W/CONTRAST: CPT

## 2024-11-25 PROCEDURE — 85049 AUTOMATED PLATELET COUNT: CPT | Performed by: STUDENT IN AN ORGANIZED HEALTH CARE EDUCATION/TRAINING PROGRAM

## 2024-11-25 PROCEDURE — 81003 URINALYSIS AUTO W/O SCOPE: CPT | Performed by: EMERGENCY MEDICINE

## 2024-11-25 PROCEDURE — 99222 1ST HOSP IP/OBS MODERATE 55: CPT | Performed by: STUDENT IN AN ORGANIZED HEALTH CARE EDUCATION/TRAINING PROGRAM

## 2024-11-25 PROCEDURE — 36415 COLL VENOUS BLD VENIPUNCTURE: CPT | Performed by: EMERGENCY MEDICINE

## 2024-11-25 PROCEDURE — 84484 ASSAY OF TROPONIN QUANT: CPT | Performed by: EMERGENCY MEDICINE

## 2024-11-25 PROCEDURE — 83690 ASSAY OF LIPASE: CPT | Performed by: EMERGENCY MEDICINE

## 2024-11-25 PROCEDURE — 85025 COMPLETE CBC W/AUTO DIFF WBC: CPT | Performed by: EMERGENCY MEDICINE

## 2024-11-25 PROCEDURE — 96366 THER/PROPH/DIAG IV INF ADDON: CPT

## 2024-11-25 RX ORDER — MORPHINE SULFATE 4 MG/ML
4 INJECTION, SOLUTION INTRAMUSCULAR; INTRAVENOUS ONCE
Status: COMPLETED | OUTPATIENT
Start: 2024-11-25 | End: 2024-11-25

## 2024-11-25 RX ORDER — MIRTAZAPINE 15 MG/1
15 TABLET, FILM COATED ORAL
Status: DISCONTINUED | OUTPATIENT
Start: 2024-11-25 | End: 2024-11-29 | Stop reason: HOSPADM

## 2024-11-25 RX ORDER — PREGABALIN 50 MG/1
50 CAPSULE ORAL
Status: DISCONTINUED | OUTPATIENT
Start: 2024-11-25 | End: 2024-11-29 | Stop reason: HOSPADM

## 2024-11-25 RX ORDER — ONDANSETRON 2 MG/ML
4 INJECTION INTRAMUSCULAR; INTRAVENOUS ONCE
Status: COMPLETED | OUTPATIENT
Start: 2024-11-25 | End: 2024-11-25

## 2024-11-25 RX ORDER — METRONIDAZOLE 500 MG/100ML
500 INJECTION, SOLUTION INTRAVENOUS EVERY 8 HOURS
Status: DISCONTINUED | OUTPATIENT
Start: 2024-11-25 | End: 2024-11-29

## 2024-11-25 RX ORDER — DICYCLOMINE HCL 20 MG
20 TABLET ORAL ONCE
Status: COMPLETED | OUTPATIENT
Start: 2024-11-25 | End: 2024-11-25

## 2024-11-25 RX ORDER — HEPARIN SODIUM 5000 [USP'U]/ML
5000 INJECTION, SOLUTION INTRAVENOUS; SUBCUTANEOUS EVERY 12 HOURS
Status: DISCONTINUED | OUTPATIENT
Start: 2024-11-25 | End: 2024-11-29 | Stop reason: HOSPADM

## 2024-11-25 RX ORDER — ATORVASTATIN CALCIUM 40 MG/1
40 TABLET, FILM COATED ORAL DAILY
Status: DISCONTINUED | OUTPATIENT
Start: 2024-11-26 | End: 2024-11-29 | Stop reason: HOSPADM

## 2024-11-25 RX ORDER — PREGABALIN 25 MG/1
25 CAPSULE ORAL DAILY
Status: DISCONTINUED | OUTPATIENT
Start: 2024-11-26 | End: 2024-11-29 | Stop reason: HOSPADM

## 2024-11-25 RX ORDER — METRONIDAZOLE 500 MG/100ML
500 INJECTION, SOLUTION INTRAVENOUS ONCE
Status: COMPLETED | OUTPATIENT
Start: 2024-11-25 | End: 2024-11-25

## 2024-11-25 RX ADMIN — ONDANSETRON 4 MG: 2 INJECTION INTRAMUSCULAR; INTRAVENOUS at 09:43

## 2024-11-25 RX ADMIN — MIRTAZAPINE 15 MG: 15 TABLET, FILM COATED ORAL at 22:11

## 2024-11-25 RX ADMIN — PREGABALIN 50 MG: 50 CAPSULE ORAL at 22:11

## 2024-11-25 RX ADMIN — METRONIDAZOLE 500 MG: 500 INJECTION, SOLUTION INTRAVENOUS at 14:26

## 2024-11-25 RX ADMIN — SODIUM CHLORIDE, SODIUM LACTATE, POTASSIUM CHLORIDE, AND CALCIUM CHLORIDE 1000 ML: .6; .31; .03; .02 INJECTION, SOLUTION INTRAVENOUS at 09:44

## 2024-11-25 RX ADMIN — METRONIDAZOLE 500 MG: 500 INJECTION, SOLUTION INTRAVENOUS at 22:11

## 2024-11-25 RX ADMIN — CEFTRIAXONE SODIUM 1000 MG: 10 INJECTION, POWDER, FOR SOLUTION INTRAVENOUS at 13:55

## 2024-11-25 RX ADMIN — HEPARIN SODIUM 5000 UNITS: 5000 INJECTION, SOLUTION INTRAVENOUS; SUBCUTANEOUS at 18:50

## 2024-11-25 RX ADMIN — MORPHINE SULFATE 4 MG: 4 INJECTION INTRAVENOUS at 09:43

## 2024-11-25 RX ADMIN — DICYCLOMINE HYDROCHLORIDE 20 MG: 20 TABLET ORAL at 09:21

## 2024-11-25 RX ADMIN — IOHEXOL 85 ML: 350 INJECTION, SOLUTION INTRAVENOUS at 10:51

## 2024-11-25 NOTE — ED PROVIDER NOTES
ED Disposition       None          Assessment & Plan       Medical Decision Making  Patient is an 84-year-old female past medical history of hypertension, hyperlipidemia, anxiety and depression, SIADH, peripheral artery disease presenting with abdominal pain.  Patient is well-appearing at bedside with stable vitals and in no acute distress.  She has equal radial pulses, diffuse lower quadrant abdominal pain, no CVA tenderness, no guarding and no other significant physical exam findings.  Will obtain labs to assess for leukocytosis, electrolyte abnormalities, anemia, urinalysis to assess for signs of infection, CT abdomen pelvis to assess for diverticulitis, appendicitis, small bowel obstruction or other intra-abdominal pathology, give pain control and reassess.    Amount and/or Complexity of Data Reviewed  Labs: ordered.  Radiology: ordered.    Risk  Prescription drug management.        ED Course as of 11/25/24 1443   Mon Nov 25, 2024   1159 No acute cause found, will treat for diverticulitis as patient does have persistent haziness at the perisigmoid area, have discussed return precautions and outpatient follow-up and patient states he understands.  Patient has lower quadrant tenderness, does have mild CBD and pancreatic duct dilatation which are not new and as she is nontender in this area feel that she is appropriate for outpatient follow-up.   1201 Chart review reveals the patient was recently treated for diverticulitis, Bactrim and Flagyl which she states she completed, will discuss with GI.       Medications   lactated ringers bolus 1,000 mL (has no administration in time range)   dicyclomine (BENTYL) tablet 20 mg (has no administration in time range)   ondansetron (ZOFRAN) injection 4 mg (has no administration in time range)   morphine injection 4 mg (has no administration in time range)       ED Risk Strat Scores                           SBIRT 22yo+      Flowsheet Row Most Recent Value   Initial Alcohol  Screen: US AUDIT-C     1. How often do you have a drink containing alcohol? 0 Filed at: 2024 0910   2. How many drinks containing alcohol do you have on a typical day you are drinking?  0 Filed at: 2024   3b. FEMALE Any Age, or MALE 65+: How often do you have 4 or more drinks on one occassion? 0 Filed at: 2024   Audit-C Score 0 Filed at: 2024   YINA: How many times in the past year have you...    Used an illegal drug or used a prescription medication for non-medical reasons? Never Filed at: 2024                            History of Present Illness       Chief Complaint   Patient presents with   • Abdominal Pain     Abdominal pain with diarrhea, nausea, and vomiting that started last night. Has been taking antibiotics for diverticulitis.        Past Medical History:   Diagnosis Date   • Anxiety    • Atherosclerosis of native arteries of extremities with rest pain, bilateral legs (HCC) 2024   • GERD (gastroesophageal reflux disease)       Past Surgical History:   Procedure Laterality Date   • APPENDECTOMY     •  SECTION        History reviewed. No pertinent family history.   Social History     Tobacco Use   • Smoking status: Former     Current packs/day: 1.00     Average packs/day: 1 pack/day for 20.0 years (20.0 ttl pk-yrs)     Types: Cigarettes     Passive exposure: Never   • Smokeless tobacco: Never   Vaping Use   • Vaping status: Never Used   Substance Use Topics   • Alcohol use: Never      E-Cigarette/Vaping   • E-Cigarette Use Never User       E-Cigarette/Vaping Substances   • Nicotine No    • THC No    • CBD No    • Flavoring No    • Other No    • Unknown No       I have reviewed and agree with the history as documented.     Patient is an 84-year-old female past medical history of hypertension, hyperlipidemia, peripheral artery disease, SIADH, anxiety and depression presenting with vomiting and abdominal pain.  Patient states that abdominal pain  started yesterday, generalized, constant, nonradiating and consistent with her prior bouts of diverticulitis.  Notes 2 weeks of diarrhea and states she has had too numerous to count episodes last night, nonbloody, nonmelanotic.  Notes 1 episode of nonbloody nonbilious vomit today.  States that she just completed a round of antibiotics for an ingrown toenail infection but does not member with the recall.  Denies any fevers, chest pain, shortness of breath, rashes, vision changes, dysuria.        Review of Systems   All other systems reviewed and are negative.          Objective       ED Triage Vitals [11/25/24 0909]   Temperature Pulse Blood Pressure Respirations SpO2 Patient Position - Orthostatic VS   98.1 °F (36.7 °C) 92 169/98 16 97 % Lying      Temp Source Heart Rate Source BP Location FiO2 (%) Pain Score    Oral Monitor Right arm -- 8      Vitals      Date and Time Temp Pulse SpO2 Resp BP Pain Score FACES Pain Rating User   11/25/24 0909 98.1 °F (36.7 °C) 92 97 % 16 169/98 8 -- SG            Physical Exam  Vitals reviewed.   Constitutional:       General: She is not in acute distress.     Appearance: Normal appearance. She is not ill-appearing.   HENT:      Mouth/Throat:      Comments: Dry mucous membranes  Eyes:      Conjunctiva/sclera: Conjunctivae normal.      Comments: Normal conjunctiva   Cardiovascular:      Rate and Rhythm: Normal rate and regular rhythm.      Heart sounds: Normal heart sounds.   Pulmonary:      Effort: Pulmonary effort is normal.      Breath sounds: Normal breath sounds.   Abdominal:      General: Abdomen is flat.      Palpations: Abdomen is soft.      Tenderness: There is generalized abdominal tenderness and tenderness in the right lower quadrant, suprapubic area and left lower quadrant. There is no right CVA tenderness or left CVA tenderness.   Musculoskeletal:         General: No swelling. Normal range of motion.      Cervical back: Neck supple.   Skin:     General: Skin is warm and  dry.   Neurological:      General: No focal deficit present.      Mental Status: She is alert.   Psychiatric:         Mood and Affect: Mood normal.         Results Reviewed       None            No orders to display       ECG 12 Lead Documentation Only    Date/Time: 11/25/2024 9:54 AM    Performed by: Shira Edward DO  Authorized by: Shira Edward DO    Patient location:  ED  Previous ECG:     Previous ECG:  Compared to current    Similarity:  No change  Interpretation:     Interpretation: abnormal    Rate:     ECG rate assessment: normal    Rhythm:     Rhythm: sinus rhythm    Ectopy:     Ectopy: PAC    QRS:     QRS axis:  Left    QRS intervals:  Wide  Conduction:     Conduction: abnormal      Abnormal conduction: incomplete LBBB    ST segments:     ST segments:  Normal  T waves:     T waves: non-specific and inverted      Inverted:  AVL      ED Medication and Procedure Management   Prior to Admission Medications   Prescriptions Last Dose Informant Patient Reported? Taking?   Multiple Vitamin (multivitamin) tablet  Self, Child Yes No   Sig: Take 1 tablet by mouth daily   acetaminophen (TYLENOL) 650 mg CR tablet  Self, Child No No   Sig: Take 1 tablet (650 mg total) by mouth every 8 (eight) hours as needed for mild pain   amLODIPine (NORVASC) 5 mg tablet   No No   Sig: Take 1 tablet (5 mg total) by mouth daily   aspirin 81 mg chewable tablet  Self, Child Yes No   Sig: Chew 1 tablet daily   atorvastatin (LIPITOR) 40 mg tablet   No No   Sig: Take 1 tablet (40 mg total) by mouth daily   clopidogrel (PLAVIX) 75 mg tablet   No No   Sig: Take 1 tablet (75 mg total) by mouth daily   hydrOXYzine HCL (ATARAX) 25 mg tablet  Self, Child Yes No   Sig: Take 1 tablet by mouth every 6 (six) hours as needed   Patient not taking: Reported on 10/29/2024   metroNIDAZOLE (FLAGYL) 500 mg tablet   No No   Sig: Take 1 tablet (500 mg total) by mouth every 8 (eight) hours for 7 days   mirtazapine (REMERON) 15 mg tablet   No No    Sig: Take 1 tablet (15 mg total) by mouth daily at bedtime   ondansetron (ZOFRAN) 4 mg tablet   No No   Sig: Take 1 tablet (4 mg total) by mouth 3 (three) times a day as needed for nausea or vomiting   pregabalin (LYRICA) 25 mg capsule   No No   Sig: Take 1 capsule (25 mg total) by mouth daily AND 2 capsules (50 mg total) daily at bedtime.   sodium chloride 1 g tablet  Self, Child No No   Sig: Take 2 tablets (2 g total) by mouth 3 (three) times a day with meals   sulfamethoxazole-trimethoprim (BACTRIM DS) 800-160 mg per tablet   No No   Sig: Take 1 tablet by mouth 2 (two) times a day for 7 days      Facility-Administered Medications: None     Patient's Medications   Discharge Prescriptions    No medications on file     No discharge procedures on file.  ED SEPSIS DOCUMENTATION            Shira Edward DO  11/25/24 9075

## 2024-11-25 NOTE — ASSESSMENT & PLAN NOTE
On dual antiplatelet therapy  Will hold for now, can likely restart just aspirin pending GI scope plans

## 2024-11-25 NOTE — ASSESSMENT & PLAN NOTE
Continue ceftriaxone and flagyl  NPO, IV fluids  Follow cx data  R/o cdiff pending  GI consult, likely inpatient scope  Holding dual antiplatelet therapy now, can likely restart aspirin but will defer to GI.

## 2024-11-25 NOTE — PLAN OF CARE
Problem: PAIN - ADULT  Goal: Verbalizes/displays adequate comfort level or baseline comfort level  Description: Interventions:  - Encourage patient to monitor pain and request assistance  - Assess pain using appropriate pain scale  - Administer analgesics based on type and severity of pain and evaluate response  - Implement non-pharmacological measures as appropriate and evaluate response  - Consider cultural and social influences on pain and pain management  - Notify physician/advanced practitioner if interventions unsuccessful or patient reports new pain  Outcome: Progressing     Problem: INFECTION - ADULT  Goal: Absence or prevention of progression during hospitalization  Description: INTERVENTIONS:  - Assess and monitor for signs and symptoms of infection  - Monitor lab/diagnostic results  - Monitor all insertion sites, i.e. indwelling lines, tubes, and drains  - Monitor endotracheal if appropriate and nasal secretions for changes in amount and color  - [place] appropriate cooling/warming therapies per order  - Administer medications as ordered  - Instruct and encourage patient and family to use good hand hygiene technique  - Identify and instruct in appropriate isolation precautions for identified infection/condition  Outcome: Progressing

## 2024-11-25 NOTE — H&P
H&P - Hospitalist   Name: Ofelia Aguilar 84 y.o. female I MRN: [id number]  Unit/Bed#: FT 05 I Date of Admission: 11/25/2024   Date of Service: 11/25/2024 I Hospital Day: 0     Assessment & Plan  Atherosclerosis of native arteries of extremities with rest pain, bilateral legs (HCC)  On dual antiplatelet therapy  Will hold for now, can likely restart just aspirin pending GI scope plans  Anxiety  Continue Remeron  Hyperlipidemia  Continue atorvastatin  Depression  Continue Remeron  Diverticulitis  Continue ceftriaxone and flagyl  NPO, IV fluids  Follow cx data  R/o cdiff pending  GI consult, likely inpatient scope  Holding dual antiplatelet therapy now, can likely restart aspirin but will defer to GI.  Paronychia of great toe  Improved from prior outpatient visit  Continue to monitor  Spinal stenosis  Continue Lyrica      VTE Pharmacologic Prophylaxis:   Moderate Risk (Score 3-4) - Pharmacological DVT Prophylaxis Ordered: heparin.  Code Status: Prior DNR/DNI  Discussion with family: Updated  (daughter) at bedside.    Anticipated Length of Stay: Patient will be admitted on an inpatient basis with an anticipated length of stay of greater than 2 midnights secondary to refractory diverticulitis.    History of Present Illness   Chief Complaint: Diverticulitis    Ofelia Aguilar is a 84 y.o. female with a PMH of peripheral arterial disease status post prior bypass on dual antiplatelet therapy who presents with diarrhea. Went to PCP on 11/19 for diverticulitis and was prescribed po antibiotics for diverticulitis flare however presented to ED with refractory symptoms.  She states that she was adherent to 7 days of antibiotic therapy and noted a slight improvement in diarrhea however it still persist.  She has chills but denies overt fevers.  Denies hematochezia.    She has a history of penicillin allergy with tongue swelling.  Emergency room was given ceftriaxone and Flagyl.  Also started on IV fluids.  Was given Zofran,  morphine, Bentyl.  Ed Provider spoke with GI will see the patient.      Historical Information   Past Medical History:   Diagnosis Date    Anxiety     Atherosclerosis of native arteries of extremities with rest pain, bilateral legs (HCC) 2024    Diverticulitis     GERD (gastroesophageal reflux disease)      Past Surgical History:   Procedure Laterality Date    APPENDECTOMY       SECTION       Social History     Tobacco Use    Smoking status: Former     Current packs/day: 1.00     Average packs/day: 1 pack/day for 20.0 years (20.0 ttl pk-yrs)     Types: Cigarettes     Passive exposure: Never    Smokeless tobacco: Never   Vaping Use    Vaping status: Never Used   Substance and Sexual Activity    Alcohol use: Never    Drug use: Not on file    Sexual activity: Not on file     E-Cigarette/Vaping    E-Cigarette Use Never User      E-Cigarette/Vaping Substances    Nicotine No     THC No     CBD No     Flavoring No     Other No     Unknown No      Family history non-contributory  Social History:  Marital Status:    Occupation: retired  Patient Pre-hospital Living Situation: Home  Patient Pre-hospital Level of Mobility: walks  Patient Pre-hospital Diet Restrictions: none    Meds/Allergies   I have reviewed home medications with patient personally.  Prior to Admission medications    Medication Sig Start Date End Date Taking? Authorizing Provider   clopidogrel (PLAVIX) 75 mg tablet Take 1 tablet (75 mg total) by mouth daily 24  Yes Madalyn Zendejas PA-C   acetaminophen (TYLENOL) 650 mg CR tablet Take 1 tablet (650 mg total) by mouth every 8 (eight) hours as needed for mild pain 10/10/24   Madalyn Zendejas PA-C   amLODIPine (NORVASC) 5 mg tablet Take 1 tablet (5 mg total) by mouth daily 24  Madalyn Zendejas PA-C   aspirin 81 mg chewable tablet Chew 1 tablet daily 24   Historical Provider, MD   atorvastatin (LIPITOR) 40 mg tablet Take 1 tablet (40 mg total) by mouth daily 24    Madalyn Zendejas PA-C   hydrOXYzine HCL (ATARAX) 25 mg tablet Take 1 tablet by mouth every 6 (six) hours as needed  Patient not taking: Reported on 10/29/2024 8/20/24   Historical Provider, MD   metroNIDAZOLE (FLAGYL) 500 mg tablet Take 1 tablet (500 mg total) by mouth every 8 (eight) hours for 7 days 11/19/24 11/26/24  Madalyn Zendejas PA-C   mirtazapine (REMERON) 15 mg tablet Take 1 tablet (15 mg total) by mouth daily at bedtime 11/19/24   DONY Mercado-TANIA   Multiple Vitamin (multivitamin) tablet Take 1 tablet by mouth daily    Historical Provider, MD   ondansetron (ZOFRAN) 4 mg tablet Take 1 tablet (4 mg total) by mouth 3 (three) times a day as needed for nausea or vomiting 11/19/24   Madalyn Zendejas PA-C   pregabalin (LYRICA) 25 mg capsule Take 1 capsule (25 mg total) by mouth daily AND 2 capsules (50 mg total) daily at bedtime. 11/7/24   Hiren Mejia MD   sodium chloride 1 g tablet Take 2 tablets (2 g total) by mouth 3 (three) times a day with meals 10/7/24 11/6/24  Madalyn Zendejas PA-C   sulfamethoxazole-trimethoprim (BACTRIM DS) 800-160 mg per tablet Take 1 tablet by mouth 2 (two) times a day for 7 days 11/19/24 11/26/24  DONY Mercado-C     Allergies   Allergen Reactions    Penicillins Anaphylaxis    Aspirin GI Intolerance       Objective :  Temp:  [98.1 °F (36.7 °C)] 98.1 °F (36.7 °C)  HR:  [74-94] 74  BP: (117-169)/(57-98) 117/57  Resp:  [16-18] 16  SpO2:  [93 %-98 %] 93 %  O2 Device: None (Room air)    Physical Exam  Vitals and nursing note reviewed.   Constitutional:       General: She is not in acute distress.     Appearance: She is well-developed. She is ill-appearing.   HENT:      Head: Normocephalic and atraumatic.   Eyes:      Conjunctiva/sclera: Conjunctivae normal.   Cardiovascular:      Rate and Rhythm: Normal rate and regular rhythm.      Heart sounds: No murmur heard.  Pulmonary:      Effort: Pulmonary effort is normal. No respiratory distress.      Breath sounds: Normal breath sounds.  "  Abdominal:      Palpations: Abdomen is soft.      Tenderness: There is abdominal tenderness. There is guarding. There is no rebound.      Comments: Left lower quadrant tenderness and guarding  Decreased bowel sounds   Musculoskeletal:         General: No swelling.      Cervical back: Neck supple.   Skin:     General: Skin is warm and dry.      Capillary Refill: Capillary refill takes less than 2 seconds.   Neurological:      Mental Status: She is alert and oriented to person, place, and time.   Psychiatric:         Mood and Affect: Mood normal.         Lines/Drains:            Lab Results: I have reviewed the following results:  Results from last 7 days   Lab Units 11/25/24  0937   WBC Thousand/uL 14.67*   HEMOGLOBIN g/dL 11.4*   HEMATOCRIT % 36.6   PLATELETS Thousands/uL 387   SEGS PCT % 82*   LYMPHO PCT % 9*   MONO PCT % 8   EOS PCT % 0     Results from last 7 days   Lab Units 11/25/24  0937   SODIUM mmol/L 136   POTASSIUM mmol/L 4.3   CHLORIDE mmol/L 105   CO2 mmol/L 23   BUN mg/dL 19   CREATININE mg/dL 0.80   ANION GAP mmol/L 8   CALCIUM mg/dL 9.6   ALBUMIN g/dL 4.0   TOTAL BILIRUBIN mg/dL 0.32   ALK PHOS U/L 53   ALT U/L 13   AST U/L 26   GLUCOSE RANDOM mg/dL 99             No results found for: \"HGBA1C\"        Imaging Results Review: I personally reviewed the following image studies in PACS and associated radiology reports: CT abdomen/pelvis. My interpretation of the radiology images/reports is: 3 with CT abdomen pelvis.  Other Study Results Review: EKG was reviewed.  Other studies reviewed include: Leukocytosis with WBC 14.6, hemoglobin 11.4, bcx pending    Administrative Statements   Topics discussed with the patient / family include symptom assessment and management, medication review, medication adjustment, advanced directives, goals of care, and supportive listening.    ** Please Note: This note has been constructed using a voice recognition system. **    "

## 2024-11-26 ENCOUNTER — APPOINTMENT (INPATIENT)
Dept: ULTRASOUND IMAGING | Facility: HOSPITAL | Age: 84
DRG: 392 | End: 2024-11-26
Payer: COMMERCIAL

## 2024-11-26 PROBLEM — R19.7 DIARRHEA: Status: ACTIVE | Noted: 2024-11-26

## 2024-11-26 PROBLEM — E46 MALNUTRITION (HCC): Status: ACTIVE | Noted: 2024-11-26

## 2024-11-26 PROBLEM — K83.8 COMMON BILE DUCT DILATATION: Status: ACTIVE | Noted: 2024-11-26

## 2024-11-26 PROBLEM — R19.8 ABDOMINAL COMPLAINTS: Status: ACTIVE | Noted: 2024-11-26

## 2024-11-26 PROBLEM — R93.5 ABNORMAL CT OF THE ABDOMEN: Status: ACTIVE | Noted: 2024-11-26

## 2024-11-26 LAB
ANION GAP SERPL CALCULATED.3IONS-SCNC: 7 MMOL/L (ref 4–13)
BASOPHILS # BLD AUTO: 0.03 THOUSANDS/ΜL (ref 0–0.1)
BASOPHILS NFR BLD AUTO: 1 % (ref 0–1)
BUN SERPL-MCNC: 14 MG/DL (ref 5–25)
CALCIUM SERPL-MCNC: 8.8 MG/DL (ref 8.4–10.2)
CHLORIDE SERPL-SCNC: 105 MMOL/L (ref 96–108)
CO2 SERPL-SCNC: 24 MMOL/L (ref 21–32)
CREAT SERPL-MCNC: 0.82 MG/DL (ref 0.6–1.3)
EOSINOPHIL # BLD AUTO: 0.09 THOUSAND/ΜL (ref 0–0.61)
EOSINOPHIL NFR BLD AUTO: 1 % (ref 0–6)
ERYTHROCYTE [DISTWIDTH] IN BLOOD BY AUTOMATED COUNT: 14.3 % (ref 11.6–15.1)
GFR SERPL CREATININE-BSD FRML MDRD: 66 ML/MIN/1.73SQ M
GLUCOSE SERPL-MCNC: 86 MG/DL (ref 65–140)
HCT VFR BLD AUTO: 28.4 % (ref 34.8–46.1)
HGB BLD-MCNC: 9 G/DL (ref 11.5–15.4)
IMM GRANULOCYTES # BLD AUTO: 0.03 THOUSAND/UL (ref 0–0.2)
IMM GRANULOCYTES NFR BLD AUTO: 1 % (ref 0–2)
LYMPHOCYTES # BLD AUTO: 2.24 THOUSANDS/ΜL (ref 0.6–4.47)
LYMPHOCYTES NFR BLD AUTO: 36 % (ref 14–44)
MAGNESIUM SERPL-MCNC: 1.9 MG/DL (ref 1.9–2.7)
MCH RBC QN AUTO: 29.7 PG (ref 26.8–34.3)
MCHC RBC AUTO-ENTMCNC: 31.7 G/DL (ref 31.4–37.4)
MCV RBC AUTO: 94 FL (ref 82–98)
MONOCYTES # BLD AUTO: 0.8 THOUSAND/ΜL (ref 0.17–1.22)
MONOCYTES NFR BLD AUTO: 13 % (ref 4–12)
NEUTROPHILS # BLD AUTO: 3.02 THOUSANDS/ΜL (ref 1.85–7.62)
NEUTS SEG NFR BLD AUTO: 48 % (ref 43–75)
NRBC BLD AUTO-RTO: 0 /100 WBCS
PLATELET # BLD AUTO: 311 THOUSANDS/UL (ref 149–390)
PMV BLD AUTO: 10.3 FL (ref 8.9–12.7)
POTASSIUM SERPL-SCNC: 4 MMOL/L (ref 3.5–5.3)
RBC # BLD AUTO: 3.03 MILLION/UL (ref 3.81–5.12)
SODIUM SERPL-SCNC: 136 MMOL/L (ref 135–147)
WBC # BLD AUTO: 6.21 THOUSAND/UL (ref 4.31–10.16)

## 2024-11-26 PROCEDURE — 80048 BASIC METABOLIC PNL TOTAL CA: CPT | Performed by: STUDENT IN AN ORGANIZED HEALTH CARE EDUCATION/TRAINING PROGRAM

## 2024-11-26 PROCEDURE — 99232 SBSQ HOSP IP/OBS MODERATE 35: CPT | Performed by: PHYSICIAN ASSISTANT

## 2024-11-26 PROCEDURE — 83735 ASSAY OF MAGNESIUM: CPT | Performed by: STUDENT IN AN ORGANIZED HEALTH CARE EDUCATION/TRAINING PROGRAM

## 2024-11-26 PROCEDURE — 76705 ECHO EXAM OF ABDOMEN: CPT

## 2024-11-26 PROCEDURE — 99223 1ST HOSP IP/OBS HIGH 75: CPT | Performed by: INTERNAL MEDICINE

## 2024-11-26 PROCEDURE — 85025 COMPLETE CBC W/AUTO DIFF WBC: CPT | Performed by: STUDENT IN AN ORGANIZED HEALTH CARE EDUCATION/TRAINING PROGRAM

## 2024-11-26 RX ORDER — SODIUM CHLORIDE 1 G/1
2 TABLET ORAL
Status: DISCONTINUED | OUTPATIENT
Start: 2024-11-26 | End: 2024-11-29 | Stop reason: HOSPADM

## 2024-11-26 RX ORDER — POLYETHYLENE GLYCOL 3350 17 G/17G
238 POWDER, FOR SOLUTION ORAL ONCE
Status: COMPLETED | OUTPATIENT
Start: 2024-11-28 | End: 2024-11-28

## 2024-11-26 RX ORDER — POLYETHYLENE GLYCOL 3350 17 G/17G
238 POWDER, FOR SOLUTION ORAL ONCE
Status: DISCONTINUED | OUTPATIENT
Start: 2024-11-28 | End: 2024-11-26

## 2024-11-26 RX ORDER — ASPIRIN 81 MG/1
81 TABLET, CHEWABLE ORAL EVERY OTHER DAY
Status: DISCONTINUED | OUTPATIENT
Start: 2024-11-26 | End: 2024-11-29 | Stop reason: HOSPADM

## 2024-11-26 RX ORDER — CLOPIDOGREL BISULFATE 75 MG/1
75 TABLET ORAL DAILY
Status: DISCONTINUED | OUTPATIENT
Start: 2024-11-26 | End: 2024-11-26

## 2024-11-26 RX ORDER — MAGNESIUM CARB/ALUMINUM HYDROX 105-160MG
296 TABLET,CHEWABLE ORAL ONCE
Status: DISCONTINUED | OUTPATIENT
Start: 2024-11-26 | End: 2024-11-26

## 2024-11-26 RX ADMIN — ASPIRIN 81 MG: 81 TABLET, CHEWABLE ORAL at 12:09

## 2024-11-26 RX ADMIN — METRONIDAZOLE 500 MG: 500 INJECTION, SOLUTION INTRAVENOUS at 05:48

## 2024-11-26 RX ADMIN — CEFTRIAXONE SODIUM 1000 MG: 10 INJECTION, POWDER, FOR SOLUTION INTRAVENOUS at 12:50

## 2024-11-26 RX ADMIN — HEPARIN SODIUM 5000 UNITS: 5000 INJECTION, SOLUTION INTRAVENOUS; SUBCUTANEOUS at 05:49

## 2024-11-26 RX ADMIN — B-COMPLEX W/ C & FOLIC ACID TAB 1 TABLET: TAB at 10:42

## 2024-11-26 RX ADMIN — ATORVASTATIN CALCIUM 40 MG: 40 TABLET, FILM COATED ORAL at 10:41

## 2024-11-26 RX ADMIN — Medication 2 G: at 12:09

## 2024-11-26 RX ADMIN — PREGABALIN 50 MG: 50 CAPSULE ORAL at 21:46

## 2024-11-26 RX ADMIN — Medication 2 G: at 18:01

## 2024-11-26 RX ADMIN — MIRTAZAPINE 15 MG: 15 TABLET, FILM COATED ORAL at 21:46

## 2024-11-26 RX ADMIN — PREGABALIN 25 MG: 25 CAPSULE ORAL at 10:41

## 2024-11-26 RX ADMIN — HEPARIN SODIUM 5000 UNITS: 5000 INJECTION, SOLUTION INTRAVENOUS; SUBCUTANEOUS at 18:01

## 2024-11-26 RX ADMIN — METRONIDAZOLE 500 MG: 500 INJECTION, SOLUTION INTRAVENOUS at 21:46

## 2024-11-26 RX ADMIN — METRONIDAZOLE 500 MG: 500 INJECTION, SOLUTION INTRAVENOUS at 15:49

## 2024-11-26 RX ADMIN — CLOPIDOGREL 75 MG: 75 TABLET ORAL at 12:09

## 2024-11-26 NOTE — CASE MANAGEMENT
Case Management Assessment & Discharge Planning Note    Patient name Ofelia Aguilar  Location /-01 MRN [id number]  : 1940 Date 2024       Current Admission Date: 2024  Current Admission Diagnosis:Diverticulitis   Patient Active Problem List    Diagnosis Date Noted Date Diagnosed    Diarrhea 2024     Abnormal CT of the abdomen 2024     Common bile duct dilatation 2024     Malnutrition (HCC) 2024     Diverticulitis 2024     Paronychia of great toe 2024     Recurrent major depressive disorder, remission status unspecified (HCC) 2024     Cigarette nicotine dependence with nicotine-induced disorder 10/29/2024     SIADH (syndrome of inappropriate ADH production) ([facility]) 10/07/2024     Hyperlipidemia 10/07/2024     Depression 10/07/2024     Insomnia 10/07/2024     Pain in both lower extremities 10/07/2024     Moderate protein-calorie malnutrition (HCC) 2024     Atherosclerosis of native arteries of extremities with rest pain, bilateral legs (HCC) 2024     HTN, goal below 140/90 2024     Anxiety 2024     GERD (gastroesophageal reflux disease) 2024     Hyponatremia 2024     Anemia 2024     Urgency of urination 2024     Spinal stenosis 2024       LOS (days): 1  Geometric Mean LOS (GMLOS) (days): 2.5  Days to GMLOS:1.4     OBJECTIVE:    Risk of Unplanned Readmission Score: 15.14         Current admission status: Inpatient       Preferred Pharmacy:   [id number] - [place] - [id number]  [place] [address]  Phone: [phone] Fax: [phone]    Primary Care Provider: Madalyn Zendejas PA-C    Primary Insurance: [employer]  Secondary Insurance:     ASSESSMENT:  Active Health Care Proxies    There are no active Health Care Proxies on file.       Advance Directives  Does patient have a Health Care POA?: No  Was patient offered paperwork?: Yes  Does patient currently  have a Health Care decision maker?: No  Does patient have Advance Directives?: No  Was patient offered paperwork?: Yes  Primary Contact: Tena, daughter         Readmission Root Cause  30 Day Readmission: No    Patient Information  Admitted from:: Home  Mental Status: Alert  During Assessment patient was accompanied by: Not accompanied during assessment  Assessment information provided by:: Patient  Primary Caregiver: Self  Support Systems: Family members  County of Residence: [place]  What city do you live in?: [place]  Home entry access options. Select all that apply.: Stairs  Number of steps to enter home.: 5  Do the steps have railings?: Yes  Type of Current Residence: [facility]  Living Arrangements: Lives w/ Daughter, Lives w/ Extended Family  Is patient a ?: No    Activities of Daily Living Prior to Admission  Functional Status: Independent  Completes ADLs independently?: No  Level of ADL dependence: Assistance  Ambulates independently?: No  Level of ambulatory dependence: Assistance  Does patient use assisted devices?: Yes  Assisted Devices (DME) used: Walker  Does patient currently own DME?: Yes  What DME does the patient currently own?: Walker  Does patient have a history of Outpatient Therapy (PT/OT)?: No  Does the patient have a history of Short-Term Rehab?: No  Does patient have a history of HHC?: No  Does patient currently have HHC?: No         Patient Information Continued  Income Source: SSI/SSD  Does patient have prescription coverage?: Yes  Does patient receive dialysis treatments?: No  Does patient have a history of substance abuse?: No  Does patient have a history of Mental Health Diagnosis?: Yes (anxiety, depression)  Is patient receiving treatment for mental health?: Yes  Has patient received inpatient treatment related to mental health in the last 2 years?: No         Means of Transportation  Means of Transport to [facility] [place]:: Family transport      Social Determinants of Health (SDOH)       Flowsheet Row Most Recent Value   Housing Stability    In the last 12 months, was there a time when you were not able to pay the mortgage or rent on time? N   In the past 12 months, how many times have you moved where you were living? 0   At any time in the past 12 months, were you homeless or living in a shelter (including now)? N   Transportation Needs    In the past 12 months, has lack of transportation kept you from medical appointments or from getting medications? no   In the past 12 months, has lack of transportation kept you from meetings, work, or from getting things needed for daily living? No   Food Insecurity    Within the past 12 months, you worried that your food would run out before you got the money to buy more. Never true   Within the past 12 months, the food you bought just didn't last and you didn't have money to get more. Never true   Utilities    In the past 12 months has the electric, gas, oil, or water company threatened to shut off services in your home? No            DISCHARGE DETAILS:    Discharge planning discussed with:: Patient at the bedside  Freedom of Choice: Yes  Comments - Freedom of Choice: FOC maintained in discussion regarding discharge planning. Patient is alert oriented and competent to make decisions. Introduced self and role, explained role of CM in arranging services such as DME, STR, HHC, and providing community resource information. Discussed current living situation and needs at discharge. Patient is able to perform majority of ADLs by herself but receives help from daughter and son in law with bathing, dressing, and other needs. She does not drive, uses a walker to ambulate. CM offered HHC, STR, DME, PCP, OP CM, community resources. Patient declined STR/ HHC, did have a question regarding her family being paid as caregivers. CM informed her that is usually through Medicaid, but sometimes insurances have plans for that and she would have to call to determine if her  plan has this benefit. CM will look into it as well. Does not need additional DME. Pt is aware and encouraged to seek CM for any questions or concerns. CM continues to follow.  CM contacted family/caregiver?: No- see comments  Were Treatment Team discharge recommendations reviewed with patient/caregiver?: Yes  Did patient/caregiver verbalize understanding of patient care needs?: Yes  Were patient/caregiver advised of the risks associated with not following Treatment Team discharge recommendations?: Yes    Contacts  Patient Contacts: Tena  Relationship to Patient:: Family  Reason/Outcome: Emergency Contact    Requested Home Health Care         Is the patient interested in HHC at discharge?: No    DME Referral Provided  Referral made for DME?: No    Other Referral/Resources/Interventions Provided:  Interventions: HHA  Referral Comments: CM to provide information from [employer] website regarding family-paid caregivers. She will have to call her insurance to discuss that benefit with them. CM will continue to follow for needs.    Would you like to participate in our [facility] service program?  : No - Declined       Discharge Destination Plan:: Home  Transport at Discharge : Family

## 2024-11-26 NOTE — ASSESSMENT & PLAN NOTE
Presenting w/ ongoing multiple abdominal complaints -unintentional weight loss, vomiting, abdominal pain, constipation, diarrhea. Dx w/ diverticulitis 1 month ago, completed ABX treatment; ABX restarted 1 week by PCP. Presented to ED w/ multiple episodes of yolanda loose stool began again on 11/25 - one formed BM noted on 11/26. Notes Wt lost 15-20 lb in 6 months.   Last colonoscopy ~ 5 years ago, h/o polyps   CT abd/pelvis:   No obvious acute intra-abdominal pathology.  Mild fecal retention throughout the colon.   Sigmoid diverticulosis.   Persistent perisigmoid haziness of uncertain clinical significance, but no obvious focal inflammation.  C. Diff pending   GI following  EGD/colonoscopy on 11/29  PTA Plavix on hold  Epic secure chat completed with GI/vascular/SLIM regarding PAD's role w/ anesthesia  Clear liquid diet   Reviewed w/ pt/daughter concern for underlying malignancy  C/w ABX (day #2) for now, Rocephin/Flagyl    Recent Labs     11/25/24  0937 11/26/24  0527   WBC 14.67* 6.21

## 2024-11-26 NOTE — ASSESSMENT & PLAN NOTE
H/O SIADH  Resume salt tablets on 11/26     Recent Labs     11/25/24  0937 11/26/24  0527   SODIUM 136 136

## 2024-11-26 NOTE — CONSULTS
Consultation -  [facility]  Ofelia Aguilar 84 y.o. female MRN: [id number]  Unit/Bed#: -01 Encounter: [id number]         Reason for Consult / Principal Problem: Unintentional weight loss, abnormal CT imaging    HPI: Ofelia is a pleasant 84-year-old female with history of peripheral artery disease on Plavix and aspirin, tobacco dependence, hyperlipidemia, and spinal stenosis.  Patient was admitted to the hospital yesterday after presenting with persistent abdominal pain, alternating diarrhea and constipation, and vomiting.  Denies signs of GI bleeding or coffee-ground emesis/hematemesis.  Patient was diagnosed with what appeared to be diverticulitis on imaging in October.  She was treated with antibiotics, and reported no improvement.  She decided to come back to the emergency room.  She is working with our vascular team as an outpatient for consideration of of aortobifemoral bypass.  However, was seen by Dr. Diaz recently on October 29, 2024 who was concerned about her malnutrition and being a candidate for the procedure.    Patient's daughter, Tena was present during our encounter over the phone.  Patient's daughter reports that she moved her mother back to [place] from [place] as the patient kept declining.  She has lost about 20 pounds in the last 6 months, and her baseline weight was around 120.  According to our bed weight, the patient only weighs 98 pounds.    White count 14,000 on admission.  Hemoglobin 11.4.  CT on this admission revealing no acute abdominal pathology.  Mild CBD dilation slightly progressed pancreatic duct dilation.  No obvious signs of pancreatic mass.  Mild fecal retention is noted throughout the colon, persistent perisigmoid haziness as compared to October 2024 scan.    Patient states her last colonoscopy was likely around 5 years ago in [place].  Has a personal history of colon polyps and was having a colonoscopy every 5 years.  There is no known family  "history of IBD or colon cancer.  Patient reports that she has had a previous EGD but likely several years ago at this point.    Historical Information   Past Medical History:   Diagnosis Date    Anxiety     Atherosclerosis of native arteries of extremities with rest pain, bilateral legs (HCC) 2024    Diverticulitis     GERD (gastroesophageal reflux disease)      Past Surgical History:   Procedure Laterality Date    APPENDECTOMY       SECTION       Social History   Social History     Substance and Sexual Activity   Alcohol Use Never     Social History     Substance and Sexual Activity   Drug Use Not on file     Social History     Tobacco Use   Smoking Status Former    Current packs/day: 1.00    Average packs/day: 1 pack/day for 20.0 years (20.0 ttl pk-yrs)    Types: Cigarettes    Passive exposure: Never   Smokeless Tobacco Never     History reviewed. No pertinent family history.     Meds/Allergies       Current Facility-Administered Medications:     aspirin chewable tablet 81 mg, Every Other Day    atorvastatin (LIPITOR) tablet 40 mg, Daily    cefTRIAXone (ROCEPHIN) 1,000 mg in dextrose 5 % 50 mL IVPB, Q24H, Last Rate: 1,000 mg (24 1250)    clopidogrel (PLAVIX) tablet 75 mg, Daily    heparin (porcine) subcutaneous injection 5,000 Units, Q12H **AND** [COMPLETED] Platelet count, Once    influenza vaccine, high-dose (Fluzone High-Dose) IM injection 0.5 mL, Prior to discharge    metroNIDAZOLE (FLAGYL) IVPB (premix) 500 mg 100 mL, Q8H, Last Rate: 500 mg (24 0548)    mirtazapine (REMERON) tablet 15 mg, HS    multivitamin stress formula tablet 1 tablet, Daily    pregabalin (LYRICA) capsule 25 mg, Daily **AND** pregabalin (LYRICA) capsule 50 mg, HS    sodium chloride tablet 2 g, TID With Meals    Allergies   Allergen Reactions    Penicillins Anaphylaxis    Aspirin GI Intolerance         Objective     Blood pressure 117/58, pulse 81, temperature 98.4 °F (36.9 °C), resp. rate 18, height 5' 4\" (1.626 " m), weight 44.6 kg (98 lb 5.2 oz), SpO2 92%.      Intake/Output Summary (Last 24 hours) at 11/26/2024 1322  Last data filed at 11/26/2024 0832  Gross per 24 hour   Intake 1050 ml   Output 300 ml   Net 750 ml         PHYSICAL EXAM:      General Appearance:   Alert and oriented x 3. Cooperative, and in no respiratory distress.  Cachectic appearing.   HEENT:   Normocephalic, atraumatic, anicteric.     Neck:  Supple, symmetrical, trachea midline   Lungs:   Clear to auscultation bilaterally; no rales, rhonchi or wheezing; respirations unlabored    Heart::   S1 and S2 normal; regular rate and rhythm; no murmur, rub, or gallop.   Abdomen:   Soft, non-tender, non-distended; normal bowel sounds; no masses, no organomegaly    Genitalia:   Deferred    Rectal:   Deferred    Extremities:  No cyanosis, clubbing or edema    Pulses:  2+ and symmetric all extremities    Skin:  Skin color, texture, turgor normal, no rashes or lesions    Lymph nodes:  No palpable cervical or supraclavicular lymphadenopathy        Lab Results:   Results from last 7 days   Lab Units 11/26/24  0527   WBC Thousand/uL 6.21   HEMOGLOBIN g/dL 9.0*   HEMATOCRIT % 28.4*   PLATELETS Thousands/uL 311   SEGS PCT % 48   LYMPHO PCT % 36   MONO PCT % 13*   EOS PCT % 1     Results from last 7 days   Lab Units 11/26/24  0527 11/25/24  0937   POTASSIUM mmol/L 4.0 4.3   CHLORIDE mmol/L 105 105   CO2 mmol/L 24 23   BUN mg/dL 14 19   CREATININE mg/dL 0.82 0.80   CALCIUM mg/dL 8.8 9.6   ALK PHOS U/L  --  53   ALT U/L  --  13   AST U/L  --  26         Results from last 7 days   Lab Units 11/25/24  0937   LIPASE u/L 34       Imaging Studies:  CT abdomen pelvis with contrast  Result Date: 11/25/2024  Impression: No obvious acute intra-abdominal pathology. Mild CBD dilatation, and mild slightly progressing pancreatic duct dilatation. Both ducts are visualized to the duodenum. No pancreatic mass. Correlate with clinical findings. Consider ERCP if clinically warranted. Mild  fecal retention throughout the colon. Sigmoid diverticulosis. Persistent perisigmoid haziness of uncertain clinical significance, but no obvious focal inflammation. Correlate with clinical findings. Chronic vascular changes, as above. Present flow in the superficial and deep femoral arteries bilaterally. Other findings, as per the body of the report. Workstation performed: [id number]       ASSESSMENT and PLAN:      1) Unintentional weight loss, vomiting, abdominal pain, constipation and perisigmoid haziness on imaging- Persistent since October 2024.  Patient states that she had her last colonoscopy 5 years ago in [place], was having colonoscopy every 5 years due to personal history of colon polyps.  There is no known family history of IBD or colon cancer.  Suffering with chronic constipation, weight loss of 20 pounds in the last 6 months.  She had a negative gastric emptying study in [place] in 2020.  - Spoke with the vascular team, from their standpoint there is no contraindication for her to receive anesthesia given extensive peripheral artery disease  - Will discuss with attending regarding a diagnostic EGD and flexible sigmoidoscopy to investigate given persistent CT findings of perisigmoid haziness despite antibiotics and weight loss to rule out malignancy  - IV antibiotics for now  - Low fiber/low residue diet as tolerated  - Patient and patient's daughter agree with plan    2) Mild CBD dilatation, and mild slightly progressing pancreatic duct dilatation - Patient states that she is not allowed to have an MRI as she was told that she has a retained piece of metal near her abdomen?  Liver enzymes are normal fortunately.  - Check right upper quadrant ultrasound      The patient was seen and examined by Dr. Loo, all curran medical decisions were made with Dr. Loo.  Thank you for allowing us to participate in the care of this pleasant patient.  We will follow up with you closely.    Portions of the record may have  "been created with voice recognition software.  Occasional wrong word or \"sound a like\" substitutions may have occurred due to the inherent limitations of voice recognition software.  Read the chart carefully and recognize, using context, where substitutions have occurred.  "

## 2024-11-26 NOTE — UTILIZATION REVIEW
Initial Clinical Review    Admission: Date/Time/Statement:   Admission Orders (From admission, onward)       Ordered        11/25/24 1312  INPATIENT ADMISSION  Once                          Orders Placed This Encounter   Procedures    INPATIENT ADMISSION     Standing Status:   Standing     Number of Occurrences:   1     Level of Care:   Med Surg [16]     Estimated length of stay:   More than 2 Midnights     Certification:   I certify that inpatient services are medically necessary for this patient for a duration of greater than two midnights. See H&P and MD Progress Notes for additional information about the patient's course of treatment.     ED Arrival Information       Expected   -    Arrival   11/25/2024 09:05    Acuity   Urgent              Means of arrival   Ambulance    [facility]    Admission type   Emergency              Arrival complaint   Vomiting             Chief Complaint   Patient presents with    Abdominal Pain     Abdominal pain with diarrhea, nausea, and vomiting that started last night. Has been taking antibiotics for diverticulitis.        Initial Presentation: 84 y.o. female to ED from home w/  PMH of peripheral arterial disease status post prior bypass on dual antiplatelet therapy who presents with diarrhea. Went to PCP on 11/19 for diverticulitis and was prescribed po antibiotics for diverticulitis flare however presented to ED with refractory symptoms. Was on 7 days of abx , slight improvement on diarrhea . Given ceftriaxone and flagyl in ED , started on IVF . Given zofran , morphine , bentyl . Admitted IP status w/ diverticulitis . Plan to cont ceftriaxone and flagyl , NPO , IVF , GI consult . HLD statin . Anxiety remeron .     Anticipated Length of Stay/Certification Statement: Patient will be admitted on an inpatient basis with an anticipated length of stay of greater than 2 midnights secondary to refractory diverticulitis.     Date:  11/26   Day 2: on Day 2 of rocephin and flagyl . Wbc dec to 6.21 from 14.67. pt had a firm BM today . some generalized abdominal discomfort without severe pain. Cdiff pending . US completed Mild common bile duct dilatation with borderline pancreatic duct. Consider MRCP or ERCP .      11/26 GI Consult   Unintentional weight loss, vomiting , abd pain . Plan for EGD , IV abx , low fiber /low residue diet as robert . LFTs normal , check RUQ US for mild CBD and mild slightly progressing pancreatic duct dilation .     Date: 11/27  Day 3: Has surpassed a 2nd midnight with active treatments and services.  C/o mild abd pain . EGD /colonoscopy planned for 11/29 . On clear liq diet . Cont rocephin and flagyl . RUQ U/S -Mild common bile duct dilatation with borderline pancreatic duct.  Consider MRCP or ERCP.  PT rec Level II mod resources on DC . Anticipate discharge in 48 hrs to discharge location to be determined pending rehab evaluations.       ED Treatment-Medication Administration from 11/25/2024 0905 to 11/25/2024 1707         Date/Time Order Dose Route Action     11/25/2024 0944 lactated ringers bolus 1,000 mL 1,000 mL Intravenous New Bag     11/25/2024 0921 dicyclomine (BENTYL) tablet 20 mg 20 mg Oral Given     11/25/2024 0943 ondansetron (ZOFRAN) injection 4 mg 4 mg Intravenous Given     11/25/2024 0943 morphine injection 4 mg 4 mg Intravenous Given     11/25/2024 1051 iohexol (OMNIPAQUE) 350 MG/ML injection (MULTI-DOSE) 85 mL 85 mL Intravenous Given     11/25/2024 1355 ceftriaxone (ROCEPHIN) 1 g/50 mL in dextrose IVPB 1,000 mg Intravenous New Bag     11/25/2024 1426 metroNIDAZOLE (FLAGYL) IVPB (premix) 500 mg 100 mL 500 mg Intravenous New Bag            Scheduled Medications:  atorvastatin, 40 mg, Oral, Daily  cefTRIAXone, 1,000 mg, Intravenous, Q24H  heparin (porcine), 5,000 Units, Subcutaneous, Q12H  metroNIDAZOLE, 500 mg, Intravenous, Q8H  mirtazapine, 15 mg, Oral, HS  multivitamin stress formula, 1 tablet, Oral,  Daily  pregabalin, 25 mg, Oral, Daily   And  pregabalin, 50 mg, Oral, HS      Continuous IV Infusions:     PRN Meds:  influenza vaccine, 0.5 mL, Intramuscular, Prior to discharge      ED Triage Vitals [11/25/24 0909]   Temperature Pulse Respirations Blood Pressure SpO2 Pain Score   98.1 °F (36.7 °C) 92 16 169/98 97 % 8     Weight (last 2 days)       Date/Time Weight    11/25/24 22:12:52 44.6 (98.33)            Vital Signs (last 3 days)       Date/Time Temp Pulse Resp BP MAP (mmHg) SpO2 O2 Device Patient Position - Orthostatic VS Pain    11/27/24 08:08:23 -- -- -- 119/70 86 -- -- -- --    11/27/24 08:08:04 -- 77 -- 119/70 86 94 % -- -- --    11/27/24 0730 -- -- -- -- -- 92 % None (Room air) -- No Pain    11/27/24 01:45:13 -- 77 16 112/73 86 91 % None (Room air) Lying --    11/26/24 22:38:51 99.3 °F (37.4 °C) 87 -- 95/49 64 94 % -- -- --    11/26/24 2100 -- -- -- -- -- -- -- -- No Pain    11/26/24 14:16:26 98.1 °F (36.7 °C) 84 -- 125/59 81 94 % -- -- --    11/26/24 07:52:43 98.4 °F (36.9 °C) 81 -- 117/58 78 92 % -- -- --    11/26/24 0730 -- -- -- -- -- 90 % None (Room air) -- No Pain    11/25/24 22:12:52 98.4 °F (36.9 °C) 77 18 122/56 78 93 % None (Room air) Lying No Pain    11/25/24 17:12:52 99 °F (37.2 °C) -- 16 167/65 99 -- -- -- No Pain    11/25/24 17:12:19 99 °F (37.2 °C) -- -- 167/65 99 -- -- -- --    11/25/24 1300 -- 74 16 117/57 81 93 % None (Room air) Lying --    11/25/24 1200 -- 80 18 164/72 104 96 % None (Room air) Lying --    11/25/24 1045 -- 94 16 149/98 118 96 % None (Room air) Lying --    11/25/24 1010 -- -- -- -- -- -- -- -- No Pain    11/25/24 1000 -- 82 18 148/77 100 98 % None (Room air) Lying --    11/25/24 0943 -- -- -- -- -- -- -- -- 8    11/25/24 0909 98.1 °F (36.7 °C) 92 16 169/98 -- 97 % None (Room air) Lying 8              Pertinent Labs/Diagnostic Test Results:   Radiology:  US right upper quadrant   Final Interpretation by Cal Michel MD ([id number])      Mild common bile duct dilatation  with borderline pancreatic duct. This persists as compared to yesterday's CT.      Further evaluation with MRCP or ERCP should be considered as noted on the CT result.      Follow-up was marked in the epic system      Workstation performed: [id number]         CT abdomen pelvis with contrast   Final Interpretation by Blessing Shukla MD (11/25 1148)   No obvious acute intra-abdominal pathology.   Mild CBD dilatation, and mild slightly progressing pancreatic duct dilatation. Both ducts are visualized to the duodenum. No pancreatic mass. Correlate with clinical findings. Consider ERCP if clinically warranted.   Mild fecal retention throughout the colon. Sigmoid diverticulosis. Persistent perisigmoid haziness of uncertain clinical significance, but no obvious focal inflammation. Correlate with clinical findings.   Chronic vascular changes, as above. Present flow in the superficial and deep femoral arteries bilaterally.   Other findings, as per the body of the report.            Workstation performed: [id number]           Cardiology:  No orders to display     GI:  No orders to display           Results from last 7 days   Lab Units 11/26/24 0527 11/25/24  1856 11/25/24  0937   WBC Thousand/uL 6.21  --  14.67*   HEMOGLOBIN g/dL 9.0*  --  11.4*   HEMATOCRIT % 28.4*  --  36.6   PLATELETS Thousands/uL 311 332 387   TOTAL NEUT ABS Thousands/µL 3.02  --  11.94*         Results from last 7 days   Lab Units 11/26/24 0527 11/25/24  0937   SODIUM mmol/L 136 136   POTASSIUM mmol/L 4.0 4.3   CHLORIDE mmol/L 105 105   CO2 mmol/L 24 23   ANION GAP mmol/L 7 8   BUN mg/dL 14 19   CREATININE mg/dL 0.82 0.80   EGFR ml/min/1.73sq m 66 68   CALCIUM mg/dL 8.8 9.6   MAGNESIUM mg/dL 1.9  --      Results from last 7 days   Lab Units 11/25/24  0937   AST U/L 26   ALT U/L 13   ALK PHOS U/L 53   TOTAL PROTEIN g/dL 7.1   ALBUMIN g/dL 4.0   TOTAL BILIRUBIN mg/dL 0.32       Results from last 7 days   Lab Units 11/26/24  0527 11/25/24  0937   GLUCOSE  RANDOM mg/dL 86 99       Results from last 7 days   Lab Units 11/25/24  1341 11/25/24  1142 11/25/24  0937   HS TNI 0HR ng/L  --   --  7   HS TNI 2HR ng/L  --  6  --    HSTNI D2 ng/L  --  -1  --    HS TNI 4HR ng/L 7  --   --    HSTNI D4 ng/L 0  --   --        Results from last 7 days   Lab Units 11/25/24  0937   LIPASE u/L 34       Results from last 7 days   Lab Units 11/25/24  1110   CLARITY UA  Clear   COLOR UA  Light Yellow   SPEC GRAV UA  1.026   PH UA  5.5   GLUCOSE UA mg/dl Negative   KETONES UA mg/dl Negative   BLOOD UA  Negative   PROTEIN UA mg/dl Negative   NITRITE UA  Negative   BILIRUBIN UA  Negative   UROBILINOGEN UA (BE) mg/dl <2.0   LEUKOCYTES UA  Negative       Results from last 7 days   Lab Units 11/25/24  1350 11/25/24  1312   BLOOD CULTURE  No Growth at 24 hrs. No Growth at 24 hrs.       Past Medical History:   Diagnosis Date    Anxiety     Atherosclerosis of native arteries of extremities with rest pain, bilateral legs (HCC) 08/25/2024    Diverticulitis     GERD (gastroesophageal reflux disease)      Present on Admission:   Anxiety   Depression   Hyperlipidemia   Atherosclerosis of native arteries of extremities with rest pain, bilateral legs (HCC)   Spinal stenosis   HTN, goal below 140/90   Hyponatremia      Admitting Diagnosis: Diverticulitis [K57.92]  Abdominal pain [R10.9]  Age/Sex: 84 y.o. female    Network Utilization Review Department  ATTENTION: Please call with any questions or concerns to [phone] and carefully listen to the prompts so that you are directed to the right person. All voicemails are confidential.   For Discharge needs, contact Care Management DC Support Team at [phone] opt. 2  Send all requests for admission clinical reviews, approved or denied determinations and any other requests to dedicated fax number below belonging to the campus where the patient is receiving treatment. List of dedicated fax numbers for the Facilities:  FACILITY NAME UR FAX NUMBER    ADMISSION DENIALS (Administrative/Medical Necessity) [phone]   DISCHARGE SUPPORT TEAM (NETWORK) [phone]   PARENT CHILD HEALTH (Maternity/NICU/Pediatrics) [phone]   [facility] [phone]   [facility] [phone]   [facility] [phone]   [facility] [phone]   Critical access hospital [phone]   [facility] [phone]   [facility] [phone]   [facility] [phone]   [facility] [phone]   [facility] [phone]   [facility] [phone]   [facility] [phone]

## 2024-11-26 NOTE — ASSESSMENT & PLAN NOTE
Advanced PAD  CT abd/pelvis: Chronic vascular changes, as above. Present flow in the superficial and deep femoral arteries bilaterally  PTA Plavix on hold; c/w Aspirin & statin

## 2024-11-26 NOTE — MALNUTRITION/BMI
This medical record reflects one or more clinical indicators suggestive of Underweight      BMI Findings:  Adult BMI Classifications: Underweight < 18.5        Body mass index is 16.88 kg/m².     See Nutrition note dated 11/26/24 for additional details.  Completed nutrition assessment is viewable in the nutrition documentation.

## 2024-11-26 NOTE — PROGRESS NOTES
Progress Note - Hospitalist   Name: Ofelia Aguilar 84 y.o. female I MRN: [id number]  Unit/Bed#: -01 I Date of Admission: 11/25/2024   Date of Service: 11/26/2024 I Hospital Day: 1    Assessment & Plan  Abdominal complaints  Presenting w/ ongoing multiple abdominal complaints -unintentional weight loss, vomiting, abdominal pain, constipation, diarrhea. Dx w/ diverticulitis 1 month ago, completed ABX treatment; ABX restarted 1 week by PCP. Presented to ED w/ multiple episodes of yolanda loose stool began again on 11/25 - one formed BM noted on 11/26. Notes Wt lost 15-20 lb in 6 months.   Last colonoscopy ~ 5 years ago, h/o polyps   CT abd/pelvis:   No obvious acute intra-abdominal pathology.  Mild fecal retention throughout the colon.   Sigmoid diverticulosis.   Persistent perisigmoid haziness of uncertain clinical significance, but no obvious focal inflammation.  C. Diff pending   GI following  EGD/colonoscopy on 11/29  PTA Plavix on hold  Epic secure chat completed with GI/vascular/SLIM regarding PAD's role w/ anesthesia  Clear liquid diet   Reviewed w/ pt/daughter concern for underlying malignancy  C/w ABX (day #2) for now, Rocephin/Flagyl    Recent Labs     11/25/24  0937 11/26/24  0527   WBC 14.67* 6.21     Common bile duct dilatation  CT abd/pelvis:   Mild CBD dilatation, and mild slightly progressing pancreatic duct dilatation  Request RUQ U/S   No yolanda isolated RUQ symptoms    Recent Labs     11/25/24  0937   AST 26   ALT 13   ALKPHOS 53   TBILI 0.32     Hyponatremia  H/O SIADH  Resume salt tablets on 11/26     Recent Labs     11/25/24  0937 11/26/24  0527   SODIUM 136 136     Atherosclerosis of native arteries of extremities with rest pain, bilateral legs (HCC)  Advanced PAD  CT abd/pelvis: Chronic vascular changes, as above. Present flow in the superficial and deep femoral arteries bilaterally  PTA Plavix on hold; c/w Aspirin & statin   Anxiety  C/w Remeron  Mood stable   Depression  C/w Remeron  Mood  stable   Hyperlipidemia  C/w atorvastatin  Paronychia of great toe  Improved from prior outpatient visit  Continue to monitor  Spinal stenosis  C/w Lyrica  Pain controlled   Malnutrition (HCC)  Malnutrition Findings: Temporal wasting, thinning orbital fat pads, prominent clavicle    BMI Findings:  Adult BMI Classifications: Underweight < 18.5        Body mass index is 16.88 kg/m².   Nutrition consult placed  HTN, goal below 140/90  PTA medications on hold with soft BP    VTE Pharmacologic Prophylaxis: VTE Score: 4 Moderate Risk (Score 3-4) - Pharmacological DVT Prophylaxis Ordered: heparin.    Mobility:   Basic Mobility Inpatient Raw Score: 17  JH-HLM Goal: 5: Stand one or more mins  JH-HLM Achieved: 7: Walk 25 feet or more  JH-HLM Goal achieved. Continue to encourage appropriate mobility.    Patient Centered Rounds: I performed bedside rounds with nursing staff today.   Discussions with Specialists or Other Care Team Provider: Plan of care reviewed with vascular surgery, GI, case management, nursing    Education and Discussions with Family / Patient: Updated  (daughter) via phone.    Current Length of Stay: 1 day(s)  Current Patient Status: Inpatient   Certification Statement: The patient will continue to require additional inpatient hospital stay due to pending GI workup  Discharge Plan: Anticipate discharge in 48 hrs to discharge location to be determined pending rehab evaluations.    Code Status: Level 3 - DNAR and DNI    Subjective   Patient notes a firm bowel movement today.  She has not had gross loose stool.  She does note some generalized abdominal discomfort without severe pain.  She is urinating regularly.  She denies chest pain or shortness of breath.  No yolanda right upper quadrant pain.    Objective :  Temp:  [98.4 °F (36.9 °C)-99 °F (37.2 °C)] 98.4 °F (36.9 °C)  HR:  [74-81] 81  BP: (117-167)/(56-72) 117/58  Resp:  [16-18] 18  SpO2:  [90 %-96 %] 92 %  O2 Device: None (Room air)    Body  mass index is 16.88 kg/m².     Input and Output Summary (last 24 hours):     Intake/Output Summary (Last 24 hours) at 11/26/2024 1128  Last data filed at 11/26/2024 0832  Gross per 24 hour   Intake 1050 ml   Output 300 ml   Net 750 ml       Physical Exam  Constitutional:       General: She is not in acute distress.     Appearance: She is normal weight. She is not toxic-appearing.      Comments: Frail, thin, temporal wasting, thinning orbital fat pads, prominent clavicle   HENT:      Head: Normocephalic.      Right Ear: External ear normal.      Left Ear: External ear normal.      Nose: Nose normal.      Mouth/Throat:      Mouth: Mucous membranes are moist.      Pharynx: Oropharynx is clear.   Eyes:      Extraocular Movements: Extraocular movements intact.      Conjunctiva/sclera: Conjunctivae normal.   Cardiovascular:      Rate and Rhythm: Normal rate and regular rhythm.      Pulses: Normal pulses.      Heart sounds: Normal heart sounds.   Pulmonary:      Effort: Pulmonary effort is normal.      Breath sounds: Normal breath sounds.   Abdominal:      General: Abdomen is flat. Bowel sounds are normal. There is no distension.      Palpations: Abdomen is soft.      Tenderness: There is abdominal tenderness (Mild diffuse). There is no guarding or rebound.   Musculoskeletal:         General: No swelling or deformity. Normal range of motion.      Cervical back: Normal range of motion.   Skin:     General: Skin is warm and dry.      Coloration: Skin is not jaundiced.      Findings: No bruising.   Neurological:      General: No focal deficit present.      Mental Status: She is alert. Mental status is at baseline.   Psychiatric:         Mood and Affect: Mood normal.         Lab Results: I have reviewed the following results:   Results from last 7 days   Lab Units 11/26/24  0527   WBC Thousand/uL 6.21   HEMOGLOBIN g/dL 9.0*   HEMATOCRIT % 28.4*   PLATELETS Thousands/uL 311   SEGS PCT % 48   LYMPHO PCT % 36   MONO PCT % 13*    EOS PCT % 1     Results from last 7 days   Lab Units 11/26/24  0527 11/25/24  0937   SODIUM mmol/L 136 136   POTASSIUM mmol/L 4.0 4.3   CHLORIDE mmol/L 105 105   CO2 mmol/L 24 23   BUN mg/dL 14 19   CREATININE mg/dL 0.82 0.80   ANION GAP mmol/L 7 8   CALCIUM mg/dL 8.8 9.6   ALBUMIN g/dL  --  4.0   TOTAL BILIRUBIN mg/dL  --  0.32   ALK PHOS U/L  --  53   ALT U/L  --  13   AST U/L  --  26   GLUCOSE RANDOM mg/dL 86 99                       Recent Cultures (last 7 days):   Results from last 7 days   Lab Units 11/25/24  1350 11/25/24  1312   BLOOD CULTURE  Received in Microbiology Lab. Culture in Progress. Received in Microbiology Lab. Culture in Progress.       Imaging Results Review: I reviewed radiology reports from this admission including: CT abdomen/pelvis.  Other Study Results Review: No additional pertinent studies reviewed.    Last 24 Hours Medication List:     Current Facility-Administered Medications:     aspirin chewable tablet 81 mg, Every Other Day    atorvastatin (LIPITOR) tablet 40 mg, Daily    cefTRIAXone (ROCEPHIN) 1,000 mg in dextrose 5 % 50 mL IVPB, Q24H    clopidogrel (PLAVIX) tablet 75 mg, Daily    heparin (porcine) subcutaneous injection 5,000 Units, Q12H **AND** [COMPLETED] Platelet count, Once    influenza vaccine, high-dose (Fluzone High-Dose) IM injection 0.5 mL, Prior to discharge    metroNIDAZOLE (FLAGYL) IVPB (premix) 500 mg 100 mL, Q8H, Last Rate: 500 mg (11/26/24 0548)    mirtazapine (REMERON) tablet 15 mg, HS    multivitamin stress formula tablet 1 tablet, Daily    pregabalin (LYRICA) capsule 25 mg, Daily **AND** pregabalin (LYRICA) capsule 50 mg, HS    sodium chloride tablet 2 g, TID With Meals    Administrative Statements   Today, Patient Was Seen By: Brenda Lara PA-C  I have spent a total time of 50 minutes in caring for this patient on the day of the visit/encounter including Diagnostic results, Prognosis, Risks and benefits of tx options, Instructions for management, Patient and  family education, Importance of tx compliance, Counseling / Coordination of care, Documenting in the medical record, Reviewing / ordering tests, medicine, procedures  , Obtaining or reviewing history  , and Communicating with other healthcare professionals .    **Please Note: This note may have been constructed using a voice recognition system.**

## 2024-11-26 NOTE — ASSESSMENT & PLAN NOTE
CT abd/pelvis:   Mild CBD dilatation, and mild slightly progressing pancreatic duct dilatation  Request RUQ U/S   No yolanda isolated RUQ symptoms    Recent Labs     11/25/24  0937   AST 26   ALT 13   ALKPHOS 53   TBILI 0.32

## 2024-11-26 NOTE — ASSESSMENT & PLAN NOTE
Malnutrition Findings: Temporal wasting, thinning orbital fat pads, prominent clavicle    BMI Findings:  Adult BMI Classifications: Underweight < 18.5        Body mass index is 16.88 kg/m².   Nutrition consult placed

## 2024-11-27 LAB
C COLI+JEJUNI TUF STL QL NAA+PROBE: NEGATIVE
EC STX1+STX2 GENES STL QL NAA+PROBE: NEGATIVE
SALMONELLA SP SPAO STL QL NAA+PROBE: NEGATIVE
SHIGELLA SP+EIEC IPAH STL QL NAA+PROBE: NEGATIVE

## 2024-11-27 PROCEDURE — 97163 PT EVAL HIGH COMPLEX 45 MIN: CPT

## 2024-11-27 PROCEDURE — 87505 NFCT AGENT DETECTION GI: CPT | Performed by: STUDENT IN AN ORGANIZED HEALTH CARE EDUCATION/TRAINING PROGRAM

## 2024-11-27 PROCEDURE — 99232 SBSQ HOSP IP/OBS MODERATE 35: CPT | Performed by: NURSE PRACTITIONER

## 2024-11-27 RX ADMIN — Medication 2 G: at 12:29

## 2024-11-27 RX ADMIN — METRONIDAZOLE 500 MG: 500 INJECTION, SOLUTION INTRAVENOUS at 21:19

## 2024-11-27 RX ADMIN — Medication 2 G: at 08:55

## 2024-11-27 RX ADMIN — HEPARIN SODIUM 5000 UNITS: 5000 INJECTION, SOLUTION INTRAVENOUS; SUBCUTANEOUS at 17:00

## 2024-11-27 RX ADMIN — Medication 3 MG: at 23:14

## 2024-11-27 RX ADMIN — PREGABALIN 50 MG: 50 CAPSULE ORAL at 21:18

## 2024-11-27 RX ADMIN — MIRTAZAPINE 15 MG: 15 TABLET, FILM COATED ORAL at 21:18

## 2024-11-27 RX ADMIN — ATORVASTATIN CALCIUM 40 MG: 40 TABLET, FILM COATED ORAL at 08:55

## 2024-11-27 RX ADMIN — METRONIDAZOLE 500 MG: 500 INJECTION, SOLUTION INTRAVENOUS at 06:02

## 2024-11-27 RX ADMIN — B-COMPLEX W/ C & FOLIC ACID TAB 1 TABLET: TAB at 08:55

## 2024-11-27 RX ADMIN — HEPARIN SODIUM 5000 UNITS: 5000 INJECTION, SOLUTION INTRAVENOUS; SUBCUTANEOUS at 06:02

## 2024-11-27 RX ADMIN — PREGABALIN 25 MG: 25 CAPSULE ORAL at 08:55

## 2024-11-27 RX ADMIN — CEFTRIAXONE SODIUM 1000 MG: 10 INJECTION, POWDER, FOR SOLUTION INTRAVENOUS at 12:31

## 2024-11-27 RX ADMIN — Medication 2 G: at 16:54

## 2024-11-27 RX ADMIN — METRONIDAZOLE 500 MG: 500 INJECTION, SOLUTION INTRAVENOUS at 16:52

## 2024-11-27 NOTE — PHYSICAL THERAPY NOTE
Physical Therapy Evaluation    Patient's Name: Ofelia Aguilar    Admitting Diagnosis  Diverticulitis [K57.92]  Abdominal pain [R10.9]    Problem List  Patient Active Problem List   Diagnosis    Atherosclerosis of native arteries of extremities with rest pain, bilateral legs (HCC)    HTN, goal below 140/90    Anxiety    GERD (gastroesophageal reflux disease)    Hyponatremia    Anemia    Urgency of urination    Spinal stenosis    Moderate protein-calorie malnutrition (HCC)    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)    Hyperlipidemia    Depression    Insomnia    Pain in both lower extremities    Cigarette nicotine dependence with nicotine-induced disorder    Recurrent major depressive disorder, remission status unspecified (HCC)    Paronychia of great toe    Abdominal complaints    Abnormal CT of the abdomen    Common bile duct dilatation    Malnutrition (HCC)       Past Medical History  Past Medical History:   Diagnosis Date    Anxiety     Atherosclerosis of native arteries of extremities with rest pain, bilateral legs (HCC) 2024    Diverticulitis     GERD (gastroesophageal reflux disease)        Past Surgical History  Past Surgical History:   Procedure Laterality Date    APPENDECTOMY       SECTION         Recent Imaging  US right upper quadrant   Final Result by Cal Michel MD ([id number])      Mild common bile duct dilatation with borderline pancreatic duct. This persists as compared to yesterday's CT.      Further evaluation with MRCP or ERCP should be considered as noted on the CT result.      Follow-up was marked in the epic system      Workstation performed: [id number]         CT abdomen pelvis with contrast   Final Result by Blessing Shukla MD ( [id number])   No obvious acute intra-abdominal pathology.   Mild CBD dilatation, and mild slightly progressing pancreatic duct dilatation. Both ducts are visualized to the duodenum. No pancreatic mass. Correlate with clinical findings.  Consider ERCP if clinically warranted.   Mild fecal retention throughout the colon. Sigmoid diverticulosis. Persistent perisigmoid haziness of uncertain clinical significance, but no obvious focal inflammation. Correlate with clinical findings.   Chronic vascular changes, as above. Present flow in the superficial and deep femoral arteries bilaterally.   Other findings, as per the body of the report.            Workstation performed: [id number]             Recent Vital Signs  Vitals:    11/27/24 0145 11/27/24 0730 11/27/24 0808 11/27/24 0808   BP: 112/73  119/70 119/70   BP Location: Left arm      Pulse: 77  77    Resp: 16      Temp:       TempSrc:       SpO2: 91% 92% 94%    Weight:       Height:              11/27/24 1347   PT Last Visit   PT Visit Date 11/27/24   Note Type   Note type Evaluation   Pain Assessment   Pain Assessment Tool 0-10   Pain Score 6   Pain Location/Orientation Location: Foot;Orientation: Left   Pain Onset/Description Onset: Ongoing;Frequency: Constant/Continuous;Other (Comment)  (numb)   Patient's Stated Pain Goal No pain   Hospital Pain Intervention(s) Repositioned;Ambulation/increased activity   Restrictions/Precautions   Weight Bearing Precautions Per Order No   Braces or Orthoses   (none)   Home Living   Type of Home House   Home Layout One level;Stairs to enter with rails;Able to live on main level with bedroom/bathroom;Performs ADLs on one level  (5 JUSTIN)   Bathroom Shower/Tub Tub/shower unit   Bathroom Toilet Standard   Bathroom Equipment Shower chair;Hand-held shower   Bathroom Accessibility Accessible   Home Equipment Other (Comment)  (rollator)   Additional Comments uses RW at baseline   Prior Function   Level of [place] Needs assistance with IADLS;Independent with ADLs;Independent with functional mobility  (dtr helps with showering and dressing)   Lives With Daughter;Family   Receives Help From Family   IADLs Family/Friend/Other provides transportation;Family/Friend/Other  "provides meals;Family/Friend/Other provides medication management   Falls in the last 6 months 0   Vocational Retired   General   Family/Caregiver Present No   Cognition   Overall Cognitive Status WFL   Orientation Level Oriented X4   Memory Within functional limits   Comments Pt agreeable to PT evaluation   Subjective   Subjective \"I am very weak\"   RLE Assessment   RLE Assessment X  (3-/5)   LLE Assessment   LLE Assessment X  (3-/5)   Vision-Basic Assessment   Current Vision Wears glasses all the time   Visual History Cataracts   Coordination   Sensation X  (numbness in L foot > R foot)   Light Touch   RLE Light Touch Grossly intact   LLE Light Touch Impaired   LLE Light Touch Comments decreased light touch distal to knee   Bed Mobility   Supine to Sit 5  Supervision   Additional items Bedrails;HOB elevated;Increased time required;Verbal cues   Additional Comments Pt ended session sitting EOB   Transfers   Sit to Stand   (CGA)   Additional items Assist x 1;Increased time required;Verbal cues   Stand to Sit   (CGA)   Additional items Assist x 1;Increased time required;Verbal cues   Additional Comments with RW   Ambulation/Elevation   Gait pattern Short stride;Step to;Decreased hip extension;Decreased heel strike;Decreased toe off;Excessively slow;Foward flexed   Gait Assistance 4  Minimal assist   Additional items Assist x 1;Verbal cues   Assistive Device Rolling walker   Distance 30'   Balance   Static Sitting Good   Dynamic Sitting Fair +   Static Standing Fair   Dynamic Standing Fair -   Ambulatory Fair -   Endurance Deficit   Endurance Deficit Yes   Activity Tolerance   Activity Tolerance Patient limited by pain;Patient limited by fatigue   Medical Staff Made Aware Haley, PCA   Assessment   Prognosis Good   Problem List Decreased strength;Decreased endurance;Impaired balance;Decreased mobility;Pain;Impaired sensation   Assessment Ofelia Aguilar is a 84 y.o. female admitted to [facility] on [facility]/25/2024 for Abdominal " complaints. Pt  has a past medical history of Anxiety, Atherosclerosis of native arteries of extremities with rest pain, bilateral legs (HCC) (08/25/2024), Diverticulitis, and GERD (gastroesophageal reflux disease).. Order placed for PT eval and tx. PT was consulted and pt was seen on 11/27/2024 for mobility assessment and d/c planning. Chart review and two person identifiers were completed.   Currently pt presents with decreased sensation, decreased strength , decreased static sitting balance , decreased dynamic sitting balance , decreased static standing balance, decreased dynamic standing balance , decreased gait speed, decreased step length , and decreased muscular endurance . Due to these impairments, they will require assistance to perform bed mobility, sit to stand , ambulation, stair negotiation, and transfers. Pt is currently functioning at a supervision assistance x1 level for bed mobility, contact guard assistance x1 level for transfers, minimum assistance x1 level for ambulation with Rolling Walker. These activity limitations significantly impact their ability to participate in previous home and community roles and responsibilities  and ambulation in home. The patient's AM-PAC Basic Mobility Inpatient Short Form Raw Score is 17. PT recommends level II moderate resource intensity. They will benefit from skilled therapy to to reduce the risk of falls, to allow for safe ambulation, and to maximize functional potential.   Barriers to Discharge Inaccessible home environment;Decreased caregiver support;Other (Comment)  (decline in functional mobility)   Goals   STG Expiration Date 12/07/24   Short Term Goal #1 Within 10 days patient will complete: 1) Bed mobility skills with modified independent assistance to facilitate safe return to previous living environment 2) Functional transfers with modified independent assistance to facilitate safe return to previous living environment  3) Ambulation with least  restrictive AD modified independent assistance without LOB and stable vitals for safe ambulation home/ community distances. 4) Stair training up/down flight 5 step/s with appropriate rail/s and modified independent assistance for safe access to previous living environment. 5) Improve balance grades to fair + to reduce risk of falls. 6)Improve LE strength grades by 1 to increase independence w/ transfers and gait.  7) PT for ongoing pt and family education; DME needs and D/C planning to promote highest level of function in least restrictive environment.   Plan   Treatment/Interventions LE strengthening/ROM;Functional transfer training;Elevations;Therapeutic exercise;Endurance training;Patient/family training;Equipment eval/education;Bed mobility;Gait training;Continued evaluation;OT   PT Frequency 3-5x/wk   Discharge Recommendation   Rehab Resource Intensity Level, PT II (Moderate Resource Intensity)   Equipment Recommended Walker   Walker Package Recommended Wheeled walker   AM-PAC Basic Mobility Inpatient   Turning in Flat Bed Without Bedrails 3   Lying on Back to Sitting on Edge of Flat Bed Without Bedrails 3   Moving Bed to Chair 3   Standing Up From Chair Using Arms 3   Walk in Room 3   Climb 3-5 Stairs With Railing 2   Basic Mobility Inpatient Raw Score 17   Basic Mobility Standardized Score 39.67   [facility] Highest Level Of Mobility   -HL Goal 5: Stand one or more mins   -HLM Achieved 7: Walk 25 feet or more   End of Consult   Patient Position at End of Consult Seated edge of bed;Bed/Chair alarm activated;All needs within reach       Recommendations                                                                                                              Pt will benefit from continued skilled IP PT to address the above mentioned impairments in order to maximize recovery and increase functional independence when completing mobility and ADLs. See flow sheet for goals and POC.     PT Evaluation Time:  [id number]    Ronnie Bucnh, PT, DPT

## 2024-11-27 NOTE — PLAN OF CARE
Problem: PAIN - ADULT  Goal: Verbalizes/displays adequate comfort level or baseline comfort level  Description: Interventions:  - Encourage patient to monitor pain and request assistance  - Assess pain using appropriate pain scale  - Administer analgesics based on type and severity of pain and evaluate response  - Implement non-pharmacological measures as appropriate and evaluate response  - Consider cultural and social influences on pain and pain management  - Notify physician/advanced practitioner if interventions unsuccessful or patient reports new pain  Outcome: Progressing     Problem: INFECTION - ADULT  Goal: Absence or prevention of progression during hospitalization  Description: INTERVENTIONS:  - Assess and monitor for signs and symptoms of infection  - Monitor lab/diagnostic results  - Monitor all insertion sites, i.e. indwelling lines, tubes, and drains  - Monitor endotracheal if appropriate and nasal secretions for changes in amount and color  - [place] appropriate cooling/warming therapies per order  - Administer medications as ordered  - Instruct and encourage patient and family to use good hand hygiene technique  - Identify and instruct in appropriate isolation precautions for identified infection/condition  Outcome: Progressing     Problem: SAFETY ADULT  Goal: Patient will remain free of falls  Description: INTERVENTIONS:  - Educate patient/family on patient safety including physical limitations  - Instruct patient to call for assistance with activity   - Consult OT/PT to assist with strengthening/mobility   - Keep Call bell within reach  - Keep bed low and locked with side rails adjusted as appropriate  - Keep care items and personal belongings within reach  - Initiate and maintain comfort rounds  - Make Fall Risk Sign visible to staff  - Offer Toileting every  Hours, in advance of need  - Initiate/Maintain alarm  - Obtain necessary fall risk management equipment:   - Apply yellow socks and  bracelet for high fall risk patients  - Consider moving patient to room near nurses station  Outcome: Progressing  Goal: Maintain or return to baseline ADL function  Description: INTERVENTIONS:  -  Assess patient's ability to carry out ADLs; assess patient's baseline for ADL function and identify physical deficits which impact ability to perform ADLs (bathing, care of mouth/teeth, toileting, grooming, dressing, etc.)  - Assess/evaluate cause of self-care deficits   - Assess range of motion  - Assess patient's mobility; develop plan if impaired  - Assess patient's need for assistive devices and provide as appropriate  - Encourage maximum independence but intervene and supervise when necessary  - Involve family in performance of ADLs  - Assess for home care needs following discharge   - Consider OT consult to assist with ADL evaluation and planning for discharge  - Provide patient education as appropriate  Outcome: Progressing  Goal: Maintains/Returns to pre admission functional level  Description: INTERVENTIONS:  - Perform AM-PAC 6 Click Basic Mobility/ Daily Activity assessment daily.  - Set and communicate daily mobility goal to care team and patient/family/caregiver.   - Collaborate with rehabilitation services on mobility goals if consulted  - Perform Range of Motion  times a day.  - Reposition patient every  hours.  - Dangle patient  times a day  - Stand patient  times a day  - Ambulate patient  times a day  - Out of bed to chair  times a day   - Out of bed for meals  times a day  - Out of bed for toileting  - Record patient progress and toleration of activity level   Outcome: Progressing     Problem: DISCHARGE PLANNING  Goal: Discharge to home or other facility with appropriate resources  Description: INTERVENTIONS:  - Identify barriers to discharge w/patient and caregiver  - Arrange for needed discharge resources and transportation as appropriate  - Identify discharge learning needs (meds, wound care, etc.)  -  Arrange for interpretive services to assist at discharge as needed  - Refer to Case Management Department for coordinating discharge planning if the patient needs post-hospital services based on physician/advanced practitioner order or complex needs related to functional status, cognitive ability, or social support system  Outcome: Progressing     Problem: Knowledge Deficit  Goal: Patient/family/caregiver demonstrates understanding of disease process, treatment plan, medications, and discharge instructions  Description: Complete learning assessment and assess knowledge base.  Interventions:  - Provide teaching at level of understanding  - Provide teaching via preferred learning methods  Outcome: Progressing     Problem: Prexisting or High Potential for Compromised Skin Integrity  Goal: Skin integrity is maintained or improved  Description: INTERVENTIONS:  - Identify patients at risk for skin breakdown  - Assess and monitor skin integrity  - Assess and monitor nutrition and hydration status  - Monitor labs   - Assess for incontinence   - Turn and reposition patient  - Assist with mobility/ambulation  - Relieve pressure over bony prominences  - Avoid friction and shearing  - Provide appropriate hygiene as needed including keeping skin clean and dry  - Evaluate need for skin moisturizer/barrier cream  - Collaborate with interdisciplinary team   - Patient/family teaching  - Consider wound care consult   Outcome: Progressing     Problem: Nutrition/Hydration-ADULT  Goal: Nutrient/Hydration intake appropriate for improving, restoring or maintaining nutritional needs  Description: Monitor and assess patient's nutrition/hydration status for malnutrition. Collaborate with interdisciplinary team and initiate plan and interventions as ordered.  Monitor patient's weight and dietary intake as ordered or per policy. Utilize nutrition screening tool and intervene as necessary. Determine patient's food preferences and provide  high-protein, high-caloric foods as appropriate.     INTERVENTIONS:  - Monitor oral intake, urinary output, labs, and treatment plans  - Assess nutrition and hydration status and recommend course of action  - Evaluate amount of meals eaten  - Assist patient with eating if necessary   - Allow adequate time for meals  - Recommend/ encourage appropriate diets, oral nutritional supplements, and vitamin/mineral supplements  - Order, calculate, and assess calorie counts as needed  - Recommend, monitor, and adjust tube feedings and TPN/PPN based on assessed needs  - Assess need for intravenous fluids  - Provide specific nutrition/hydration education as appropriate  - Include patient/family/caregiver in decisions related to nutrition  Outcome: Progressing

## 2024-11-27 NOTE — PROGRESS NOTES
Progress Note - Hospitalist   Name: Ofelia Aguilar 84 y.o. female I MRN: [id number]  Unit/Bed#: -01 I Date of Admission: 11/25/2024   Date of Service: 11/27/2024 I Hospital Day: 2    Assessment & Plan  Abdominal complaints  Presenting w/ ongoing multiple abdominal complaints -unintentional weight loss, vomiting, abdominal pain, constipation, diarrhea. Dx w/ diverticulitis 1 month ago, completed ABX treatment; ABX restarted 1 week by PCP. Presented to ED w/ multiple episodes of yolanda loose stool began again on 11/25 - one formed BM noted on 11/26. Notes Wt lost 15-20 lb in 6 months.   Last colonoscopy ~ 5 years ago, h/o polyps   CT abd/pelvis:   No obvious acute intra-abdominal pathology.  Mild fecal retention throughout the colon.   Sigmoid diverticulosis.   Persistent perisigmoid haziness of uncertain clinical significance, but no obvious focal inflammation.  C. Diff/stool studies are negative  GI following  EGD/colonoscopy planned for 11/29  PTA Plavix on hold  Epic secure chat completed with GI/vascular/SLIM regarding PAD's role w/ anesthesia  Clear liquid diet   Reviewed w/ pt/daughter concern for underlying malignancy  Continue Rocephin/Flagyl    Recent Labs     11/25/24  0937 11/26/24  0527   WBC 14.67* 6.21     Common bile duct dilatation  CT abd/pelvis:   Mild CBD dilatation, and mild slightly progressing pancreatic duct dilatation  RUQ U/S -Mild common bile duct dilatation with borderline pancreatic duct.   Consider MRCP or ERCP  No yolanda isolated RUQ symptoms    Recent Labs     11/25/24  0937   AST 26   ALT 13   ALKPHOS 53   TBILI 0.32     Hyponatremia  H/O SIADH  Resume salt tablets on 11/26   Stable    Recent Labs     11/25/24  0937 11/26/24  0527   SODIUM 136 136     Atherosclerosis of native arteries of extremities with rest pain, bilateral legs (HCC)  Advanced PAD  CT abd/pelvis: Chronic vascular changes, as above. Present flow in the superficial and deep femoral arteries bilaterally  PTA Plavix on  hold; c/w Aspirin & statin   Spinal stenosis  C/w Lyrica  Pain controlled   Ambulating in room and able to complete ADLs  Malnutrition (HCC)  Malnutrition Findings: Temporal wasting, thinning orbital fat pads, prominent clavicle    BMI Findings:  Adult BMI Classifications: Underweight < 18.5        Body mass index is 16.88 kg/m².   Nutrition consult placed  HTN, goal below 140/90  PTA medications on hold with soft BP  Abnormal CT of the abdomen  See above    VTE Pharmacologic Prophylaxis: VTE Score: 4 Moderate Risk (Score 3-4) - Pharmacological DVT Prophylaxis Ordered: heparin.    Mobility:   Basic Mobility Inpatient Raw Score: 17  JH-HLM Goal: 5: Stand one or more mins  JH-HLM Achieved: 7: Walk 25 feet or more  JH-HLM Goal achieved. Continue to encourage appropriate mobility.    Patient Centered Rounds: I performed bedside rounds with nursing staff today.   Discussions with Specialists or Other Care Team Provider: Plan of care reviewed with vascular surgery, GI, case management, nursing    Education and Discussions with Family / Patient: Updated  (daughter) via phone.    Current Length of Stay: 2 day(s)  Current Patient Status: Inpatient   Certification Statement:  Colonoscopy on Friday  Discharge Plan: Anticipate discharge in 48 hrs to discharge location to be determined pending rehab evaluations.    Code Status: Level 3 - DNAR and DNI    Subjective   Patient is lying in bed resting comfortably offers no acute complaints denies any chest pain chest tightness shortness of breath or difficulty breathing has some mild intermittent abdominal pain but overall no acute complaints understands were waiting for colonoscopy offers no questions    Objective :  Temp:  [99.3 °F (37.4 °C)] 99.3 °F (37.4 °C)  HR:  [77-87] 77  BP: ()/(49-73) 119/70  Resp:  [16] 16  SpO2:  [91 %-94 %] 94 %  O2 Device: None (Room air)    Body mass index is 16.88 kg/m².     Input and Output Summary (last 24 hours):      Intake/Output Summary (Last 24 hours) at 11/27/2024 1503  Last data filed at 11/27/2024 1306  Gross per 24 hour   Intake 120 ml   Output 700 ml   Net -580 ml       Physical Exam  Constitutional:       General: She is not in acute distress.     Appearance: She is normal weight. She is not toxic-appearing.      Comments: Frail, thin, temporal wasting, thinning orbital fat pads, prominent clavicle   HENT:      Head: Normocephalic.      Right Ear: External ear normal.      Left Ear: External ear normal.      Nose: Nose normal.      Mouth/Throat:      Mouth: Mucous membranes are moist.      Pharynx: Oropharynx is clear.   Eyes:      Extraocular Movements: Extraocular movements intact.      Conjunctiva/sclera: Conjunctivae normal.   Cardiovascular:      Rate and Rhythm: Normal rate and regular rhythm.      Pulses: Normal pulses.   Pulmonary:      Effort: Pulmonary effort is normal.   Abdominal:      General: Abdomen is flat.      Palpations: Abdomen is soft.      Tenderness: Tenderness: Mild diffuse.   Musculoskeletal:         General: Normal range of motion.      Cervical back: Normal range of motion.   Skin:     General: Skin is warm.      Coloration: Skin is not jaundiced.      Findings: No bruising.   Neurological:      General: No focal deficit present.      Mental Status: She is alert.   Psychiatric:         Mood and Affect: Mood normal.         Thought Content: Thought content normal.         Lab Results: I have reviewed the following results:   Results from last 7 days   Lab Units 11/26/24  0527   WBC Thousand/uL 6.21   HEMOGLOBIN g/dL 9.0*   HEMATOCRIT % 28.4*   PLATELETS Thousands/uL 311   SEGS PCT % 48   LYMPHO PCT % 36   MONO PCT % 13*   EOS PCT % 1     Results from last 7 days   Lab Units 11/26/24  0527 11/25/24  0937   SODIUM mmol/L 136 136   POTASSIUM mmol/L 4.0 4.3   CHLORIDE mmol/L 105 105   CO2 mmol/L 24 23   BUN mg/dL 14 19   CREATININE mg/dL 0.82 0.80   ANION GAP mmol/L 7 8   CALCIUM mg/dL 8.8 9.6    ALBUMIN g/dL  --  4.0   TOTAL BILIRUBIN mg/dL  --  0.32   ALK PHOS U/L  --  53   ALT U/L  --  13   AST U/L  --  26   GLUCOSE RANDOM mg/dL 86 99                       Recent Cultures (last 7 days):   Results from last 7 days   Lab Units 11/25/24  1350 11/25/24  1312   BLOOD CULTURE  No Growth at 24 hrs. No Growth at 24 hrs.       Imaging Results Review: I reviewed radiology reports from this admission including: CT abdomen/pelvis.  Other Study Results Review: No additional pertinent studies reviewed.    Last 24 Hours Medication List:     Current Facility-Administered Medications:     aspirin chewable tablet 81 mg, Every Other Day    atorvastatin (LIPITOR) tablet 40 mg, Daily    cefTRIAXone (ROCEPHIN) 1,000 mg in dextrose 5 % 50 mL IVPB, Q24H, Last Rate: 1,000 mg (11/27/24 1231)    heparin (porcine) subcutaneous injection 5,000 Units, Q12H **AND** [COMPLETED] Platelet count, Once    influenza vaccine, high-dose (Fluzone High-Dose) IM injection 0.5 mL, Prior to discharge    metroNIDAZOLE (FLAGYL) IVPB (premix) 500 mg 100 mL, Q8H, Last Rate: 500 mg (11/27/24 0602)    mirtazapine (REMERON) tablet 15 mg, HS    multivitamin stress formula tablet 1 tablet, Daily    [START ON 11/28/2024] polyethylene glycol (GLYCOLAX) bowel prep 238 g, Once    pregabalin (LYRICA) capsule 25 mg, Daily **AND** pregabalin (LYRICA) capsule 50 mg, HS    sodium chloride tablet 2 g, TID With Meals    Administrative Statements   Today, Patient Was Seen By: SARAY James  I have spent a total time of 50 minutes in caring for this patient on the day of the visit/encounter including Diagnostic results, Prognosis, Risks and benefits of tx options, Instructions for management, Patient and family education, Importance of tx compliance, Counseling / Coordination of care, Documenting in the medical record, Reviewing / ordering tests, medicine, procedures  , Obtaining or reviewing history  , and Communicating with other healthcare professionals  .    **Please Note: This note may have been constructed using a voice recognition system.**

## 2024-11-27 NOTE — CASE MANAGEMENT
Case Management Progress Note    Patient name Ofelia Aguilar  Location /-01 MRN [id number]  : 1940 Date 2024       LOS (days): 2  Geometric Mean LOS (GMLOS) (days): 2.5  Days to GMLOS:0.5        OBJECTIVE:        Current admission status: Inpatient  Preferred Pharmacy:   [facility]/pharmacy #[id number] - [place] - [phone]  [place] [address]  Phone: [phone] Fax: [phone]    Primary Care Provider: Madalyn Zendejas PA-C    Primary Insurance: [employer]  Secondary Insurance:     PROGRESS NOTE:  Discussed patient's case in interdisciplinary rounds this morning with [facility]IM provider. Patient is not medically cleared for discharge- scheduled for prep tomorrow and colonoscopy Friday. Anticipating discharge in 48h to home. Patient has declined CM needs. CM will continue to follow.

## 2024-11-27 NOTE — ASSESSMENT & PLAN NOTE
H/O SIADH  Resume salt tablets on 11/26   Stable    Recent Labs     11/25/24  0937 11/26/24  0527   SODIUM 136 136

## 2024-11-27 NOTE — PLAN OF CARE
Problem: PHYSICAL THERAPY ADULT  Goal: Performs mobility at highest level of function for planned discharge setting.  See evaluation for individualized goals.  Description: Treatment/Interventions: LE strengthening/ROM, Functional transfer training, Elevations, Therapeutic exercise, Endurance training, Patient/family training, Equipment eval/education, Bed mobility, Gait training, Continued evaluation, OT  Equipment Recommended: Walker       See flowsheet documentation for full assessment, interventions and recommendations.  Note: Prognosis: Good  Problem List: Decreased strength, Decreased endurance, Impaired balance, Decreased mobility, Pain, Impaired sensation  Assessment: Ofelia Aguilar is a 84 y.o. female admitted to [facility] on 11/25/2024 for Abdominal complaints. Pt  has a past medical history of Anxiety, Atherosclerosis of native arteries of extremities with rest pain, bilateral legs (HCC) (08/25/2024), Diverticulitis, and GERD (gastroesophageal reflux disease).. Order placed for PT eval and tx. PT was consulted and pt was seen on 11/27/2024 for mobility assessment and d/c planning. Chart review and two person identifiers were completed.   Currently pt presents with decreased sensation, decreased strength , decreased static sitting balance , decreased dynamic sitting balance , decreased static standing balance, decreased dynamic standing balance , decreased gait speed, decreased step length , and decreased muscular endurance . Due to these impairments, they will require assistance to perform bed mobility, sit to stand , ambulation, stair negotiation, and transfers. Pt is currently functioning at a supervision assistance x1 level for bed mobility, contact guard assistance x1 level for transfers, minimum assistance x1 level for ambulation with Rolling Walker. These activity limitations significantly impact their ability to participate in previous home and community roles and responsibilities  and ambulation in home.  The patient's AM-PAC Basic Mobility Inpatient Short Form Raw Score is 17. PT recommends level II moderate resource intensity. They will benefit from skilled therapy to to reduce the risk of falls, to allow for safe ambulation, and to maximize functional potential.  Barriers to Discharge: Inaccessible home environment, Decreased caregiver support, Other (Comment) (decline in functional mobility)     Rehab Resource Intensity Level, PT: II (Moderate Resource Intensity)    See flowsheet documentation for full assessment.

## 2024-11-27 NOTE — ASSESSMENT & PLAN NOTE
Presenting w/ ongoing multiple abdominal complaints -unintentional weight loss, vomiting, abdominal pain, constipation, diarrhea. Dx w/ diverticulitis 1 month ago, completed ABX treatment; ABX restarted 1 week by PCP. Presented to ED w/ multiple episodes of yolanda loose stool began again on 11/25 - one formed BM noted on 11/26. Notes Wt lost 15-20 lb in 6 months.   Last colonoscopy ~ 5 years ago, h/o polyps   CT abd/pelvis:   No obvious acute intra-abdominal pathology.  Mild fecal retention throughout the colon.   Sigmoid diverticulosis.   Persistent perisigmoid haziness of uncertain clinical significance, but no obvious focal inflammation.  C. Diff/stool studies are negative  GI following  EGD/colonoscopy planned for 11/29  PTA Plavix on hold  Epic secure chat completed with GI/vascular/SLIM regarding PAD's role w/ anesthesia  Clear liquid diet   Reviewed w/ pt/daughter concern for underlying malignancy  Continue Rocephin/Flagyl    Recent Labs     11/25/24  0937 11/26/24  0527   WBC 14.67* 6.21

## 2024-11-27 NOTE — ASSESSMENT & PLAN NOTE
CT abd/pelvis:   Mild CBD dilatation, and mild slightly progressing pancreatic duct dilatation  RUQ U/S -Mild common bile duct dilatation with borderline pancreatic duct.   Consider MRCP or ERCP  No yolanda isolated RUQ symptoms    Recent Labs     11/25/24  0937   AST 26   ALT 13   ALKPHOS 53   TBILI 0.32

## 2024-11-28 LAB
ANION GAP SERPL CALCULATED.3IONS-SCNC: 7 MMOL/L (ref 4–13)
BUN SERPL-MCNC: 11 MG/DL (ref 5–25)
CALCIUM SERPL-MCNC: 8.7 MG/DL (ref 8.4–10.2)
CHLORIDE SERPL-SCNC: 110 MMOL/L (ref 96–108)
CO2 SERPL-SCNC: 21 MMOL/L (ref 21–32)
CREAT SERPL-MCNC: 0.65 MG/DL (ref 0.6–1.3)
ERYTHROCYTE [DISTWIDTH] IN BLOOD BY AUTOMATED COUNT: 14.4 % (ref 11.6–15.1)
GFR SERPL CREATININE-BSD FRML MDRD: 82 ML/MIN/1.73SQ M
GLUCOSE SERPL-MCNC: 87 MG/DL (ref 65–140)
HCT VFR BLD AUTO: 29.5 % (ref 34.8–46.1)
HGB BLD-MCNC: 9.2 G/DL (ref 11.5–15.4)
MAGNESIUM SERPL-MCNC: 2 MG/DL (ref 1.9–2.7)
MCH RBC QN AUTO: 29.5 PG (ref 26.8–34.3)
MCHC RBC AUTO-ENTMCNC: 31.2 G/DL (ref 31.4–37.4)
MCV RBC AUTO: 95 FL (ref 82–98)
PLATELET # BLD AUTO: 323 THOUSANDS/UL (ref 149–390)
PMV BLD AUTO: 10.4 FL (ref 8.9–12.7)
POTASSIUM SERPL-SCNC: 4.1 MMOL/L (ref 3.5–5.3)
RBC # BLD AUTO: 3.12 MILLION/UL (ref 3.81–5.12)
SODIUM SERPL-SCNC: 138 MMOL/L (ref 135–147)
WBC # BLD AUTO: 5.29 THOUSAND/UL (ref 4.31–10.16)

## 2024-11-28 PROCEDURE — 85027 COMPLETE CBC AUTOMATED: CPT | Performed by: NURSE PRACTITIONER

## 2024-11-28 PROCEDURE — 99232 SBSQ HOSP IP/OBS MODERATE 35: CPT

## 2024-11-28 PROCEDURE — 80048 BASIC METABOLIC PNL TOTAL CA: CPT | Performed by: NURSE PRACTITIONER

## 2024-11-28 PROCEDURE — 83735 ASSAY OF MAGNESIUM: CPT | Performed by: NURSE PRACTITIONER

## 2024-11-28 RX ORDER — BISACODYL 5 MG/1
10 TABLET, DELAYED RELEASE ORAL ONCE
Status: COMPLETED | OUTPATIENT
Start: 2024-11-28 | End: 2024-11-28

## 2024-11-28 RX ADMIN — PREGABALIN 25 MG: 25 CAPSULE ORAL at 08:49

## 2024-11-28 RX ADMIN — Medication 2 G: at 13:39

## 2024-11-28 RX ADMIN — PREGABALIN 50 MG: 50 CAPSULE ORAL at 23:53

## 2024-11-28 RX ADMIN — B-COMPLEX W/ C & FOLIC ACID TAB 1 TABLET: TAB at 08:48

## 2024-11-28 RX ADMIN — METRONIDAZOLE 500 MG: 500 INJECTION, SOLUTION INTRAVENOUS at 13:41

## 2024-11-28 RX ADMIN — HEPARIN SODIUM 5000 UNITS: 5000 INJECTION, SOLUTION INTRAVENOUS; SUBCUTANEOUS at 17:11

## 2024-11-28 RX ADMIN — HEPARIN SODIUM 5000 UNITS: 5000 INJECTION, SOLUTION INTRAVENOUS; SUBCUTANEOUS at 06:08

## 2024-11-28 RX ADMIN — BISACODYL 10 MG: 5 TABLET, COATED ORAL at 15:36

## 2024-11-28 RX ADMIN — Medication 2 G: at 08:48

## 2024-11-28 RX ADMIN — MIRTAZAPINE 15 MG: 15 TABLET, FILM COATED ORAL at 23:47

## 2024-11-28 RX ADMIN — ASPIRIN 81 MG: 81 TABLET, CHEWABLE ORAL at 08:49

## 2024-11-28 RX ADMIN — POLYETHYLENE GLYCOL 3350 238 G: 17 POWDER, FOR SOLUTION ORAL at 13:41

## 2024-11-28 RX ADMIN — METRONIDAZOLE 500 MG: 500 INJECTION, SOLUTION INTRAVENOUS at 06:08

## 2024-11-28 RX ADMIN — CEFTRIAXONE SODIUM 1000 MG: 10 INJECTION, POWDER, FOR SOLUTION INTRAVENOUS at 15:34

## 2024-11-28 RX ADMIN — ATORVASTATIN CALCIUM 40 MG: 40 TABLET, FILM COATED ORAL at 08:49

## 2024-11-28 RX ADMIN — Medication 2 G: at 17:11

## 2024-11-28 RX ADMIN — METRONIDAZOLE 500 MG: 500 INJECTION, SOLUTION INTRAVENOUS at 23:36

## 2024-11-28 NOTE — ASSESSMENT & PLAN NOTE
H/O SIADH  Resume salt tablets on 11/26   Stable    Recent Labs     11/26/24  0527 11/28/24  0434   SODIUM 136 138

## 2024-11-28 NOTE — PROGRESS NOTES
"Progress Note - Hospitalist   Name: Ofelia Aguilar 84 y.o. female I MRN: [id number]  Unit/Bed#: -[facility] I Date of Admission: 11/25/2024   Date of Service: 11/28/2024 I Hospital Day: 3    Assessment & Plan  Abdominal complaints  Presenting w/ ongoing multiple abdominal complaints -unintentional weight loss, vomiting, abdominal pain, constipation, diarrhea. Dx w/ diverticulitis 1 month ago, completed ABX treatment; ABX restarted 1 week by PCP. Presented to ED w/ multiple episodes of yolanda loose stool began again on 11/25 - one formed BM noted on 11/26. Notes Wt lost 15-20 lb in 6 months.   Last colonoscopy ~ 5 years ago, h/o polyps   CT abd/pelvis:   No obvious acute intra-abdominal pathology.  Mild fecal retention throughout the colon.   Sigmoid diverticulosis.   Persistent perisigmoid haziness of uncertain clinical significance, but no obvious focal inflammation.  C. Diff/stool studies are negative  GI following  EGD/colonoscopy planned for 11/29  PTA Plavix on hold  Epic secure chat completed with GI/vascular/SLIM regarding PAD's role w/ anesthesia  Clear liquid diet   Reviewed w/ pt/daughter concern for underlying malignancy  Continue Rocephin/Flagyl    Recent Labs     11/26/24  0527 11/28/24  0434   WBC 6.21 5.29     Common bile duct dilatation  CT abd/pelvis:   Mild CBD dilatation, and mild slightly progressing pancreatic duct dilatation  RUQ U/S -Mild common bile duct dilatation with borderline pancreatic duct.   Consider MRCP or ERCP  No yolanda isolated RUQ symptoms    No results for input(s): \"AST\", \"ALT\", \"ALKPHOS\", \"TBILI\" in the last 72 hours.    Hyponatremia  H/O SIADH  Resume salt tablets on 11/26   Stable    Recent Labs     11/26/24  0527 11/28/24  0434   SODIUM 136 138     Atherosclerosis of native arteries of extremities with rest pain, bilateral legs (HCC)  Advanced PAD  CT abd/pelvis: Chronic vascular changes, as above. Present flow in the superficial and deep femoral arteries bilaterally  PTA Plavix " on hold; c/w Aspirin & statin   Spinal stenosis  C/w Lyrica  Pain controlled   Ambulating in room and able to complete ADLs  Malnutrition (HCC)  Malnutrition Findings: Temporal wasting, thinning orbital fat pads, prominent clavicle    BMI Findings:  Adult BMI Classifications: Underweight < 18.5        Body mass index is 16.88 kg/m².   Nutrition consult placed  HTN, goal below 140/90  PTA medications on hold with soft BP  Abnormal CT of the abdomen  See above    VTE Pharmacologic Prophylaxis: VTE Score: 4 Moderate Risk (Score 3-4) - Pharmacological DVT Prophylaxis Ordered: heparin.    Mobility:   Basic Mobility Inpatient Raw Score: 17  JH-HLM Goal: 5: Stand one or more mins  JH-HLM Achieved: 7: Walk 25 feet or more  JH-HLM Goal achieved. Continue to encourage appropriate mobility.    Patient Centered Rounds: I performed bedside rounds with nursing staff today.   Discussions with Specialists or Other Care Team Provider: CM    Education and Discussions with Family / Patient: Updated  (daughter) via phone.    Current Length of Stay: 3 day(s)  Current Patient Status: Inpatient   Certification Statement: The patient will continue to require additional inpatient hospital stay due to awaiting for colonoscopy and further GI workup  Discharge Plan: Anticipate discharge tomorrow to home.    Code Status: Level 3 - DNAR and DNI    Subjective   Patient reports to be feeling significant improvement to days prior.  She continues to have bowel movements.  Bowel movements are hard and small in consistency however frequent.  She currently denies any chest pain/pressure, palpitations, lightness, nausea, shortness of breath, or chills.    Objective :  Temp:  [98.6 °F (37 °C)-99 °F (37.2 °C)] 98.6 °F (37 °C)  HR:  [75-85] 75  BP: (131-142)/(67-95) 142/95  Resp:  [16] 16  SpO2:  [92 %-93 %] 93 %  O2 Device: None (Room air)    Body mass index is 16.88 kg/m².     Input and Output Summary (last 24 hours):     Intake/Output  Summary (Last 24 hours) at 11/28/2024 1118  Last data filed at 11/28/2024 0608  Gross per 24 hour   Intake 360 ml   Output 400 ml   Net -40 ml       Physical Exam  Vitals and nursing note reviewed.   Constitutional:       General: She is not in acute distress.     Appearance: She is normal weight. She is not ill-appearing, toxic-appearing or diaphoretic.   HENT:      Head: Normocephalic.      Nose: Nose normal.      Mouth/Throat:      Mouth: Mucous membranes are moist.      Pharynx: Oropharynx is clear.   Eyes:      General: No scleral icterus.     Conjunctiva/sclera: Conjunctivae normal.      Pupils: Pupils are equal, round, and reactive to light.   Cardiovascular:      Rate and Rhythm: Normal rate and regular rhythm.      Heart sounds: No murmur heard.     No friction rub. No gallop.   Pulmonary:      Effort: Pulmonary effort is normal. No respiratory distress.      Breath sounds: Normal breath sounds. No stridor. No wheezing, rhonchi or rales.   Abdominal:      General: Abdomen is flat.      Palpations: Abdomen is soft.   Musculoskeletal:         General: Normal range of motion.      Cervical back: Normal range of motion and neck supple.      Right lower leg: No edema.      Left lower leg: No edema.   Lymphadenopathy:      Cervical: No cervical adenopathy.   Skin:     General: Skin is warm.      Coloration: Skin is not jaundiced or pale.      Findings: No bruising, erythema or lesion.   Neurological:      General: No focal deficit present.      Mental Status: She is alert and oriented to person, place, and time. Mental status is at baseline.      Cranial Nerves: No cranial nerve deficit.      Motor: No weakness.   Psychiatric:         Mood and Affect: Mood normal.         Behavior: Behavior normal.         Thought Content: Thought content normal.           Lines/Drains:              Lab Results: I have reviewed the following results:   Results from last 7 days   Lab Units 11/28/24  0434 11/26/24  0588   WBC  Thousand/uL 5.29 6.21   HEMOGLOBIN g/dL 9.2* 9.0*   HEMATOCRIT % 29.5* 28.4*   PLATELETS Thousands/uL 323 311   SEGS PCT %  --  48   LYMPHO PCT %  --  36   MONO PCT %  --  13*   EOS PCT %  --  1     Results from last 7 days   Lab Units 11/28/24  0434 11/26/24  0527 11/25/24  0937   SODIUM mmol/L 138   < > 136   POTASSIUM mmol/L 4.1   < > 4.3   CHLORIDE mmol/L 110*   < > 105   CO2 mmol/L 21   < > 23   BUN mg/dL 11   < > 19   CREATININE mg/dL 0.65   < > 0.80   ANION GAP mmol/L 7   < > 8   CALCIUM mg/dL 8.7   < > 9.6   ALBUMIN g/dL  --   --  4.0   TOTAL BILIRUBIN mg/dL  --   --  0.32   ALK PHOS U/L  --   --  53   ALT U/L  --   --  13   AST U/L  --   --  26   GLUCOSE RANDOM mg/dL 87   < > 99    < > = values in this interval not displayed.                       Recent Cultures (last 7 days):   Results from last 7 days   Lab Units 11/25/24  1350 11/25/24  1312   BLOOD CULTURE  No Growth at 48 hrs. No Growth at 48 hrs.       Imaging Results Review: No pertinent imaging studies reviewed.  Other Study Results Review: No additional pertinent studies reviewed.    Last 24 Hours Medication List:     Current Facility-Administered Medications:     aspirin chewable tablet 81 mg, Every Other Day    atorvastatin (LIPITOR) tablet 40 mg, Daily    cefTRIAXone (ROCEPHIN) 1,000 mg in dextrose 5 % 50 mL IVPB, Q24H, Last Rate: 1,000 mg (11/27/24 1231)    heparin (porcine) subcutaneous injection 5,000 Units, Q12H **AND** [COMPLETED] Platelet count, Once    influenza vaccine, high-dose (Fluzone High-Dose) IM injection 0.5 mL, Prior to discharge    metroNIDAZOLE (FLAGYL) IVPB (premix) 500 mg 100 mL, Q8H, Last Rate: 500 mg (11/28/24 0608)    mirtazapine (REMERON) tablet 15 mg, HS    multivitamin stress formula tablet 1 tablet, Daily    polyethylene glycol (GLYCOLAX) bowel prep 238 g, Once    pregabalin (LYRICA) capsule 25 mg, Daily **AND** pregabalin (LYRICA) capsule 50 mg, HS    sodium chloride tablet 2 g, TID With Meals    Administrative  Statements   Today, Patient Was Seen By: Reza Fuentes PA-C  I have spent a total time of 35 minutes in caring for this patient on the day of the visit/encounter including Diagnostic results, Reviewing / ordering tests, medicine, procedures  , Obtaining or reviewing history  , and Communicating with other healthcare professionals .    **Please Note: This note may have been constructed using a voice recognition system.**

## 2024-11-28 NOTE — ASSESSMENT & PLAN NOTE
Presenting w/ ongoing multiple abdominal complaints -unintentional weight loss, vomiting, abdominal pain, constipation, diarrhea. Dx w/ diverticulitis 1 month ago, completed ABX treatment; ABX restarted 1 week by PCP. Presented to ED w/ multiple episodes of yolanda loose stool began again on 11/25 - one formed BM noted on 11/26. Notes Wt lost 15-20 lb in 6 months.   Last colonoscopy ~ 5 years ago, h/o polyps   CT abd/pelvis:   No obvious acute intra-abdominal pathology.  Mild fecal retention throughout the colon.   Sigmoid diverticulosis.   Persistent perisigmoid haziness of uncertain clinical significance, but no obvious focal inflammation.  C. Diff/stool studies are negative  GI following  EGD/colonoscopy planned for 11/29  PTA Plavix on hold  Epic secure chat completed with GI/vascular/SLIM regarding PAD's role w/ anesthesia  Clear liquid diet   Reviewed w/ pt/daughter concern for underlying malignancy  Continue Rocephin/Flagyl    Recent Labs     11/26/24  0527 11/28/24  0434   WBC 6.21 5.29

## 2024-11-28 NOTE — ASSESSMENT & PLAN NOTE
"CT abd/pelvis:   Mild CBD dilatation, and mild slightly progressing pancreatic duct dilatation  RUQ U/S -Mild common bile duct dilatation with borderline pancreatic duct.   Consider MRCP or ERCP  No yolanda isolated RUQ symptoms    No results for input(s): \"AST\", \"ALT\", \"ALKPHOS\", \"TBILI\" in the last 72 hours.    "

## 2024-11-29 ENCOUNTER — ANESTHESIA EVENT (INPATIENT)
Dept: GASTROENTEROLOGY | Facility: HOSPITAL | Age: 84
DRG: 392 | End: 2024-11-29
Payer: COMMERCIAL

## 2024-11-29 ENCOUNTER — APPOINTMENT (INPATIENT)
Dept: GASTROENTEROLOGY | Facility: HOSPITAL | Age: 84
DRG: 392 | End: 2024-11-29
Payer: COMMERCIAL

## 2024-11-29 ENCOUNTER — ANESTHESIA (INPATIENT)
Dept: GASTROENTEROLOGY | Facility: HOSPITAL | Age: 84
DRG: 392 | End: 2024-11-29
Payer: COMMERCIAL

## 2024-11-29 VITALS
HEART RATE: 89 BPM | OXYGEN SATURATION: 96 % | TEMPERATURE: 98.1 F | HEIGHT: 64 IN | SYSTOLIC BLOOD PRESSURE: 112 MMHG | DIASTOLIC BLOOD PRESSURE: 60 MMHG | WEIGHT: 98.33 LBS | BODY MASS INDEX: 16.79 KG/M2 | RESPIRATION RATE: 18 BRPM

## 2024-11-29 PROBLEM — J44.9 CHRONIC OBSTRUCTIVE PULMONARY DISEASE (HCC): Status: ACTIVE | Noted: 2024-11-29

## 2024-11-29 LAB
ANION GAP SERPL CALCULATED.3IONS-SCNC: 6 MMOL/L (ref 4–13)
BUN SERPL-MCNC: 5 MG/DL (ref 5–25)
CALCIUM SERPL-MCNC: 8.7 MG/DL (ref 8.4–10.2)
CHLORIDE SERPL-SCNC: 111 MMOL/L (ref 96–108)
CO2 SERPL-SCNC: 22 MMOL/L (ref 21–32)
CREAT SERPL-MCNC: 0.65 MG/DL (ref 0.6–1.3)
ERYTHROCYTE [DISTWIDTH] IN BLOOD BY AUTOMATED COUNT: 14.3 % (ref 11.6–15.1)
GFR SERPL CREATININE-BSD FRML MDRD: 82 ML/MIN/1.73SQ M
GLUCOSE SERPL-MCNC: 90 MG/DL (ref 65–140)
HCT VFR BLD AUTO: 29.2 % (ref 34.8–46.1)
HGB BLD-MCNC: 9 G/DL (ref 11.5–15.4)
MCH RBC QN AUTO: 29.5 PG (ref 26.8–34.3)
MCHC RBC AUTO-ENTMCNC: 30.8 G/DL (ref 31.4–37.4)
MCV RBC AUTO: 96 FL (ref 82–98)
PLATELET # BLD AUTO: 299 THOUSANDS/UL (ref 149–390)
PMV BLD AUTO: 10.5 FL (ref 8.9–12.7)
POTASSIUM SERPL-SCNC: 3.4 MMOL/L (ref 3.5–5.3)
RBC # BLD AUTO: 3.05 MILLION/UL (ref 3.81–5.12)
SODIUM SERPL-SCNC: 139 MMOL/L (ref 135–147)
WBC # BLD AUTO: 5.19 THOUSAND/UL (ref 4.31–10.16)

## 2024-11-29 PROCEDURE — 0DB98ZX EXCISION OF DUODENUM, VIA NATURAL OR ARTIFICIAL OPENING ENDOSCOPIC, DIAGNOSTIC: ICD-10-PCS | Performed by: RADIOLOGY

## 2024-11-29 PROCEDURE — 45385 COLONOSCOPY W/LESION REMOVAL: CPT | Performed by: INTERNAL MEDICINE

## 2024-11-29 PROCEDURE — 85027 COMPLETE CBC AUTOMATED: CPT

## 2024-11-29 PROCEDURE — 45381 COLONOSCOPY SUBMUCOUS NJX: CPT | Performed by: INTERNAL MEDICINE

## 2024-11-29 PROCEDURE — 80048 BASIC METABOLIC PNL TOTAL CA: CPT

## 2024-11-29 PROCEDURE — 43239 EGD BIOPSY SINGLE/MULTIPLE: CPT | Performed by: INTERNAL MEDICINE

## 2024-11-29 PROCEDURE — 88341 IMHCHEM/IMCYTCHM EA ADD ANTB: CPT | Performed by: STUDENT IN AN ORGANIZED HEALTH CARE EDUCATION/TRAINING PROGRAM

## 2024-11-29 PROCEDURE — 88305 TISSUE EXAM BY PATHOLOGIST: CPT | Performed by: STUDENT IN AN ORGANIZED HEALTH CARE EDUCATION/TRAINING PROGRAM

## 2024-11-29 PROCEDURE — 88342 IMHCHEM/IMCYTCHM 1ST ANTB: CPT | Performed by: STUDENT IN AN ORGANIZED HEALTH CARE EDUCATION/TRAINING PROGRAM

## 2024-11-29 PROCEDURE — 0DBM8ZX EXCISION OF DESCENDING COLON, VIA NATURAL OR ARTIFICIAL OPENING ENDOSCOPIC, DIAGNOSTIC: ICD-10-PCS | Performed by: RADIOLOGY

## 2024-11-29 PROCEDURE — 0DB68ZX EXCISION OF STOMACH, VIA NATURAL OR ARTIFICIAL OPENING ENDOSCOPIC, DIAGNOSTIC: ICD-10-PCS | Performed by: RADIOLOGY

## 2024-11-29 PROCEDURE — 99239 HOSP IP/OBS DSCHRG MGMT >30: CPT

## 2024-11-29 RX ORDER — EPHEDRINE SULFATE 50 MG/ML
INJECTION INTRAVENOUS AS NEEDED
Status: DISCONTINUED | OUTPATIENT
Start: 2024-11-29 | End: 2024-11-29

## 2024-11-29 RX ORDER — LIDOCAINE HYDROCHLORIDE 20 MG/ML
INJECTION, SOLUTION EPIDURAL; INFILTRATION; INTRACAUDAL; PERINEURAL AS NEEDED
Status: DISCONTINUED | OUTPATIENT
Start: 2024-11-29 | End: 2024-11-29

## 2024-11-29 RX ORDER — PROPOFOL 10 MG/ML
INJECTION, EMULSION INTRAVENOUS AS NEEDED
Status: DISCONTINUED | OUTPATIENT
Start: 2024-11-29 | End: 2024-11-29

## 2024-11-29 RX ORDER — SODIUM CHLORIDE, SODIUM LACTATE, POTASSIUM CHLORIDE, CALCIUM CHLORIDE 600; 310; 30; 20 MG/100ML; MG/100ML; MG/100ML; MG/100ML
INJECTION, SOLUTION INTRAVENOUS CONTINUOUS PRN
Status: DISCONTINUED | OUTPATIENT
Start: 2024-11-29 | End: 2024-11-29

## 2024-11-29 RX ORDER — PHENYLEPHRINE HCL IN 0.9% NACL 1 MG/10 ML
SYRINGE (ML) INTRAVENOUS AS NEEDED
Status: DISCONTINUED | OUTPATIENT
Start: 2024-11-29 | End: 2024-11-29

## 2024-11-29 RX ADMIN — Medication 100 MCG: at 11:25

## 2024-11-29 RX ADMIN — B-COMPLEX W/ C & FOLIC ACID TAB 1 TABLET: TAB at 13:55

## 2024-11-29 RX ADMIN — PROPOFOL 30 MG: 10 INJECTION, EMULSION INTRAVENOUS at 11:18

## 2024-11-29 RX ADMIN — PROPOFOL 30 MG: 10 INJECTION, EMULSION INTRAVENOUS at 11:52

## 2024-11-29 RX ADMIN — Medication 2 G: at 13:54

## 2024-11-29 RX ADMIN — PROPOFOL 20 MG: 10 INJECTION, EMULSION INTRAVENOUS at 12:05

## 2024-11-29 RX ADMIN — Medication 100 MCG: at 12:05

## 2024-11-29 RX ADMIN — EPHEDRINE SULFATE 10 MG: 50 INJECTION, SOLUTION INTRAVENOUS at 11:40

## 2024-11-29 RX ADMIN — EPHEDRINE SULFATE 5 MG: 50 INJECTION, SOLUTION INTRAVENOUS at 12:05

## 2024-11-29 RX ADMIN — Medication 100 MCG: at 11:54

## 2024-11-29 RX ADMIN — Medication 100 MCG: at 11:40

## 2024-11-29 RX ADMIN — PROPOFOL 100 MG: 10 INJECTION, EMULSION INTRAVENOUS at 11:15

## 2024-11-29 RX ADMIN — SODIUM CHLORIDE, SODIUM LACTATE, POTASSIUM CHLORIDE, AND CALCIUM CHLORIDE: .6; .31; .03; .02 INJECTION, SOLUTION INTRAVENOUS at 11:13

## 2024-11-29 RX ADMIN — PROPOFOL 20 MG: 10 INJECTION, EMULSION INTRAVENOUS at 11:25

## 2024-11-29 RX ADMIN — PROPOFOL 20 MG: 10 INJECTION, EMULSION INTRAVENOUS at 11:57

## 2024-11-29 RX ADMIN — Medication 100 MCG: at 11:30

## 2024-11-29 RX ADMIN — ATORVASTATIN CALCIUM 40 MG: 40 TABLET, FILM COATED ORAL at 13:54

## 2024-11-29 RX ADMIN — PROPOFOL 20 MG: 10 INJECTION, EMULSION INTRAVENOUS at 11:47

## 2024-11-29 RX ADMIN — EPHEDRINE SULFATE 5 MG: 50 INJECTION, SOLUTION INTRAVENOUS at 11:54

## 2024-11-29 RX ADMIN — PREGABALIN 25 MG: 25 CAPSULE ORAL at 13:54

## 2024-11-29 RX ADMIN — PROPOFOL 30 MG: 10 INJECTION, EMULSION INTRAVENOUS at 11:28

## 2024-11-29 RX ADMIN — HEPARIN SODIUM 5000 UNITS: 5000 INJECTION, SOLUTION INTRAVENOUS; SUBCUTANEOUS at 06:12

## 2024-11-29 RX ADMIN — LIDOCAINE HYDROCHLORIDE 60 MG: 20 INJECTION, SOLUTION EPIDURAL; INFILTRATION; INTRACAUDAL; PERINEURAL at 11:15

## 2024-11-29 RX ADMIN — PROPOFOL 20 MG: 10 INJECTION, EMULSION INTRAVENOUS at 11:32

## 2024-11-29 RX ADMIN — PROPOFOL 20 MG: 10 INJECTION, EMULSION INTRAVENOUS at 11:21

## 2024-11-29 RX ADMIN — CEFTRIAXONE SODIUM 1000 MG: 10 INJECTION, POWDER, FOR SOLUTION INTRAVENOUS at 13:56

## 2024-11-29 RX ADMIN — PROPOFOL 30 MG: 10 INJECTION, EMULSION INTRAVENOUS at 11:43

## 2024-11-29 NOTE — DISCHARGE INSTR - AVS FIRST PAGE
Please be aware it is recommended to follow-up with the GI team.  The GI team has taken biopsies during your procedures and we will further assist with going over the results of the biopsies.  They are also recommending repeat colonoscopy in 1/28.  Please see your PCP as well for continuity care within 1 week.

## 2024-11-29 NOTE — DISCHARGE SUMMARY
"Discharge Summary - Hospitalist   Name: Ofelia Aguilar 84 y.o. female I MRN: [id number]  Unit/Bed#: -01 I Date of Admission: 11/25/2024   Date of Service: 11/29/2024 I Hospital Day: 4     Assessment & Plan  Abdominal complaints  Presenting w/ ongoing multiple abdominal complaints -unintentional weight loss, vomiting, abdominal pain, constipation, diarrhea. Dx w/ diverticulitis 1 month ago, completed ABX treatment; ABX restarted 1 week by PCP. Presented to ED w/ multiple episodes of yolanda loose stool began again on 11/25 - one formed BM noted on 11/26. Notes Wt lost 15-20 lb in 6 months.   Last colonoscopy ~ 5 years ago, h/o polyps   CT abd/pelvis:   No obvious acute intra-abdominal pathology.  Mild fecal retention throughout the colon.   Sigmoid diverticulosis.   Persistent perisigmoid haziness of uncertain clinical significance, but no obvious focal inflammation.  C. Diff/stool studies are negative  GI following  Underwent EGD and colonoscopy which showed hemorrhagic mucosa, diverticulosis, and numerous polyps.  They are recommending a repeat colonoscopy 1/28 to follow-up in the office to go over biopsy findings  Will restart Plavix  Epic secure chat completed with GI/vascular/SLIM regarding PAD's role w/ anesthesia  Reviewed w/ pt/daughter concern for underlying malignancy  Continue Rocephin/Flagyl    Recent Labs     11/28/24  0434 11/29/24  0453   WBC 5.29 5.19     Common bile duct dilatation  CT abd/pelvis:   Mild CBD dilatation, and mild slightly progressing pancreatic duct dilatation  RUQ U/S -Mild common bile duct dilatation with borderline pancreatic duct.   Consider MRCP or ERCP  No yolanda isolated RUQ symptoms    No results for input(s): \"AST\", \"ALT\", \"ALKPHOS\", \"TBILI\" in the last 72 hours.    Hyponatremia  H/O SIADH  Resume salt tablets on 11/26   Stable    Recent Labs     11/28/24  0434 11/29/24  0453   SODIUM 138 139     Atherosclerosis of native arteries of extremities with rest pain, bilateral legs " (HCC)  Advanced PAD  CT abd/pelvis: Chronic vascular changes, as above. Present flow in the superficial and deep femoral arteries bilaterally  PTA Plavix on hold; c/w Aspirin & statin   Spinal stenosis  C/w Lyrica  Pain controlled   Ambulating in room and able to complete ADLs  Malnutrition (HCC)  Malnutrition Findings: Temporal wasting, thinning orbital fat pads, prominent clavicle    BMI Findings:  Adult BMI Classifications: Underweight < 18.5        Body mass index is 16.88 kg/m².   Nutrition consult placed  HTN, goal below 140/90  PTA medications on hold with soft BP, will restart on discharge  Abnormal CT of the abdomen  See above     Discharging Physician / Practitioner: Reza Fuentes PA-C  PCP: Madalyn Zendejas PA-C  Admission Date:   Admission Orders (From admission, onward)       Ordered        11/25/24 1312  INPATIENT ADMISSION  Once                          Discharge Date: 11/29/24    Consultations During Hospital Stay:  IP CONSULT TO GASTROENTEROLOGY  IP CONSULT TO NUTRITION SERVICES    Procedures Performed:   Colonoscopy and EGD    Significant Findings / Test Results:   Colonoscopy  Result Date: 11/29/2024  Impression: Subcentimeter polyp in the proximal descending colon was removed with hot snare; lift performed; placed 1 clip successfully; hemostasis achieved Multiple subcenteimeter polyps in the colon, not all of them removed as the prep was not ideal and scop was ultraslim. Diverticulosis of severe severity causing moderate luminal narrowing in the sigmoid colon Medium hemorrhoids RECOMMENDATION: Await pathology results Schedule repeat colonoscopy, due: 1/28/2025   Jose L Loo MD     EGD  Result Date: 11/29/2024  Impression: Abnormal mucosa with exudate in the Z-line; performed cold forceps biopsy Mild granular mucosa in the body of the stomach and incisura; performed cold forceps biopsy Mild hemorrhagic mucosa in the antrum; performed cold forceps biopsy to rule out H. pylori The duodenum  "appeared normal. RECOMMENDATION: Await pathology results Proceed with colonoscopy   Jose L Loo MD      right upper quadrant  Result Date: 11/26/2024  Impression: Mild common bile duct dilatation with borderline pancreatic duct. This persists as compared to yesterday's CT. Further evaluation with MRCP or ERCP should be considered as noted on the CT result. Follow-up was marked in the epic system Workstation performed: [id number]     CT abdomen pelvis with contrast  Result Date: 11/25/2024  Impression: No obvious acute intra-abdominal pathology. Mild CBD dilatation, and mild slightly progressing pancreatic duct dilatation. Both ducts are visualized to the duodenum. No pancreatic mass. Correlate with clinical findings. Consider ERCP if clinically warranted. Mild fecal retention throughout the colon. Sigmoid diverticulosis. Persistent perisigmoid haziness of uncertain clinical significance, but no obvious focal inflammation. Correlate with clinical findings. Chronic vascular changes, as above. Present flow in the superficial and deep femoral arteries bilaterally. Other findings, as per the body of the report. Workstation performed: [id number]       No Chest XR results available for this patient.     No results found for this or any previous visit.      No results for input(s): \"BLOODCX\", \"SPUTUMCULTUR\", \"GRAMSTAIN\", \"URINECX\", \"WOUNDCULT\", \"BODYFLUIDCUL\", \"MRSACULTURE\", \"INFLUAPCR\", \"INFLUBPCR\", \"RSVPCR\", \"LEGIONELLAUR\", \"STPU\", \"CDIFFTOXINB\" in the last 72 hours.    Incidental Findings:   None other than noted above; I reviewed the above mentioned incidental findings with the patient and/or family and they expressed understanding.    Test Results Pending at Discharge (will require follow up):   None     Outpatient Tests Requested:  Repeat colonoscopy    Complications:  None    Reason for Admission:   Chief Complaint   Patient presents with    Abdominal Pain     Abdominal pain with diarrhea, nausea, and vomiting that " "started last night. Has been taking antibiotics for diverticulitis.          Hospital Course:   Patient initially presented to the hospital after having abdominal pain.  While in the ED she underwent CT abdomen pelvis which showed no obvious acute intra-abdominal pathology however did show sigmoid diverticulosis as well as mild fecal retention throughout colon.  The patient then underwent an infectious workup which included a C. difficile panel as well as stool enteric bacterial panel and both were negative.  She was initially started on IV antibiotics which have since been discontinued.  The patient then underwent an EGD/colonoscopy on 11/29 which showed mild hemorrhagic mucosa, diverticulosis, and numerous polyps.  Polyps were biopsied to rule out H. pylori.  As the patient did not have an adequate bowel prep, it is recommended by the GI team to undergo a repeat colonoscopy in outpatient setting on 1/28.  At this time patient is medically stable to be discharged and agreeable for plan at discharge.  For further remission regards course hospitalization, please do not status.      Please see above list of diagnoses and related plan for additional information.     Condition at Discharge: good    Discharge Day Visit / Exam:   Subjective: Patient was to be feeling significantly improved.  She is having intermittent bowel movements which she relates to be bowel prep she has been receiving.  She currently denies any chest pain/pressure, palpitations, lightness, nausea, shortness breath, or chills.  Offers no new complaints at this time. No acute events reported overnight. Understanding of plan.  All questions answered to the best of my ability at this time to patient satisfaction. Plan of care agreed upon.  Vitals: Blood Pressure: 146/72 (11/29/24 1314)  Pulse: 85 (11/29/24 1314)  Temperature: (!) 97.1 °F (36.2 °C) (11/29/24 1314)  Temp Source: Temporal (11/29/24 1218)  Respirations: 18 (11/29/24 1228)  Height: 5' 4\" " (162.6 cm) (11/25/24 1710)  Weight - Scale: 44.6 kg (98 lb 5.2 oz) (11/25/24 2212)  SpO2: 97 % (11/29/24 1314)  Exam:   Physical Exam  Vitals and nursing note reviewed.   Constitutional:       General: She is not in acute distress.     Appearance: She is normal weight. She is not ill-appearing, toxic-appearing or diaphoretic.   HENT:      Head: Normocephalic.      Nose: Nose normal.      Mouth/Throat:      Mouth: Mucous membranes are moist.      Pharynx: Oropharynx is clear.   Eyes:      General: No scleral icterus.     Conjunctiva/sclera: Conjunctivae normal.      Pupils: Pupils are equal, round, and reactive to light.   Cardiovascular:      Rate and Rhythm: Normal rate and regular rhythm.      Heart sounds: No murmur heard.     No friction rub. No gallop.   Pulmonary:      Effort: Pulmonary effort is normal. No respiratory distress.      Breath sounds: Normal breath sounds. No stridor. No wheezing, rhonchi or rales.   Abdominal:      General: Abdomen is flat.      Palpations: Abdomen is soft.   Musculoskeletal:         General: Normal range of motion.      Cervical back: Normal range of motion and neck supple.      Right lower leg: No edema.      Left lower leg: No edema.   Lymphadenopathy:      Cervical: No cervical adenopathy.   Skin:     General: Skin is warm.      Coloration: Skin is not jaundiced or pale.      Findings: No bruising, erythema or lesion.   Neurological:      General: No focal deficit present.      Mental Status: She is alert and oriented to person, place, and time. Mental status is at baseline.      Cranial Nerves: No cranial nerve deficit.      Motor: No weakness.   Psychiatric:         Mood and Affect: Mood normal.         Behavior: Behavior normal.         Thought Content: Thought content normal.          Discussion with Family: Updated  (daughter) via phone.    Discharge instructions/Information to patient and family:   See after visit summary for information provided to  patient and family.      Provisions for Follow-Up Care:  See after visit summary for information related to follow-up care and any pertinent home health orders.       Mobility at time of Discharge:   Basic Mobility Inpatient Raw Score: 17  JH-HLM Goal: 5: Stand one or more mins  JH-HLM Achieved: 6: Walk 10 steps or more  HLM Goal achieved. Continue to encourage appropriate mobility.    Disposition:   Home with [facility]NA Services (Reminder: Complete face to face encounter)    Planned Readmission: none     Discharge Statement:  I spent 65 minutes discharging the patient. This time was spent on the day of discharge. I had direct contact with the patient on the day of discharge. Greater than 50% of the total time was spent examining patient, answering all patient questions, arranging and discussing plan of care with patient as well as directly providing post-discharge instructions.  Additional time then spent on discharge activities.    Discharge Medications:  See after visit summary for reconciled discharge medications provided to patient and/or family.      **Please Note: This note may have been constructed using a voice recognition system**

## 2024-11-29 NOTE — ANESTHESIA POSTPROCEDURE EVALUATION
Post-Op Assessment Note    CV Status:  Stable    Pain management: adequate       Mental Status:  Alert and awake   Hydration Status:  Euvolemic   PONV Controlled:  Controlled   Airway Patency:  Patent     Post Op Vitals Reviewed: Yes    No anethesia notable event occurred.    Staff: Anesthesiologist           Last Filed PACU Vitals:  Vitals Value Taken Time   Temp 97.8 °F (36.6 °C) 11/29/24 1218   Pulse 104 11/29/24 1228   /71 11/29/24 1228   Resp 18 11/29/24 1228   SpO2 96 % 11/29/24 1228       Modified Iva:  Activity: 2 (11/29/2024 12:28 PM)  Respiration: 2 (11/29/2024 12:28 PM)  Circulation: 2 (11/29/2024 12:28 PM)  Consciousness: 2 (11/29/2024 12:28 PM)  Oxygen Saturation: 2 (11/29/2024 12:28 PM)  Modified Iva Score: 10 (11/29/2024 12:28 PM)

## 2024-11-29 NOTE — NURSING NOTE
Patient IV is leaking. One failed attempt made to put in new IV. Patient decline to be stuck a second time. Patient requesting for ICU to put it in.

## 2024-11-29 NOTE — ASSESSMENT & PLAN NOTE
H/O SIADH  Resume salt tablets on 11/26   Stable    Recent Labs     11/28/24  0434 11/29/24  0453   SODIUM 138 139

## 2024-11-29 NOTE — ANESTHESIA PREPROCEDURE EVALUATION
Procedure:  COLONOSCOPY  EGD    Relevant Problems   ANESTHESIA (within normal limits)      CARDIO   (+) Atherosclerosis of native arteries of extremities with rest pain, bilateral legs (HCC)   (+) HTN, goal below 140/90   (+) Hyperlipidemia      ENDO (within normal limits)      GI/HEPATIC   (+) GERD (gastroesophageal reflux disease)      /RENAL (within normal limits)      GYN (within normal limits)      HEMATOLOGY   (+) Anemia      MUSCULOSKELETAL (within normal limits)      NEURO/PSYCH   (+) Anxiety   (+) Depression   (+) Recurrent major depressive disorder, remission status unspecified (HCC)      PULMONARY   (+) Chronic obstructive pulmonary disease (HCC)      Other   (+) Abdominal complaints        Physical Exam    Airway    Mallampati score: II  TM Distance: >3 FB  Neck ROM: full     Dental   No notable dental hx     Cardiovascular  Cardiovascular exam normal    Pulmonary  Pulmonary exam normal     Other Findings  post-pubertal.      Anesthesia Plan  ASA Score- 2     Anesthesia Type- IV sedation with anesthesia with ASA Monitors.         Additional Monitors:     Airway Plan:            Plan Factors-Exercise tolerance (METS): >4 METS.    Chart reviewed. EKG reviewed. Imaging results reviewed. Existing labs reviewed. Patient summary reviewed.    Patient is not a current smoker.              Induction- intravenous.    Postoperative Plan-     Perioperative Resuscitation Plan - Level 1 - Full Code.       Informed Consent- Anesthetic plan and risks discussed with patient.  I personally reviewed this patient with the CRNA. Discussed and agreed on the Anesthesia Plan with the CRNA..    Discussed IV Sedation with General Anesthesia as backup with patient including but not limited to risk of cardiac insult, pulmonary complication, stroke, reaction to medications and death. All questions answered and consent was obtained.      NPO appropriate.

## 2024-11-29 NOTE — UTILIZATION REVIEW
NOTIFICATION OF INPATIENT ADMISSION   AUTHORIZATION REQUEST   SERVICING FACILITY:   [place] [address]  Tax ID: [id number]  NPI: [id number] ATTENDING PROVIDER:  Attending Name and NPI#: Jeison Proctor Md [[id number]]  Address: [address] [place] [address]  Phone: [phone]     ADMISSION INFORMATION:  Place of Service: Inpatient [facility]  Place of Service Code: 21  Inpatient Admission Date/Time: 11/25/24  1:12 PM  Discharge Date/Time: No discharge date for patient encounter.  Admitting Diagnosis Code/Description:  Diverticulitis [K57.92]  Abdominal pain [R10.9]     UTILIZATION REVIEW CONTACT:  Mellissa Alvarez Utilization   Network Utilization Review Department  Phone: [phone]  Fax [phone]  Email: [email or web address]  Contact for approvals/pending authorizations, clinical reviews, and discharge.     PHYSICIAN ADVISORY SERVICES:  Medical Necessity Denial & Vmda-uo-Cjlw Review  Phone: [phone]  Fax: [phone]  Email: [email or web address]     DISCHARGE SUPPORT TEAM:  For Patients Discharge Needs & Updates  Phone: [phone] opt. 2 Fax: [phone]  Email: [email or web address]

## 2024-11-29 NOTE — ANESTHESIA POSTPROCEDURE EVALUATION
Post-Op Assessment Note    CV Status:  Stable    Pain management: adequate       Mental Status:  Sleepy   Hydration Status:  Euvolemic   PONV Controlled:  Controlled   Airway Patency:  Patent     Post Op Vitals Reviewed: Yes    No anethesia notable event occurred.    Staff: CRNA           Last Filed PACU Vitals:  Vitals Value Taken Time   Temp     Pulse 92    /72    Resp 14    SpO2 100        Modified Iva:  Activity: 2 (11/29/2024 12:18 PM)  Respiration: 2 (11/29/2024 12:18 PM)  Circulation: 2 (11/29/2024 12:18 PM)  Consciousness: 1 (11/29/2024 12:18 PM)  Oxygen Saturation: 2 (11/29/2024 12:18 PM)  Modified Iva Score: 9 (11/29/2024 12:18 PM)

## 2024-11-29 NOTE — ASSESSMENT & PLAN NOTE
Presenting w/ ongoing multiple abdominal complaints -unintentional weight loss, vomiting, abdominal pain, constipation, diarrhea. Dx w/ diverticulitis 1 month ago, completed ABX treatment; ABX restarted 1 week by PCP. Presented to ED w/ multiple episodes of yolanda loose stool began again on 11/25 - one formed BM noted on 11/26. Notes Wt lost 15-20 lb in 6 months.   Last colonoscopy ~ 5 years ago, h/o polyps   CT abd/pelvis:   No obvious acute intra-abdominal pathology.  Mild fecal retention throughout the colon.   Sigmoid diverticulosis.   Persistent perisigmoid haziness of uncertain clinical significance, but no obvious focal inflammation.  C. Diff/stool studies are negative  GI following  Underwent EGD and colonoscopy which showed hemorrhagic mucosa, diverticulosis, and numerous polyps.  They are recommending a repeat colonoscopy 1/28 to follow-up in the office to go over biopsy findings  Will restart Plavix  Epic secure chat completed with GI/vascular/SLIM regarding PAD's role w/ anesthesia  Reviewed w/ pt/daughter concern for underlying malignancy  Continue Rocephin/Flagyl    Recent Labs     11/28/24  0434 11/29/24  0453   WBC 5.29 5.19

## 2024-11-29 NOTE — CASE MANAGEMENT
Case Management Discharge Planning Note    Patient name Ofelia Aguilar  Location /-01 MRN [id number]  : 1940 Date 2024       Current Admission Date: 2024  Current Admission Diagnosis:Abdominal complaints   Patient Active Problem List    Diagnosis Date Noted Date Diagnosed    Chronic obstructive pulmonary disease (HCC) 2024     Abdominal complaints 2024     Abnormal CT of the abdomen 2024     Common bile duct dilatation 2024     Malnutrition (HCC) 2024     Paronychia of great toe 2024     Recurrent major depressive disorder, remission status unspecified (HCC) 2024     Cigarette nicotine dependence with nicotine-induced disorder 10/29/2024     SIADH (syndrome of inappropriate ADH production) ([facility]) 10/07/2024     Hyperlipidemia 10/07/2024     Depression 10/07/2024     Insomnia 10/07/2024     Pain in both lower extremities 10/07/2024     Moderate protein-calorie malnutrition (HCC) 2024     Atherosclerosis of native arteries of extremities with rest pain, bilateral legs (HCC) 2024     HTN, goal below 140/90 2024     Anxiety 2024     GERD (gastroesophageal reflux disease) 2024     Hyponatremia 2024     Anemia 2024     Urgency of urination 2024     Spinal stenosis 2024       LOS (days): 4  Geometric Mean LOS (GMLOS) (days): 2.5  Days to GMLOS:-1.7     OBJECTIVE:  Risk of Unplanned Readmission Score: 15.71         Current admission status: Inpatient   Preferred Pharmacy:   [facility]/pharmacy #[id number] - [place] - [address]  [place] [address]  Phone: [phone] Fax: [phone]    Primary Care Provider: Madalyn eZndejas PA-C    Primary Insurance: [employer]  Secondary Insurance:     DISCHARGE DETAILS:    Discharge planning discussed with:: Patient at the bedside  [place] of Choice: Yes                                                                                IMM Given  (Date):: 11/29/24  IMM Given to:: Patient     Additional Comments: IMM and Medicare rights reviewed with patient at the bedside. Patient verbalized understanding and signed original. Copy given to patient, signed original filed to medical records.

## 2024-11-30 LAB
BACTERIA BLD CULT: NORMAL
BACTERIA BLD CULT: NORMAL

## 2024-12-02 ENCOUNTER — PATIENT OUTREACH (OUTPATIENT)
Dept: CASE MANAGEMENT | Facility: OTHER | Age: 84
End: 2024-12-02

## 2024-12-02 ENCOUNTER — TRANSITIONAL CARE MANAGEMENT (OUTPATIENT)
Dept: FAMILY MEDICINE CLINIC | Facility: CLINIC | Age: 84
End: 2024-12-02

## 2024-12-02 ENCOUNTER — TELEPHONE (OUTPATIENT)
Dept: GASTROENTEROLOGY | Facility: CLINIC | Age: 84
End: 2024-12-02

## 2024-12-02 DIAGNOSIS — Z71.89 COMPLEX CARE COORDINATION: Primary | ICD-10-CM

## 2024-12-02 NOTE — TELEPHONE ENCOUNTER
----- Message from Miguelina Garcia PA-C sent at 11/30/2024  5:58 PM EST -----  Please schedule follow up with me thank u

## 2024-12-02 NOTE — PROGRESS NOTES
Received referral via in basket message as covering RN Care Manager. Chart reviewed.  Pt was admitted 11/25-11/29/24 with abdominal pain, vomiting weight loss. Pt with Diverticulosis. To follow up with GI and a repeat colonoscopy in January.  Past medical history of HTN, Atherosclerosis, GERD.  Telephone call to patient, left message on machine with my contact information as well as contact for RN LAVINIA Coleman.  Will retry again later this week.

## 2024-12-03 PROCEDURE — 88342 IMHCHEM/IMCYTCHM 1ST ANTB: CPT | Performed by: STUDENT IN AN ORGANIZED HEALTH CARE EDUCATION/TRAINING PROGRAM

## 2024-12-03 PROCEDURE — 88341 IMHCHEM/IMCYTCHM EA ADD ANTB: CPT | Performed by: STUDENT IN AN ORGANIZED HEALTH CARE EDUCATION/TRAINING PROGRAM

## 2024-12-03 PROCEDURE — 88305 TISSUE EXAM BY PATHOLOGIST: CPT | Performed by: STUDENT IN AN ORGANIZED HEALTH CARE EDUCATION/TRAINING PROGRAM

## 2024-12-03 NOTE — UTILIZATION REVIEW
NOTIFICATION OF ADMISSION DISCHARGE   This is a Notification of Discharge from [facility]. Please be advised that this patient has been discharge from our facility. Below you will find the admission and discharge date and time including the patient’s disposition.   UTILIZATION REVIEW CONTACT:  Jasmina Alvarez  Utilization   Network Utilization Review Department  Phone: [phone] x carefully listen to the prompts. All voicemails are confidential.  Email: [email or web address]     ADMISSION INFORMATION  PRESENTATION DATE: 11/25/2024  9:05 AM  OBERVATION ADMISSION DATE: N/A  INPATIENT ADMISSION DATE: 11/25/24  1:12 PM   DISCHARGE DATE: 11/29/2024  6:30 PM   DISPOSITION:Home/Self Care    Network Utilization Review Department  ATTENTION: Please call with any questions or concerns to [phone] and carefully listen to the prompts so that you are directed to the right person. All voicemails are confidential.   For Discharge needs, contact Care Management DC Support Team at [phone] opt. 2  Send all requests for admission clinical reviews, approved or denied determinations and any other requests to dedicated fax number below belonging to the campus where the patient is receiving treatment. List of dedicated fax numbers for the Facilities:  FACILITY NAME UR FAX NUMBER   ADMISSION DENIALS (Administrative/Medical Necessity) [phone]   DISCHARGE SUPPORT TEAM ([facility]) [phone]   PARENT CHILD HEALTH (Maternity/NICU/Pediatrics) [phone]   [facility] [phone]   [facility] [phone]   [facility] [phone]   [facility] [phone]   [facility] [phone]   [facility] [phone]   [facility] [phone]   [facility]  [phone]   [facility] [phone]   [facility] [phone]   [facility] [phone]   [facility] [phone]

## 2024-12-05 ENCOUNTER — TELEPHONE (OUTPATIENT)
Dept: FAMILY MEDICINE CLINIC | Facility: CLINIC | Age: 84
End: 2024-12-05

## 2024-12-05 NOTE — TELEPHONE ENCOUNTER
----- Message from Javier DURBIN sent at 12/5/2024 10:25 AM EST -----  12/5/24 lmom to call back to set up appt with our office.Mentioned that I left messages dailyfor her to set upa ppt. Said we will wait for her call but it is very important to set something up sooner then the 19th. Rb  ----- Message -----  From: Javier Davis  Sent: 12/3/2024   3:06 PM EST  To: [facility]    12/3/2024 lmom to call back to set up tcm  rb  ----- Message -----  From: Javier Davis  Sent: 12/2/2024   5:13 PM EST  To: [facility]    12/2/2024 lmom to call back to set up an earlier appt for her sarah rb  ----- Message -----  From: Madalyn Zendejas PA-C  Sent: 12/1/2024   5:28 PM EST  To: [facility]; #    Please schedule TCM sooner then her next scheduled follow up on 12/19. Needs 40 minutes. Thanks!  ----- Message -----  From: Reza Fuentes PA-C  Sent: 11/29/2024   3:09 PM EST  To: Madalyn Zendejas PA-C    Thank you for allowing us to participate in the care of your patient, Ofelia Aguilar, who was hospitalized from 11/25/2024 through 11/29/2024 with the admitting diagnosis of abdominal cramping.  Patient initially presented to the hospital after having abdominal pain.  While in the ED she underwent CT abdomen pelvis which showed no obvious acute intra-abdominal pathology however did show sigmoid diverticulosis as well as mild fecal retention throughout colon.  The patient then underwent an infectious workup which included a C. difficile panel as well as stool enteric bacterial panel and both were negative.  She was initially started on IV antibiotics which have since been discontinued.  The patient then underwent an EGD/colonoscopy on 11/29 which showed mild hemorrhagic mucosa, diverticulosis, and numerous polyps.  Polyps were biopsied to rule out H. pylori.  As the patient did not have an adequate bowel prep, it is recommended by the GI team to undergo a  repeat colonoscopy in outpatient setting on 1/28.  At this time patient is medically stable to be discharged and agreeable for plan at discharge.     Medication Changes:  None    Outpatient testing recommended:  Repeat colonoscopy 1/28 and follow-up with outpatient biopsies    If you have any additional questions or would like to discuss further, please feel free to contact me.    Reza Fuentes PA-C  [facility] Internal Medicine, Hospitalist  [phone]

## 2024-12-06 ENCOUNTER — RESULTS FOLLOW-UP (OUTPATIENT)
Dept: GASTROENTEROLOGY | Facility: MEDICAL CENTER | Age: 84
End: 2024-12-06

## 2024-12-06 ENCOUNTER — PATIENT OUTREACH (OUTPATIENT)
Dept: CASE MANAGEMENT | Facility: OTHER | Age: 84
End: 2024-12-06

## 2024-12-06 NOTE — LETTER
Date: 12/06/24    Dear Ofelia Aguilar,   My name is Virginia; I am a registered nurse care manager working with [employer].  I have not been able to reach you and would like to set a time that I can talk with you over the phone.  My work is to help patients that have complex medical conditions get the care they need. This includes patients who may have been in the hospital or emergency room.     Please call me at [phone] with any questions you may have. I look forward to hearing from  you.  Sincerely,  Virginia Berrios RN  Outpatient Care Manager     No

## 2024-12-06 NOTE — RESULT ENCOUNTER NOTE
EGD biopsy showed focal intestinal metaplasia, this is a benign condition which requires repeating EGD in 1 year. EGD showed esophagitis, biopsy showed intestinal metaplasia, will need EGD repeated for Monique's surveillance. Colon polyp was precancerous. Will need colonoscopy repeated to remove the rest of the polyps.   Please schedule follow up appointment in office to see the patient and plan for repeat procedures. Thanks.

## 2024-12-10 ENCOUNTER — HOSPITAL ENCOUNTER (OUTPATIENT)
Dept: VASCULAR ULTRASOUND | Facility: HOSPITAL | Age: 84
Discharge: HOME | End: 2024-12-10
Attending: SURGERY
Payer: COMMERCIAL

## 2024-12-10 DIAGNOSIS — I70.223 ATHEROSCLEROSIS OF NATIVE ARTERIES OF EXTREMITIES WITH REST PAIN, BILATERAL LEGS (HCC): ICD-10-CM

## 2024-12-10 DIAGNOSIS — I73.9 PVD (PERIPHERAL VASCULAR DISEASE) (HCC): Chronic | ICD-10-CM

## 2024-12-10 PROCEDURE — 93923 UPR/LXTR ART STDY 3+ LVLS: CPT

## 2024-12-10 PROCEDURE — 93931 UPPER EXTREMITY STUDY: CPT | Performed by: SURGERY

## 2024-12-10 PROCEDURE — 93923 UPR/LXTR ART STDY 3+ LVLS: CPT | Performed by: SURGERY

## 2024-12-10 PROCEDURE — 93931 UPPER EXTREMITY STUDY: CPT

## 2024-12-13 ENCOUNTER — PATIENT OUTREACH (OUTPATIENT)
Dept: CASE MANAGEMENT | Facility: OTHER | Age: 84
End: 2024-12-13

## 2024-12-15 DIAGNOSIS — E87.1 HYPONATREMIA: ICD-10-CM

## 2024-12-16 ENCOUNTER — HOSPITAL ENCOUNTER (OUTPATIENT)
Dept: CT IMAGING | Facility: HOSPITAL | Age: 84
Discharge: HOME | End: 2024-12-16
Attending: SURGERY
Payer: COMMERCIAL

## 2024-12-16 DIAGNOSIS — I70.223 ATHEROSCLEROSIS OF NATIVE ARTERIES OF EXTREMITIES WITH REST PAIN, BILATERAL LEGS (HCC): ICD-10-CM

## 2024-12-16 PROCEDURE — 75635 CT ANGIO ABDOMINAL ARTERIES: CPT

## 2024-12-16 RX ORDER — SODIUM CHLORIDE 1 G/1
2 TABLET ORAL
Qty: 180 TABLET | Refills: 1 | Status: SHIPPED | OUTPATIENT
Start: 2024-12-16 | End: 2025-01-15

## 2024-12-16 RX ADMIN — IOHEXOL 100 ML: 350 INJECTION, SOLUTION INTRAVENOUS at 16:43

## 2024-12-19 ENCOUNTER — OFFICE VISIT (OUTPATIENT)
Dept: FAMILY MEDICINE CLINIC | Facility: CLINIC | Age: 84
End: 2024-12-19
Payer: COMMERCIAL

## 2024-12-19 VITALS
WEIGHT: 101 LBS | HEART RATE: 86 BPM | BODY MASS INDEX: 17.24 KG/M2 | SYSTOLIC BLOOD PRESSURE: 136 MMHG | HEIGHT: 64 IN | DIASTOLIC BLOOD PRESSURE: 74 MMHG | OXYGEN SATURATION: 98 % | TEMPERATURE: 97.8 F

## 2024-12-19 DIAGNOSIS — G47.00 INSOMNIA, UNSPECIFIED TYPE: ICD-10-CM

## 2024-12-19 DIAGNOSIS — D64.9 ANEMIA, UNSPECIFIED TYPE: Primary | ICD-10-CM

## 2024-12-19 DIAGNOSIS — I70.223 ATHEROSCLEROSIS OF NATIVE ARTERIES OF EXTREMITIES WITH REST PAIN, BILATERAL LEGS (HCC): ICD-10-CM

## 2024-12-19 DIAGNOSIS — R19.8 ABDOMINAL COMPLAINTS: ICD-10-CM

## 2024-12-19 DIAGNOSIS — E87.6 HYPOKALEMIA: ICD-10-CM

## 2024-12-19 PROCEDURE — 99214 OFFICE O/P EST MOD 30 MIN: CPT

## 2024-12-19 PROCEDURE — G2211 COMPLEX E/M VISIT ADD ON: HCPCS

## 2024-12-19 RX ORDER — HYDROXYZINE HYDROCHLORIDE 10 MG/1
10 TABLET, FILM COATED ORAL
Qty: 30 TABLET | Refills: 0 | Status: SHIPPED | OUTPATIENT
Start: 2024-12-19

## 2024-12-19 NOTE — ASSESSMENT & PLAN NOTE
Reviewed recent CBC from hospital admission on 11/29, showed decreasing Hgb/Hct/RBC  Therefore we will recheck CBC and iron panel to ensure stability     Orders:    CBC and differential; Future    Iron Panel (Includes Ferritin, Iron Sat%, Iron, and TIBC); Future

## 2024-12-19 NOTE — PROGRESS NOTES
Name: Ofelia Aguilar      : 1940      MRN: [id number]  Encounter Provider: Madalyn Zendejas PA-C  Encounter Date: 2024   Encounter department: [facility]  :  Assessment & Plan  Anemia, unspecified type  Reviewed recent CBC from hospital admission on , showed decreasing Hgb/Hct/RBC  Therefore we will recheck CBC and iron panel to ensure stability     Orders:    CBC and differential; Future    Iron Panel (Includes Ferritin, Iron Sat%, Iron, and TIBC); Future    Hypokalemia  Reviewed recent BMP from hospital admission on  -- showed low potassium at 3.4  Therefore we will recheck BMP    Orders:    Basic metabolic panel; Future    Atherosclerosis of native arteries of extremities with rest pain, bilateral legs (HCC)  Patient had CTA abdomen, and vascular US completed through her vascular specialist for her arterial disease -- daughter reports she is waiting to hear from vascular provider regarding next steps  She did report that they would require cardiac clearance before any vascular procedure, had it scheduled for  however had to cancel due to being in the hospital -- therefore new cardiology ASAP referral placed for pre operative clearance   Continue following with vascular  Continue daily atorvastatin and ASA    Orders:    Ambulatory Referral to Cardiology; Future    Insomnia, unspecified type  Continues to do well on Remeron 15 mg HS, however still has nights where it is difficult to fall asleep  Therefore, we will trial hydroxyzine 10 mg HS for insomnia, discussed side effects of sleepiness/drowsiness  We will follow up in six weeks, sooner as need   To call with any questions/concerns    Orders:    hydrOXYzine HCL (ATARAX) 10 mg tablet; Take 1 tablet (10 mg total) by mouth daily at bedtime    Abdominal complaints  Reviewed colonoscopy and EGD from recent hospital course  Encouraged to schedule outpatient follow up with GI as recommended, daughter reports she will  "call to schedule appointment asap  Has repeat colonoscopy scheduled for 1/28 due to inadequate bowel prep  No current abdominal pain or bowel changes -- did advise ER if she develop any of these symptoms, fevers, or chills -- pt and daughter will monitor          Overall, stable. Continue following with specialists (vascular, cardiology, GI). ER precautions provided. To call with any questions/concerns.   Follow up in six weeks, sooner as needed.      History of Present Illness     CC: hospital follow up     Patient presents with daughter for hospital follow up -- recently admitted to [facility] from 11/25-11/29/24 for abdominal pain. Per discharge note, her hospital course is below:  \"Patient initially presented to the hospital after having abdominal pain.  While in the ED she underwent CT abdomen pelvis which showed no obvious acute intra-abdominal pathology however did show sigmoid diverticulosis as well as mild fecal retention throughout colon.  The patient then underwent an infectious workup which included a C. difficile panel as well as stool enteric bacterial panel and both were negative.  She was initially started on IV antibiotics which have since been discontinued.  The patient then underwent an EGD/colonoscopy on 11/29 which showed mild hemorrhagic mucosa, diverticulosis, and numerous polyps.  Polyps were biopsied to rule out H. pylori.  As the patient did not have an adequate bowel prep, it is recommended by the GI team to undergo a repeat colonoscopy in outpatient setting on 1/28.  At this time patient is medically stable to be discharged and agreeable for plan at discharge.  For further remission regards course hospitalization, please do not status.\"    Patient and daughter report it has been difficult since she was discharged.   Reports she had CTA abdomen and vascular US completed through vascular however pt had allergy to dye so she has not been feeling the best. Today,finally feeling better and back " "to herself.   Daughter reports her appetite has been good since she returned from hospital and she has been gaining weight which she is happy about.   No current abdominal pain or diarrhea, constipation.      Review of Systems   Constitutional:  Positive for fatigue. Negative for chills, diaphoresis and fever.   Respiratory:  Negative for cough, chest tightness, shortness of breath and wheezing.    Cardiovascular:  Negative for chest pain and palpitations.   Gastrointestinal:  Negative for abdominal pain, blood in stool, constipation, diarrhea, nausea and vomiting.   Neurological:  Negative for dizziness, light-headedness and headaches.   Psychiatric/Behavioral:  Positive for sleep disturbance. Negative for self-injury and suicidal ideas. The patient is not nervous/anxious.        Objective   /74   Pulse 86   Temp 97.8 °F (36.6 °C)   Ht 5' 4\" (1.626 m)   Wt 45.8 kg (101 lb)   LMP  (LMP Unknown)   SpO2 98%   BMI 17.34 kg/m²      Physical Exam  Vitals reviewed.   Constitutional:       General: She is not in acute distress.     Appearance: Normal appearance. She is not ill-appearing or diaphoretic.   HENT:      Head: Normocephalic and atraumatic.      Right Ear: External ear normal.      Left Ear: External ear normal.      Nose: Nose normal.      Mouth/Throat:      Mouth: Mucous membranes are moist.   Eyes:      General:         Right eye: No discharge.         Left eye: No discharge.      Conjunctiva/sclera: Conjunctivae normal.   Cardiovascular:      Rate and Rhythm: Normal rate and regular rhythm.      Pulses: Normal pulses.      Heart sounds: Normal heart sounds. No murmur heard.  Pulmonary:      Effort: Pulmonary effort is normal. No respiratory distress.      Breath sounds: Normal breath sounds. No wheezing, rhonchi or rales.   Musculoskeletal:      Cervical back: Normal range of motion.      Right lower leg: No edema.      Left lower leg: No edema.   Skin:     General: Skin is warm.   Neurological: "      General: No focal deficit present.      Mental Status: She is alert.   Psychiatric:         Mood and Affect: Mood normal.

## 2024-12-19 NOTE — ASSESSMENT & PLAN NOTE
Continues to do well on Remeron 15 mg HS, however still has nights where it is difficult to fall asleep  Therefore, we will trial hydroxyzine 10 mg HS for insomnia, discussed side effects of sleepiness/drowsiness  We will follow up in six weeks, sooner as need   To call with any questions/concerns    Orders:    hydrOXYzine HCL (ATARAX) 10 mg tablet; Take 1 tablet (10 mg total) by mouth daily at bedtime     extubated on 10/16  - patient is comfortable but tachypneic, denies sob, no respiratory distress, oxygen saturation adequate  CXR: R pleural effusion, ? infiltrate, but no fever, normal wbc  - lasix IV x 1 today  - c/w lasix po

## 2024-12-19 NOTE — ASSESSMENT & PLAN NOTE
Patient had CTA abdomen, and vascular US completed through her vascular specialist for her arterial disease -- daughter reports she is waiting to hear from vascular provider regarding next steps  She did report that they would require cardiac clearance before any vascular procedure, had it scheduled for 11/25 however had to cancel due to being in the hospital -- therefore new cardiology ASAP referral placed for pre operative clearance   Continue following with vascular  Continue daily atorvastatin and ASA    Orders:    Ambulatory Referral to Cardiology; Future

## 2024-12-19 NOTE — ASSESSMENT & PLAN NOTE
Reviewed colonoscopy and EGD from recent hospital course  Encouraged to schedule outpatient follow up with GI as recommended, daughter reports she will call to schedule appointment asap  Has repeat colonoscopy scheduled for 1/28 due to inadequate bowel prep  No current abdominal pain or bowel changes -- did advise ER if she develop any of these symptoms, fevers, or chills -- pt and daughter will monitor

## 2024-12-20 ENCOUNTER — RESULTS FOLLOW-UP (OUTPATIENT)
Dept: OTHER | Facility: HOSPITAL | Age: 84
End: 2024-12-20

## 2024-12-21 ENCOUNTER — APPOINTMENT (OUTPATIENT)
Dept: LAB | Facility: CLINIC | Age: 84
End: 2024-12-21
Payer: COMMERCIAL

## 2024-12-21 DIAGNOSIS — E87.6 HYPOKALEMIA: ICD-10-CM

## 2024-12-21 DIAGNOSIS — R53.83 OTHER FATIGUE: ICD-10-CM

## 2024-12-21 DIAGNOSIS — Z79.899 LONG TERM USE OF DRUG: ICD-10-CM

## 2024-12-21 DIAGNOSIS — D64.9 ANEMIA, UNSPECIFIED TYPE: ICD-10-CM

## 2024-12-21 LAB
25(OH)D3 SERPL-MCNC: 25.6 NG/ML (ref 30–100)
ALBUMIN SERPL BCG-MCNC: 4.2 G/DL (ref 3.5–5)
ALP SERPL-CCNC: 50 U/L (ref 34–104)
ALT SERPL W P-5'-P-CCNC: 12 U/L (ref 7–52)
ANION GAP SERPL CALCULATED.3IONS-SCNC: 13 MMOL/L (ref 4–13)
AST SERPL W P-5'-P-CCNC: 21 U/L (ref 13–39)
BASOPHILS # BLD AUTO: 0.05 THOUSANDS/ÂΜL (ref 0–0.1)
BASOPHILS NFR BLD AUTO: 1 % (ref 0–1)
BILIRUB SERPL-MCNC: 0.31 MG/DL (ref 0.2–1)
BUN SERPL-MCNC: 14 MG/DL (ref 5–25)
CALCIUM SERPL-MCNC: 9.7 MG/DL (ref 8.4–10.2)
CHLORIDE SERPL-SCNC: 105 MMOL/L (ref 96–108)
CO2 SERPL-SCNC: 22 MMOL/L (ref 21–32)
CREAT SERPL-MCNC: 0.73 MG/DL (ref 0.6–1.3)
EOSINOPHIL # BLD AUTO: 0.19 THOUSAND/ÂΜL (ref 0–0.61)
EOSINOPHIL NFR BLD AUTO: 3 % (ref 0–6)
ERYTHROCYTE [DISTWIDTH] IN BLOOD BY AUTOMATED COUNT: 14.9 % (ref 11.6–15.1)
FERRITIN SERPL-MCNC: 67 NG/ML (ref 11–307)
GFR SERPL CREATININE-BSD FRML MDRD: 76 ML/MIN/1.73SQ M
GLUCOSE P FAST SERPL-MCNC: 85 MG/DL (ref 65–99)
HCT VFR BLD AUTO: 35.3 % (ref 34.8–46.1)
HGB BLD-MCNC: 11 G/DL (ref 11.5–15.4)
IMM GRANULOCYTES # BLD AUTO: 0.02 THOUSAND/UL (ref 0–0.2)
IMM GRANULOCYTES NFR BLD AUTO: 0 % (ref 0–2)
IRON SATN MFR SERPL: 17 % (ref 15–50)
IRON SERPL-MCNC: 55 UG/DL (ref 50–212)
LYMPHOCYTES # BLD AUTO: 2.3 THOUSANDS/ÂΜL (ref 0.6–4.47)
LYMPHOCYTES NFR BLD AUTO: 41 % (ref 14–44)
MCH RBC QN AUTO: 30.3 PG (ref 26.8–34.3)
MCHC RBC AUTO-ENTMCNC: 31.2 G/DL (ref 31.4–37.4)
MCV RBC AUTO: 97 FL (ref 82–98)
MONOCYTES # BLD AUTO: 0.64 THOUSAND/ÂΜL (ref 0.17–1.22)
MONOCYTES NFR BLD AUTO: 12 % (ref 4–12)
NEUTROPHILS # BLD AUTO: 2.38 THOUSANDS/ÂΜL (ref 1.85–7.62)
NEUTS SEG NFR BLD AUTO: 43 % (ref 43–75)
NRBC BLD AUTO-RTO: 0 /100 WBCS
PLATELET # BLD AUTO: 284 THOUSANDS/UL (ref 149–390)
PMV BLD AUTO: 11.1 FL (ref 8.9–12.7)
POTASSIUM SERPL-SCNC: 4.4 MMOL/L (ref 3.5–5.3)
PROT SERPL-MCNC: 7.1 G/DL (ref 6.4–8.4)
RBC # BLD AUTO: 3.63 MILLION/UL (ref 3.81–5.12)
SODIUM SERPL-SCNC: 140 MMOL/L (ref 135–147)
T4 FREE SERPL-MCNC: 0.74 NG/DL (ref 0.61–1.12)
TIBC SERPL-MCNC: 326.2 UG/DL (ref 250–450)
TRANSFERRIN SERPL-MCNC: 233 MG/DL (ref 203–362)
TSH SERPL DL<=0.05 MIU/L-ACNC: 4.96 UIU/ML (ref 0.45–4.5)
UIBC SERPL-MCNC: 271 UG/DL (ref 155–355)
VIT B12 SERPL-MCNC: 308 PG/ML (ref 180–914)
WBC # BLD AUTO: 5.58 THOUSAND/UL (ref 4.31–10.16)

## 2024-12-21 PROCEDURE — 82728 ASSAY OF FERRITIN: CPT

## 2024-12-21 PROCEDURE — 82306 VITAMIN D 25 HYDROXY: CPT

## 2024-12-21 PROCEDURE — 85025 COMPLETE CBC W/AUTO DIFF WBC: CPT

## 2024-12-21 PROCEDURE — 82607 VITAMIN B-12: CPT

## 2024-12-21 PROCEDURE — 83550 IRON BINDING TEST: CPT

## 2024-12-21 PROCEDURE — 80053 COMPREHEN METABOLIC PANEL: CPT

## 2024-12-21 PROCEDURE — 84443 ASSAY THYROID STIM HORMONE: CPT

## 2024-12-21 PROCEDURE — 83540 ASSAY OF IRON: CPT

## 2024-12-21 PROCEDURE — 84439 ASSAY OF FREE THYROXINE: CPT

## 2024-12-21 PROCEDURE — 36415 COLL VENOUS BLD VENIPUNCTURE: CPT

## 2024-12-23 ENCOUNTER — TELEPHONE (OUTPATIENT)
Age: 84
End: 2024-12-23

## 2024-12-23 NOTE — TELEPHONE ENCOUNTER
Pt's daughter, Tena called and was read her mom's results from Madalyn.  She's asking if any of her mom's medications could be causing the kidney function to be at baseline.  Tena would like a call back.      Madalyn Zendejas PA-C  12/23/2024  7:43 AM EST       Vitamin D is low -- would recommend starting OTC supplement of 2000 units daily.  TSH is a bit high but normal T4 -- repeat TSH in six weeks.  RBC/Hgb improving which is good  Iron panel is normal  Kidney function at baseline -- make sure to stay hydrated, avoid NSAIDs  Vitamin B12 is normal

## 2024-12-26 ENCOUNTER — TELEPHONE (OUTPATIENT)
Age: 84
End: 2024-12-26

## 2024-12-26 NOTE — TELEPHONE ENCOUNTER
Hello,    The following message was sent via e-mail to the leadership team:     Please advise if you can help facilitate the following overbook request:    Patient Name: Ofelia Aguilar    Patient MRN: [id number]    Call back #: [phone] - Tena/daughter    Insurance: [employer]    Department:Vascular    Speciality:     Reason for overbook request: PROVIDER REQUEST    Comments: Patient needs appointment to discuss planning for bypass surgery per Dr Diaz. 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 There is also blockage of both iliac arteries and prior bypass.    Requested doctor and location: [facility]    Date of current appointment: 3/5/25      Thank you.

## 2025-01-02 ENCOUNTER — TELEPHONE (OUTPATIENT)
Dept: GASTROENTEROLOGY | Facility: CLINIC | Age: 85
End: 2025-01-02

## 2025-01-02 NOTE — TELEPHONE ENCOUNTER
Please call patient's daughter to schedule office appointment, Dr. Loo from recommended a follow-up colonoscopy.  Thank you

## 2025-01-09 DIAGNOSIS — I70.223 ATHEROSCLEROSIS OF NATIVE ARTERIES OF EXTREMITIES WITH REST PAIN, BILATERAL LEGS (HCC): ICD-10-CM

## 2025-01-09 NOTE — PROGRESS NOTES
Name: Ofelia Aguilar      : 1940      MRN: [id number]  Encounter Provider: Meghan Diaz MD  Encounter Date: 1/10/2025   Encounter department: [facility] [place]  :  Assessment & Plan  Atherosclerosis of native arteries of extremities with rest pain, bilateral legs (HCC)  Reviewed CTA in detail. Reviewed arterial doppler.  Difficulty walking short distances due to pain in the calves.  She also has pain in the foot occasionally at night.  Surgery got delayed secondary to episode of diverticulitis requiring hospital admission and IV antibiotics.  That is all now resolved.  She also has put on some weight.  She has quit smoking.    She will benefit from right Ax bifem bypass (since the common femoral is occluded with prior fem fem bypass, she will require bypass to proximal SFA bilateral.  Remainder of runoff is patent.    Risks of the procedure were discussed with the patient and her daughter in detail.  Risks including bleeding bruising, infection, bypass thrombosis, need for additional interventions were discussed.      Operative Scheduling Information:    Hospital:  [facility]    Physician:  Emily    Surgery: right Ax fem bypass    Urgency:  Standard    Level:  Level 4: Outpatients to be scheduled for screening procedures and elective surgery that can be delayed for longer than one month without reasonable expectation of detriment to patient.    Case Length:  Normal    Post-op Bed:  Stepdown    OR Table:  Standard    Equipment Needs:  None    Medication Instructions:  Aspirin:   Continue (do not hold)  Plavix:  Continue (do not hold)    Hydration:  No    Contrast Allergy:  no      Orders:  •  Case request operating room: BYPASS AXILLO-FEMORAL; Standing  •  Type and screen; Future  •  Prepare Leukoreduced RBC: 2 Units; Future  •  CBC and Platelet; Future  •  Protime-INR; Future  •  EKG 12 lead; Future  •  XR chest pa & lateral; Future  •  Prealbumin; Future  •  Basic metabolic  panel; Future    Cigarette nicotine dependence with nicotine-induced disorder  She has quit a month ago.           History of Present Illness   HPI  Ofelia Aguilar is a 84 y.o. female who presents follow-up bilateral leg pain.  Difficulty to walk secondary to the pain.  Walks minimal distances due to this pain.  Prior history of femorofemoral crossover bypass in [place] over 10 years ago.  Bypasses occluded.  History obtained from: patient and her daughter    Review of Systems   Constitutional: Negative.    HENT: Negative.     Eyes: Negative.    Respiratory: Negative.     Cardiovascular: Negative.    Gastrointestinal: Negative.    Endocrine: Negative.    Genitourinary: Negative.    Musculoskeletal: Negative.    Skin: Negative.    Allergic/Immunologic: Negative.    Neurological: Negative.    Hematological: Negative.    Psychiatric/Behavioral: Negative.     I have reviewed the review of systems as entered and made appropriate changes as necessary  Past Medical History   Past Medical History:   Diagnosis Date   • Anxiety    • Atherosclerosis of native arteries of extremities with rest pain, bilateral legs (HCC) 2024   • Cigarette nicotine dependence with nicotine-induced disorder 10/29/2024   • Diverticulitis    • GERD (gastroesophageal reflux disease)      Past Surgical History:   Procedure Laterality Date   • APPENDECTOMY     •  SECTION       No family history on file.   reports that she has quit smoking. Her smoking use included cigarettes. She has a 20 pack-year smoking history. She has never been exposed to tobacco smoke. She has never used smokeless tobacco. She reports that she does not drink alcohol.  Current Outpatient Medications on File Prior to Visit   Medication Sig Dispense Refill   • acetaminophen (TYLENOL) 650 mg CR tablet Take 1 tablet (650 mg total) by mouth every 8 (eight) hours as needed for mild pain 30 tablet 0   • amLODIPine (NORVASC) 5 mg tablet Take 1 tablet (5 mg total) by mouth  "daily 90 tablet 0   • aspirin 81 mg chewable tablet Chew 1 tablet daily     • atorvastatin (LIPITOR) 40 mg tablet Take 1 tablet (40 mg total) by mouth daily 90 tablet 0   • clopidogrel (PLAVIX) 75 mg tablet Take 1 tablet (75 mg total) by mouth daily 90 tablet 0   • hydrOXYzine HCL (ATARAX) 10 mg tablet Take 1 tablet (10 mg total) by mouth daily at bedtime 30 tablet 0   • mirtazapine (REMERON) 15 mg tablet Take 1 tablet (15 mg total) by mouth daily at bedtime 90 tablet 0   • Multiple Vitamin (multivitamin) tablet Take 1 tablet by mouth daily     • ondansetron (ZOFRAN) 4 mg tablet Take 1 tablet (4 mg total) by mouth 3 (three) times a day as needed for nausea or vomiting 20 tablet 0   • pregabalin (LYRICA) 25 mg capsule Take 1 capsule (25 mg total) by mouth daily AND 2 capsules (50 mg total) daily at bedtime. 90 capsule 1   • sodium chloride 1 g tablet Take 2 tablets (2 g total) by mouth 3 (three) times a day with meals 180 tablet 1     No current facility-administered medications on file prior to visit.     Allergies   Allergen Reactions   • Penicillins Anaphylaxis   • Aspirin GI Intolerance         Objective   /70 (BP Location: Left arm, Patient Position: Sitting, Cuff Size: Standard)   Pulse 90   Ht 5' 4\" (1.626 m)   Wt 45.8 kg (101 lb)   LMP  (LMP Unknown)   BMI 17.34 kg/m²      Physical Exam  Vitals and nursing note reviewed.   Constitutional:       Appearance: She is underweight.   HENT:      Head: Normocephalic and atraumatic.   Cardiovascular:      Rate and Rhythm: Normal rate.      Heart sounds: Normal heart sounds.   Pulmonary:      Effort: Pulmonary effort is normal.      Breath sounds: Normal breath sounds.   Abdominal:      Palpations: Abdomen is soft.      Comments: Multiple incisions in the abdominal area.  Bilateral vertical groin incisions.   Musculoskeletal:      Right lower leg: No edema.      Left lower leg: No edema.   Skin:     General: Skin is warm.      Capillary Refill: Capillary " refill takes more than 3 seconds.   Neurological:      General: No focal deficit present.      Mental Status: She is alert and oriented to person, place, and time.   Psychiatric:         Mood and Affect: Mood normal.         Behavior: Behavior normal.

## 2025-01-10 ENCOUNTER — TELEPHONE (OUTPATIENT)
Dept: VASCULAR SURGERY | Facility: CLINIC | Age: 85
End: 2025-01-10

## 2025-01-10 ENCOUNTER — OFFICE VISIT (OUTPATIENT)
Dept: VASCULAR SURGERY | Facility: CLINIC | Age: 85
End: 2025-01-10
Payer: COMMERCIAL

## 2025-01-10 VITALS
SYSTOLIC BLOOD PRESSURE: 146 MMHG | DIASTOLIC BLOOD PRESSURE: 70 MMHG | WEIGHT: 101 LBS | HEART RATE: 90 BPM | HEIGHT: 64 IN | BODY MASS INDEX: 17.24 KG/M2

## 2025-01-10 DIAGNOSIS — F17.219 CIGARETTE NICOTINE DEPENDENCE WITH NICOTINE-INDUCED DISORDER: ICD-10-CM

## 2025-01-10 DIAGNOSIS — I70.223 ATHEROSCLEROSIS OF NATIVE ARTERIES OF EXTREMITIES WITH REST PAIN, BILATERAL LEGS (HCC): Primary | ICD-10-CM

## 2025-01-10 PROCEDURE — 99215 OFFICE O/P EST HI 40 MIN: CPT | Performed by: SURGERY

## 2025-01-10 RX ORDER — CLOPIDOGREL BISULFATE 75 MG/1
75 TABLET ORAL DAILY
Qty: 90 TABLET | Refills: 1 | Status: SHIPPED | OUTPATIENT
Start: 2025-01-10

## 2025-01-10 RX ORDER — CHLORHEXIDINE GLUCONATE ORAL RINSE 1.2 MG/ML
15 SOLUTION DENTAL ONCE
OUTPATIENT
Start: 2025-01-10 | End: 2025-01-10

## 2025-01-10 NOTE — TELEPHONE ENCOUNTER
REMINDER: Under Reason For Call, comments MUST be formatted as:   (Surgeon's Initials) / (Procedure)      Special Instructions / FYI: [facility] IS PATIENT'S 1ST CHOICE    Consent: I certify that patient has signed, printed, timed, and dated their surgery consent.  I certify that the patient's LEGAL NAME and DATE OF BIRTH are written in the upper left corner on BOTH sides of the consent.  I certify that BOTH sides of the completed surgery consent have been scanned into the patient's Epic chart by myself on 1/10/2025.  Yes, I have LABELED the consent in Epic as Consent for Vascular Procedure.     For Surgical Clearances     Levels   1-3   ROUTE this encounter to [facility] Clearance Pool (AND)   [facility] Surgery Coordinator Pool     Level   4   ROUTE this encounter to [facility] Surgery Coordinator Pool       HYDRATION CLEARANCES   ONLY ROUTE TO  [facility] Clearance Pool       Yes, I have ROUTED this encounter to [facility] Surgery Coordinator and/or [facility] Clearance Pool.

## 2025-01-10 NOTE — TELEPHONE ENCOUNTER
Operative Scheduling Information:     Hospital:  [facility]     Physician:  Emily     Surgery: right Ax fem bypass     Urgency:  Standard     Level:  Level 4: Outpatients to be scheduled for screening procedures and elective surgery that can be delayed for longer than one month without reasonable expectation of detriment to patient.     Case Length:  Normal     Post-op Bed:  Stepdown     OR Table:  Standard     Equipment Needs:  None     Medication Instructions:  Aspirin:   Continue (do not hold)  Plavix:  Continue (do not hold)     Hydration:  No     Contrast Allergy:  no

## 2025-01-10 NOTE — ASSESSMENT & PLAN NOTE
Reviewed CTA in detail. Reviewed arterial doppler.  Difficulty walking short distances due to pain in the calves.  She also has pain in the foot occasionally at night.  Surgery got delayed secondary to episode of diverticulitis requiring hospital admission and IV antibiotics.  That is all now resolved.  She also has put on some weight.  She has quit smoking.    She will benefit from right Ax bifem bypass (since the common femoral is occluded with prior fem fem bypass, she will require bypass to proximal SFA bilateral.  Remainder of runoff is patent.    Risks of the procedure were discussed with the patient and her daughter in detail.  Risks including bleeding bruising, infection, bypass thrombosis, need for additional interventions were discussed.      Operative Scheduling Information:    Hospital:  [facility]    Physician:  Emily    Surgery: right Ax fem bypass    Urgency:  Standard    Level:  Level 4: Outpatients to be scheduled for screening procedures and elective surgery that can be delayed for longer than one month without reasonable expectation of detriment to patient.    Case Length:  Normal    Post-op Bed:  Stepdown    OR Table:  Standard    Equipment Needs:  None    Medication Instructions:  Aspirin:   Continue (do not hold)  Plavix:  Continue (do not hold)    Hydration:  No    Contrast Allergy:  no      Orders:  •  Case request operating room: BYPASS AXILLO-FEMORAL; Standing  •  Type and screen; Future  •  Prepare Leukoreduced RBC: 2 Units; Future  •  CBC and Platelet; Future  •  Protime-INR; Future  •  EKG 12 lead; Future  •  XR chest pa & lateral; Future  •  Prealbumin; Future  •  Basic metabolic panel; Future

## 2025-01-13 ENCOUNTER — PREP FOR PROCEDURE (OUTPATIENT)
Dept: VASCULAR SURGERY | Facility: CLINIC | Age: 85
End: 2025-01-13

## 2025-01-13 NOTE — TELEPHONE ENCOUNTER
Verified patient's insurance   CONFIRMED - Patient's insurance is [employer]   Is patient requesting a call when authorization has been obtained? Patient did not request a call.    Surgery Date: 2/25/25  Primary Surgeon: EDIE // Meghan Diaz (NPI: [id number])  Assisting Surgeon: Not Applicable (N/A)  Facility: [facility] (Tax: [id number] / NPI: [id number])  Inpatient / Outpatient: Inpatient  Level: 4    Clearance Received: No clearance ordered.  Consent Received: Yes, scanned into Epic on 1/10/25.  Medication Hold / Last Dose:  No hold on Plavix or ASA  IR Notified: Not Applicable (N/A)  Rep. 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

## 2025-01-14 DIAGNOSIS — G47.00 INSOMNIA, UNSPECIFIED TYPE: ICD-10-CM

## 2025-01-14 DIAGNOSIS — F32.A DEPRESSION, UNSPECIFIED DEPRESSION TYPE: ICD-10-CM

## 2025-01-15 RX ORDER — MIRTAZAPINE 15 MG/1
15 TABLET, FILM COATED ORAL
Qty: 90 TABLET | Refills: 1 | Status: SHIPPED | OUTPATIENT
Start: 2025-01-15

## 2025-01-16 ENCOUNTER — PREP FOR PROCEDURE (OUTPATIENT)
Dept: GASTROENTEROLOGY | Facility: MEDICAL CENTER | Age: 85
End: 2025-01-16

## 2025-01-16 DIAGNOSIS — Z80.0 FAMILY HISTORY OF COLON CANCER: Primary | ICD-10-CM

## 2025-01-17 ENCOUNTER — TELEPHONE (OUTPATIENT)
Dept: GASTROENTEROLOGY | Facility: MEDICAL CENTER | Age: 85
End: 2025-01-17

## 2025-01-17 NOTE — LETTER
1/17/2025  Ofelia Aguilar  [address]  [place] [address]      Dear Ofelia Aguilar,    Review of our records shows you are due for the following:    Colonoscopy    Please call the following office to schedule your appointment:      We look forward to hearing from you.    Sincerely,     [facility]  [phone]

## 2025-01-22 ENCOUNTER — TELEPHONE (OUTPATIENT)
Dept: ANESTHESIOLOGY | Facility: CLINIC | Age: 85
End: 2025-01-22

## 2025-01-22 DIAGNOSIS — D50.9 IRON DEFICIENCY ANEMIA, UNSPECIFIED IRON DEFICIENCY ANEMIA TYPE: ICD-10-CM

## 2025-01-22 DIAGNOSIS — Z00.8 NUTRITIONAL ASSESSMENT: ICD-10-CM

## 2025-01-22 DIAGNOSIS — Z01.89 ENCOUNTER FOR GERIATRIC ASSESSMENT: Primary | ICD-10-CM

## 2025-01-27 PROBLEM — Z01.89 ENCOUNTER FOR GERIATRIC ASSESSMENT: Status: ACTIVE | Noted: 2025-01-27

## 2025-01-27 PROBLEM — I10 ESSENTIAL HYPERTENSION: Status: ACTIVE | Noted: 2025-01-27

## 2025-01-27 NOTE — PROGRESS NOTES
[facility]   Consult: Geriatric Surgery      Reason for Visit: Pre-operative Evaluation for Risk Stratification, Optimization and geriatric assessment     Patient ID: Ofelia Aguilar is a 84 y.o. female.     Surgery:   Case: [id number] Date/Time: 02/25/25 0730   Procedure: BYPASS AXILLO-FEMORAL (Bilateral: Abdomen)   Anesthesia type: General   Diagnosis: Atherosclerosis of native arteries of extremities with rest pain, bilateral legs (HCC) [I70.223]   Pre-op diagnosis: Atherosclerosis of native arteries of extremities with rest pain, bilateral legs (HCC) [I70.223]   Location: [address]   Surgeons: Meghan Diaz MD        Assessment    Pre-operative Medical Evaluation for planned surgery  Recommendations as listed in PLAN section below      Assessment & Plan  Encounter for geriatric assessment  Cognitive Assessment:   CAM:   TUG <15 sec:  Falls (last 6 months): no  Hand  score: 16  -Attempt 1:16  -Attempt 2:16  -Attempt 3: 16  Tolu Total Score: 20  PHQ- 9 Depression Scale: 5  Nutrition Assessment Score:11  METS:3.63  DX SLEEP APNEA; NO:SCORE 2  Health goals:  -What are your overall health goals? Get better, walk better     -What brings you strength? daughter     -What activities are important to you? Crossword puzzles  Chronic obstructive pulmonary disease, unspecified COPD type (HCC)  Quit smoking  2024  Atherosclerosis of native arteries of extremities with rest pain, bilateral legs (HCC)  H/o bypass >10 years ago in [place]  Now for repeat bypass  Takes ASA/plavix and lipitor for same  Essential hypertension    Malnutrition, unspecified type (HCC)  Malnutrition Findings:                             BMI Findings:           Body mass index is 17.71 kg/m².   Recent CMP with normal protein levels  Prealbumin level normal    Hyponatremia  Mild  H/o SIADH  Iron deficiency anemia, unspecified iron deficiency anemia type  stable  Nutritional  "assessment  Gained 10lbs  Drinks protein drinks         Plan:     1. Further preoperative workup as follows:   -none    2. Preoperative Medication Management Review performed by PAT nursing  NO          PRE-OP WORKSHEET DATA  Assessment of Pre-Operative Risks     SOC Quality Hard Stops:    SSI Risk:  BMI (>40) : Estimated body mass index is 17.71 kg/m² as calculated from the following:    Height as of this encounter: 5' 4\" (1.626 m).    Weight as of this encounter: 46.8 kg (103 lb 3.2 oz). (if >than 40 then offer weight management)  -Weight management consult No    HbA1c (<8.0) : No results found for: \"HGBA1C\" (>8.0 recommend Endocrine consult)   -endocrine consult indicated N/A    Tobacco use:  No, quit     -lifestyle management consult No    SOC Anemia review:  Hgb ( >11):   Lab Results   Component Value Date    HGB 11.5 01/31/2025    HGB 11.0 (L) 12/21/2024    HGB 9.0 (L) 11/29/2024      -IV venofer indicated  await repeat labs    LAI risk:  GFR (>60) (Less then 45 = Nephrology consult):    Lab Results   Component Value Date    EGFR 68 01/31/2025    EGFR 76 12/21/2024    EGFR 82 11/29/2024      -Nephrology consult indicated No    GERIATRIC ASSESSMENT Yes  Mini-Cognitive Screen (MCI) score <2    inpatient geriatric consult Yes, however should not impede DC planning   -ordered delirium precautions on admit No  Depression Screen   Falls (yes within the last 6 months)   -Fall precautions ordered on admit Yes  Mini Nutritional Assessment (MNA) (score <12)   -Estimated body mass index is 17.71 kg/m² as calculated from the following:    Height as of this encounter: 5' 4\" (1.626 m).    Weight as of this encounter: 46.8 kg (103 lb 3.2 oz).   -order inpatient nutrition evaluation Yes, d/t pt being under weight        Obstructive Sleep Apnea Screening  -sleep medicine referral needed No      Active Decompensated Chronic Conditions which would delay surgery  No acutely decompensated medical issues such as recent CVA, MI, new " "onset arrhythmia, severe aortic stenosis, CHF, uncontrolled COPD       Functional capacity:   METS (<4) 3.97   -<4 MET send to SOC DOC for evaluation     Carb Drinks given by SOC   N/A              Subjective:           History of Present Illness:     Ofelia Aguilar is a 84 y.o. female who presents to the office today for a preoperative screening. They are electing to undergo planned procedure with an understanding that all surgery has inherent risk. Today they present for preoperative risk assessment and recommendations for optimization in preparation for surgery.  We have reviewed their past medical/surgical/social history as listed below, their most recent labs and EKG, and labs were also reviewed and recommendations are as below.     Pt has an incomplete LBBB on her recent EKG. She has a scheduled consultation with cardiology for clearance 2.11.2025. Pt has quit smoking in 2024. She has been able to gain 10 lbs and she is eating one ensure daily. Her last protein level in December was normal at 7.1    She has a h/o increasing b/l foot pain, unable to walk any long distances d/t same. She has a h/o fem/fem bypass >10 years ago in [place] and those have since been occluded. Pt having significant pain at night especially, I suggested she reach out to her surgeon for any other suggestions to help with her pain.     She had a recent bout of diverticulitis requiring hospitalization and IV abx. SHe is scheduled for colonoscopy in the future.           ROS: No TIA's or unusual headaches, no dysphagia.  No prolonged cough, congestion. No dyspnea or chest pain on exertion.  No abdominal pain, change in bowel habits, black or bloody stools.  No urinary tract or BPH symptoms.  No skin rashes or issues.            Objective:    /70 (BP Location: Right arm, Patient Position: Sitting)   Pulse 80   Ht 5' 4\" (1.626 m)   Wt 46.8 kg (103 lb 3.2 oz)   LMP  (LMP Unknown)   SpO2 97%   BMI 17.71 kg/m²       General Appearance: " no distress, appropriate  HEENT: PERRLA, conjuctiva normal; oropharynx clear; mucous membranes moist;   Neck:  Supple  Lungs: lungs are clear bilaterally, no wheezes/rales/rhonchi noted, no accessory muscle use noted  CV: S1S2 regular, no murmurs, rubs or gallops; PMI normal   ABD: soft non tender, +BSx4  EXT: DP pulses intact, no lymphadenopathy, no edema  Skin: normal turgor, normal texture, no rash  Psych: affect normal, mood normal  Neuro: AAOx3, frail      The following portions of the patient's history were reviewed and updated as appropriate: allergies, current medications, past family history, past medical history, past social history, past surgical history and problem list.     Past History:       Past Medical History:   Diagnosis Date    Anxiety     Atherosclerosis of native arteries of extremities with rest pain, bilateral legs (HCC) 2024    Cigarette nicotine dependence with nicotine-induced disorder 10/29/2024    Colon polyp     Diverticulitis     GERD (gastroesophageal reflux disease)     Hyperlipidemia     Hypertension     Past Surgical History:   Procedure Laterality Date    APPENDECTOMY       SECTION      COLONOSCOPY      FEMORAL BYPASS Right     2017    PERIPHERAL ANGIOGRAM            Social History     Tobacco Use    Smoking status: Former     Current packs/day: 1.00     Average packs/day: 1 pack/day for 20.0 years (20.0 ttl pk-yrs)     Types: Cigarettes     Passive exposure: Never    Smokeless tobacco: Never   Vaping Use    Vaping status: Never Used   Substance Use Topics    Alcohol use: Never    Drug use: Not Currently     History reviewed. No pertinent family history.       Allergies:     Allergies   Allergen Reactions    Penicillins Anaphylaxis    Aspirin GI Intolerance        Current Medications:     Current Outpatient Medications   Medication Instructions    acetaminophen (TYLENOL) 650 mg, Oral, Every 8 hours PRN    amLODIPine (NORVASC) 5 mg, Oral, Daily    aspirin 81 mg  "chewable tablet 1 tablet, Daily    atorvastatin (LIPITOR) 40 mg, Oral, Daily    clopidogrel (PLAVIX) 75 mg, Oral, Daily    hydrOXYzine HCL (ATARAX) 10 mg, Oral, Daily at bedtime    mirtazapine (REMERON) 15 mg, Oral, Daily at bedtime    Multiple Vitamin (multivitamin) tablet 1 tablet, Daily    ondansetron (ZOFRAN) 4 mg, Oral, 3 times daily PRN    pregabalin (LYRICA) 25 mg capsule Take 1 capsule (25 mg total) by mouth daily AND 2 capsules (50 mg total) daily at bedtime.    sodium chloride 2 g, Oral, 3 times daily with meals           Assessment of intra and post operative respiratory, hemodynamic and thrombotic risks     Prior Anesthesia Reactions: No     Personal history of venous thromboembolic disease? No        The patient's risk factors for cardiac complications include :  \"high-risk\" surgery (intraperitoneal, intrathoracic, or suprainguinal vascular)    Ofelia Aguilar has an IN HOSPITAL cardiac risk of RCI RISK CLASS II (1 risk factor, risk of major cardiac compl. appr. 1.3%) based on RCRI calculator    Cardiac Risk Estimation: per the Revised Cardiac Risk Index (Circ. 100:1043, 1999),        Pre-Op Data Reviewed:       Laboratory Results: I have personally reviewed the pertinent laboratory results/reports     EKG:I have personally reviewed pertinent reports.  . I personally reviewed and interpreted available tracings in the electronic medical record    Encounter Date: 01/31/25   ECG 12 lead   Result Value    Ventricular Rate 69    Atrial Rate 69    OK Interval 140    QRSD Interval 116    QT Interval 434    QTC Interval 465    P Axis 65    QRS Axis -44    T Wave Axis 98    Narrative    Normal sinus rhythm  Left axis deviation  Intraventricular conduction delay  Confirmed by Raquel Zepeda ([id number]) on 2/1/2025 6:10:01 PM       OLD RECORDS: reviewed old records in the chart review section if EHR on day of visit.    Previous cardiopulmonary studies within the past year:  Echocardiogram: no   Cardiac Catheterization: " no  Stress Test: no      Time of visit including pre-visit chart review, visit and post-visit coordination of plan and care , review of pre-surgical lab work, preparation and time spent documenting note in electronic medical record, time spent face-to-face in physical examination answering patient questions by care team 45 minutes             [facility]

## 2025-01-27 NOTE — ASSESSMENT & PLAN NOTE
Malnutrition Findings:                             BMI Findings:           Body mass index is 17.71 kg/m².   Recent CMP with normal protein levels  Prealbumin level normal

## 2025-01-30 ENCOUNTER — TELEPHONE (OUTPATIENT)
Dept: FAMILY MEDICINE CLINIC | Facility: CLINIC | Age: 85
End: 2025-01-30

## 2025-01-30 NOTE — TELEPHONE ENCOUNTER
Pt has appt Friday with Madalyn. She has [employer] insurance and we are not listed [facility]. Please call pt and have her call her insurance and change it to us.  Thanks, Rb

## 2025-01-31 ENCOUNTER — OFFICE VISIT (OUTPATIENT)
Dept: FAMILY MEDICINE CLINIC | Facility: CLINIC | Age: 85
End: 2025-01-31
Payer: COMMERCIAL

## 2025-01-31 ENCOUNTER — OFFICE VISIT (OUTPATIENT)
Dept: LAB | Facility: HOSPITAL | Age: 85
End: 2025-01-31
Attending: SURGERY
Payer: COMMERCIAL

## 2025-01-31 ENCOUNTER — APPOINTMENT (OUTPATIENT)
Dept: LAB | Facility: HOSPITAL | Age: 85
End: 2025-01-31
Payer: COMMERCIAL

## 2025-01-31 ENCOUNTER — HOSPITAL ENCOUNTER (OUTPATIENT)
Dept: RADIOLOGY | Facility: HOSPITAL | Age: 85
End: 2025-01-31
Payer: COMMERCIAL

## 2025-01-31 ENCOUNTER — LAB REQUISITION (OUTPATIENT)
Dept: LAB | Facility: HOSPITAL | Age: 85
End: 2025-01-31
Payer: COMMERCIAL

## 2025-01-31 VITALS
HEART RATE: 83 BPM | HEIGHT: 64 IN | SYSTOLIC BLOOD PRESSURE: 138 MMHG | WEIGHT: 103 LBS | DIASTOLIC BLOOD PRESSURE: 68 MMHG | OXYGEN SATURATION: 96 % | TEMPERATURE: 96.8 F | BODY MASS INDEX: 17.58 KG/M2

## 2025-01-31 DIAGNOSIS — I70.223 ATHEROSCLEROSIS OF NATIVE ARTERIES OF EXTREMITIES WITH REST PAIN, BILATERAL LEGS (HCC): ICD-10-CM

## 2025-01-31 DIAGNOSIS — E22.2 SIADH (SYNDROME OF INAPPROPRIATE ADH PRODUCTION) (HCC): ICD-10-CM

## 2025-01-31 DIAGNOSIS — I10 ESSENTIAL HYPERTENSION: ICD-10-CM

## 2025-01-31 DIAGNOSIS — Z01.818 ENCOUNTER FOR OTHER PREPROCEDURAL EXAMINATION: ICD-10-CM

## 2025-01-31 DIAGNOSIS — F32.2 SEVERE MAJOR DEPRESSIVE DISORDER (HCC): Primary | ICD-10-CM

## 2025-01-31 DIAGNOSIS — Z01.818 PRE-OP EVALUATION: ICD-10-CM

## 2025-01-31 DIAGNOSIS — E44.0 MODERATE PROTEIN-CALORIE MALNUTRITION (HCC): ICD-10-CM

## 2025-01-31 DIAGNOSIS — J44.9 CHRONIC OBSTRUCTIVE PULMONARY DISEASE, UNSPECIFIED COPD TYPE (HCC): ICD-10-CM

## 2025-01-31 LAB
ANION GAP SERPL CALCULATED.3IONS-SCNC: 7 MMOL/L (ref 4–13)
BASOPHILS # BLD AUTO: 0.03 THOUSANDS/ΜL (ref 0–0.1)
BASOPHILS NFR BLD AUTO: 0 % (ref 0–1)
BUN SERPL-MCNC: 22 MG/DL (ref 5–25)
CALCIUM SERPL-MCNC: 10 MG/DL (ref 8.4–10.2)
CHLORIDE SERPL-SCNC: 100 MMOL/L (ref 96–108)
CO2 SERPL-SCNC: 28 MMOL/L (ref 21–32)
CREAT SERPL-MCNC: 0.8 MG/DL (ref 0.6–1.3)
EOSINOPHIL # BLD AUTO: 0.18 THOUSAND/ΜL (ref 0–0.61)
EOSINOPHIL NFR BLD AUTO: 3 % (ref 0–6)
ERYTHROCYTE [DISTWIDTH] IN BLOOD BY AUTOMATED COUNT: 14.4 % (ref 11.6–15.1)
GFR SERPL CREATININE-BSD FRML MDRD: 68 ML/MIN/1.73SQ M
GLUCOSE P FAST SERPL-MCNC: 93 MG/DL (ref 65–99)
HCT VFR BLD AUTO: 35.6 % (ref 34.8–46.1)
HGB BLD-MCNC: 11.5 G/DL (ref 11.5–15.4)
IMM GRANULOCYTES # BLD AUTO: 0.03 THOUSAND/UL (ref 0–0.2)
IMM GRANULOCYTES NFR BLD AUTO: 0 % (ref 0–2)
INR PPP: 0.99 (ref 0.85–1.19)
LYMPHOCYTES # BLD AUTO: 2 THOUSANDS/ΜL (ref 0.6–4.47)
LYMPHOCYTES NFR BLD AUTO: 27 % (ref 14–44)
MCH RBC QN AUTO: 30.1 PG (ref 26.8–34.3)
MCHC RBC AUTO-ENTMCNC: 32.3 G/DL (ref 31.4–37.4)
MCV RBC AUTO: 93 FL (ref 82–98)
MONOCYTES # BLD AUTO: 0.59 THOUSAND/ΜL (ref 0.17–1.22)
MONOCYTES NFR BLD AUTO: 8 % (ref 4–12)
NEUTROPHILS # BLD AUTO: 4.46 THOUSANDS/ΜL (ref 1.85–7.62)
NEUTS SEG NFR BLD AUTO: 62 % (ref 43–75)
NRBC BLD AUTO-RTO: 0 /100 WBCS
PLATELET # BLD AUTO: 297 THOUSANDS/UL (ref 149–390)
PMV BLD AUTO: 10.2 FL (ref 8.9–12.7)
POTASSIUM SERPL-SCNC: 4.5 MMOL/L (ref 3.5–5.3)
PREALB SERPL-MCNC: 27.6 MG/DL (ref 17–34)
PROTHROMBIN TIME: 13.8 SECONDS (ref 12.3–15)
RBC # BLD AUTO: 3.82 MILLION/UL (ref 3.81–5.12)
SODIUM SERPL-SCNC: 135 MMOL/L (ref 135–147)
WBC # BLD AUTO: 7.29 THOUSAND/UL (ref 4.31–10.16)

## 2025-01-31 PROCEDURE — 99213 OFFICE O/P EST LOW 20 MIN: CPT

## 2025-01-31 PROCEDURE — 86850 RBC ANTIBODY SCREEN: CPT | Performed by: SURGERY

## 2025-01-31 PROCEDURE — 86905 BLOOD TYPING RBC ANTIGENS: CPT

## 2025-01-31 PROCEDURE — 84134 ASSAY OF PREALBUMIN: CPT

## 2025-01-31 PROCEDURE — 86901 BLOOD TYPING SEROLOGIC RH(D): CPT | Performed by: SURGERY

## 2025-01-31 PROCEDURE — 71046 X-RAY EXAM CHEST 2 VIEWS: CPT

## 2025-01-31 PROCEDURE — 93005 ELECTROCARDIOGRAM TRACING: CPT

## 2025-01-31 PROCEDURE — 86870 RBC ANTIBODY IDENTIFICATION: CPT | Performed by: SURGERY

## 2025-01-31 PROCEDURE — 86900 BLOOD TYPING SEROLOGIC ABO: CPT | Performed by: SURGERY

## 2025-01-31 PROCEDURE — 80048 BASIC METABOLIC PNL TOTAL CA: CPT

## 2025-01-31 PROCEDURE — 36415 COLL VENOUS BLD VENIPUNCTURE: CPT

## 2025-01-31 PROCEDURE — 85610 PROTHROMBIN TIME: CPT

## 2025-01-31 NOTE — ASSESSMENT & PLAN NOTE
Following with vascular surgeon, Dr. Diaz   Has Vascular surgery scheduled on 2/25/25  Continue daily ASA and statin

## 2025-01-31 NOTE — ASSESSMENT & PLAN NOTE
Diagnosed during last hospitalization. Remains on sodium chloride tablets 1 g TID. BMP completed earlier today with sodium in normal range, 135 which is reassuring.

## 2025-01-31 NOTE — PROGRESS NOTES
"Name: Ofelia Aguilar      : 1940      MRN: [id number]  Encounter Provider: Madalyn Zendejas PA-C  Encounter Date: 2025   Encounter department: [facility]  :  Assessment & Plan  Severe major depressive disorder (HCC)  Well controlled on current Remeron 15 mg HS  Daughter reports her mood has been stable and improved -- therefore continue current medication. No SI.          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)  Diagnosed during last hospitalization. Remains on sodium chloride tablets 1 g TID. BMP completed earlier today with sodium in normal range, 135 which is reassuring.        Chronic obstructive pulmonary disease, unspecified COPD type (HCC)  Stable, denies breathing difficulty. Lungs are clear with O2 96% on room air.        Essential hypertension  BP goal for age, 138/68 mmHg  Continue current amlodipine 5 mg QD       Atherosclerosis of native arteries of extremities with rest pain, bilateral legs (HCC)  Following with vascular surgeon, Dr. Diaz   Has Vascular surgery scheduled on 25  Continue daily ASA and statin        Moderate protein-calorie malnutrition (HCC)  Has gained 2 lbs since last visit  Continue working on increasing nutrition intake          Follow up in 8 weeks. Sooner as needed. To call with any questions/concerns.      History of Present Illness   Patient presents for follow up. Accompanied by her daughter whom she lives with.   Since our last visit, she saw vascular in the office, Dr. Diaz, for severe atherosclerosis of the arteries. Per his note, \"She will benefit from right Ax bifem bypass (since the common femoral is occluded with prior fem fem bypass, she will require bypass to proximal SFA bilateral.  Remainder of runoff is patent\"  She is scheduled for vascular surgery on 25 and cardiology beforehand on .   She continues on daily ASA and atorvastatin 40 mg QD.   She is scheduled for pre-op visit with the surgical " "optimization center on 2/5/25.     Continues on Remeron 15 mg HS for insomnia/depression. At last visit, we also started her on hydroxyzine 10 mg HS for insomnia given persistent trouble falling to sleep. Her daughter reports sometimes her sleep seems to better, and then other times it is not. They'd like to wait until after her surgery to discuss more.     She has been trying to eat more. Has gained an additional 2 lbs since last visit which is good.  Mood has been stable, daughter reports seems happier.     No acute complaints today. Overall stable.       Review of Systems   Constitutional:  Negative for appetite change and unexpected weight change.   Respiratory:  Negative for shortness of breath.    Cardiovascular:  Negative for chest pain.   Neurological:  Negative for dizziness and headaches.   Psychiatric/Behavioral:  Negative for self-injury and suicidal ideas.        Objective   /68   Pulse 83   Temp (!) 96.8 °F (36 °C)   Ht 5' 4\" (1.626 m)   Wt 46.7 kg (103 lb)   LMP  (LMP Unknown)   SpO2 96%   BMI 17.68 kg/m²      Physical Exam  Vitals reviewed.   Constitutional:       General: She is not in acute distress.     Appearance: Normal appearance. She is not ill-appearing or diaphoretic.   HENT:      Head: Normocephalic and atraumatic.      Right Ear: External ear normal.      Left Ear: External ear normal.      Nose: Nose normal.      Mouth/Throat:      Mouth: Mucous membranes are moist.   Eyes:      General:         Right eye: No discharge.         Left eye: No discharge.      Conjunctiva/sclera: Conjunctivae normal.   Cardiovascular:      Rate and Rhythm: Normal rate and regular rhythm.      Pulses: Normal pulses.      Heart sounds: Normal heart sounds.   Pulmonary:      Effort: Pulmonary effort is normal. No respiratory distress.      Breath sounds: Normal breath sounds. No wheezing, rhonchi or rales.   Musculoskeletal:      Cervical back: Normal range of motion.      Right lower leg: No edema. "      Left lower leg: No edema.   Skin:     General: Skin is warm.   Neurological:      General: No focal deficit present.      Mental Status: She is alert.   Psychiatric:         Mood and Affect: Mood normal.

## 2025-01-31 NOTE — ASSESSMENT & PLAN NOTE
Well controlled on current Remeron 15 mg HS  Daughter reports her mood has been stable and improved -- therefore continue current medication. No SI.

## 2025-02-01 LAB
ABO GROUP BLD: NORMAL
ATRIAL RATE: 69 BPM
BLD GP AB SCN SERPL QL: POSITIVE
BLOOD GROUP ANTIBODIES SERPL: NORMAL
C AG RBC QL: NEGATIVE
E AG RBC QL: NEGATIVE
P AXIS: 65 DEGREES
PR INTERVAL: 140 MS
QRS AXIS: -44 DEGREES
QRSD INTERVAL: 116 MS
QT INTERVAL: 434 MS
QTC INTERVAL: 465 MS
RH BLD: NEGATIVE
SPECIMEN EXPIRATION DATE: NORMAL
T WAVE AXIS: 98 DEGREES
VENTRICULAR RATE: 69 BPM

## 2025-02-01 PROCEDURE — 93010 ELECTROCARDIOGRAM REPORT: CPT | Performed by: INTERNAL MEDICINE

## 2025-02-03 ENCOUNTER — ANESTHESIA EVENT (INPATIENT)
Dept: PERIOP | Facility: HOSPITAL | Age: 85
End: 2025-02-03
Payer: COMMERCIAL

## 2025-02-03 DIAGNOSIS — M79.605 PAIN IN BOTH LOWER EXTREMITIES: ICD-10-CM

## 2025-02-03 DIAGNOSIS — M79.604 PAIN IN BOTH LOWER EXTREMITIES: ICD-10-CM

## 2025-02-03 DIAGNOSIS — M48.061 SPINAL STENOSIS OF LUMBAR REGION, UNSPECIFIED WHETHER NEUROGENIC CLAUDICATION PRESENT: ICD-10-CM

## 2025-02-03 RX ORDER — PREGABALIN 25 MG/1
CAPSULE ORAL
Qty: 90 CAPSULE | Refills: 0 | Status: SHIPPED | OUTPATIENT
Start: 2025-02-03

## 2025-02-03 NOTE — TELEPHONE ENCOUNTER
Last ov 10/14  Will advise pt make f/u ov for future refills  Can a script be sent today so pt does not run out?

## 2025-02-05 ENCOUNTER — CONSULT (OUTPATIENT)
Dept: ANESTHESIOLOGY | Facility: CLINIC | Age: 85
End: 2025-02-05
Payer: COMMERCIAL

## 2025-02-05 VITALS
HEIGHT: 64 IN | SYSTOLIC BLOOD PRESSURE: 138 MMHG | BODY MASS INDEX: 17.62 KG/M2 | DIASTOLIC BLOOD PRESSURE: 70 MMHG | OXYGEN SATURATION: 97 % | WEIGHT: 103.2 LBS | HEART RATE: 80 BPM

## 2025-02-05 DIAGNOSIS — M79.604 PAIN IN BOTH LOWER EXTREMITIES: ICD-10-CM

## 2025-02-05 DIAGNOSIS — D50.9 IRON DEFICIENCY ANEMIA, UNSPECIFIED IRON DEFICIENCY ANEMIA TYPE: ICD-10-CM

## 2025-02-05 DIAGNOSIS — Z00.8 NUTRITIONAL ASSESSMENT: ICD-10-CM

## 2025-02-05 DIAGNOSIS — Z01.89 ENCOUNTER FOR GERIATRIC ASSESSMENT: Primary | ICD-10-CM

## 2025-02-05 DIAGNOSIS — M79.605 PAIN IN BOTH LOWER EXTREMITIES: ICD-10-CM

## 2025-02-05 DIAGNOSIS — E46 MALNUTRITION, UNSPECIFIED TYPE (HCC): ICD-10-CM

## 2025-02-05 DIAGNOSIS — J44.9 CHRONIC OBSTRUCTIVE PULMONARY DISEASE, UNSPECIFIED COPD TYPE (HCC): ICD-10-CM

## 2025-02-05 DIAGNOSIS — I70.223 ATHEROSCLEROSIS OF NATIVE ARTERIES OF EXTREMITIES WITH REST PAIN, BILATERAL LEGS (HCC): ICD-10-CM

## 2025-02-05 DIAGNOSIS — I10 ESSENTIAL HYPERTENSION: ICD-10-CM

## 2025-02-05 DIAGNOSIS — E87.1 HYPONATREMIA: ICD-10-CM

## 2025-02-05 PROCEDURE — 99214 OFFICE O/P EST MOD 30 MIN: CPT | Performed by: NURSE PRACTITIONER

## 2025-02-05 NOTE — PROGRESS NOTES
[facility]  CONSULT       Patient has the ability to take their vital signs at home____  Allergies reviewed today   PMH reviewed today   Medications reviewed today     See Geriatric Assessment below...  Cognitive Assessment:   CAM:   TUG <15 sec:  Falls (last 6 months): no  Hand  score: 16  -Attempt 1:16  -Attempt 2:16  -Attempt 3: 16  Tolu Total Score: 20  PHQ- 9 Depression Scale: 5  Nutrition Assessment Score:11  METS:3.63  DX SLEEP APNEA; NO:SCORE 2  Health goals:  -What are your overall health goals? Get better, walk better    -What brings you strength? daughter    -What activities are important to you? Crossword puzzles     As always we discussed having your BEST surgery, and BEST recovery.  Surgery goals reviewed today.      Breathing exercises   Patient was encouraged to begin lung exercises today.  This could be accomplished through deep breathing and cough exercises.  Patient was taught how to use an incentive spirometer.  Return demonstration provided.    Eating/nutrition   Encouraged patient to increase oral protein intake prior to surgery.  This can be accomplished by consuming chicken, fish, tuna fish, cottage cheese, cheese, eggs, Greek yogurt, and protein shakes as needed.  I encouraged use of protein shakes such ENLIVE.  I also recommended making your own protein shakes with protein powder.     Sleep/Stress management  Patient was encouraged to rest their body prior to surgery.  Encouraged attempting to get 8 hours of sleep at night.  Avoid stress.  Avoid sick contacts.  Encouraged to find a relaxing hobby such as reading, meditation, listening to music.  Training exercises  Patient was encouraged to remain active as possible.  Today bilateral lower extremity generic exercises were taught for muscle strengthening and balance.  All exercises to be done sitting down.

## 2025-02-05 NOTE — ASSESSMENT & PLAN NOTE
Cognitive Assessment:   CAM:   TUG <15 sec:  Falls (last 6 months): no  Hand  score: 16  -Attempt 1:16  -Attempt 2:16  -Attempt 3: 16  Tolu Total Score: 20  PHQ- 9 Depression Scale: 5  Nutrition Assessment Score:11  METS:3.63  DX SLEEP APNEA; NO:SCORE 2  Health goals:  -What are your overall health goals? Get better, walk better     -What brings you strength? daughter     -What activities are important to you? Crossword puzzles

## 2025-02-07 DIAGNOSIS — G47.00 INSOMNIA, UNSPECIFIED TYPE: ICD-10-CM

## 2025-02-07 DIAGNOSIS — E87.1 HYPONATREMIA: ICD-10-CM

## 2025-02-07 RX ORDER — HYDROXYZINE HYDROCHLORIDE 10 MG/1
10 TABLET, FILM COATED ORAL
Qty: 30 TABLET | Refills: 5 | Status: SHIPPED | OUTPATIENT
Start: 2025-02-07

## 2025-02-07 RX ORDER — HYDROXYZINE HYDROCHLORIDE 10 MG/1
10 TABLET, FILM COATED ORAL
Qty: 30 TABLET | Refills: 0 | OUTPATIENT
Start: 2025-02-07

## 2025-02-07 RX ORDER — SODIUM CHLORIDE 1 G/1
2 TABLET ORAL
Qty: 180 TABLET | Refills: 5 | Status: SHIPPED | OUTPATIENT
Start: 2025-02-07 | End: 2025-03-09

## 2025-02-13 NOTE — PRE-PROCEDURE INSTRUCTIONS
Pre-Surgery Instructions:   Medication Instructions    acetaminophen (TYLENOL) 650 mg CR tablet Uses PRN- OK to take day of surgery    amLODIPine (NORVASC) 5 mg tablet Take day of surgery.    atorvastatin (LIPITOR) 40 mg tablet Take day of surgery.    clopidogrel (PLAVIX) 75 mg tablet Instructions provided by MD- vega sandoval per vascular    mirtazapine (REMERON) 15 mg tablet Take night before surgery    Multiple Vitamin (multivitamin) tablet Stop taking 7 days prior to surgery.    ondansetron (ZOFRAN) 4 mg tablet Uses PRN- OK to take day of surgery                   Medication instructions for day surgery reviewed. Please use only a sip of water to take your instructed medications. Avoid all over the counter vitamins, supplements and NSAIDS for one week prior to surgery per anesthesia guidelines. Tylenol is ok to take as needed.     You will receive a call one business day prior to surgery with an arrival time and hospital directions. If your surgery is scheduled on a Monday, the hospital will be calling you on the Friday prior to your surgery. If you have not heard from anyone by 8pm, please call the hospital supervisor through the hospital  at [phone]. ([place] [phone] or [place] [phone]).    Do not eat or drink anything after midnight the night before your surgery, including candy, mints, lifesavers, or chewing gum. Do not drink alcohol 24hrs before your surgery. Try not to smoke at least 24hrs before your surgery.       Follow the pre surgery showering instructions as listed in the “My Surgical Experience Booklet” or otherwise provided by your surgeon's office. Do not use a blade to shave the surgical area 1 week before surgery. It is okay to use a clean electric clippers up to 24 hours before surgery. Do not apply any lotions, creams, including makeup, cologne, deodorant, or perfumes after showering on the day of your surgery. Do not use dry shampoo, hair spray, hair gel, or any type of  hair products.     No contact lenses, eye make-up, or artificial eyelashes. Remove nail polish, including gel polish, and any artificial, gel, or acrylic nails if possible. Remove all jewelry including rings and body piercing jewelry.     Wear causal clothing that is easy to take on and off. Consider your type of surgery.    Keep any valuables, jewelry, piercings at home. Please bring any specially ordered equipment (sling, braces) if indicated.    Arrange for a responsible person to drive you to and from the hospital on the day of your surgery. Please confirm the visitor policy for the day of your procedure when you receive your phone call with an arrival time.     Call the surgeon's office with any new illnesses, exposures, or additional questions prior to surgery.    Please reference your “My Surgical Experience Booklet” for additional information to prepare for your upcoming surgery.

## 2025-02-25 ENCOUNTER — HOSPITAL ENCOUNTER (INPATIENT)
Facility: HOSPITAL | Age: 85
LOS: 5 days | Discharge: HOME HEALTH | DRG: 253 | End: 2025-03-02
Attending: SURGERY | Admitting: SURGERY
Payer: COMMERCIAL

## 2025-02-25 ENCOUNTER — ANESTHESIA (INPATIENT)
Dept: PERIOP | Facility: HOSPITAL | Age: 85
End: 2025-02-25
Payer: COMMERCIAL

## 2025-02-25 DIAGNOSIS — I47.19 ATRIAL TACHYCARDIA (HCC): ICD-10-CM

## 2025-02-25 DIAGNOSIS — I70.223 ATHEROSCLEROSIS OF NATIVE ARTERIES OF EXTREMITIES WITH REST PAIN, BILATERAL LEGS (HCC): Primary | ICD-10-CM

## 2025-02-25 PROBLEM — Z79.01 CHRONIC ANTICOAGULATION: Status: ACTIVE | Noted: 2025-02-25

## 2025-02-25 LAB
ABO GROUP BLD: NORMAL
ANION GAP SERPL CALCULATED.3IONS-SCNC: 10 MMOL/L (ref 4–13)
APTT PPP: 27 SECONDS (ref 23–34)
BASOPHILS # BLD AUTO: 0.02 THOUSANDS/ÂΜL (ref 0–0.1)
BASOPHILS NFR BLD AUTO: 0 % (ref 0–1)
BLD GP AB SCN SERPL QL: POSITIVE
BUN SERPL-MCNC: 18 MG/DL (ref 5–25)
CALCIUM SERPL-MCNC: 8.6 MG/DL (ref 8.4–10.2)
CHLORIDE SERPL-SCNC: 111 MMOL/L (ref 96–108)
CO2 SERPL-SCNC: 19 MMOL/L (ref 21–32)
CREAT SERPL-MCNC: 0.72 MG/DL (ref 0.6–1.3)
EOSINOPHIL # BLD AUTO: 0.02 THOUSAND/ÂΜL (ref 0–0.61)
EOSINOPHIL NFR BLD AUTO: 0 % (ref 0–6)
ERYTHROCYTE [DISTWIDTH] IN BLOOD BY AUTOMATED COUNT: 14.3 % (ref 11.6–15.1)
GFR SERPL CREATININE-BSD FRML MDRD: 78 ML/MIN/1.73SQ M
GLUCOSE SERPL-MCNC: 122 MG/DL (ref 65–140)
HCT VFR BLD AUTO: 29.9 % (ref 34.8–46.1)
HGB BLD-MCNC: 9.7 G/DL (ref 11.5–15.4)
IMM GRANULOCYTES # BLD AUTO: 0.03 THOUSAND/UL (ref 0–0.2)
IMM GRANULOCYTES NFR BLD AUTO: 0 % (ref 0–2)
KCT BLD-ACNC: 207 SEC (ref 89–137)
LYMPHOCYTES # BLD AUTO: 0.99 THOUSANDS/ÂΜL (ref 0.6–4.47)
LYMPHOCYTES NFR BLD AUTO: 9 % (ref 14–44)
MCH RBC QN AUTO: 30.1 PG (ref 26.8–34.3)
MCHC RBC AUTO-ENTMCNC: 32.4 G/DL (ref 31.4–37.4)
MCV RBC AUTO: 93 FL (ref 82–98)
MONOCYTES # BLD AUTO: 1.02 THOUSAND/ÂΜL (ref 0.17–1.22)
MONOCYTES NFR BLD AUTO: 9 % (ref 4–12)
NEUTROPHILS # BLD AUTO: 9.23 THOUSANDS/ÂΜL (ref 1.85–7.62)
NEUTS SEG NFR BLD AUTO: 82 % (ref 43–75)
NRBC BLD AUTO-RTO: 0 /100 WBCS
PLATELET # BLD AUTO: 185 THOUSANDS/UL (ref 149–390)
PMV BLD AUTO: 10.3 FL (ref 8.9–12.7)
POTASSIUM SERPL-SCNC: 3.6 MMOL/L (ref 3.5–5.3)
RBC # BLD AUTO: 3.22 MILLION/UL (ref 3.81–5.12)
RH BLD: NEGATIVE
SODIUM SERPL-SCNC: 140 MMOL/L (ref 135–147)
SPECIMEN EXPIRATION DATE: NORMAL
SPECIMEN SOURCE: ABNORMAL
WBC # BLD AUTO: 11.31 THOUSAND/UL (ref 4.31–10.16)

## 2025-02-25 PROCEDURE — 99223 1ST HOSP IP/OBS HIGH 75: CPT

## 2025-02-25 PROCEDURE — 03150J9 BYPASS RIGHT AXILLARY ARTERY TO RIGHT LOWER LEG ARTERY WITH SYNTHETIC SUBSTITUTE, OPEN APPROACH: ICD-10-PCS | Performed by: SURGERY

## 2025-02-25 PROCEDURE — 85730 THROMBOPLASTIN TIME PARTIAL: CPT | Performed by: SURGERY

## 2025-02-25 PROCEDURE — NC001 PR NO CHARGE: Performed by: SURGERY

## 2025-02-25 PROCEDURE — 35654 BPG AXILLARY-FEMORAL-FEMORAL: CPT | Performed by: SURGERY

## 2025-02-25 PROCEDURE — C1768 GRAFT, VASCULAR: HCPCS | Performed by: SURGERY

## 2025-02-25 PROCEDURE — 86901 BLOOD TYPING SEROLOGIC RH(D): CPT | Performed by: ANESTHESIOLOGY

## 2025-02-25 PROCEDURE — 03160JB BYPASS LEFT AXILLARY ARTERY TO LEFT LOWER LEG ARTERY WITH SYNTHETIC SUBSTITUTE, OPEN APPROACH: ICD-10-PCS | Performed by: SURGERY

## 2025-02-25 PROCEDURE — G0008 ADMIN INFLUENZA VIRUS VAC: HCPCS | Performed by: SURGERY

## 2025-02-25 PROCEDURE — 86900 BLOOD TYPING SEROLOGIC ABO: CPT | Performed by: ANESTHESIOLOGY

## 2025-02-25 PROCEDURE — 90662 IIV NO PRSV INCREASED AG IM: CPT | Performed by: SURGERY

## 2025-02-25 PROCEDURE — 85347 COAGULATION TIME ACTIVATED: CPT

## 2025-02-25 PROCEDURE — 85025 COMPLETE CBC W/AUTO DIFF WBC: CPT

## 2025-02-25 PROCEDURE — 86850 RBC ANTIBODY SCREEN: CPT | Performed by: ANESTHESIOLOGY

## 2025-02-25 PROCEDURE — 80048 BASIC METABOLIC PNL TOTAL CA: CPT

## 2025-02-25 DEVICE — PROPATEN VASC GRAFT SW RR AXBI 8MMX70CM 8MMX40CM HEPARIN
Type: IMPLANTABLE DEVICE | Site: ABDOMEN | Status: FUNCTIONAL
Brand: GORE PROPATEN VASCULAR GRAFT

## 2025-02-25 RX ORDER — CLOPIDOGREL BISULFATE 75 MG/1
75 TABLET ORAL DAILY
Status: DISCONTINUED | OUTPATIENT
Start: 2025-02-25 | End: 2025-03-02 | Stop reason: HOSPADM

## 2025-02-25 RX ORDER — BUPIVACAINE HYDROCHLORIDE 2.5 MG/ML
INJECTION, SOLUTION EPIDURAL; INFILTRATION; INTRACAUDAL AS NEEDED
Status: DISCONTINUED | OUTPATIENT
Start: 2025-02-25 | End: 2025-02-25 | Stop reason: HOSPADM

## 2025-02-25 RX ORDER — ACETAMINOPHEN 325 MG/1
975 TABLET ORAL EVERY 8 HOURS SCHEDULED
Status: DISCONTINUED | OUTPATIENT
Start: 2025-02-25 | End: 2025-03-02 | Stop reason: HOSPADM

## 2025-02-25 RX ORDER — HEPARIN SODIUM 1000 [USP'U]/ML
INJECTION, SOLUTION INTRAVENOUS; SUBCUTANEOUS AS NEEDED
Status: DISCONTINUED | OUTPATIENT
Start: 2025-02-25 | End: 2025-02-25

## 2025-02-25 RX ORDER — MIRTAZAPINE 15 MG/1
15 TABLET, FILM COATED ORAL
Status: DISCONTINUED | OUTPATIENT
Start: 2025-02-25 | End: 2025-03-02 | Stop reason: HOSPADM

## 2025-02-25 RX ORDER — ATORVASTATIN CALCIUM 40 MG/1
40 TABLET, FILM COATED ORAL DAILY
Status: DISCONTINUED | OUTPATIENT
Start: 2025-02-25 | End: 2025-03-02 | Stop reason: HOSPADM

## 2025-02-25 RX ORDER — ONDANSETRON 2 MG/ML
4 INJECTION INTRAMUSCULAR; INTRAVENOUS EVERY 6 HOURS PRN
Status: DISCONTINUED | OUTPATIENT
Start: 2025-02-25 | End: 2025-03-02 | Stop reason: HOSPADM

## 2025-02-25 RX ORDER — ROCURONIUM BROMIDE 10 MG/ML
INJECTION, SOLUTION INTRAVENOUS AS NEEDED
Status: DISCONTINUED | OUTPATIENT
Start: 2025-02-25 | End: 2025-02-25

## 2025-02-25 RX ORDER — PROTAMINE SULFATE 10 MG/ML
INJECTION, SOLUTION INTRAVENOUS AS NEEDED
Status: DISCONTINUED | OUTPATIENT
Start: 2025-02-25 | End: 2025-02-25

## 2025-02-25 RX ORDER — OXYCODONE HYDROCHLORIDE 10 MG/1
10 TABLET ORAL EVERY 4 HOURS PRN
Refills: 0 | Status: DISCONTINUED | OUTPATIENT
Start: 2025-02-25 | End: 2025-03-02 | Stop reason: HOSPADM

## 2025-02-25 RX ORDER — SENNOSIDES 8.6 MG
1 TABLET ORAL DAILY
Status: DISCONTINUED | OUTPATIENT
Start: 2025-02-25 | End: 2025-03-02 | Stop reason: HOSPADM

## 2025-02-25 RX ORDER — ASPIRIN 81 MG/1
81 TABLET, CHEWABLE ORAL DAILY
Status: DISCONTINUED | OUTPATIENT
Start: 2025-02-25 | End: 2025-03-02 | Stop reason: HOSPADM

## 2025-02-25 RX ORDER — PREGABALIN 50 MG/1
50 CAPSULE ORAL
Status: DISCONTINUED | OUTPATIENT
Start: 2025-02-25 | End: 2025-03-02 | Stop reason: HOSPADM

## 2025-02-25 RX ORDER — SODIUM CHLORIDE 9 MG/ML
INJECTION, SOLUTION INTRAVENOUS CONTINUOUS PRN
Status: DISCONTINUED | OUTPATIENT
Start: 2025-02-25 | End: 2025-02-25

## 2025-02-25 RX ORDER — SODIUM CHLORIDE 9 MG/ML
75 INJECTION, SOLUTION INTRAVENOUS CONTINUOUS
Status: DISPENSED | OUTPATIENT
Start: 2025-02-25 | End: 2025-02-25

## 2025-02-25 RX ORDER — FENTANYL CITRATE 50 UG/ML
INJECTION, SOLUTION INTRAMUSCULAR; INTRAVENOUS AS NEEDED
Status: DISCONTINUED | OUTPATIENT
Start: 2025-02-25 | End: 2025-02-25

## 2025-02-25 RX ORDER — ONDANSETRON 2 MG/ML
4 INJECTION INTRAMUSCULAR; INTRAVENOUS ONCE AS NEEDED
Status: DISCONTINUED | OUTPATIENT
Start: 2025-02-25 | End: 2025-02-25 | Stop reason: HOSPADM

## 2025-02-25 RX ORDER — MAGNESIUM HYDROXIDE 1200 MG/15ML
LIQUID ORAL AS NEEDED
Status: DISCONTINUED | OUTPATIENT
Start: 2025-02-25 | End: 2025-02-25 | Stop reason: HOSPADM

## 2025-02-25 RX ORDER — HEPARIN SODIUM 5000 [USP'U]/ML
5000 INJECTION, SOLUTION INTRAVENOUS; SUBCUTANEOUS EVERY 8 HOURS SCHEDULED
Status: DISCONTINUED | OUTPATIENT
Start: 2025-02-25 | End: 2025-02-28

## 2025-02-25 RX ORDER — SODIUM CHLORIDE 1 G/1
2 TABLET ORAL
Status: DISCONTINUED | OUTPATIENT
Start: 2025-02-25 | End: 2025-03-01

## 2025-02-25 RX ORDER — ACETAMINOPHEN 325 MG/1
650 TABLET ORAL ONCE
Status: COMPLETED | OUTPATIENT
Start: 2025-02-25 | End: 2025-02-25

## 2025-02-25 RX ORDER — PROPOFOL 10 MG/ML
INJECTION, EMULSION INTRAVENOUS AS NEEDED
Status: DISCONTINUED | OUTPATIENT
Start: 2025-02-25 | End: 2025-02-25

## 2025-02-25 RX ORDER — PROPOFOL 10 MG/ML
INJECTION, EMULSION INTRAVENOUS CONTINUOUS PRN
Status: DISCONTINUED | OUTPATIENT
Start: 2025-02-25 | End: 2025-02-25

## 2025-02-25 RX ORDER — CHLORHEXIDINE GLUCONATE ORAL RINSE 1.2 MG/ML
15 SOLUTION DENTAL ONCE
Status: COMPLETED | OUTPATIENT
Start: 2025-02-25 | End: 2025-02-25

## 2025-02-25 RX ORDER — OXYCODONE HYDROCHLORIDE 5 MG/1
5 TABLET ORAL EVERY 4 HOURS PRN
Refills: 0 | Status: DISCONTINUED | OUTPATIENT
Start: 2025-02-25 | End: 2025-03-02 | Stop reason: HOSPADM

## 2025-02-25 RX ORDER — EPHEDRINE SULFATE 50 MG/ML
INJECTION INTRAVENOUS AS NEEDED
Status: DISCONTINUED | OUTPATIENT
Start: 2025-02-25 | End: 2025-02-25

## 2025-02-25 RX ORDER — HYDROMORPHONE HCL/PF 1 MG/ML
0.5 SYRINGE (ML) INJECTION
Status: DISCONTINUED | OUTPATIENT
Start: 2025-02-25 | End: 2025-02-25 | Stop reason: HOSPADM

## 2025-02-25 RX ORDER — ONDANSETRON 2 MG/ML
INJECTION INTRAMUSCULAR; INTRAVENOUS AS NEEDED
Status: DISCONTINUED | OUTPATIENT
Start: 2025-02-25 | End: 2025-02-25

## 2025-02-25 RX ORDER — HYDROXYZINE HYDROCHLORIDE 10 MG/1
10 TABLET, FILM COATED ORAL
Status: DISCONTINUED | OUTPATIENT
Start: 2025-02-25 | End: 2025-03-02 | Stop reason: HOSPADM

## 2025-02-25 RX ORDER — LIDOCAINE HYDROCHLORIDE 10 MG/ML
INJECTION, SOLUTION EPIDURAL; INFILTRATION; INTRACAUDAL; PERINEURAL AS NEEDED
Status: DISCONTINUED | OUTPATIENT
Start: 2025-02-25 | End: 2025-02-25

## 2025-02-25 RX ORDER — CEFAZOLIN SODIUM 1 G/3ML
INJECTION, POWDER, FOR SOLUTION INTRAMUSCULAR; INTRAVENOUS AS NEEDED
Status: DISCONTINUED | OUTPATIENT
Start: 2025-02-25 | End: 2025-02-25

## 2025-02-25 RX ORDER — PREGABALIN 25 MG/1
25 CAPSULE ORAL EVERY MORNING
Status: DISCONTINUED | OUTPATIENT
Start: 2025-02-26 | End: 2025-03-02 | Stop reason: HOSPADM

## 2025-02-25 RX ORDER — AMLODIPINE BESYLATE 5 MG/1
5 TABLET ORAL DAILY
Status: DISCONTINUED | OUTPATIENT
Start: 2025-02-26 | End: 2025-02-28

## 2025-02-25 RX ORDER — FENTANYL CITRATE/PF 50 MCG/ML
25 SYRINGE (ML) INJECTION
Status: DISCONTINUED | OUTPATIENT
Start: 2025-02-25 | End: 2025-02-25 | Stop reason: HOSPADM

## 2025-02-25 RX ORDER — SODIUM CHLORIDE, SODIUM LACTATE, POTASSIUM CHLORIDE, CALCIUM CHLORIDE 600; 310; 30; 20 MG/100ML; MG/100ML; MG/100ML; MG/100ML
125 INJECTION, SOLUTION INTRAVENOUS CONTINUOUS
Status: DISCONTINUED | OUTPATIENT
Start: 2025-02-25 | End: 2025-02-25

## 2025-02-25 RX ADMIN — EPHEDRINE SULFATE 10 MG: 50 INJECTION INTRAVENOUS at 14:20

## 2025-02-25 RX ADMIN — FENTANYL CITRATE 25 MCG: 50 INJECTION INTRAMUSCULAR; INTRAVENOUS at 13:36

## 2025-02-25 RX ADMIN — HEPARIN SODIUM 5000 UNITS: 5000 INJECTION INTRAVENOUS; SUBCUTANEOUS at 21:10

## 2025-02-25 RX ADMIN — FENTANYL CITRATE 25 MCG: 50 INJECTION INTRAMUSCULAR; INTRAVENOUS at 15:07

## 2025-02-25 RX ADMIN — PROTAMINE SULFATE 30 MG: 10 INJECTION, SOLUTION INTRAVENOUS at 13:56

## 2025-02-25 RX ADMIN — ACETAMINOPHEN 650 MG: 325 TABLET, FILM COATED ORAL at 08:09

## 2025-02-25 RX ADMIN — ROCURONIUM 50 MG: 50 INJECTION, SOLUTION INTRAVENOUS at 11:02

## 2025-02-25 RX ADMIN — FENTANYL CITRATE 50 MCG: 50 INJECTION INTRAMUSCULAR; INTRAVENOUS at 11:02

## 2025-02-25 RX ADMIN — EPHEDRINE SULFATE 10 MG: 50 INJECTION INTRAVENOUS at 12:24

## 2025-02-25 RX ADMIN — FENTANYL CITRATE 25 MCG: 50 INJECTION INTRAMUSCULAR; INTRAVENOUS at 13:35

## 2025-02-25 RX ADMIN — FENTANYL CITRATE 25 MCG: 50 INJECTION INTRAMUSCULAR; INTRAVENOUS at 12:02

## 2025-02-25 RX ADMIN — OXYCODONE 5 MG: 5 TABLET ORAL at 17:25

## 2025-02-25 RX ADMIN — SODIUM CHLORIDE: 0.9 INJECTION, SOLUTION INTRAVENOUS at 12:34

## 2025-02-25 RX ADMIN — OXYCODONE 5 MG: 5 TABLET ORAL at 21:25

## 2025-02-25 RX ADMIN — ROCURONIUM 20 MG: 50 INJECTION, SOLUTION INTRAVENOUS at 11:51

## 2025-02-25 RX ADMIN — PHENYLEPHRINE HYDROCHLORIDE 20 MCG/MIN: 50 INJECTION INTRAVENOUS at 11:12

## 2025-02-25 RX ADMIN — FENTANYL CITRATE 25 MCG: 50 INJECTION INTRAMUSCULAR; INTRAVENOUS at 11:20

## 2025-02-25 RX ADMIN — ASPIRIN 81 MG CHEWABLE TABLET 81 MG: 81 TABLET CHEWABLE at 17:01

## 2025-02-25 RX ADMIN — FENTANYL CITRATE 25 MCG: 50 INJECTION INTRAMUSCULAR; INTRAVENOUS at 13:47

## 2025-02-25 RX ADMIN — ONDANSETRON 4 MG: 2 INJECTION INTRAMUSCULAR; INTRAVENOUS at 14:16

## 2025-02-25 RX ADMIN — CHLORHEXIDINE GLUCONATE 15 ML: 1.2 SOLUTION ORAL at 08:10

## 2025-02-25 RX ADMIN — ROCURONIUM 20 MG: 50 INJECTION, SOLUTION INTRAVENOUS at 13:09

## 2025-02-25 RX ADMIN — LIDOCAINE HYDROCHLORIDE 40 MG: 10 INJECTION, SOLUTION EPIDURAL; INFILTRATION; INTRACAUDAL; PERINEURAL at 11:02

## 2025-02-25 RX ADMIN — HEPARIN SODIUM 500 UNITS: 1000 INJECTION, SOLUTION INTRAVENOUS; SUBCUTANEOUS at 13:08

## 2025-02-25 RX ADMIN — INFLUENZA A VIRUS A/VICTORIA/4897/2022 IVR-238 (H1N1) ANTIGEN (FORMALDEHYDE INACTIVATED), INFLUENZA A VIRUS A/CALIFORNIA/122/2022 SAN-022 (H3N2) ANTIGEN (FORMALDEHYDE INACTIVATED), AND INFLUENZA B VIRUS B/MICHIGAN/01/2021 ANTIGEN (FORMALDEHYDE INACTIVATED) 0.5 ML: 60; 60; 60 INJECTION, SUSPENSION INTRAMUSCULAR at 17:05

## 2025-02-25 RX ADMIN — MIRTAZAPINE 15 MG: 15 TABLET, FILM COATED ORAL at 21:10

## 2025-02-25 RX ADMIN — ATORVASTATIN CALCIUM 40 MG: 40 TABLET, FILM COATED ORAL at 17:01

## 2025-02-25 RX ADMIN — CEFAZOLIN 1000 MG: 1 INJECTION, POWDER, FOR SOLUTION INTRAMUSCULAR; INTRAVENOUS; PARENTERAL at 11:25

## 2025-02-25 RX ADMIN — CEFAZOLIN 1000 MG: 1 INJECTION, POWDER, FOR SOLUTION INTRAMUSCULAR; INTRAVENOUS; PARENTERAL at 11:47

## 2025-02-25 RX ADMIN — FENTANYL CITRATE 25 MCG: 50 INJECTION INTRAMUSCULAR; INTRAVENOUS at 14:55

## 2025-02-25 RX ADMIN — PROPOFOL 100 MCG/KG/MIN: 10 INJECTION, EMULSION INTRAVENOUS at 11:39

## 2025-02-25 RX ADMIN — FENTANYL CITRATE 25 MCG: 50 INJECTION INTRAMUSCULAR; INTRAVENOUS at 13:11

## 2025-02-25 RX ADMIN — PROPOFOL 100 MCG/KG/MIN: 10 INJECTION, EMULSION INTRAVENOUS at 11:02

## 2025-02-25 RX ADMIN — HYDROXYZINE HYDROCHLORIDE 10 MG: 10 TABLET ORAL at 21:11

## 2025-02-25 RX ADMIN — CLOPIDOGREL BISULFATE 75 MG: 75 TABLET ORAL at 17:01

## 2025-02-25 RX ADMIN — PROPOFOL 75 MCG/KG/MIN: 10 INJECTION, EMULSION INTRAVENOUS at 13:58

## 2025-02-25 RX ADMIN — HEPARIN SODIUM 5000 UNITS: 1000 INJECTION, SOLUTION INTRAVENOUS; SUBCUTANEOUS at 12:38

## 2025-02-25 RX ADMIN — SODIUM CHLORIDE, SODIUM LACTATE, POTASSIUM CHLORIDE, AND CALCIUM CHLORIDE: .6; .31; .03; .02 INJECTION, SOLUTION INTRAVENOUS at 14:19

## 2025-02-25 RX ADMIN — SODIUM CHLORIDE 75 ML/HR: 0.9 INJECTION, SOLUTION INTRAVENOUS at 17:02

## 2025-02-25 RX ADMIN — SODIUM CHLORIDE: 0.9 INJECTION, SOLUTION INTRAVENOUS at 11:03

## 2025-02-25 RX ADMIN — SUGAMMADEX 200 MG: 100 INJECTION, SOLUTION INTRAVENOUS at 14:44

## 2025-02-25 RX ADMIN — PREGABALIN 50 MG: 50 CAPSULE ORAL at 21:10

## 2025-02-25 RX ADMIN — ROCURONIUM 10 MG: 50 INJECTION, SOLUTION INTRAVENOUS at 13:59

## 2025-02-25 RX ADMIN — PROPOFOL 150 MG: 10 INJECTION, EMULSION INTRAVENOUS at 11:02

## 2025-02-25 RX ADMIN — ACETAMINOPHEN 975 MG: 325 TABLET, FILM COATED ORAL at 17:01

## 2025-02-25 RX ADMIN — SODIUM CHLORIDE, SODIUM LACTATE, POTASSIUM CHLORIDE, AND CALCIUM CHLORIDE 125 ML/HR: .6; .31; .03; .02 INJECTION, SOLUTION INTRAVENOUS at 08:14

## 2025-02-25 RX ADMIN — SODIUM CHLORIDE, SODIUM LACTATE, POTASSIUM CHLORIDE, AND CALCIUM CHLORIDE: .6; .31; .03; .02 INJECTION, SOLUTION INTRAVENOUS at 15:17

## 2025-02-25 NOTE — PLAN OF CARE
Problem: PAIN - ADULT  Goal: Verbalizes/displays adequate comfort level or baseline comfort level  Description: Interventions:  - Encourage patient to monitor pain and request assistance  - Assess pain using appropriate pain scale  - Administer analgesics based on type and severity of pain and evaluate response  - Implement non-pharmacological measures as appropriate and evaluate response  - Consider cultural and social influences on pain and pain management  - Notify physician/advanced practitioner if interventions unsuccessful or patient reports new pain  Outcome: Progressing     Problem: INFECTION - ADULT  Goal: Absence or prevention of progression during hospitalization  Description: INTERVENTIONS:  - Assess and monitor for signs and symptoms of infection  - Monitor lab/diagnostic results  - Monitor all insertion sites, i.e. indwelling lines, tubes, and drains  - Monitor endotracheal if appropriate and nasal secretions for changes in amount and color  - [place] appropriate cooling/warming therapies per order  - Administer medications as ordered  - Instruct and encourage patient and family to use good hand hygiene technique  - Identify and instruct in appropriate isolation precautions for identified infection/condition  Outcome: Progressing  Goal: Absence of fever/infection during neutropenic period  Description: INTERVENTIONS:  - Monitor WBC    Outcome: Progressing     Problem: SAFETY ADULT  Goal: Patient will remain free of falls  Description: INTERVENTIONS:  - Educate patient/family on patient safety including physical limitations  - Instruct patient to call for assistance with activity   - Consult OT/PT to assist with strengthening/mobility   - Keep Call bell within reach  - Keep bed low and locked with side rails adjusted as appropriate  - Keep care items and personal belongings within reach  - Initiate and maintain comfort rounds  - Make Fall Risk Sign visible to staff  - Offer Toileting every 2 Hours, in  advance of need  - Initiate/Maintain Bed alarm  - Obtain necessary fall risk management equipment  - Apply yellow socks and bracelet for high fall risk patients  - Consider moving patient to room near nurses station  Outcome: Progressing  Goal: Maintain or return to baseline ADL function  Description: INTERVENTIONS:  -  Assess patient's ability to carry out ADLs; assess patient's baseline for ADL function and identify physical deficits which impact ability to perform ADLs (bathing, care of mouth/teeth, toileting, grooming, dressing, etc.)  - Assess/evaluate cause of self-care deficits   - Assess range of motion  - Assess patient's mobility; develop plan if impaired  - Assess patient's need for assistive devices and provide as appropriate  - Encourage maximum independence but intervene and supervise when necessary  - Involve family in performance of ADLs  - Assess for home care needs following discharge   - Consider OT consult to assist with ADL evaluation and planning for discharge  - Provide patient education as appropriate  Outcome: Progressing  Goal: Maintains/Returns to pre admission functional level  Description: INTERVENTIONS:  - Perform AM-PAC 6 Click Basic Mobility/ Daily Activity assessment daily.  - Set and communicate daily mobility goal to care team and patient/family/caregiver.   - Collaborate with rehabilitation services on mobility goals if consulted  - Perform Range of Motion 3 times a day.  - Reposition patient every 2 hours.  - Dangle patient 3 times a day  - Stand patient 3 times a day  - Ambulate patient 3 times a day  - Out of bed to chair 3 times a day   - Out of bed for meals 3 times a day  - Out of bed for toileting  - Record patient progress and toleration of activity level   Outcome: Progressing     Problem: DISCHARGE PLANNING  Goal: Discharge to home or other facility with appropriate resources  Description: INTERVENTIONS:  - Identify barriers to discharge w/patient and caregiver  -  Arrange for needed discharge resources and transportation as appropriate  - Identify discharge learning needs (meds, wound care, etc.)  - Arrange for interpretive services to assist at discharge as needed  - Refer to Case Management Department for coordinating discharge planning if the patient needs post-hospital services based on physician/advanced practitioner order or complex needs related to functional status, cognitive ability, or social support system  Outcome: Progressing     Problem: Knowledge Deficit  Goal: Patient/family/caregiver demonstrates understanding of disease process, treatment plan, medications, and discharge instructions  Description: Complete learning assessment and assess knowledge base.  Interventions:  - Provide teaching at level of understanding  - Provide teaching via preferred learning methods  Outcome: Progressing

## 2025-02-25 NOTE — CONSULTS
Consultation - Critical Care/ICU   Name: Ofelia Aguilar 84 y.o. female I MRN: [id number]  Unit/Bed#: ICU 15 I Date of Admission: 2/25/2025   Date of Service: 2/25/2025 I Hospital Day: 0   Consults  Physician Requesting Evaluation: Meghan Diaz MD   Reason for Evaluation / Principal Problem: Status post right axillary to bilateral femoral bypass    Please contact the SecureChat role for the Critical Care/ICU service with any questions/concerns.    Assessment & Plan  Atherosclerosis of native arteries of extremities with rest pain, bilateral legs (HCC)  Patient is a vascular surgery patient who underwent a right axillary bifemoral bypass on 2/25.  Vascular reports that on the procedure was uncomplicated, patient tolerated the procedure well.  Estimated blood loss described as minimal.  Postoperatively patient was found to have multiphasic signals of the lower extremities.  Home medication includes daily aspirin 81 milligrams, clopidogrel 75 mg, and atorvastatin 40 mg.    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  Outpatient regimen of sodium chloride tablets 1 g 3 times daily.  Baseline sodium of 135-140.    Plan:  -Continue to monitor electrolyte status  -Continue sodium chloride tablets 1 g 3 times daily  Essential hypertension  Home medication:  -Amlodipine 5 mg daily    Plan: Continue outpatient regimen  Disposition: Stepdown Level 1    History of Present Illness   Ofelia Aguilar is a 84 y.o. who presents to the ICU with a past medical history of malnutrition, SIADH, hypertension, GERD, anxiety, and aortoiliac occlusive disease, for postoperative monitoring status post right axillary to bilateral femoral bypass.  Vascular reports that the procedure was uncomplicated and patient has  multiphasic signals to the lower extremities.  Patient is presently well and without complaint. Endorses mild discomfort to her shoulder. Denies chest pain, sob, abdominal pain, nausea, or vomiting.     History obtained from chart review.  Review of Systems: See HPI for Review of Systems    Historical Information   Past Medical History:  No date: Anxiety  2024: Atherosclerosis of native arteries of extremities with   rest pain, bilateral legs (HCC)  10/29/2024: Cigarette nicotine dependence with nicotine-induced   disorder  No date: Colon polyp  No date: Diverticulitis  No date: GERD (gastroesophageal reflux disease)  No date: Hyperlipidemia  No date: Hypertension Past Surgical History:  No date: APPENDECTOMY  No date:  SECTION  No date: COLONOSCOPY  No date: FEMORAL BYPASS; Right      Comment:    No date: PERIPHERAL ANGIOGRAM   Current Outpatient Medications   Medication Instructions    acetaminophen (TYLENOL) 650 mg, Oral, Every 8 hours PRN    amLODIPine (NORVASC) 5 mg, Oral, Daily    aspirin 81 mg chewable tablet 1 tablet, Daily    atorvastatin (LIPITOR) 40 mg, Oral, Daily    clopidogrel (PLAVIX) 75 mg, Oral, Daily    hydrOXYzine HCL (ATARAX) 10 mg, Oral, Daily at bedtime    mirtazapine (REMERON) 15 mg, Oral, Daily at bedtime    Multiple Vitamin (multivitamin) tablet 1 tablet, Daily    ondansetron (ZOFRAN) 4 mg, Oral, 3 times daily PRN    pregabalin (LYRICA) 25 mg capsule Take 1 capsule (25 mg total) by mouth daily AND 2 capsules (50 mg total) daily at bedtime.    sodium chloride 2 g, Oral, 3 times daily with meals    Allergies   Allergen Reactions    Penicillins Anaphylaxis     PO keflex in past without reaction (but did not work for UTI)    Aspirin GI Intolerance      Social History     Tobacco Use    Smoking status: Former     Current packs/day: 1.00     Average packs/day: 1 pack/day for 20.0 years (20.0 ttl pk-yrs)     Types: Cigarettes     Passive exposure: Never    Smokeless tobacco: Never    Vaping Use    Vaping status: Never Used   Substance Use Topics    Alcohol use: Never    Drug use: Not Currently    History reviewed. No pertinent family history.       Objective :                   Vitals I/O      Most Recent Min/Max in 24hrs   Temp (!) 96.3 °F (35.7 °C) Temp  Min: 96.3 °F (35.7 °C)  Max: 98 °F (36.7 °C)   Pulse 96 Pulse  Min: 74  Max: 96   Resp (!) 23 Resp  Min: 12  Max: 23   /96 BP  Min: 125/61  Max: 144/96   O2 Sat 94 % SpO2  Min: 94 %  Max: 99 %      Intake/Output Summary (Last 24 hours) at 2/25/2025 1714  Last data filed at 2/25/2025 1630  Gross per 24 hour   Intake 3616.67 ml   Output 350 ml   Net 3266.67 ml       Diet Regular; Regular House    Invasive Monitoring           Physical Exam   Physical Exam  Eyes:      Extraocular Movements: Extraocular movements intact.      Pupils: Pupils are equal, round, and reactive to light.   Skin:     General: Skin is warm and dry.   HENT:      Head: Normocephalic and atraumatic.      Mouth/Throat:      Mouth: Mucous membranes are moist.   Cardiovascular:      Rate and Rhythm: Normal rate and regular rhythm.      Pulses: Pulses are Diminished at right DP.      Heart sounds: Normal heart sounds.   Musculoskeletal:         General: No tenderness, deformity or signs of injury.   Abdominal: General: There is no distension.      Palpations: Abdomen is soft.      Tenderness: There is no abdominal tenderness.   Constitutional:       General: She is not in acute distress.     Appearance: She is not toxic-appearing.   Pulmonary:      Effort: Pulmonary effort is normal. No accessory muscle usage, respiratory distress or accessory muscle usage.      Breath sounds: Normal breath sounds. No wheezing or rales.   Neurological:      General: No focal deficit present.      Mental Status: She is alert and oriented to person, place and time. She is calm.   Genitourinary/Anorectal:  Saenz present.        Diagnostic Studies        Lab Results: I have reviewed the  following results:     Medications:  Scheduled PRN   acetaminophen, 975 mg, Q8H SHANA  [START ON 2/26/2025] amLODIPine, 5 mg, Daily  aspirin, 81 mg, Daily  atorvastatin, 40 mg, Daily  clopidogrel, 75 mg, Daily  heparin (porcine), 5,000 Units, Q8H SHANA  hydrOXYzine HCL, 10 mg, HS  mirtazapine, 15 mg, HS  [START ON 2/26/2025] pregabalin, 25 mg, QAM   And  pregabalin, 50 mg, HS  senna, 1 tablet, Daily  sodium chloride, 2 g, TID With Meals      lactated ringers, 500 mL, Once PRN   And  lactated ringers, 500 mL, Once PRN  ondansetron, 4 mg, Q6H PRN  oxyCODONE, 10 mg, Q4H PRN  oxyCODONE, 5 mg, Q4H PRN  sodium chloride, 500 mL, Once PRN   And  sodium chloride, 500 mL, Once PRN       Continuous    sodium chloride, 75 mL/hr, Last Rate: 75 mL/hr (02/25/25 1702)         Labs:   CBC    No recent results  BMP    No recent results    Coags    Recent Labs     02/25/25  0814   PTT 27        Additional Electrolytes  No recent results       Blood Gas    No recent results  No recent results LFTs  No recent results    Infectious  No recent results  Glucose  No recent results     Administrative Statements

## 2025-02-25 NOTE — H&P
:  Assessment & Plan  Atherosclerosis of native arteries of extremities with rest pain, bilateral legs (HCC)  Reviewed CTA in detail. Reviewed arterial doppler.  Difficulty walking short distances due to pain in the calves.  She also has pain in the foot occasionally at night.  Surgery got delayed secondary to episode of diverticulitis requiring hospital admission and IV antibiotics.  That is all now resolved.  She also has put on some weight.  She has quit smoking.     She will benefit from right Ax bifem bypass (since the common femoral is occluded with prior fem fem bypass, she will require bypass to proximal SFA bilateral.  Remainder of runoff is patent.     Risks of the procedure were discussed with the patient and her daughter in detail.  Risks including bleeding bruising, infection, bypass thrombosis, need for additional interventions were discussed.        Operative Scheduling Information:     Hospital:  [facility]     Physician:  Emily     Surgery: right Ax fem bypass     Urgency:  Standard     Level:  Level 4: Outpatients to be scheduled for screening procedures and elective surgery that can be delayed for longer than one month without reasonable expectation of detriment to patient.     Case Length:  Normal     Post-op Bed:  Stepdown     OR Table:  Standard     Equipment Needs:  None     Medication Instructions:  Aspirin:   Continue (do not hold)  Plavix:  Continue (do not hold)     Hydration:  No     Contrast Allergy:  no        Orders:    Case request operating room: BYPASS AXILLO-FEMORAL; Standing    Type and screen; Future    Prepare Leukoreduced RBC: 2 Units; Future    CBC and Platelet; Future    Protime-INR; Future    EKG 12 lead; Future    XR chest pa & lateral; Future    Prealbumin; Future    Basic metabolic panel; Future     Cigarette nicotine dependence with nicotine-induced disorder  She has quit a month ago.           History of Present Illness     HPI  Ofelia Aguilar is a 84 y.o.  female who presents follow-up bilateral leg pain.  Difficulty to walk secondary to the pain.  Walks minimal distances due to this pain.  Prior history of femorofemoral crossover bypass in [place] over 10 years ago.  Bypasses occluded.  History obtained from: patient and her daughter     Review of Systems   Constitutional: Negative.    HENT: Negative.     Eyes: Negative.    Respiratory: Negative.     Cardiovascular: Negative.    Gastrointestinal: Negative.    Endocrine: Negative.    Genitourinary: Negative.    Musculoskeletal: Negative.    Skin: Negative.    Allergic/Immunologic: Negative.    Neurological: Negative.    Hematological: Negative.    Psychiatric/Behavioral: Negative.     I have reviewed the review of systems as entered and made appropriate changes as necessary  Past Medical History  Medical History        Past Medical History:   Diagnosis Date    Anxiety      Atherosclerosis of native arteries of extremities with rest pain, bilateral legs (HCC) 2024    Cigarette nicotine dependence with nicotine-induced disorder 10/29/2024    Diverticulitis      GERD (gastroesophageal reflux disease)           Surgical History         Past Surgical History:   Procedure Laterality Date    APPENDECTOMY         SECTION             Family History   No family history on file.      reports that she has quit smoking. Her smoking use included cigarettes. She has a 20 pack-year smoking history. She has never been exposed to tobacco smoke. She has never used smokeless tobacco. She reports that she does not drink alcohol.  Medications Ordered Prior to Encounter          Current Outpatient Medications on File Prior to Visit   Medication Sig Dispense Refill    acetaminophen (TYLENOL) 650 mg CR tablet Take 1 tablet (650 mg total) by mouth every 8 (eight) hours as needed for mild pain 30 tablet 0    amLODIPine (NORVASC) 5 mg tablet Take 1 tablet (5 mg total) by mouth daily 90 tablet 0    aspirin 81 mg chewable tablet Chew  "1 tablet daily        atorvastatin (LIPITOR) 40 mg tablet Take 1 tablet (40 mg total) by mouth daily 90 tablet 0    clopidogrel (PLAVIX) 75 mg tablet Take 1 tablet (75 mg total) by mouth daily 90 tablet 0    hydrOXYzine HCL (ATARAX) 10 mg tablet Take 1 tablet (10 mg total) by mouth daily at bedtime 30 tablet 0    mirtazapine (REMERON) 15 mg tablet Take 1 tablet (15 mg total) by mouth daily at bedtime 90 tablet 0    Multiple Vitamin (multivitamin) tablet Take 1 tablet by mouth daily        ondansetron (ZOFRAN) 4 mg tablet Take 1 tablet (4 mg total) by mouth 3 (three) times a day as needed for nausea or vomiting 20 tablet 0    pregabalin (LYRICA) 25 mg capsule Take 1 capsule (25 mg total) by mouth daily AND 2 capsules (50 mg total) daily at bedtime. 90 capsule 1    sodium chloride 1 g tablet Take 2 tablets (2 g total) by mouth 3 (three) times a day with meals 180 tablet 1      No current facility-administered medications on file prior to visit.         Allergies        Allergies   Allergen Reactions    Penicillins Anaphylaxis    Aspirin GI Intolerance            Objective     /67   Pulse 84   Temp 97.7 °F (36.5 °C) (Temporal)   Resp 16   Ht 5' 4\" (1.626 m)   Wt 47.9 kg (105 lb 9.6 oz) Comment: with coat  LMP  (LMP Unknown)   SpO2 95%   BMI 18.13 kg/m²          Physical Exam  Vitals and nursing note reviewed.   Constitutional:       Appearance: She is underweight.   HENT:      Head: Normocephalic and atraumatic.   Cardiovascular:      Rate and Rhythm: Normal rate.      Heart sounds: Normal heart sounds.   Pulmonary:      Effort: Pulmonary effort is normal.      Breath sounds: Normal breath sounds.   Abdominal:      Palpations: Abdomen is soft.      Comments: Multiple incisions in the abdominal area.  Bilateral vertical groin incisions.   Musculoskeletal:      Right lower leg: No edema.      Left lower leg: No edema.   Skin:     General: Skin is warm.      Capillary Refill: Capillary refill takes more than " 3 seconds.   Neurological:      General: No focal deficit present.      Mental Status: She is alert and oriented to person, place, and time.   Psychiatric:         Mood and Affect: Mood normal.         Behavior: Behavior normal.

## 2025-02-25 NOTE — ASSESSMENT & PLAN NOTE
Patient is a vascular surgery patient who underwent a right axillary bifemoral bypass on 2/25.  Vascular reports that on the procedure was uncomplicated, patient tolerated the procedure well.  Estimated blood loss described as minimal.  Postoperatively patient was found to have multiphasic signals of the lower extremities.  Home medication includes daily aspirin 81 milligrams, clopidogrel 75 mg, and atorvastatin 40 mg.    Plan:  -Discontinue Saenz when required  -Out of bed  -Diet as tolerated  -Continue aspirin and Plavix  -Continue close neurovascular monitoring

## 2025-02-25 NOTE — ANESTHESIA PROCEDURE NOTES
Arterial Line Insertion    Performed by: Sera Jaimes MD  Authorized by: Sera Jaimes MD  Consent: Verbal consent obtained. Written consent obtained.  Risks and benefits: risks, benefits and alternatives were discussed  Consent given by: patient  Patient understanding: patient states understanding of the procedure being performed  Patient consent: the patient's understanding of the procedure matches consent given  Procedure consent: procedure consent matches procedure scheduled  Relevant documents: relevant documents present and verified  Test results: test results available and properly labeled  Site marked: the operative site was not marked  Radiology Images: No Radiology Images displayed or report reviewed.    Required items: required blood products, implants, devices, and special equipment available  Patient identity confirmed: verbally with patient, provided demographic data and hospital-assigned identification number  Preparation: Patient was prepped and draped in the usual sterile fashion.  Indications: multiple ABGs and hemodynamic monitoring  Orientation:  Left  Location: brachial artery (radial x2 attempted on the left, including with US, venous vessel immediately overriding artery and unable to cannulate artery.)  Sedation:  Patient sedated: done after induction of GA.    Procedure Details:  Sonny's test normal: yes  Needle gauge: 20  Number of attempts: 3 (radial x2 with venous vessel cannulated, moved proximal on the arm until no immediately over-riiding venous vessel present)    Post-procedure:  Post-procedure: dressing applied  Waveform: good waveform  Post-procedure CNS: unchanged  Patient tolerance: patient tolerated the procedure well with no immediate complications

## 2025-02-25 NOTE — ANESTHESIA POSTPROCEDURE EVALUATION
Post-Op Assessment Note    CV Status:  Stable  Pain Score: 0    Pain management: adequate       Mental Status:  Awake   Hydration Status:  Stable   PONV Controlled:  None   Airway Patency:  Patent     Post Op Vitals Reviewed: Yes    No anethesia notable event occurred.    Staff: CRNA           Last Filed PACU Vitals:  Vitals Value Taken Time   Temp 96.6 °F (35.9 °C) 02/25/25 1515   Pulse 76 02/25/25 1518   /61 02/25/25 1515   Resp 16 02/25/25 1515   SpO2 98 % 02/25/25 1518   Vitals shown include unfiled device data.

## 2025-02-25 NOTE — OP NOTE
OPERATIVE REPORT  PATIENT NAME: Ofelia Aguilar    :  1940  MRN: [id number]  Pt Location: [facility] 09    SURGERY DATE: 2025    Surgeons and Role:     * Meghan Diaz MD - Primary     * Linwood Reyes DO - Fellow    Preop Diagnosis:  Atherosclerosis of native arteries of extremities with rest pain, bilateral legs (HCC) [I70.223]    Post-Op Diagnosis Codes:     * Atherosclerosis of native arteries of extremities with rest pain, bilateral legs (HCC) [I70.223]    Procedure(s):  Bilateral - BYPASS AXILLO-FEMORAL    Specimen(s):  * No specimens in log *    Estimated Blood Loss:   Minimal    Drains:  Urethral Catheter Latex 16 Fr. (Active)   Site Assessment Clean;Skin intact 25   Collection Container Standard drainage bag 25   Securement Method Securing device (Describe) 25   Number of days: 0       Anesthesia Type:   General    Operative Indications:  Atherosclerosis of native arteries of extremities with rest pain, bilateral legs (HCC) [I70.223]      Operative Findings:  Patent right axillary artery.  Triphasic right dorsalis pedis and left posterior tibial signal on completion      Complications:   None    Procedure and Technique:  The patient was brought to the operating room and placed on the operating table in the supine position. The patient was identified by verbal confirmation and armband identification. Ancef was given of iv antibiotic. The patient was prepped and draped in usual sterile fashion with ioban and a formal timeout was called.    Dr. Reyes and me started working simultaneously exposing the axillary artery and the groins.    A transverse infraclavicular incision was made approximately two fingerbreadths below the clavicle. The pectoralis muscle is exposed and fibers were split superiorly and inferiorly. We divided the pectoralis minor insertion to allow further exposure. Retractors were placed and theThe axillary vein was mobilized inferiorly to afford  access to the artery. We Isolated a 3-4 cm length of artery.     Bilateral exposures of the femoral arteries were done using  longitudinal incisions for the femoral exposure.  It was a redo groin as there was scar tissue from previous femorofemoral bypass surgery.  We dissected the subcutaneous tissues and entered the femoral sheath. We dissected the common, superficial, and deep femoral  Arteries and encircled them with vessel loops.  On the right side we dissected the common femoral bifurcation.  On the left side we also dissected the common femoral bifurcation, it appeared that there was an end-to-end anastomosis between the common femoral bifurcation and previous occluded femorofemoral bypass.      A separate counter incision was made in the right flank for the Pre-made Ax bifem graft.  We performed subcutaneous tunneling and then systemic heparinization. A midaxillary tunnel, lateral to the nipple and above the abdominal fascia, from the axillary incision to the counter incision and then to each femoral incision was made. We passed the PTFE 8mm ringed Goretex through the tunnels with the use of the device and ensure that there are no twists or kinks.     We then did proximal axillary anastomosis first.  Vesseloops were used to obtain proximal and distal control of the axillary artery. After longitudinal arteriotomy we fashioned a galan on the PTFE graft. Then we created an end-to-side running anastomosis using 5-0 Prolene.Prior to closure of the anastomosis we forward flushed and back flushed and irrigated with heparinized saline. We evaluated graft inflow and distal axillary arterial outflow with doppler and as the arm was in abducted position we ensured that there was no tension on the anastomosis.    We then created the distal femoral anastomosis in an end-to-side fashion to the common femoral artery extending onto the SFA on the right side and profound on the left side.    Both anastomosis were created after  spatulating the galan of the PTFE graft. They were created using 5-0 Prolene suture.. Prior to closure of the anastomosis we forward flushed and back flushed and irrigated with heparinized saline.  We then evaluated the inflow and outflow using Doppler and was found to be adequate.  Strict hemostasis was achieved at each anastomosis.  Gelfoam thrombin and Surgicel was used to aid in hemostasis.  On the right side additional sutures with 6-0 Prolene were required to secure hemostasis.    30 mg of protamine was administered and FloSeal and Fibrillar procoagulant was instilled in the operative field. The skin edges were infiltrated with quater percent Marcaine with epinephrine and closure was then achieved with a running 2-0 and 3-0 Monocryl suture for the femoral sheath and danielle's.  Skin was closed 4-0 Monocryl suture.    Axillary wound was closed using 2-0, 3-0 Monocryl for the deeper tissues and then 4-0 Monocryl for skin.  Exofin was applied..  At end of case instrument, sponge and needle counts were found to be correct.    The patient awoke and had excellent triphasic  tibial signals on both feet. The patient was transferred to recovery in stable condition I was present for the entire procedure I was present for the entire procedure.    Patient Disposition:  PACU              SIGNATURE: Meghan Diaz MD  DATE: February 25, 2025  TIME: 4:06 PM          Vascular Quality Initiative - Supra-Inguinal Bypass    Urgency:  Elective    Anesthesia: General  Skin Prep:Chlor + Alcohol     Groin Incision: Bilateral.  Incision Orientation is: Vertical.   Groin Closure Type: Absorbable Subcuticular. Closure Dressing is: Cyanoacrylate Adhesive.. .      Total Procedure Time:   Event Time In   Procedure Start 1146   Procedure Closing 1422   Procedure Finish 1500       EBL: Minimal    Graft Origin:   Artery: Axillary    Side: right Graft Diameter: 8mm    Graft Recipient 1:    Artery: Common Femoral  Graft Vein Type:  PTFE  Side: right  Graft Diameter: 8mm    Concomitant Endarterectomy:  no    Graft Recipient 2:   yes  Artery: Common Femoral  Graft Vein Type: PTFE  Side: left Graft Diameter: 8mm    Concomitant Endarterectomy:  no    Concomitant Infra-inguinal:   Right PVI:  no  Left PVI:  no   Right Bypass:no Left Bypass:  no     Completion Study:   Doppler: yes   Duplex: no   Arteriogram: no

## 2025-02-25 NOTE — ANESTHESIA PREPROCEDURE EVALUATION
Procedure:  BYPASS AXILLO-FEMORAL (Bilateral: Abdomen)    Relevant Problems   CARDIO   (+) Atherosclerosis of native arteries of extremities with rest pain, bilateral legs (HCC)   (+) Essential hypertension   (+) Hyperlipidemia      GI/HEPATIC   (+) GERD (gastroesophageal reflux disease)      HEMATOLOGY   (+) Anemia      NEURO/PSYCH   (+) Anxiety   (+) Depression   (+) Recurrent major depressive disorder, remission status unspecified (HCC)   (+) Severe major depressive disorder (HCC)      PULMONARY   (+) Chronic obstructive pulmonary disease (HCC)      Neurology/Sleep   (+) 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)      Orthopedic/Musculoskeletal   (+) Spinal stenosis      Other   (+) Chronic anticoagulation (plavix)   (+) Malnutrition (HCC)   (+) Moderate protein-calorie malnutrition (HCC)        Physical Exam    Airway    Mallampati score: II  TM Distance: >3 FB       Dental   Comment: Upper right rear molar implant     Cardiovascular  Rhythm: regular, Rate: normal    Pulmonary   Breath sounds clear to auscultation    Other Findings  post-pubertal.      Anesthesia Plan  ASA Score- 3     Anesthesia Type- general with ASA Monitors.         Additional Monitors: arterial line.    Airway Plan: ETT.    Comment: 2nd PIV, rt line, confirmed tranfusion ok if needed.       Plan Factors-Exercise tolerance (METS): <4 METS.    Chart reviewed.   Existing labs reviewed.     Patient is not a current smoker.      Obstructive sleep apnea risk education given perioperatively.        Induction- intravenous.    Postoperative Plan- Plan for postoperative opioid use.         Informed Consent- Anesthetic plan and risks discussed with patient.        NPO Status:  Vitals Value Taken Time   Date of last liquid 02/25/25 02/25/25 0825   Time of last liquid 0815 02/25/25 0825   Date of last solid 02/24/25 02/25/25 0825   Time of last solid 1700 02/25/25 0825         
caffeine

## 2025-02-25 NOTE — ASSESSMENT & PLAN NOTE
Diagnosed on hospital admission from November 25 to November 29, 2024.  Outpatient regimen of sodium chloride tablets 1 g 3 times daily.  Baseline sodium of 135-140.    Plan:  -Continue to monitor electrolyte status  -Continue sodium chloride tablets 1 g 3 times daily

## 2025-02-25 NOTE — ANESTHESIA POSTPROCEDURE EVALUATION
Post-Op Assessment Note    CV Status:  Stable    Pain management: adequate       Mental Status:  Awake   Hydration Status:  Stable   PONV Controlled:  Controlled   Airway Patency:  Patent     Post Op Vitals Reviewed: Yes    No anethesia notable event occurred.    Staff: Anesthesiologist           Last Filed PACU Vitals:  Vitals Value Taken Time   Temp 96.6 °F (35.9 °C) 02/25/25 1515   Pulse 76 02/25/25 1606   /59 02/25/25 1556   Resp 12 02/25/25 1545   SpO2 98 % 02/25/25 1606   Vitals shown include unfiled device data.    Modified Iva:     Vitals Value Taken Time   Activity 2 02/25/25 1545   Respiration 2 02/25/25 1545   Circulation 2 02/25/25 1545   Consciousness 1 02/25/25 1545   Oxygen Saturation 2 02/25/25 1545     Modified Iva Score: 9

## 2025-02-26 ENCOUNTER — DOCUMENTATION (OUTPATIENT)
Dept: VASCULAR SURGERY | Facility: CLINIC | Age: 85
End: 2025-02-26

## 2025-02-26 PROBLEM — Z01.89 ENCOUNTER FOR GERIATRIC ASSESSMENT: Status: RESOLVED | Noted: 2025-01-27 | Resolved: 2025-02-26

## 2025-02-26 LAB
ANION GAP SERPL CALCULATED.3IONS-SCNC: 6 MMOL/L (ref 4–13)
ATRIAL RATE: 95 BPM
BUN SERPL-MCNC: 16 MG/DL (ref 5–25)
CALCIUM SERPL-MCNC: 8.2 MG/DL (ref 8.4–10.2)
CHLORIDE SERPL-SCNC: 112 MMOL/L (ref 96–108)
CO2 SERPL-SCNC: 21 MMOL/L (ref 21–32)
CREAT SERPL-MCNC: 0.85 MG/DL (ref 0.6–1.3)
ERYTHROCYTE [DISTWIDTH] IN BLOOD BY AUTOMATED COUNT: 14.4 % (ref 11.6–15.1)
GFR SERPL CREATININE-BSD FRML MDRD: 64 ML/MIN/1.73SQ M
GLUCOSE SERPL-MCNC: 126 MG/DL (ref 65–140)
HCT VFR BLD AUTO: 28.9 % (ref 34.8–46.1)
HGB BLD-MCNC: 9.5 G/DL (ref 11.5–15.4)
MCH RBC QN AUTO: 30.9 PG (ref 26.8–34.3)
MCHC RBC AUTO-ENTMCNC: 32.9 G/DL (ref 31.4–37.4)
MCV RBC AUTO: 94 FL (ref 82–98)
P AXIS: 89 DEGREES
PLATELET # BLD AUTO: 192 THOUSANDS/UL (ref 149–390)
PMV BLD AUTO: 10.3 FL (ref 8.9–12.7)
POTASSIUM SERPL-SCNC: 3.4 MMOL/L (ref 3.5–5.3)
PR INTERVAL: 104 MS
QRS AXIS: 24 DEGREES
QRSD INTERVAL: 121 MS
QT INTERVAL: 375 MS
QTC INTERVAL: 472 MS
RBC # BLD AUTO: 3.07 MILLION/UL (ref 3.81–5.12)
SODIUM SERPL-SCNC: 139 MMOL/L (ref 135–147)
T WAVE AXIS: 159 DEGREES
VENTRICULAR RATE: 95 BPM
WBC # BLD AUTO: 6.36 THOUSAND/UL (ref 4.31–10.16)

## 2025-02-26 PROCEDURE — 99232 SBSQ HOSP IP/OBS MODERATE 35: CPT | Performed by: INTERNAL MEDICINE

## 2025-02-26 PROCEDURE — 99024 POSTOP FOLLOW-UP VISIT: CPT | Performed by: SURGERY

## 2025-02-26 PROCEDURE — 93005 ELECTROCARDIOGRAM TRACING: CPT

## 2025-02-26 PROCEDURE — 93010 ELECTROCARDIOGRAM REPORT: CPT | Performed by: INTERNAL MEDICINE

## 2025-02-26 PROCEDURE — 80048 BASIC METABOLIC PNL TOTAL CA: CPT | Performed by: STUDENT IN AN ORGANIZED HEALTH CARE EDUCATION/TRAINING PROGRAM

## 2025-02-26 PROCEDURE — 85027 COMPLETE CBC AUTOMATED: CPT | Performed by: STUDENT IN AN ORGANIZED HEALTH CARE EDUCATION/TRAINING PROGRAM

## 2025-02-26 RX ORDER — POTASSIUM CHLORIDE 1500 MG/1
40 TABLET, EXTENDED RELEASE ORAL ONCE
Status: COMPLETED | OUTPATIENT
Start: 2025-02-26 | End: 2025-02-26

## 2025-02-26 RX ADMIN — PREGABALIN 25 MG: 25 CAPSULE ORAL at 08:26

## 2025-02-26 RX ADMIN — HEPARIN SODIUM 5000 UNITS: 5000 INJECTION INTRAVENOUS; SUBCUTANEOUS at 21:53

## 2025-02-26 RX ADMIN — OXYCODONE HYDROCHLORIDE 10 MG: 10 TABLET ORAL at 20:40

## 2025-02-26 RX ADMIN — HYDROXYZINE HYDROCHLORIDE 10 MG: 10 TABLET ORAL at 21:52

## 2025-02-26 RX ADMIN — ATORVASTATIN CALCIUM 40 MG: 40 TABLET, FILM COATED ORAL at 08:25

## 2025-02-26 RX ADMIN — SODIUM CHLORIDE 2 G: 1 TABLET ORAL at 17:12

## 2025-02-26 RX ADMIN — POTASSIUM CHLORIDE 40 MEQ: 1500 TABLET, EXTENDED RELEASE ORAL at 07:22

## 2025-02-26 RX ADMIN — OXYCODONE 5 MG: 5 TABLET ORAL at 17:13

## 2025-02-26 RX ADMIN — ACETAMINOPHEN 975 MG: 325 TABLET, FILM COATED ORAL at 21:52

## 2025-02-26 RX ADMIN — HEPARIN SODIUM 5000 UNITS: 5000 INJECTION INTRAVENOUS; SUBCUTANEOUS at 06:40

## 2025-02-26 RX ADMIN — OXYCODONE 5 MG: 5 TABLET ORAL at 07:22

## 2025-02-26 RX ADMIN — PREGABALIN 50 MG: 50 CAPSULE ORAL at 21:52

## 2025-02-26 RX ADMIN — ACETAMINOPHEN 975 MG: 325 TABLET, FILM COATED ORAL at 06:40

## 2025-02-26 RX ADMIN — MIRTAZAPINE 15 MG: 15 TABLET, FILM COATED ORAL at 21:52

## 2025-02-26 RX ADMIN — SODIUM CHLORIDE 2 G: 1 TABLET ORAL at 06:40

## 2025-02-26 RX ADMIN — ACETAMINOPHEN 975 MG: 325 TABLET, FILM COATED ORAL at 13:00

## 2025-02-26 RX ADMIN — ONDANSETRON 4 MG: 2 INJECTION INTRAMUSCULAR; INTRAVENOUS at 11:44

## 2025-02-26 RX ADMIN — HEPARIN SODIUM 5000 UNITS: 5000 INJECTION INTRAVENOUS; SUBCUTANEOUS at 13:00

## 2025-02-26 RX ADMIN — CLOPIDOGREL BISULFATE 75 MG: 75 TABLET ORAL at 08:26

## 2025-02-26 RX ADMIN — AMLODIPINE BESYLATE 5 MG: 5 TABLET ORAL at 08:25

## 2025-02-26 RX ADMIN — ASPIRIN 81 MG CHEWABLE TABLET 81 MG: 81 TABLET CHEWABLE at 08:25

## 2025-02-26 NOTE — PLAN OF CARE
Problem: PAIN - ADULT  Goal: Verbalizes/displays adequate comfort level or baseline comfort level  Description: Interventions:  - Encourage patient to monitor pain and request assistance  - Assess pain using appropriate pain scale  - Administer analgesics based on type and severity of pain and evaluate response  - Implement non-pharmacological measures as appropriate and evaluate response  - Consider cultural and social influences on pain and pain management  - Notify physician/advanced practitioner if interventions unsuccessful or patient reports new pain  Outcome: Progressing     Problem: INFECTION - ADULT  Goal: Absence or prevention of progression during hospitalization  Description: INTERVENTIONS:  - Assess and monitor for signs and symptoms of infection  - Monitor lab/diagnostic results  - Monitor all insertion sites, i.e. indwelling lines, tubes, and drains  - Monitor endotracheal if appropriate and nasal secretions for changes in amount and color  - [place]art appropriate cooling/warming therapies per order  - Administer medications as ordered  - Instruct and encourage patient and family to use good hand hygiene technique  - Identify and instruct in appropriate isolation precautions for identified infection/condition  Outcome: Progressing  Goal: Absence of fever/infection during neutropenic period  Description: INTERVENTIONS:  - Monitor WBC    Outcome: Progressing     Problem: SAFETY ADULT  Goal: Patient will remain free of falls  Description: INTERVENTIONS:  - Educate patient/family on patient safety including physical limitations  - Instruct patient to call for assistance with activity   - Consult OT/PT to assist with strengthening/mobility   - Keep Call bell within reach  - Keep bed low and locked with side rails adjusted as appropriate  - Keep care items and personal belongings within reach  - Initiate and maintain comfort rounds  - Make Fall Risk Sign visible to staff  - Offer Toileting every  Hours,  in advance of need  - Initiate/Maintain alarm  - Obtain necessary fall risk management equipment:   - Apply yellow socks and bracelet for high fall risk patients  - Consider moving patient to room near nurses station  Outcome: Progressing  Goal: Maintain or return to baseline ADL function  Description: INTERVENTIONS:  -  Assess patient's ability to carry out ADLs; assess patient's baseline for ADL function and identify physical deficits which impact ability to perform ADLs (bathing, care of mouth/teeth, toileting, grooming, dressing, etc.)  - Assess/evaluate cause of self-care deficits   - Assess range of motion  - Assess patient's mobility; develop plan if impaired  - Assess patient's need for assistive devices and provide as appropriate  - Encourage maximum independence but intervene and supervise when necessary  - Involve family in performance of ADLs  - Assess for home care needs following discharge   - Consider OT consult to assist with ADL evaluation and planning for discharge  - Provide patient education as appropriate  Outcome: Progressing  Goal: Maintains/Returns to pre admission functional level  Description: INTERVENTIONS:  - Perform AM-PAC 6 Click Basic Mobility/ Daily Activity assessment daily.  - Set and communicate daily mobility goal to care team and patient/family/caregiver.   - Collaborate with rehabilitation services on mobility goals if consulted  - Perform Range of Motion  times a day.  - Reposition patient every  hours.  - Dangle patient  times a day  - Stand patient  times a day  - Ambulate patient  times a day  - Out of bed to chair  times a day   - Out of bed for meals  times a day  - Out of bed for toileting  - Record patient progress and toleration of activity level   Outcome: Progressing     Problem: DISCHARGE PLANNING  Goal: Discharge to home or other facility with appropriate resources  Description: INTERVENTIONS:  - Identify barriers to discharge w/patient and caregiver  - Arrange for  needed discharge resources and transportation as appropriate  - Identify discharge learning needs (meds, wound care, etc.)  - Arrange for interpretive services to assist at discharge as needed  - Refer to Case Management Department for coordinating discharge planning if the patient needs post-hospital services based on physician/advanced practitioner order or complex needs related to functional status, cognitive ability, or social support system  Outcome: Progressing     Problem: Knowledge Deficit  Goal: Patient/family/caregiver demonstrates understanding of disease process, treatment plan, medications, and discharge instructions  Description: Complete learning assessment and assess knowledge base.  Interventions:  - Provide teaching at level of understanding  - Provide teaching via preferred learning methods  Outcome: Progressing     Problem: Prexisting or High Potential for Compromised Skin Integrity  Goal: Skin integrity is maintained or improved  Description: INTERVENTIONS:  - Identify patients at risk for skin breakdown  - Assess and monitor skin integrity  - Assess and monitor nutrition and hydration status  - Monitor labs   - Assess for incontinence   - Turn and reposition patient  - Assist with mobility/ambulation  - Relieve pressure over bony prominences  - Avoid friction and shearing  - Provide appropriate hygiene as needed including keeping skin clean and dry  - Evaluate need for skin moisturizer/barrier cream  - Collaborate with interdisciplinary team   - Patient/family teaching  - Consider wound care consult   Outcome: Progressing     Problem: Nutrition/Hydration-ADULT  Goal: Nutrient/Hydration intake appropriate for improving, restoring or maintaining nutritional needs  Description: Monitor and assess patient's nutrition/hydration status for malnutrition. Collaborate with interdisciplinary team and initiate plan and interventions as ordered.  Monitor patient's weight and dietary intake as ordered or  per policy. Utilize nutrition screening tool and intervene as necessary. Determine patient's food preferences and provide high-protein, high-caloric foods as appropriate.     INTERVENTIONS:  - Monitor oral intake, urinary output, labs, and treatment plans  - Assess nutrition and hydration status and recommend course of action  - Evaluate amount of meals eaten  - Assist patient with eating if necessary   - Allow adequate time for meals  - Recommend/ encourage appropriate diets, oral nutritional supplements, and vitamin/mineral supplements  - Order, calculate, and assess calorie counts as needed  - Recommend, monitor, and adjust tube feedings and TPN/PPN based on assessed needs  - Assess need for intravenous fluids  - Provide specific nutrition/hydration education as appropriate  - Include patient/family/caregiver in decisions related to nutrition  Outcome: Progressing

## 2025-02-26 NOTE — PROGRESS NOTES
Vascular Nurse Navigator Post Op Education    Met with patient at bedside to introduce myself as Vascular Nurse Navigator and explained my role.  Patient is appropriate and accepting to education. Patient was educated with Review of written materials provided, Teachback, Explanation, Demonstration, and Question & Answer on expectations of post op care and recovery on right axillary bifemoral bypass. Patient is a former smoker, quit 1 month ago, as such Smoking effects on the lungs, tobacco triggers, and Smoking cessation was reviewed. Education provided to patient on infection prevention, activity limitations, when to call the office, importance of follow up, and incisional care.  Discharge instruction handout provided to patient to review.  Provided patient with a pack of disposable washcloths, a pack of guaze, and a pack of chlorhexidine wipes for incision care upon discharge.

## 2025-02-26 NOTE — UTILIZATION REVIEW
Initial Clinical Review    Elective Inpatient surgical procedure  Age/Sex: 84 y.o. female  Surgery Date: 2/25/2025  Procedure: Bilateral - BYPASS AXILLO-FEMORAL   Anesthesia: general   Operative Findings: Patent right axillary artery.  Triphasic right dorsalis pedis and left posterior tibial signal on completion       POD#1 Progress Note: Vascular Surgery  POD #1 Right axillary to bifemoral bypass.  Doing well anticipated tera-incisional discomfort.  C/o persistent left heel pain (baseline) although improved.  Rt infraclavicular incision clean and well approximated; easily palpable radial pulse  B groin dressings clean and intact- do not remove til POD #7 if feasible  Palp R DP; multiphasic L doppler signals.  Ok to transfer out of ICU  PT/OT  Cont ASA/Plavix/Atorvastatin    Admission Orders: Date/Time/Statement:   Admission Orders (From admission, onward)       Ordered        02/25/25 0821  Inpatient Admission  Once                          Orders Placed This Encounter   Procedures    Inpatient Admission     Standing Status:   Standing     Number of Occurrences:   1     Level of Care:   Critical Care [15]     Estimated length of stay:   Inpatient Only Surgery     Tele  frequent neurovascular checks   Freq neuro checks  I/O  Saenz  Monitor electrolyte status- HX SIADH cont sodium chloride tablets 1 g 3 times daily   Diet: as tolerated  Mobility: OOB  DVT Prophylaxis: heparin (porcine), 5,000 Units, Subcutaneous, Q8H SHANA, SCD    Medications/Pain Control:   Scheduled Medications:  acetaminophen, 975 mg, Oral, Q8H SHANA  amLODIPine, 5 mg, Oral, Daily  aspirin, 81 mg, Oral, Daily  atorvastatin, 40 mg, Oral, Daily  clopidogrel, 75 mg, Oral, Daily  heparin (porcine), 5,000 Units, Subcutaneous, Q8H SHANA  hydrOXYzine HCL, 10 mg, Oral, HS  mirtazapine, 15 mg, Oral, HS  pregabalin, 25 mg, Oral, QAM   And  pregabalin, 50 mg, Oral, HS  senna, 1 tablet, Oral, Daily  sodium chloride, 2 g, Oral, TID With Meals      Continuous IV  Infusions:     PRN Meds:  ondansetron, 4 mg, Intravenous, Q6H PRN  oxyCODONE, 10 mg, Oral, Q4H PRN  oxyCODONE, 5 mg, Oral, Q4H PRN      Vital Signs (last 3 days)       Date/Time Temp Pulse Resp BP MAP (mmHg) SpO2 Calculated FIO2 (%) - Nasal Cannula Nasal Cannula O2 Flow Rate (L/min) O2 Device Cardiac (WDL) [place] Coma Scale Score Pain    02/26/25 1340 99.3 °F (37.4 °C) -- -- -- -- -- -- -- -- -- -- --    02/26/25 1330 -- 82 16 122/44 65 92 % -- -- -- -- -- --    02/26/25 1300 -- -- -- -- -- -- -- -- -- -- -- 2    02/26/25 1230 -- 84 17 132/49 68 92 % -- -- -- -- -- --    02/26/25 1200 -- -- -- -- -- -- -- -- -- -- 15 2    02/26/25 1130 -- 84 16 135/54 84 92 % -- -- -- -- -- --    02/26/25 1030 -- 82 17 127/59 88 93 % -- -- -- -- -- --    02/26/25 0930 -- 86 17 132/62 96 95 % -- -- -- -- -- --    02/26/25 0822 -- -- -- -- -- -- -- -- -- -- 15 --    02/26/25 0821 -- 92 16 121/55 84 93 % -- -- -- -- -- --    02/26/25 0818 99.1 °F (37.3 °C) -- -- -- -- -- -- -- -- -- -- --    02/26/25 0722 -- 92 18 136/58 95 92 % -- -- -- -- -- 3    02/26/25 0640 -- -- -- -- -- -- -- -- -- -- -- 2    02/26/25 0423 -- -- -- -- -- -- -- -- -- -- 15 --    02/26/25 0300 98.3 °F (36.8 °C) -- -- -- -- -- -- -- -- -- -- --    02/26/25 0220 -- 92 21 123/53 84 91 % -- -- -- -- -- --    02/26/25 0120 -- 82 20 129/60 86 94 % -- -- -- -- -- --    02/26/25 0023 -- -- -- -- -- -- -- -- -- -- 15 --    02/26/25 0000 -- 76 17 112/52 75 93 % -- -- -- -- -- --    02/25/25 2300 98.1 °F (36.7 °C) -- -- -- -- -- -- -- -- -- -- --    02/25/25 2227 -- 80 16 114/50 78 92 % -- -- -- -- -- --    02/25/25 2200 -- 80 17 -- -- 92 % -- -- -- -- -- --    02/25/25 2127 -- 82 16 119/56 89 94 % -- -- -- -- -- --    02/25/25 2125 -- -- -- -- -- -- -- -- -- -- -- 7    02/25/25 2027 -- 78 15 107/48 62 94 % -- -- -- -- -- --    02/25/25 2007 -- -- -- -- -- -- -- -- -- -- 15 --    02/25/25 1927 97.5 °F (36.4 °C) 92 15 100/44 70 93 % -- -- -- -- -- --    02/25/25 1830 -- 100  16 124/58 78 94 % -- -- None (Room air) -- -- --    02/25/25 1744 -- 98 17 141/82 108 94 % -- -- None (Room air) -- -- --    02/25/25 1725 -- -- -- -- -- -- -- -- -- -- -- 5    02/25/25 1700 -- -- -- -- -- -- -- -- -- -- 15 5    02/25/25 1645 96.3 °F (35.7 °C) 96 23 144/96 117 94 % 28 2 L/min Nasal cannula -- -- No Pain    02/25/25 1615 98 °F (36.7 °C) 78 12 131/61 88 99 % 28 2 L/min Nasal cannula -- -- No Pain    02/25/25 1600 -- 78 12 127/59 85 98 % 28 2 L/min Nasal cannula -- -- No Pain    02/25/25 1545 -- 74 12 128/55 85 99 % 28 2 L/min Nasal cannula -- 15 No Pain    02/25/25 1530 -- 78 16 129/59 85 95 % 32 3 L/min Nasal cannula -- 15 No Pain    02/25/25 1515 96.6 °F (35.9 °C) 74 16 125/61 88 94 % -- -- None (Room air) [facility] 15 --    02/25/25 0906 -- -- -- -- -- -- -- -- -- -- -- 5    02/25/25 0800 97.7 °F (36.5 °C) 84 16 144/67 -- 95 % -- -- None (Room air) -- -- 10 - Worst Possible Pain          Weight (last 2 days)       Date/Time Weight    02/25/25 1645 49.6 (109.35)    02/25/25 0800 47.9 (105.6)     Weight: with coat at 02/25/25 0800            Pertinent Labs/Diagnostic Test Results:   Radiology:  No orders to display     Cardiology:  ECG 12 lead    by Interface, Ris Results In (02/26 1447)        GI:  No orders to display           Results from last 7 days   Lab Units 02/26/25  0603 02/25/25  1736   WBC Thousand/uL 6.36 11.31*   HEMOGLOBIN g/dL 9.5* 9.7*   HEMATOCRIT % 28.9* 29.9*   PLATELETS Thousands/uL 192 185   TOTAL NEUT ABS Thousands/µL  --  9.23*         Results from last 7 days   Lab Units 02/26/25  0603 02/25/25  1736   SODIUM mmol/L 139 140   POTASSIUM mmol/L 3.4* 3.6   CHLORIDE mmol/L 112* 111*   CO2 mmol/L 21 19*   ANION GAP mmol/L 6 10   BUN mg/dL 16 18   CREATININE mg/dL 0.85 0.72   EGFR ml/min/1.73sq m 64 78   CALCIUM mg/dL 8.2* 8.6             Results from last 7 days   Lab Units 02/26/25  0603 02/25/25  1736   GLUCOSE RANDOM mg/dL 126 122             No results found for:  "\"BETA-HYDROXYBUTYRATE\"                           Results from last 7 days   Lab Units 02/25/25  0814   PTT seconds 27     Network Utilization Review Department  ATTENTION: Please call with any questions or concerns to [phone] and carefully listen to the prompts so that you are directed to the right person. All voicemails are confidential.   For Discharge needs, contact Care Management DC Support Team at [phone] opt. [phone]  Send all requests for admission clinical reviews, approved or denied determinations and any other requests to dedicated fax number below belonging to the [facility] where the patient is receiving treatment. List of dedicated fax numbers for the Facilities:  FACILITY NAME UR FAX NUMBER   ADMISSION DENIALS (Administrative/Medical Necessity) [phone]   DISCHARGE SUPPORT TEAM (NETWORK) [phone]   PARENT CHILD HEALTH (Maternity/NICU/Pediatrics) [phone]   [facility] [phone]   [facility] [phone]   [facility] [phone]   [facility] [phone]   [facility] [phone]   [facility] [phone]   [facility] [phone]   [facility] [phone]   [facility] [phone]   [facility] [phone]   [facility] [phone]   [facility] [phone]       "

## 2025-02-26 NOTE — ASSESSMENT & PLAN NOTE
83 yo female ex-smoker w/ a hx of PAD, anxiety, depression, GERD, HTN and HLD presented to [facility] on 2/25/25 for an elective axillary bi-fem bypass. Pt has a hx of atherosclerosis w/ B/L short-distance claudication and rest pain, L >R. Pt underwent a R axillary to bi-fem bypass on 2/25/25. Immediate post-op course was uneventful.    POD #1:  Pt remains hemodynamically stable w/ Hgb 9.5 and stable VS. Labs showed ABLA w/ Hgb decrease from 11.5 pre-op to 9.7 post-op. ABLA secondary to procedural and dilutional losses without any overt signs of bleeding. R subclavian, R flank and B/L groin incision sites soft, no swelling or ecchymosis. All incisions covered w/ mepilex which is clean, dry and intact w/ no strikethrough. Strong bypass signal. 1+ palpable DP and R PT signal. L triphasic PT and monophasic DP signals. Pt reports generalized soreness and ongoing L heel and toe pain. Pt reports L heel and toe pain were present for months prior to surgery and are improved from prior. (+) motor/sensation in R foot. (+) motor w/ decreased sensation in toes on L foot. Otherwise, pt denies any further complaints at this time.    Plan:  -Atherosclerosis w/ B/L short-distance claudication and rest pain, L >R  -S/P R axillary to bi-fem bypass on 2/25 (POD #1)  -Continue frequent neurovascular checks  -Diet as tolerated  -OOB as tolerated  -Continue ASA, plavix and lipitor  -Continue routine tylenol and oxycodone prn for post-op pain control  -Continue heparin sq for VTE prophylaxis  -Critical Care following for medical management; assistance appreciated  -PT/OT consult  -Plan to transfer out of ICU today  -Will discuss w/ Dr. Major

## 2025-02-26 NOTE — CASE MANAGEMENT
Case Management Assessment & Discharge Planning Note    Patient name Ofelia Aguilar  Location ICU 15/ICU 15 MRN [id number]  : 1940 Date 2025       Current Admission Date: 2025  Current Admission Diagnosis:Atherosclerosis of native arteries of extremities with rest pain, bilateral legs (HCC)   Patient Active Problem List    Diagnosis Date Noted Date Diagnosed    Chronic anticoagulation 2025     Severe major depressive disorder (HCC) 2025     Encounter for geriatric assessment 2025     Essential hypertension 2025     Chronic obstructive pulmonary disease (HCC) 2024     Abdominal complaints 2024     Abnormal CT of the abdomen 2024     Common bile duct dilatation 2024     Malnutrition (HCC) 2024     Paronychia of great toe 2024     Recurrent major depressive disorder, remission status unspecified (HCC) 2024     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) 10/07/2024     Hyperlipidemia 10/07/2024     Depression 10/07/2024     Insomnia 10/07/2024     Pain in both lower extremities 10/07/2024     Moderate protein-calorie malnutrition (HCC) 2024     Atherosclerosis of native arteries of extremities with rest pain, bilateral legs (HCC) 2024     Anxiety 2024     GERD (gastroesophageal reflux disease) 2024     Hyponatremia 2024     Anemia 2024     Urgency of urination 2024     Spinal stenosis 2024       LOS (days): 1  Geometric Mean LOS (GMLOS) (days): 3.8  Days to GMLOS:2.6     OBJECTIVE:    Risk of Unplanned Readmission Score: 21.18         Current admission status: Inpatient       Preferred Pharmacy:   [id number] [place] - [address]  [place] [address]  Phone: [phone] Fax: [phone]    Primary Care Provider: Madalyn Zendejas PA-C    Primary Insurance: [employer]  Secondary Insurance:     ASSESSMENT:  Active Health Care Proxies        Tena Monaco Health Care Representative - Daughter   Primary Phone: [phone] (Mobile)                 Advance Directives  Does patient have a Health Care POA?: No  Was patient offered paperwork?: Yes (declined)  Does patient currently have a Health Care decision maker?: Yes, please see Health Care Proxy section  Does patient have Advance Directives?: No  Was patient offered paperwork?: Yes (declined)  Primary Contact: Tena Monaco (Daughter) [phone]       Readmission Root Cause  30 Day Readmission: No    Patient Information  Admitted from:: Home  Mental Status: Alert  During Assessment patient was accompanied by: Not accompanied during assessment  Assessment information provided by:: Patient  Primary Caregiver: Self  Support Systems: Family members  County of Residence: [place]  What city do you live in?: [place]  Home entry access options. Select all that apply.: Stairs  Number of steps to enter home.: 5  Do the steps have railings?: Yes  Type of Current Residence: [address] home  Upon entering residence, is there a bedroom on the main floor (no further steps)?: Yes  Upon entering residence, is there a bathroom on the main floor (no further steps)?: Yes  Living Arrangements: Lives w/ Daughter, Lives w/ Extended Family  Is patient a ?: No    Activities of Daily Living Prior to Admission  Functional Status: Independent  Completes ADLs independently?: Yes  Ambulates independently?: Yes  Does patient use assisted devices?: Yes  Assisted Devices (DME) used: Walker  Does patient currently own DME?: Yes  What DME does the patient currently own?: Walker  Does patient have a history of Outpatient Therapy (PT/OT)?: No  Does the patient have a history of Short-Term Rehab?: No  Does patient have a history of HHC?: No  Does patient currently have HHC?: No       Patient Information Continued  Income Source: Pension/long term  Does patient have prescription coverage?: Yes  Does patient receive dialysis  treatments?: No  Does patient have a history of substance abuse?: No  Does patient have a history of Mental Health Diagnosis?: No       Means of Transportation  Means of Transport to Appts:: Family transport      Social Determinants of Health (SDOH)      Flowsheet Row Most Recent Value   Housing Stability    In the last 12 months, was there a time when you were not able to pay the mortgage or rent on time? N   In the past 12 months, how many times have you moved where you were living? 0   At any time in the past 12 months, were you homeless or living in a shelter (including now)? N   Transportation Needs    In the past 12 months, has lack of transportation kept you from medical appointments or from getting medications? no   In the past 12 months, has lack of transportation kept you from meetings, work, or from getting things needed for daily living? No   Food Insecurity    Within the past 12 months, you worried that your food would run out before you got the money to buy more. Never true   Within the past 12 months, the food you bought just didn't last and you didn't have money to get more. Never true   Utilities    In the past 12 months has the electric, gas, oil, or water company threatened to shut off services in your home? No            DISCHARGE DETAILS:    Discharge planning discussed with:: patient  Freedom of Choice: Yes  Comments - Freedom of Choice: Home with OP follow up  CM contacted family/caregiver?: No- see comments (patient is alert and oriented X 3 and request no call be made to her daughter at this time)  Were Treatment Team discharge recommendations reviewed with patient/caregiver?: Yes (no discharge recommendation at this time)        Contacts  Patient Contacts: Tena  Phone Number: [phone]  Reason/Outcome: Emergency Contact    Requested Home Health Care         Is the patient interested in HHC at discharge?: No    DME Referral Provided  Referral made for DME?: No      Discharge  Destination Plan:: Home  Transport at Discharge : Family        Additional Comments: CM met with the patient at the bedside for intake assessment and discharge planning. CM introduced self and reviewed role. Patient informed CM that she lives with her daughter who takes care of everything. Patient does not anticipate having and discharge needs. Patient agrees to notify CM if a need arise. CM department will continue to follow the patient through hospital discharge.

## 2025-02-26 NOTE — PROGRESS NOTES
Progress Note - Critical Care/ICU   Name: Ofelia Aguilar 84 y.o. female I MRN: [id number]  Unit/Bed#: ICU 15 I Date of Admission: 2/25/2025   Date of Service: 2/26/2025 I Hospital Day: 1     Assessment & Plan  Atherosclerosis of native arteries of extremities with rest pain, bilateral legs (HCC)  Patient is a vascular surgery patient who underwent a right axillary bifemoral bypass on 2/25.  Vascular reports that on the procedure was uncomplicated, patient tolerated the procedure well.  Estimated blood loss described as minimal.  Postoperatively patient was found to have multiphasic signals of the lower extremities.  Home medication includes daily aspirin 81 milligrams, clopidogrel 75 mg, and atorvastatin 40 mg.  Patient is being stable postop and hemodynamically stable     Plan:  -Consider discontinue Saenz   -Out of bed  -Diet as tolerated  -Continue aspirin and Plavix  -Continue close neurovascular monitoring  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)  Diagnosed on hospital admission from November 25 to November 29, 2024.  Outpatient regimen of sodium chloride tablets 1 g 3 times daily.  Baseline sodium of 135-140.    Plan:  -Continue to monitor electrolyte status  -Continue sodium chloride tablets 1 g 3 times daily  Essential hypertension  Home medication:  -Amlodipine 5 mg daily    Plan: Continue outpatient regimen  Disposition: Critical care    ICU Core Measures     A: Assess, Prevent, and Manage Pain Has pain been assessed? Yes  Need for changes to pain regimen? No   B: Both SAT/SAT  N/A   C: Choice of Sedation RASS Goal: 0 Alert and Calm  Need for changes to sedation or analgesia regimen? Yes   D: Delirium CAM-ICU: Negative   E: Early Mobility  Plan for early mobility? No   F: Family Engagement Plan for family engagement today? No       Review of Invasive Devices:    Saenz Plan: Continue for accurate I/O monitoring for 48 hours        Prophylaxis:  VTE VTE covered by:  heparin (porcine),  Subcutaneous, 5,000 Units at 02/25/25 2110       Stress Ulcer  not ordered         24 Hour Events : No acute overnight events   Subjective Patient was seen and examined at bedside this morning, she was resting in bed. Patient reports feeling okay, denies pain, sob, chest pain, or chills. She start feeling nauseas after took some pills but not vomiting.       Objective :                   Vitals I/O      Most Recent Min/Max in 24hrs   Temp 98.3 °F (36.8 °C) Temp  Min: 96.3 °F (35.7 °C)  Max: 98.3 °F (36.8 °C)   Pulse 92 Pulse  Min: 74  Max: 100   Resp 21 Resp  Min: 12  Max: 23   /53 BP  Min: 100/44  Max: 144/96   O2 Sat 91 % SpO2  Min: 91 %  Max: 99 %      Intake/Output Summary (Last 24 hours) at 2/26/2025 0616  Last data filed at 2/26/2025 0201  Gross per 24 hour   Intake 3856.67 ml   Output 550 ml   Net 3306.67 ml       Diet Regular; Regular House    Invasive Monitoring           Physical Exam   Physical Exam  Vitals reviewed.   Eyes:      Extraocular Movements: Extraocular movements intact.      Conjunctiva/sclera: Conjunctivae normal.      Pupils: Pupils are equal, round, and reactive to light.   Skin:     Capillary Refill: Capillary refill takes less than 2 seconds.      Comments: Surgical incisions are covered in right axillary and left groin    HENT:      Head: Normocephalic and atraumatic.      Mouth/Throat:      Mouth: Mucous membranes are moist.   Cardiovascular:      Rate and Rhythm: Normal rate and regular rhythm.      Pulses: Pulses are PD: Diminished . Decreased pulses.      Heart sounds: Normal heart sounds.   Musculoskeletal:      Right lower leg: No edema.      Left lower leg: No edema.   Abdominal: General: Bowel sounds are normal.      Palpations: Abdomen is soft.      Tenderness: There is no abdominal tenderness.   Constitutional:       Appearance: She is well-developed and well-nourished. She is not ill-appearing.   Pulmonary:      Effort: Pulmonary effort is normal. No respiratory  distress.      Breath sounds: Normal breath sounds. No wheezing or rales.   Psychiatric:         Speech: Speech is not no expressive aphasia.   Neurological:      Mental Status: She is alert and oriented to person, place and time. She is calm.      Cranial Nerves: No facial asymmetry.   Genitourinary/Anorectal:  Saenz present.        Diagnostic Studies        Lab Results: I have reviewed the following results:     Medications:  Scheduled PRN   acetaminophen, 975 mg, Q8H SHANA  amLODIPine, 5 mg, Daily  aspirin, 81 mg, Daily  atorvastatin, 40 mg, Daily  clopidogrel, 75 mg, Daily  heparin (porcine), 5,000 Units, Q8H SHANA  hydrOXYzine HCL, 10 mg, HS  mirtazapine, 15 mg, HS  pregabalin, 25 mg, QAM   And  pregabalin, 50 mg, HS  senna, 1 tablet, Daily  sodium chloride, 2 g, TID With Meals      lactated ringers, 500 mL, Once PRN   And  lactated ringers, 500 mL, Once PRN  ondansetron, 4 mg, Q6H PRN  oxyCODONE, 10 mg, Q4H PRN  oxyCODONE, 5 mg, Q4H PRN  sodium chloride, 500 mL, Once PRN   And  sodium chloride, 500 mL, Once PRN       Continuous          Labs:   CBC    Recent Labs     02/25/25  1736 02/26/25  0603   WBC 11.31* 6.36   HGB 9.7* 9.5*   HCT 29.9* 28.9*    192     BMP    Recent Labs     02/25/25  1736   SODIUM 140   K 3.6   *   CO2 19*   AGAP 10   BUN 18   CREATININE 0.72   CALCIUM 8.6       Coags    Recent Labs     02/25/25  0814   PTT 27        Additional Electrolytes  No recent results       Blood Gas    No recent results  No recent results LFTs  No recent results    Infectious  No recent results  Glucose  Recent Labs     02/25/25  1736   GLUC 122

## 2025-02-26 NOTE — ASSESSMENT & PLAN NOTE
Patient is a vascular surgery patient who underwent a right axillary bifemoral bypass on 2/25.  Vascular reports that on the procedure was uncomplicated, patient tolerated the procedure well.  Estimated blood loss described as minimal.  Postoperatively patient was found to have multiphasic signals of the lower extremities.  Home medication includes daily aspirin 81 milligrams, clopidogrel 75 mg, and atorvastatin 40 mg.  Patient is being stable postop and hemodynamically stable     Plan:  -Consider discontinue Saenz   -Out of bed  -Diet as tolerated  -Continue aspirin and Plavix  -Continue close neurovascular monitoring

## 2025-02-26 NOTE — PROGRESS NOTES
Progress Note - Vascular Surgery   Name: Ofelia Aguilar 84 y.o. female I MRN: [id number]  Unit/Bed#: ICU 15 I Date of Admission: 2/25/2025   Date of Service: 2/26/2025 I Hospital Day: 1    Assessment & Plan  Atherosclerosis of native arteries of extremities with rest pain, bilateral legs (HCC)  85 yo female ex-smoker w/ a hx of PAD, anxiety, depression, GERD, HTN and HLD presented to [facility] on 2/25/25 for an elective axillary bi-fem bypass. Pt has a hx of atherosclerosis w/ B/L short-distance claudication and rest pain, L >R. Pt underwent a R axillary to bi-fem bypass on 2/25/25. Immediate post-op course was uneventful.    POD #1:  Pt remains hemodynamically stable w/ Hgb 9.5 and stable VS. Labs showed ABLA w/ Hgb decrease from 11.5 pre-op to 9.7 post-op. ABLA secondary to procedural and dilutional losses without any overt signs of bleeding. R subclavian, R flank and B/L groin incision sites soft, no swelling or ecchymosis. All incisions covered w/ mepilex which is clean, dry and intact w/ no strikethrough. Strong bypass signal. 1+ palpable DP and R PT signal. L triphasic PT and monophasic DP signals. Pt reports generalized soreness and ongoing L heel and toe pain. Pt reports L heel and toe pain were present for months prior to surgery and are improved from prior. (+) motor/sensation in R foot. (+) motor w/ decreased sensation in toes on L foot. Otherwise, pt denies any further complaints at this time.    Plan:  -Atherosclerosis w/ B/L short-distance claudication and rest pain, L >R  -S/P R axillary to bi-fem bypass on 2/25 (POD #1)  -Continue frequent neurovascular checks  -Diet as tolerated  -OOB as tolerated  -Continue ASA, plavix and lipitor  -Continue routine tylenol and oxycodone prn for post-op pain control  -Continue heparin sq for VTE prophylaxis  -Critical Care following for medical management; assistance appreciated  -PT/OT consult  -Plan to transfer out of ICU today  -Will discuss w/ Dr. Major    Subjective  : Pt seen for exam while lying in bed; NAD. Pt reports generalized soreness and ongoing L heel and toe pain. Pt reports L heel and toe pain were present for months prior to surgery and are improved from prior. Otherwise, she denies any further complaints at this time.    Objective :  Temp:  [96.3 °F (35.7 °C)-99.1 °F (37.3 °C)] 99.1 °F (37.3 °C)  HR:  [] 82  BP: (100-144)/(44-96) 127/59  Resp:  [12-23] 17  SpO2:  [91 %-99 %] 93 %  O2 Device: None (Room air)  Nasal Cannula O2 Flow Rate (L/min):  [2 L/min-3 L/min] 2 L/min     I/O         02/24 0701 02/25 0700 02/25 0701 02/26 0700 02/26 0701 02/27 0700    P.O.  240     I.V. (mL/kg)  3616.7 (72.9)     Total Intake(mL/kg)  3856.7 (77.8)     Urine (mL/kg/hr)  550     Total Output  550     Net  +3306.7                  Lines/Drains/Airways       Active Status       Name Placement date Placement time Site Days    Urethral Catheter Latex 16 Fr. 02/25/25  1110  Latex  1                  Physical Exam  Vitals and nursing note reviewed.   Constitutional:       General: She is awake. She is not in acute distress.     Appearance: Normal appearance. She is well-developed.   HENT:      Head: Normocephalic and atraumatic.   Cardiovascular:      Rate and Rhythm: Normal rate and regular rhythm.      Pulses:           Dorsalis pedis pulses are 1+ on the right side and detected w/ Doppler on the left side.        Posterior tibial pulses are detected w/ Doppler on the right side and detected w/ Doppler on the left side.      Heart sounds: Normal heart sounds.      Comments: (+) bypass signal  Pulmonary:      Effort: Pulmonary effort is normal. No respiratory distress.      Breath sounds: Normal breath sounds.   Abdominal:      General: There is no distension.      Palpations: Abdomen is soft.   Musculoskeletal:      Right lower leg: No edema.      Left lower leg: No edema.   Skin:     General: Skin is warm and dry.      Capillary Refill: Capillary refill takes less than 2  seconds.      Comments: R subclavian, R flank and B/L groin incisions   Neurological:      General: No focal deficit present.      Mental Status: She is alert and oriented to person, place, and time.   Psychiatric:         Mood and Affect: Mood and affect normal.         Speech: Speech normal.         Behavior: Behavior normal. Behavior is cooperative.         Cognition and Memory: Cognition normal.       Wound/Incision:  R subclavian, R flank and B/L groin incision sites soft, no swelling or ecchymosis. All incisions covered w/ mepilex which is clean, dry and intact w/ no strikethrough.      Lab Results: I have reviewed the following results:  CBC with diff:   Lab Results   Component Value Date    WBC 6.36 02/26/2025    HGB 9.5 (L) 02/26/2025    HCT 28.9 (L) 02/26/2025    MCV 94 02/26/2025     02/26/2025    RBC 3.07 (L) 02/26/2025    MCH 30.9 02/26/2025    MCHC 32.9 02/26/2025    RDW 14.4 02/26/2025    MPV 10.3 02/26/2025    NRBC 0 02/25/2025     BMP/CMP:  Lab Results   Component Value Date    SODIUM 139 02/26/2025    SODIUM 133 (L) 09/14/2024    K 3.4 (L) 02/26/2025    K 5.1 09/14/2024     (H) 02/26/2025    CL 99 09/14/2024    CO2 21 02/26/2025    CO2 22 09/14/2024    BUN 16 02/26/2025    BUN 14 09/14/2024    CREATININE 0.85 02/26/2025    CREATININE 0.7 09/14/2024    CALCIUM 8.2 (L) 02/26/2025    CALCIUM 9.7 09/14/2024    AST 21 12/21/2024    AST 23 09/14/2024    ALT 12 12/21/2024    ALT 16 09/14/2024    ALKPHOS 50 12/21/2024    ALKPHOS 66 09/14/2024    EGFR 64 02/26/2025    EGFR 85 09/14/2024     Coags:   Lab Results   Component Value Date    PTT 27 02/25/2025    INR 0.99 01/31/2025

## 2025-02-26 NOTE — PLAN OF CARE
Problem: PAIN - ADULT  Goal: Verbalizes/displays adequate comfort level or baseline comfort level  Description: Interventions:  - Encourage patient to monitor pain and request assistance  - Assess pain using appropriate pain scale  - Administer analgesics based on type and severity of pain and evaluate response  - Implement non-pharmacological measures as appropriate and evaluate response  - Consider cultural and social influences on pain and pain management  - Notify physician/advanced practitioner if interventions unsuccessful or patient reports new pain  Outcome: Progressing     Problem: INFECTION - ADULT  Goal: Absence or prevention of progression during hospitalization  Description: INTERVENTIONS:  - Assess and monitor for signs and symptoms of infection  - Monitor lab/diagnostic results  - Monitor all insertion sites, i.e. indwelling lines, tubes, and drains  - Monitor endotracheal if appropriate and nasal secretions for changes in amount and color  - [place] appropriate cooling/warming therapies per order  - Administer medications as ordered  - Instruct and encourage patient and family to use good hand hygiene technique  - Identify and instruct in appropriate isolation precautions for identified infection/condition  Outcome: Progressing  Goal: Absence of fever/infection during neutropenic period  Description: INTERVENTIONS:  - Monitor WBC    Outcome: Progressing     Problem: SAFETY ADULT  Goal: Patient will remain free of falls  Description: INTERVENTIONS:  - Educate patient/family on patient safety including physical limitations  - Instruct patient to call for assistance with activity   - Consult OT/PT to assist with strengthening/mobility   - Keep Call bell within reach  - Keep bed low and locked with side rails adjusted as appropriate  - Keep care items and personal belongings within reach  - Initiate and maintain comfort rounds  - Make Fall Risk Sign visible to staff  - Offer Toileting every  Hours,  in advance of need  - Initiate/Maintain alarm  - Obtain necessary fall risk management equipment:   - Apply yellow socks and bracelet for high fall risk patients  - Consider moving patient to room near nurses station  Outcome: Progressing  Goal: Maintain or return to baseline ADL function  Description: INTERVENTIONS:  -  Assess patient's ability to carry out ADLs; assess patient's baseline for ADL function and identify physical deficits which impact ability to perform ADLs (bathing, care of mouth/teeth, toileting, grooming, dressing, etc.)  - Assess/evaluate cause of self-care deficits   - Assess range of motion  - Assess patient's mobility; develop plan if impaired  - Assess patient's need for assistive devices and provide as appropriate  - Encourage maximum independence but intervene and supervise when necessary  - Involve family in performance of ADLs  - Assess for home care needs following discharge   - Consider OT consult to assist with ADL evaluation and planning for discharge  - Provide patient education as appropriate  Outcome: Progressing  Goal: Maintains/Returns to pre admission functional level  Description: INTERVENTIONS:  - Perform AM-PAC 6 Click Basic Mobility/ Daily Activity assessment daily.  - Set and communicate daily mobility goal to care team and patient/family/caregiver.   - Collaborate with rehabilitation services on mobility goals if consulted  - Perform Range of Motion  times a day.  - Reposition patient every  hours.  - Dangle patient  times a day  - Stand patient  times a day  - Ambulate patient  times a day  - Out of bed to chair  times a day   - Out of bed for meals  times a day  - Out of bed for toileting  - Record patient progress and toleration of activity level   Outcome: Progressing     Problem: DISCHARGE PLANNING  Goal: Discharge to home or other facility with appropriate resources  Description: INTERVENTIONS:  - Identify barriers to discharge w/patient and caregiver  - Arrange for  needed discharge resources and transportation as appropriate  - Identify discharge learning needs (meds, wound care, etc.)  - Arrange for interpretive services to assist at discharge as needed  - Refer to Case Management Department for coordinating discharge planning if the patient needs post-hospital services based on physician/advanced practitioner order or complex needs related to functional status, cognitive ability, or social support system  Outcome: Progressing     Problem: Knowledge Deficit  Goal: Patient/family/caregiver demonstrates understanding of disease process, treatment plan, medications, and discharge instructions  Description: Complete learning assessment and assess knowledge base.  Interventions:  - Provide teaching at level of understanding  - Provide teaching via preferred learning methods  Outcome: Progressing

## 2025-02-27 LAB
ANION GAP SERPL CALCULATED.3IONS-SCNC: 5 MMOL/L (ref 4–13)
BUN SERPL-MCNC: 15 MG/DL (ref 5–25)
CALCIUM SERPL-MCNC: 8.4 MG/DL (ref 8.4–10.2)
CHLORIDE SERPL-SCNC: 112 MMOL/L (ref 96–108)
CO2 SERPL-SCNC: 22 MMOL/L (ref 21–32)
CREAT SERPL-MCNC: 0.81 MG/DL (ref 0.6–1.3)
ERYTHROCYTE [DISTWIDTH] IN BLOOD BY AUTOMATED COUNT: 14.5 % (ref 11.6–15.1)
GFR SERPL CREATININE-BSD FRML MDRD: 67 ML/MIN/1.73SQ M
GLUCOSE SERPL-MCNC: 98 MG/DL (ref 65–140)
HCT VFR BLD AUTO: 27.2 % (ref 34.8–46.1)
HGB BLD-MCNC: 8.7 G/DL (ref 11.5–15.4)
MCH RBC QN AUTO: 30.5 PG (ref 26.8–34.3)
MCHC RBC AUTO-ENTMCNC: 32 G/DL (ref 31.4–37.4)
MCV RBC AUTO: 95 FL (ref 82–98)
PLATELET # BLD AUTO: 208 THOUSANDS/UL (ref 149–390)
PMV BLD AUTO: 10.5 FL (ref 8.9–12.7)
POTASSIUM SERPL-SCNC: 4.1 MMOL/L (ref 3.5–5.3)
RBC # BLD AUTO: 2.85 MILLION/UL (ref 3.81–5.12)
SODIUM SERPL-SCNC: 139 MMOL/L (ref 135–147)
WBC # BLD AUTO: 8.6 THOUSAND/UL (ref 4.31–10.16)

## 2025-02-27 PROCEDURE — 99222 1ST HOSP IP/OBS MODERATE 55: CPT | Performed by: INTERNAL MEDICINE

## 2025-02-27 PROCEDURE — 97166 OT EVAL MOD COMPLEX 45 MIN: CPT

## 2025-02-27 PROCEDURE — 99024 POSTOP FOLLOW-UP VISIT: CPT | Performed by: SURGERY

## 2025-02-27 PROCEDURE — 85027 COMPLETE CBC AUTOMATED: CPT | Performed by: NURSE PRACTITIONER

## 2025-02-27 PROCEDURE — 80048 BASIC METABOLIC PNL TOTAL CA: CPT | Performed by: NURSE PRACTITIONER

## 2025-02-27 RX ADMIN — ACETAMINOPHEN 975 MG: 325 TABLET, FILM COATED ORAL at 05:43

## 2025-02-27 RX ADMIN — AMLODIPINE BESYLATE 5 MG: 5 TABLET ORAL at 08:50

## 2025-02-27 RX ADMIN — OXYCODONE 5 MG: 5 TABLET ORAL at 08:50

## 2025-02-27 RX ADMIN — ACETAMINOPHEN 975 MG: 325 TABLET, FILM COATED ORAL at 19:32

## 2025-02-27 RX ADMIN — OXYCODONE HYDROCHLORIDE 10 MG: 10 TABLET ORAL at 19:32

## 2025-02-27 RX ADMIN — HEPARIN SODIUM 5000 UNITS: 5000 INJECTION INTRAVENOUS; SUBCUTANEOUS at 22:07

## 2025-02-27 RX ADMIN — SODIUM CHLORIDE 2 G: 1 TABLET ORAL at 17:47

## 2025-02-27 RX ADMIN — MIRTAZAPINE 15 MG: 15 TABLET, FILM COATED ORAL at 22:07

## 2025-02-27 RX ADMIN — PREGABALIN 25 MG: 25 CAPSULE ORAL at 09:25

## 2025-02-27 RX ADMIN — OXYCODONE HYDROCHLORIDE 10 MG: 10 TABLET ORAL at 15:22

## 2025-02-27 RX ADMIN — ASPIRIN 81 MG CHEWABLE TABLET 81 MG: 81 TABLET CHEWABLE at 08:50

## 2025-02-27 RX ADMIN — ACETAMINOPHEN 975 MG: 325 TABLET, FILM COATED ORAL at 13:22

## 2025-02-27 RX ADMIN — PREGABALIN 50 MG: 50 CAPSULE ORAL at 22:07

## 2025-02-27 RX ADMIN — ATORVASTATIN CALCIUM 40 MG: 40 TABLET, FILM COATED ORAL at 08:50

## 2025-02-27 RX ADMIN — CLOPIDOGREL BISULFATE 75 MG: 75 TABLET ORAL at 08:50

## 2025-02-27 RX ADMIN — HEPARIN SODIUM 5000 UNITS: 5000 INJECTION INTRAVENOUS; SUBCUTANEOUS at 05:43

## 2025-02-27 RX ADMIN — SODIUM CHLORIDE 2 G: 1 TABLET ORAL at 08:53

## 2025-02-27 RX ADMIN — SODIUM CHLORIDE 2 G: 1 TABLET ORAL at 13:22

## 2025-02-27 RX ADMIN — HYDROXYZINE HYDROCHLORIDE 10 MG: 10 TABLET ORAL at 22:07

## 2025-02-27 RX ADMIN — HEPARIN SODIUM 5000 UNITS: 5000 INJECTION INTRAVENOUS; SUBCUTANEOUS at 13:22

## 2025-02-27 NOTE — ASSESSMENT & PLAN NOTE
Lab Results   Component Value Date    SODIUM 139 02/27/2025    SODIUM 139 02/26/2025    SODIUM 140 02/25/2025       Diagnosed on hospital admission from November 25 to November 29, 2024.  Outpatient regimen of sodium chloride tablets 1 g 3 times daily.  Baseline sodium of 135-140.     Plan:  NA tablet increased to 2G TID, NA stable  Continue to monitor BMP

## 2025-02-27 NOTE — PROGRESS NOTES
Patient:    MRN:  [id number]    Johnnie Request ID:  [id number]    Level of care reserved:  Home Health Agency    Partner Reserved:  [facility] [place] [address] [phone]    Clinical needs requested:    Geography searched:  [phone]    Start of Service:    Request sent:  9:46am EST on 2/27/2025 by Amelia Laura    Partner reserved:  11:48am EST on 2/27/2025 by Amelia Laura    Choice list shared:  11:36am EST on 2/27/2025 by Amelia Laura

## 2025-02-27 NOTE — OCCUPATIONAL THERAPY NOTE
Occupational Therapy Evaluation     Patient Name: Ofelia Aguilar  Today's Date: 2025  Problem List  Principal Problem:    Atherosclerosis of native arteries of extremities with rest pain, bilateral legs (HCC)  Active Problems:    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)    Essential hypertension    Past Medical History  Past Medical History:   Diagnosis Date    Anxiety     Atherosclerosis of native arteries of extremities with rest pain, bilateral legs (HCC) 2024    Cigarette nicotine dependence with nicotine-induced disorder 10/29/2024    Colon polyp     Diverticulitis     GERD (gastroesophageal reflux disease)     Hyperlipidemia     Hypertension      Past Surgical History  Past Surgical History:   Procedure Laterality Date    APPENDECTOMY       SECTION      COLONOSCOPY      FEMORAL BYPASS Right     2017    PERIPHERAL ANGIOGRAM      GA BYPASS W/VEIN AXILLARY-FEMORAL Bilateral 2025    Procedure: BYPASS AXILLO-FEMORAL;  Surgeon: Meghan Diaz MD;  Location: [facility];  Service: Vascular           25   OT Last Visit   OT Visit Date 25   Note Type   Note type Evaluation   Pain Assessment   Pain Assessment Tool 0-10   Pain Score 5   Pain Location/Orientation Orientation: Left;Location: Foot   Patient's Stated Pain Goal No pain   Hospital Pain Intervention(s) Repositioned;Ambulation/increased activity;Emotional support;Rest   Multiple Pain Sites No   Restrictions/Precautions   Weight Bearing Precautions Per Order No   Other Precautions Chair Alarm;Bed Alarm;Telemetry;Multiple lines;Fall Risk;Pain   Home Living   Type of Home House   Home Layout One level;Stairs to enter with rails  (5 JUSTIN)   Bathroom Shower/Tub Tub/shower unit  (Pt's dtr assists pt get in/out of tub at baseline)   Bathroom Toilet Standard   Bathroom Equipment Grab bars in shower;Shower chair   Bathroom Accessibility Accessible   Home Equipment Walker   Additional Comments Pt lives with dtr  "and MIGUEL in a one level house with 5 JUSTIN. (+) home alone at times.   Prior Function   Level of [place] Independent with ADLs;Independent with functional mobility;Needs assistance with IADLS   Lives With Daughter;Other (Comment)  (MIGUEL)   Receives Help From Family   IADLs Family/Friend/Other provides transportation;Family/Friend/Other provides meals;Family/Friend/Other provides medication management   Falls in the last 6 months 0   Vocational Retired   Comments At baseline, pt was I w/ ADLs, required assist w/ IADLs, and Mod I for functional transfers/mobility w/ use of RW. (-) . Denies falls PTA.   Lifestyle   Autonomy At baseline, pt was I w/ ADLs, required assist w/ IADLs, and Mod I for functional transfers/mobility w/ use of RW. (-) . Denies falls PTA.   Reciprocal Relationships Supportive family   Service to Others Retired   Intrinsic Gratification Puzzles   Subjective   Subjective \"I lost all of my strength over the last 6 months!\"   ADL   Where Assessed Edge of bed   Eating Assistance 7  Independent   Grooming Assistance 5  Supervision/Setup   UB Bathing Assistance 5  Supervision/Setup   LB Bathing Assistance 4  Minimal Assistance   UB Dressing Assistance 5  Supervision/Setup   LB Dressing Assistance 3  Moderate Assistance   Toileting Assistance  4  Minimal Assistance   Bed Mobility   Supine to Sit 4  Minimal assistance   Additional items Assist x 1;Increased time required;Verbal cues   Sit to Supine 5  Supervision   Additional items Increased time required;Verbal cues   Additional Comments Pt lying supine with bed alarm activated at end of session. Call bell and phone within reach. All needs met and pt reports no further questions for OT at this time.   Transfers   Sit to Stand 4  Minimal assistance   Additional items Assist x 1;Increased time required;Verbal cues   Stand to Sit 5  Supervision   Additional items Increased time required;Verbal cues   Additional Comments Cues for safe technique "   Functional Mobility   Functional Mobility 4  Minimal assistance  (progressing to close supervision)   Additional Comments Assist x1; Initially Min A, progressing to close supervision   Additional items Rolling walker   Balance   Static Sitting Fair   Dynamic Sitting Fair -   Static Standing Fair -   Dynamic Standing Poor +   Ambulatory Poor +   Activity Tolerance   Activity Tolerance Patient tolerated treatment well;Patient limited by pain   Medical Staff Made Aware KEELY Stephens   Nurse Made Aware yes   RUE Assessment   RUE Assessment X  (limited shldr ROM, elbow-distal=WFL)   LUE Assessment   LUE Assessment X  (limited shldr ROM, elbow-distal=WFL)   Hand Function   Gross Motor Coordination Functional   Fine Motor Coordination Functional   Sensation   Light Touch Partial deficits in the RLE;Partial deficits in the LLE   Proprioception   Proprioception No apparent deficits   Vision-Basic Assessment   Current Vision Wears glasses only for reading   Vision - Complex Assessment   Acuity Able to read clock/calendar on wall without difficulty;Able to read employee name badge without difficulty   Psychosocial   Psychosocial (WDL) WDL   Perception   Inattention/Neglect Appears intact   Cognition   Overall Cognitive Status WFL   Arousal/Participation Alert;Cooperative   Attention Within functional limits   Orientation Level Oriented X4   Memory Within functional limits   Following Commands Follows one step commands without difficulty   Assessment   Limitation Decreased ADL status;Decreased UE strength;Decreased endurance;Decreased sensation;Decreased self-care trans;Decreased high-level ADLs   Prognosis Good   Assessment Pt is a 84 y.o. female seen for OT evaluation s/p adm to [facility] on [facility]/25/2025 w/ Atherosclerosis of native arteries of extremities with rest pain, bilateral legs (HCC) . Pt now POD #2 s/p R ax-bifemoral bypass. Comorbidities affecting pt’s functional performance include a significant PMH of  malnutrition, SIADH, hypertension, GERD, anxiety, and aortoiliac occlusive disease. Pt with active OT orders and activity orders for Activity as tolerated. Pt lives with dtr and MIGUEL in a one level house with 5 JUSTIN. (+) home alone at times. At baseline, pt was I w/ ADLs, required assist w/ IADLs, and Mod I for functional transfers/mobility w/ use of RW. (-) . Denies falls PTA. Upon evaluation, pt currently requires Supervision for UB ADLs, Mod-Min A for LB ADLs, Min A for toileting, Min A-Supervision for bed mobility, and Min A-Supervision for functional mobility/transfers 2* the following deficits impacting occupational performance: weakness, decreased strength , decreased balance, decreased activity tolerance, limited functional reach, impaired sensation, increased pain, and decreased postural control. These impairments, as well at pt’s personal factors of: JUSTIN home environment, limited home support, difficulty performing ADLs, difficulty performing IADLs, difficulty performing transfers/mobility, fall risk , functional decline , and advanced age limit pt’s ability to safely engage in all baseline areas of occupation. Pt to continue to benefit from continued acute OT services during hospital stay to address defined deficits and to maximize level of functional independence in the following Occupational Performance areas: grooming, bathing/shower, toilet hygiene, dressing, health maintenance, functional mobility, community mobility, clothing management, and social participation. The patient's raw score on the -PAC Daily Activity Inpatient Short Form is 18. A raw score of less than 19 suggests the patient may benefit from discharge to post-acute rehabilitation services. Please refer to the recommendation of the Occupational Therapist for safe discharge planning. OT will continue to follow pt 2-5x/wk to address the following goals to  w/in 10-14 days:   Goals   Patient Goals To get stronger   LTG Time  Frame 10-14   Long Term Goal Please refer to LTGs listed below   Plan   Treatment Interventions ADL retraining;Functional transfer training;UE strengthening/ROM;Endurance training;Patient/family training;Equipment evaluation/education;Compensatory technique education;Continued evaluation;Activityengagement   Goal Expiration Date 03/13/25   OT Treatment Day 0   OT Frequency 2-3x/wk;3-5x/wk   Discharge Recommendation   Rehab Resource Intensity Level, OT II (Moderate Resource Intensity)  (HHOT vs STR pending progress and available support in home environment; Pt wishes to D/C home with HHPT/OT)   AM-PAC Daily Activity Inpatient   Lower Body Dressing 2   Bathing 3   Toileting 3   Upper Body Dressing 3   Grooming 3   Eating 4   Daily Activity Raw Score 18   Daily Activity Standardized Score (Calc for Raw Score >=11) 38.66   AM-PAC Applied Cognition Inpatient   Following a Speech/Presentation 4   Understanding Ordinary Conversation 4   Taking Medications 3   Remembering Where Things Are Placed or Put Away 3   Remembering List of 4-5 Errands 3   Taking Care of Complicated Tasks 3   Applied Cognition Raw Score 20   Applied Cognition Standardized Score 41.76       GOALS    Pt will improve activity tolerance to G for min 30 min txment sessions for increase engagement in functional tasks    Pt will complete bed mobility at a Mod I level w/ G balance/safety demonstrated to decrease caregiver assistance required     Pt will complete UB dressing/self care w/ mod I using adaptive device and DME as needed     Pt will complete LB dressing/self care w/ mod I using adaptive device and DME as needed    Pt will complete toileting w/ mod I w/ G hygiene/thoroughness using DME as needed    Pt will improve functional transfers to Mod I on/off all surfaces using DME as needed w/ G balance/safety     Pt will improve functional mobility during ADL/IADL/leisure tasks to Mod I using DME as needed w/ G balance/safety     Pt will be attentive 100%  of the time during ongoing cognitive assessment w/ G participation to assist w/ safe d/c planning/recommendations    Pt will demonstrate G carryover of pt/caregiver education and training as appropriate w/o cues w/ good tolerance to increase safety during functional tasks    Pt will verbalize 3 potential fall hazards and identify appropriate compensatory techniques to decrease fall risk in home environment     Pt will increase standing tolerance to 5-8 mins with Fair+ dynamic standing balance to increase safety during participation in ADLs       Shelli El, OTR/L

## 2025-02-27 NOTE — CONSULTS
Consultation - Hospitalist   Name: Ofelia Aguilar 84 y.o. female I MRN: [id number]  Unit/Bed#: E4 -01 I Date of Admission: 2/25/2025   Date of Service: 2/27/2025 I Hospital Day: 2   Inpatient consult to Internal Medicine  Consult performed by: SARAY Parsons  Consult ordered by: SARAY Martinez        Physician Requesting Evaluation: Meghan Diaz MD   Reason for Evaluation / Principal Problem: Medical Management    Assessment & Plan  Atherosclerosis of native arteries of extremities with rest pain, bilateral legs (HCC)  Patient is a vascular surgery patient who underwent a right axillary bifemoral bypass on 2/25.  Vascular reports that on the procedure was uncomplicated, patient tolerated the procedure well.  Estimated blood loss described as minimal.  Postoperatively patient was found to have multiphasic signals of the lower extremities.  Home medication includes daily aspirin 81 milligrams, clopidogrel 75 mg, and atorvastatin 40 mg.     Plan:  POD #2  Now transferred from Critical Care to Med/Surg overnight  PT/OT eval  Tolerating Po diet  Continue ASA/Plavix/statin  Continue close neurovascular monitoring  Multimodal pain management  Remainder of care per primary service  SIADH (syndrome of inappropriate ADH production) ([facility])  Lab Results   Component Value Date    SODIUM 139 02/27/2025    SODIUM 139 02/26/2025    SODIUM 140 02/25/2025       Diagnosed on hospital admission from November 25 to November 29, 2024.  Outpatient regimen of sodium chloride tablets 1 g 3 times daily.  Baseline sodium of 135-140.     Plan:  NA tablet increased to 2G TID, NA stable  Continue to monitor BMP  Essential hypertension  Bp accetable  Continue POA amlodipine 5mg  Monitor  Anemia  Lab Results   Component Value Date    HGB 8.7 (L) 02/27/2025    HGB 9.5 (L) 02/26/2025    HGB 9.7 (L) 02/25/2025     Hgb 8.7 today, likely secondary to recent surgery  No signs of over bleeding  Monitor  CBC  Hyperlipidemia  Continue statin  Hospitalist service will follow.      VTE Pharmacologic Prophylaxis:   Moderate Risk (Score 3-4) - Pharmacological DVT Prophylaxis Ordered: heparin.  Code Status: Prior   Discussion with family: Patient declined call to .     Anticipated Length of Stay: Patient will be admitted on an inpatient basis with an anticipated length of stay of greater than 2 midnights secondary to post operative monitoring.    History of Present Illness   Chief Complaint: No chief complaint on file.        Ofelia Aguilar is a 84 y.o. female with a PMH of SIADH, HTN,Anxiety and GERD who presents with post operative monitoring post right axillary to bilateral femoral bypass. We were consulted for medical management. Patient downgraded from critical care to medsurg overnight. Patient remains hemodynamically stable. Tolerated surgery well. Patient denies chest pain, SOB, palpations, fever or chills. Will continue to monitor.     Review of Systems   Constitutional:  Negative for chills and fever.   Respiratory:  Negative for cough, choking, shortness of breath and wheezing.    Cardiovascular:  Negative for chest pain and palpitations.   Gastrointestinal:  Negative for nausea and vomiting.   Neurological:  Negative for dizziness.       Historical Information   Past Medical History:   Diagnosis Date    Anxiety     Atherosclerosis of native arteries of extremities with rest pain, bilateral legs (HCC) 2024    Cigarette nicotine dependence with nicotine-induced disorder 10/29/2024    Colon polyp     Diverticulitis     GERD (gastroesophageal reflux disease)     Hyperlipidemia     Hypertension      Past Surgical History:   Procedure Laterality Date    APPENDECTOMY       SECTION      COLONOSCOPY      FEMORAL BYPASS Right         PERIPHERAL ANGIOGRAM      ME BYPASS W/VEIN AXILLARY-FEMORAL Bilateral 2025    Procedure: BYPASS AXILLO-FEMORAL;  Surgeon: Meghan Diaz MD;   Location: [facility];  Service: Vascular     Social History     Tobacco Use    Smoking status: Former     Current packs/day: 1.00     Average packs/day: 1 pack/day for 20.0 years (20.0 ttl pk-yrs)     Types: Cigarettes     Passive exposure: Never    Smokeless tobacco: Never   Vaping Use    Vaping status: Never Used   Substance and Sexual Activity    Alcohol use: Never    Drug use: Not Currently    Sexual activity: Not on file     E-Cigarette/Vaping    E-Cigarette Use Never User      E-Cigarette/Vaping Substances    Nicotine No     THC No     CBD No     Flavoring No     Other No     Unknown No      Family history non-contributory  Social History:  Marital Status:    Patient Pre-hospital Living Situation: Home  Patient Pre-hospital Level of Mobility: walks    Meds/Allergies   I have reviewed home medications using recent Epic encounter.  Prior to Admission medications    Medication Sig Start Date End Date Taking? Authorizing Provider   acetaminophen (TYLENOL) 650 mg CR tablet Take 1 tablet (650 mg total) by mouth every 8 (eight) hours as needed for mild pain 10/10/24  Yes DONY Mercado-C   amLODIPine (NORVASC) 5 mg tablet Take 1 tablet (5 mg total) by mouth daily 11/19/24 2/25/25 Yes Madalyn Zendejas PA-C   atorvastatin (LIPITOR) 40 mg tablet Take 1 tablet (40 mg total) by mouth daily 11/19/24  Yes DONY Mercado-C   clopidogrel (PLAVIX) 75 mg tablet TAKE 1 TABLET BY MOUTH EVERY DAY 1/10/25  Yes Madalyn Zendejas PA-C   hydrOXYzine HCL (ATARAX) 10 mg tablet TAKE 1 TABLET BY MOUTH DAILY AT BEDTIME 2/7/25  Yes DONY Mercado-C   mirtazapine (REMERON) 15 mg tablet TAKE 1 TABLET BY MOUTH DAILY AT BEDTIME 1/15/25  Yes Madalyn Zendejas PA-C   Multiple Vitamin (multivitamin) tablet Take 1 tablet by mouth daily   Yes Historical Provider, MD   pregabalin (LYRICA) 25 mg capsule Take 1 capsule (25 mg total) by mouth daily AND 2 capsules (50 mg total) daily at bedtime. 2/3/25  Yes SARAY Crabtree   sodium  chloride 1 g tablet Take 2 tablets (2 g total) by mouth 3 (three) times a day with meals 2/7/25 3/9/25 Yes Madalyn Zendejas PA-C   aspirin 81 mg chewable tablet Chew 1 tablet daily 7/31/24   Historical Provider, MD   ondansetron (ZOFRAN) 4 mg tablet Take 1 tablet (4 mg total) by mouth 3 (three) times a day as needed for nausea or vomiting 11/19/24   DONY Mercado-C     Allergies   Allergen Reactions    Penicillins Anaphylaxis     PO keflex in past without reaction (but did not work for UTI)    Aspirin GI Intolerance       Objective :  Temp:  [97.4 °F (36.3 °C)-98.9 °F (37.2 °C)] 98 °F (36.7 °C)  HR:  [78-94] 86  BP: (111-149)/(50-62) 118/57  Resp:  [14-21] 16  SpO2:  [94 %-98 %] 95 %  O2 Device: None (Room air)    Physical Exam  Vitals and nursing note reviewed.   Constitutional:       General: She is not in acute distress.     Appearance: She is well-developed.   HENT:      Head: Normocephalic and atraumatic.      Mouth/Throat:      Mouth: Mucous membranes are moist.   Eyes:      Conjunctiva/sclera: Conjunctivae normal.   Cardiovascular:      Rate and Rhythm: Normal rate and regular rhythm.      Pulses: Normal pulses.      Heart sounds: Normal heart sounds. No murmur heard.  Pulmonary:      Effort: Pulmonary effort is normal. No respiratory distress.      Breath sounds: Normal breath sounds.   Abdominal:      General: Bowel sounds are normal.      Palpations: Abdomen is soft.      Tenderness: There is no abdominal tenderness.   Musculoskeletal:         General: No swelling.      Cervical back: Neck supple.   Skin:     General: Skin is warm and dry.      Capillary Refill: Capillary refill takes less than 2 seconds.   Neurological:      Mental Status: She is alert and oriented to person, place, and time.   Psychiatric:         Mood and Affect: Mood normal.         Behavior: Behavior normal.         Thought Content: Thought content normal.          Lines/Drains:            Lab Results: I have reviewed the  "following results:  Results from last 7 days   Lab Units 02/27/25  0528 02/26/25  0603 02/25/25  1736   WBC Thousand/uL 8.60   < > 11.31*   HEMOGLOBIN g/dL 8.7*   < > 9.7*   HEMATOCRIT % 27.2*   < > 29.9*   PLATELETS Thousands/uL 208   < > 185   SEGS PCT %  --   --  82*   LYMPHO PCT %  --   --  9*   MONO PCT %  --   --  9   EOS PCT %  --   --  0    < > = values in this interval not displayed.     Results from last 7 days   Lab Units 02/27/25  0528   SODIUM mmol/L 139   POTASSIUM mmol/L 4.1   CHLORIDE mmol/L 112*   CO2 mmol/L 22   BUN mg/dL 15   CREATININE mg/dL 0.81   ANION GAP mmol/L 5   CALCIUM mg/dL 8.4   GLUCOSE RANDOM mg/dL 98             No results found for: \"HGBA1C\"        Imaging Results Review: No pertinent imaging studies reviewed.  Other Study Results Review: EKG was reviewed.     Administrative Statements   I have spent a total time of 35 minutes in caring for this patient on the day of the visit/encounter including Prognosis, Instructions for management, Importance of tx compliance, Risk factor reductions, Impressions, Counseling / Coordination of care, Reviewing/placing orders in the medical record (including tests, medications, and/or procedures), Obtaining or reviewing history  , and Communicating with other healthcare professionals .    ** Please Note: This note has been constructed using a voice recognition system. **    "

## 2025-02-27 NOTE — CASE MANAGEMENT
Case Management Discharge Planning Note    Patient name Ofelia Aguilar  Location [facility] /E4 -* MRN [id number]  : 1940 Date 2025       Current Admission Date: 2025  Current Admission Diagnosis:Atherosclerosis of native arteries of extremities with rest pain, bilateral legs (HCC)   Patient Active Problem List    Diagnosis Date Noted Date Diagnosed    Chronic anticoagulation 2025     Severe major depressive disorder (HCC) 2025     Essential hypertension 2025     Chronic obstructive pulmonary disease (HCC) 2024     Abdominal complaints 2024     Abnormal CT of the abdomen 2024     Common bile duct dilatation 2024     Malnutrition (HCC) 2024     Paronychia of great toe 2024     Recurrent major depressive disorder, remission status unspecified (HCC) 2024     SIADH (syndrome of inappropriate ADH production) ([facility]) 10/07/2024     Hyperlipidemia 10/07/2024     Depression 10/07/2024     Insomnia 10/07/2024     Pain in both lower extremities 10/07/2024     Moderate protein-calorie malnutrition (HCC) 2024     Atherosclerosis of native arteries of extremities with rest pain, bilateral legs (HCC) 2024     Anxiety 2024     GERD (gastroesophageal reflux disease) 2024     Hyponatremia 2024     Anemia 2024     Urgency of urination 2024     Spinal stenosis 2024       LOS (days): 2  Geometric Mean LOS (GMLOS) (days): 3.8  Days to GMLOS:1.7     OBJECTIVE:  Risk of Unplanned Readmission Score: 17.46         Current admission status: Inpatient   Preferred Pharmacy:   [facility]/pharmacy #[id number] - [place] - [address]  [place] [address]  Phone: [phone] Fax: [phone]    Primary Care Provider: Madalyn Zendejas PA-C    Primary Insurance: [employer]  Secondary Insurance:     DISCHARGE DETAILS:    Discharge planning discussed with:: PT and daughter  Freedom of Choice: Yes  Comments  - Freedom of Choice: [facility] reserved  CM contacted family/caregiver?: Yes  Were Treatment Team discharge recommendations reviewed with patient/caregiver?: Yes  Did patient/caregiver verbalize understanding of patient care needs?: Yes  Were patient/caregiver advised of the risks associated with not following Treatment Team discharge recommendations?: Yes    Contacts  Patient Contacts: Tena  Relationship to Patient:: Family  Contact Method: Phone  Phone Number: [phone]  Reason/Outcome: Emergency Contact, Continuity of Care    Requested Home Health Care         Is the patient interested in [facility] at discharge?: Yes  Home Health Discipline requested:: Physical Therapy, Occupational Therapy, Nursing  Home Health Agency Name:: [facility] External Referral Reason (only applicable if external HH name selected): Services not provided in network or near patient location  Home Health Follow-Up Provider:: PCP  Home Health Services Needed:: Evaluate Functional Status and Safety, Gait/ADL Training, Strengthening/Theraputic Exercises to Improve Function, Other (comment) (Med management)  Homebound Criteria Met:: Requires the Assistance of Another Person for Safe Ambulation or to Leave the Home, Uses an Assist Device (i.e. cane, walker, etc)  Supporting Clincal Findings:: Limited Endurance, Fatigues Easliy in Short Distances    DME Referral Provided  Referral made for DME?: No    Other Referral/Resources/Interventions Provided:  Interventions: [facility]    Would you like to participate in our [facility] service program?  : No - Declined    Treatment Team Recommendation: Short Term Rehab  Discharge Destination Plan:: Home with Home Health Care  Transport at Discharge : Family                             IMM Given (Date):: 02/27/25  IMM Given to:: Patient          IMM reviewed with patient, patient agrees with discharge determination.    CM met with patient at bedside to review therapy recommendation of STR vs Home with  [facility].  Patient would like home with [facility], she would like daughter to choose HH agency.    CM spoke with daughter via PC, she is in agreement with home with [facility].  Choice list provided, daughter would prefer [facility].  Daughter will transport patient home at time of DC.    CM reserved [facility] via [facility]. CM department remains available.

## 2025-02-27 NOTE — PLAN OF CARE
Problem: PAIN - ADULT  Goal: Verbalizes/displays adequate comfort level or baseline comfort level  Description: Interventions:  - Encourage patient to monitor pain and request assistance  - Assess pain using appropriate pain scale  - Administer analgesics based on type and severity of pain and evaluate response  - Implement non-pharmacological measures as appropriate and evaluate response  - Consider cultural and social influences on pain and pain management  - Notify physician/advanced practitioner if interventions unsuccessful or patient reports new pain  Outcome: Progressing     Problem: INFECTION - ADULT  Goal: Absence or prevention of progression during hospitalization  Description: INTERVENTIONS:  - Assess and monitor for signs and symptoms of infection  - Monitor lab/diagnostic results  - Monitor all insertion sites, i.e. indwelling lines, tubes, and drains  - Monitor endotracheal if appropriate and nasal secretions for changes in amount and color  - [place] appropriate cooling/warming therapies per order  - Administer medications as ordered  - Instruct and encourage patient and family to use good hand hygiene technique  - Identify and instruct in appropriate isolation precautions for identified infection/condition  Outcome: Progressing  Goal: Absence of fever/infection during neutropenic period  Description: INTERVENTIONS:  - Monitor WBC    Outcome: Progressing     Problem: SAFETY ADULT  Goal: Patient will remain free of falls  Description: INTERVENTIONS:  - Educate patient/family on patient safety including physical limitations  - Instruct patient to call for assistance with activity   - Consult OT/PT to assist with strengthening/mobility   - Keep Call bell within reach  - Keep bed low and locked with side rails adjusted as appropriate  - Keep care items and personal belongings within reach  - Initiate and maintain comfort rounds  - Make Fall Risk Sign visible to staff  - Offer Toileting every 2 Hours, in  advance of need  - Initiate/Maintain bed alarm  - Obtain necessary fall risk management equipment: alarm  - Apply yellow socks and bracelet for high fall risk patients  - Consider moving patient to room near nurses station  Outcome: Progressing  Goal: Maintain or return to baseline ADL function  Description: INTERVENTIONS:  -  Assess patient's ability to carry out ADLs; assess patient's baseline for ADL function and identify physical deficits which impact ability to perform ADLs (bathing, care of mouth/teeth, toileting, grooming, dressing, etc.)  - Assess/evaluate cause of self-care deficits   - Assess range of motion  - Assess patient's mobility; develop plan if impaired  - Assess patient's need for assistive devices and provide as appropriate  - Encourage maximum independence but intervene and supervise when necessary  - Involve family in performance of ADLs  - Assess for home care needs following discharge   - Consider OT consult to assist with ADL evaluation and planning for discharge  - Provide patient education as appropriate  Outcome: Progressing  Goal: Maintains/Returns to pre admission functional level  Description: INTERVENTIONS:  - Perform AM-PAC 6 Click Basic Mobility/ Daily Activity assessment daily.  - Set and communicate daily mobility goal to care team and patient/family/caregiver.   - Collaborate with rehabilitation services on mobility goals if consulted  - Perform Range of Motion 3 times a day.  - Reposition patient every 2 hours.  - Dangle patient 3 times a day  - Stand patient 3 times a day  - Ambulate patient 3 times a day  - Out of bed to chair 3 times a day   - Out of bed for meals 3 times a day  - Out of bed for toileting  - Record patient progress and toleration of activity level   Outcome: Progressing     Problem: DISCHARGE PLANNING  Goal: Discharge to home or other facility with appropriate resources  Description: INTERVENTIONS:  - Identify barriers to discharge w/patient and caregiver  -  Arrange for needed discharge resources and transportation as appropriate  - Identify discharge learning needs (meds, wound care, etc.)  - Arrange for interpretive services to assist at discharge as needed  - Refer to Case Management Department for coordinating discharge planning if the patient needs post-hospital services based on physician/advanced practitioner order or complex needs related to functional status, cognitive ability, or social support system  Outcome: Progressing     Problem: Knowledge Deficit  Goal: Patient/family/caregiver demonstrates understanding of disease process, treatment plan, medications, and discharge instructions  Description: Complete learning assessment and assess knowledge base.  Interventions:  - Provide teaching at level of understanding  - Provide teaching via preferred learning methods  Outcome: Progressing     Problem: Prexisting or High Potential for Compromised Skin Integrity  Goal: Skin integrity is maintained or improved  Description: INTERVENTIONS:  - Identify patients at risk for skin breakdown  - Assess and monitor skin integrity  - Assess and monitor nutrition and hydration status  - Monitor labs   - Assess for incontinence   - Turn and reposition patient  - Assist with mobility/ambulation  - Relieve pressure over bony prominences  - Avoid friction and shearing  - Provide appropriate hygiene as needed including keeping skin clean and dry  - Evaluate need for skin moisturizer/barrier cream  - Collaborate with interdisciplinary team   - Patient/family teaching  - Consider wound care consult   Outcome: Progressing     Problem: Nutrition/Hydration-ADULT  Goal: Nutrient/Hydration intake appropriate for improving, restoring or maintaining nutritional needs  Description: Monitor and assess patient's nutrition/hydration status for malnutrition. Collaborate with interdisciplinary team and initiate plan and interventions as ordered.  Monitor patient's weight and dietary intake as  ordered or per policy. Utilize nutrition screening tool and intervene as necessary. Determine patient's food preferences and provide high-protein, high-caloric foods as appropriate.     INTERVENTIONS:  - Monitor oral intake, urinary output, labs, and treatment plans  - Assess nutrition and hydration status and recommend course of action  - Evaluate amount of meals eaten  - Assist patient with eating if necessary   - Allow adequate time for meals  - Recommend/ encourage appropriate diets, oral nutritional supplements, and vitamin/mineral supplements  - Order, calculate, and assess calorie counts as needed  - Recommend, monitor, and adjust tube feedings and TPN/PPN based on assessed needs  - Assess need for intravenous fluids  - Provide specific nutrition/hydration education as appropriate  - Include patient/family/caregiver in decisions related to nutrition  Outcome: Progressing

## 2025-02-27 NOTE — ASSESSMENT & PLAN NOTE
Patient is a vascular surgery patient who underwent a right axillary bifemoral bypass on 2/25.  Vascular reports that on the procedure was uncomplicated, patient tolerated the procedure well.  Estimated blood loss described as minimal.  Postoperatively patient was found to have multiphasic signals of the lower extremities.  Home medication includes daily aspirin 81 milligrams, clopidogrel 75 mg, and atorvastatin 40 mg.     Plan:  POD #2  Now transferred from Critical Care to Med/Surg overnight  PT/OT eval  Tolerating Po diet  Continue ASA/Plavix/statin  Continue close neurovascular monitoring  Multimodal pain management  Remainder of care per primary service

## 2025-02-27 NOTE — ASSESSMENT & PLAN NOTE
85 yo female ex-smoker w/ a hx of PAD, anxiety, depression, GERD, HTN and HLD presented to [facility] on 2/25/25 for an elective axillary bi-fem bypass. Pt has a hx of atherosclerosis w/ B/L short-distance claudication and rest pain, L >R. Pt underwent a R axillary to bi-fem bypass on 2/25/25. Immediate post-op course was uneventful.    POD #1:  Labs showed ABLA w/ Hgb decrease from 11.5 pre-op to 9.7 post-op. ABLA secondary to procedural and dilutional losses without any overt signs of bleeding.  Strong bypass signal.     POD #2:  Pt transferred to med-surg. Pt seen by OT w/ recommendations for STR vs HHC. Daughter opted for HHC.    POD #3: Pt remains hemodynamically stable w/ a slight decrease in Hgb to 8.4 w/ no overt signs of bleeding and stable VS. Pt having periods of afib w/ RVR on monitor. Cardiology consulted. R subclavian and B/L groin incision sites soft, no ecchymosis or swelling. R flank incision site w/ mild soft swelling and resolving ecchymosis. All incisions covered w/ mepilex which is clean, dry and intact w/ no strikethrough. Strong bypass signal. 2+ palpable DP B/L. R PT signal. 1+ palpable L PT. (+) motor/sensation in R foot. (+) motor w/ decreased sensation in toes on L foot.    Plan:  -Atherosclerosis w/ B/L short-distance claudication and rest pain, L >R  -S/P R axillary to bi-fem bypass on 2/25 (POD #3)  -Continue frequent neurovascular checks  -Diet as tolerated  -OOB as tolerated  -Continue ASA, plavix and lipitor  -Continue routine tylenol and oxycodone prn for post-op pain control  -New periods of afib w/ RVR; cardiology consult placed  -Heparin gtt started for afib  -If pt requires DOAC on discharge, will D/C ASA and keep plavix monotherapy  -PT/OT recommends STR vs HHC; family opts for HHC  -Continue medical management per primary team  -Will continue to follow  -Will discuss w/ Dr. Loo

## 2025-02-27 NOTE — PLAN OF CARE
Problem: PAIN - ADULT  Goal: Verbalizes/displays adequate comfort level or baseline comfort level  Description: Interventions:  - Encourage patient to monitor pain and request assistance  - Assess pain using appropriate pain scale  - Administer analgesics based on type and severity of pain and evaluate response  - Implement non-pharmacological measures as appropriate and evaluate response  - Consider cultural and social influences on pain and pain management  - Notify physician/advanced practitioner if interventions unsuccessful or patient reports new pain  Outcome: Progressing     Problem: INFECTION - ADULT  Goal: Absence or prevention of progression during hospitalization  Description: INTERVENTIONS:  - Assess and monitor for signs and symptoms of infection  - Monitor lab/diagnostic results  - Monitor all insertion sites, i.e. indwelling lines, tubes, and drains  - Monitor endotracheal if appropriate and nasal secretions for changes in amount and color  - [place] appropriate cooling/warming therapies per order  - Administer medications as ordered  - Instruct and encourage patient and family to use good hand hygiene technique  - Identify and instruct in appropriate isolation precautions for identified infection/condition  Outcome: Completed  Goal: Absence of fever/infection during neutropenic period  Description: INTERVENTIONS:  - Monitor WBC    Outcome: Completed     Problem: SAFETY ADULT  Goal: Patient will remain free of falls  Description: INTERVENTIONS:  - Educate patient/family on patient safety including physical limitations  - Instruct patient to call for assistance with activity   - Consult OT/PT to assist with strengthening/mobility   - Keep Call bell within reach  - Keep bed low and locked with side rails adjusted as appropriate  - Keep care items and personal belongings within reach  - Initiate and maintain comfort rounds  - Make Fall Risk Sign visible to staff  - Offer Toileting every 2 Hours, in  advance of need  - Initiate/Maintain bed alarm  - Obtain necessary fall risk management equipment: walker  - Apply yellow socks and bracelet for high fall risk patients  - Consider moving patient to room near nurses station  Outcome: Progressing  Goal: Maintain or return to baseline ADL function  Description: INTERVENTIONS:  -  Assess patient's ability to carry out ADLs; assess patient's baseline for ADL function and identify physical deficits which impact ability to perform ADLs (bathing, care of mouth/teeth, toileting, grooming, dressing, etc.)  - Assess/evaluate cause of self-care deficits   - Assess range of motion  - Assess patient's mobility; develop plan if impaired  - Assess patient's need for assistive devices and provide as appropriate  - Encourage maximum independence but intervene and supervise when necessary  - Involve family in performance of ADLs  - Assess for home care needs following discharge   - Consider OT consult to assist with ADL evaluation and planning for discharge  - Provide patient education as appropriate  Outcome: Progressing  Goal: Maintains/Returns to pre admission functional level  Description: INTERVENTIONS:  - Perform AM-PAC 6 Click Basic Mobility/ Daily Activity assessment daily.  - Set and communicate daily mobility goal to care team and patient/family/caregiver.   - Collaborate with rehabilitation services on mobility goals if consulted  - Perform Range of Motion 3 times a day.  - Reposition patient every 2 hours.  - Dangle patient 3 times a day  - Stand patient 3 times a day  - Ambulate patient 3 times a day  - Out of bed to chair 3 times a day   - Out of bed for meals 3 times a day  - Out of bed for toileting  - Record patient progress and toleration of activity level   Outcome: Progressing     Problem: DISCHARGE PLANNING  Goal: Discharge to home or other facility with appropriate resources  Description: INTERVENTIONS:  - Identify barriers to discharge w/patient and  caregiver  - Arrange for needed discharge resources and transportation as appropriate  - Identify discharge learning needs (meds, wound care, etc.)  - Arrange for interpretive services to assist at discharge as needed  - Refer to Case Management Department for coordinating discharge planning if the patient needs post-hospital services based on physician/advanced practitioner order or complex needs related to functional status, cognitive ability, or social support system  Outcome: Progressing     Problem: Knowledge Deficit  Goal: Patient/family/caregiver demonstrates understanding of disease process, treatment plan, medications, and discharge instructions  Description: Complete learning assessment and assess knowledge base.  Interventions:  - Provide teaching at level of understanding  - Provide teaching via preferred learning methods  Outcome: Progressing     Problem: Prexisting or High Potential for Compromised Skin Integrity  Goal: Skin integrity is maintained or improved  Description: INTERVENTIONS:  - Identify patients at risk for skin breakdown  - Assess and monitor skin integrity  - Assess and monitor nutrition and hydration status  - Monitor labs   - Assess for incontinence   - Turn and reposition patient  - Assist with mobility/ambulation  - Relieve pressure over bony prominences  - Avoid friction and shearing  - Provide appropriate hygiene as needed including keeping skin clean and dry  - Evaluate need for skin moisturizer/barrier cream  - Collaborate with interdisciplinary team   - Patient/family teaching  - Consider wound care consult   Outcome: Progressing     Problem: Nutrition/Hydration-ADULT  Goal: Nutrient/Hydration intake appropriate for improving, restoring or maintaining nutritional needs  Description: Monitor and assess patient's nutrition/hydration status for malnutrition. Collaborate with interdisciplinary team and initiate plan and interventions as ordered.  Monitor patient's weight and  dietary intake as ordered or per policy. Utilize nutrition screening tool and intervene as necessary. Determine patient's food preferences and provide high-protein, high-caloric foods as appropriate.     INTERVENTIONS:  - Monitor oral intake, urinary output, labs, and treatment plans  - Assess nutrition and hydration status and recommend course of action  - Evaluate amount of meals eaten  - Assist patient with eating if necessary   - Allow adequate time for meals  - Recommend/ encourage appropriate diets, oral nutritional supplements, and vitamin/mineral supplements  - Order, calculate, and assess calorie counts as needed  - Recommend, monitor, and adjust tube feedings and TPN/PPN based on assessed needs  - Assess need for intravenous fluids  - Provide specific nutrition/hydration education as appropriate  - Include patient/family/caregiver in decisions related to nutrition  Outcome: Progressing

## 2025-02-27 NOTE — PROGRESS NOTES
Progress Note - Vascular Surgery   Name: Ofelia Aguilar 84 y.o. female I MRN: [id number]  Unit/Bed#: E4 -01 I Date of Admission: 2/25/2025   Date of Service: 2/27/2025 I Hospital Day: 2     Pt is an 85 yo F w/ HTN, DIADM, anxiety, GERD, spinal stenosis, HLD, MDD, malnutrition BMI: 18.7, COPD, aortoiliac occlusive disease w/ BLE rest pain, L>R, now POD#2 s/p R ax-bifemoral bypass.    Patient continues to complain of sharp L foot pain.    R axillary, R flank and B fem surgical dressings are C/D/I without hematoma or bleeding  2+ R DP/PT and L PT pulses  B feet warm, pink    Cr: 0.81  WBC: 8.6  Hgb: 8.7  Plt: 208    -s/p R ax-bifemoral bypass for aortoiliac occlusive disease  -BLE very well perfused; L foot pain may be reperfusion pain vs neuropathic  -encouraged patient OOB for meals and ambulate (minimal ambulation at baseline due to rest pain but was walking around her home); PT/OT consult placed yesterday; awaiting recommendations  -continue ASA/statin/plavix (all home meds)  -had nausea yesterday; patient attributes this to salt tablets on empty stomach; ate breakfast today prior to salt tablet; hopefully this will improve today  -hemodynamically stable  -likely ready for d/c home tomorrow

## 2025-02-27 NOTE — PLAN OF CARE
Problem: OCCUPATIONAL THERAPY ADULT  Goal: Performs self-care activities at highest level of function for planned discharge setting.  See evaluation for individualized goals.  Description: Treatment Interventions: ADL retraining, Functional transfer training, UE strengthening/ROM, Endurance training, Patient/family training, Equipment evaluation/education, Compensatory technique education, Continued evaluation, Activityengagement          See flowsheet documentation for full assessment, interventions and recommendations.   Note: Limitation: Decreased ADL status, Decreased UE strength, Decreased endurance, Decreased sensation, Decreased self-care trans, Decreased high-level ADLs  Prognosis: Good  Assessment: Pt is a 84 y.o. female seen for OT evaluation s/p adm to [facility] on 2/25/2025 w/ Atherosclerosis of native arteries of extremities with rest pain, bilateral legs (HCC) . Pt now POD #2 s/p R ax-bifemoral bypass. Comorbidities affecting pt’s functional performance include a significant PMH of malnutrition, SIADH, hypertension, GERD, anxiety, and aortoiliac occlusive disease. Pt with active OT orders and activity orders for Activity as tolerated. Pt lives with dtr and MIGUEL in a one level house with 5 JUSTIN. (+) home alone at times. At baseline, pt was I w/ ADLs, required assist w/ IADLs, and Mod I for functional transfers/mobility w/ use of RW. (-) . Denies falls PTA. Upon evaluation, pt currently requires Supervision for UB ADLs, Mod-Min A for LB ADLs, Min A for toileting, Min A-Supervision for bed mobility, and Min A-Supervision for functional mobility/transfers 2* the following deficits impacting occupational performance: weakness, decreased strength , decreased balance, decreased activity tolerance, limited functional reach, impaired sensation, increased pain, and decreased postural control. These impairments, as well at pt’s personal factors of: JUSTIN home environment, limited home support,  difficulty performing ADLs, difficulty performing IADLs, difficulty performing transfers/mobility, fall risk , functional decline , and advanced age limit pt’s ability to safely engage in all baseline areas of occupation. Pt to continue to benefit from continued acute OT services during hospital stay to address defined deficits and to maximize level of functional independence in the following Occupational Performance areas: grooming, bathing/shower, toilet hygiene, dressing, health maintenance, functional mobility, community mobility, clothing management, and social participation. The patient's raw score on the -PAC Daily Activity Inpatient Short Form is 18. A raw score of less than 19 suggests the patient may benefit from discharge to post-acute rehabilitation services. Please refer to the recommendation of the Occupational Therapist for safe discharge planning. OT will continue to follow pt 2-5x/wk to address the following goals to  w/in 10-14 days:     Rehab Resource Intensity Level, OT: II (Moderate Resource Intensity) (HHOT vs STR pending progress and available support in home environment; Pt wishes to D/C home with HHPT/OT)

## 2025-02-27 NOTE — ASSESSMENT & PLAN NOTE
Lab Results   Component Value Date    HGB 8.7 (L) 02/27/2025    HGB 9.5 (L) 02/26/2025    HGB 9.7 (L) 02/25/2025     Hgb 8.7 today, likely secondary to recent surgery  No signs of over bleeding  Monitor CBC

## 2025-02-28 PROBLEM — I48.91 NEW ONSET A-FIB (HCC): Status: ACTIVE | Noted: 2025-02-28

## 2025-02-28 PROBLEM — I47.19 ATRIAL TACHYCARDIA (HCC): Status: ACTIVE | Noted: 2025-02-28

## 2025-02-28 LAB
ANION GAP SERPL CALCULATED.3IONS-SCNC: 4 MMOL/L (ref 4–13)
APTT PPP: 183 SECONDS (ref 23–34)
APTT PPP: 36 SECONDS (ref 23–34)
ATRIAL RATE: 99 BPM
BUN SERPL-MCNC: 18 MG/DL (ref 5–25)
CALCIUM SERPL-MCNC: 8.3 MG/DL (ref 8.4–10.2)
CHLORIDE SERPL-SCNC: 114 MMOL/L (ref 96–108)
CO2 SERPL-SCNC: 22 MMOL/L (ref 21–32)
CREAT SERPL-MCNC: 0.64 MG/DL (ref 0.6–1.3)
ERYTHROCYTE [DISTWIDTH] IN BLOOD BY AUTOMATED COUNT: 14.6 % (ref 11.6–15.1)
GFR SERPL CREATININE-BSD FRML MDRD: 83 ML/MIN/1.73SQ M
GLUCOSE SERPL-MCNC: 94 MG/DL (ref 65–140)
HCT VFR BLD AUTO: 25.5 % (ref 34.8–46.1)
HGB BLD-MCNC: 8.4 G/DL (ref 11.5–15.4)
INR PPP: 1.05 (ref 0.85–1.19)
MCH RBC QN AUTO: 31 PG (ref 26.8–34.3)
MCHC RBC AUTO-ENTMCNC: 32.9 G/DL (ref 31.4–37.4)
MCV RBC AUTO: 94 FL (ref 82–98)
P AXIS: 74 DEGREES
PLATELET # BLD AUTO: 207 THOUSANDS/UL (ref 149–390)
PMV BLD AUTO: 10.2 FL (ref 8.9–12.7)
POTASSIUM SERPL-SCNC: 3.7 MMOL/L (ref 3.5–5.3)
PR INTERVAL: 132 MS
PROTHROMBIN TIME: 13.9 SECONDS (ref 12.3–15)
QRS AXIS: -31 DEGREES
QRSD INTERVAL: 120 MS
QT INTERVAL: 342 MS
QTC INTERVAL: 439 MS
RBC # BLD AUTO: 2.71 MILLION/UL (ref 3.81–5.12)
SODIUM SERPL-SCNC: 140 MMOL/L (ref 135–147)
T WAVE AXIS: 120 DEGREES
VENTRICULAR RATE: 99 BPM
WBC # BLD AUTO: 8.08 THOUSAND/UL (ref 4.31–10.16)

## 2025-02-28 PROCEDURE — 93005 ELECTROCARDIOGRAM TRACING: CPT

## 2025-02-28 PROCEDURE — 93010 ELECTROCARDIOGRAM REPORT: CPT | Performed by: STUDENT IN AN ORGANIZED HEALTH CARE EDUCATION/TRAINING PROGRAM

## 2025-02-28 PROCEDURE — 85610 PROTHROMBIN TIME: CPT | Performed by: NURSE PRACTITIONER

## 2025-02-28 PROCEDURE — 85730 THROMBOPLASTIN TIME PARTIAL: CPT | Performed by: NURSE PRACTITIONER

## 2025-02-28 PROCEDURE — 80048 BASIC METABOLIC PNL TOTAL CA: CPT

## 2025-02-28 PROCEDURE — 97163 PT EVAL HIGH COMPLEX 45 MIN: CPT

## 2025-02-28 PROCEDURE — 99232 SBSQ HOSP IP/OBS MODERATE 35: CPT | Performed by: INTERNAL MEDICINE

## 2025-02-28 PROCEDURE — 85730 THROMBOPLASTIN TIME PARTIAL: CPT | Performed by: INTERNAL MEDICINE

## 2025-02-28 PROCEDURE — 85027 COMPLETE CBC AUTOMATED: CPT

## 2025-02-28 PROCEDURE — 99222 1ST HOSP IP/OBS MODERATE 55: CPT | Performed by: INTERNAL MEDICINE

## 2025-02-28 PROCEDURE — 99024 POSTOP FOLLOW-UP VISIT: CPT | Performed by: SURGERY

## 2025-02-28 RX ORDER — HEPARIN SODIUM 1000 [USP'U]/ML
1800 INJECTION, SOLUTION INTRAVENOUS; SUBCUTANEOUS EVERY 6 HOURS PRN
Status: DISCONTINUED | OUTPATIENT
Start: 2025-02-28 | End: 2025-03-01

## 2025-02-28 RX ORDER — METOPROLOL TARTRATE 25 MG/1
25 TABLET, FILM COATED ORAL EVERY 8 HOURS
Status: DISCONTINUED | OUTPATIENT
Start: 2025-02-28 | End: 2025-03-01

## 2025-02-28 RX ORDER — HEPARIN SODIUM 1000 [USP'U]/ML
3600 INJECTION, SOLUTION INTRAVENOUS; SUBCUTANEOUS EVERY 6 HOURS PRN
Status: DISCONTINUED | OUTPATIENT
Start: 2025-02-28 | End: 2025-03-01

## 2025-02-28 RX ORDER — HEPARIN SODIUM 1000 [USP'U]/ML
3600 INJECTION, SOLUTION INTRAVENOUS; SUBCUTANEOUS ONCE
Status: COMPLETED | OUTPATIENT
Start: 2025-02-28 | End: 2025-02-28

## 2025-02-28 RX ORDER — HEPARIN SODIUM 10000 [USP'U]/100ML
3-30 INJECTION, SOLUTION INTRAVENOUS
Status: DISCONTINUED | OUTPATIENT
Start: 2025-02-28 | End: 2025-03-01

## 2025-02-28 RX ADMIN — HEPARIN SODIUM 5000 UNITS: 5000 INJECTION INTRAVENOUS; SUBCUTANEOUS at 06:06

## 2025-02-28 RX ADMIN — METOPROLOL TARTRATE 25 MG: 25 TABLET, FILM COATED ORAL at 16:37

## 2025-02-28 RX ADMIN — PREGABALIN 25 MG: 25 CAPSULE ORAL at 08:14

## 2025-02-28 RX ADMIN — SODIUM CHLORIDE 2 G: 1 TABLET ORAL at 17:14

## 2025-02-28 RX ADMIN — ACETAMINOPHEN 975 MG: 325 TABLET, FILM COATED ORAL at 13:26

## 2025-02-28 RX ADMIN — ATORVASTATIN CALCIUM 40 MG: 40 TABLET, FILM COATED ORAL at 08:14

## 2025-02-28 RX ADMIN — OXYCODONE HYDROCHLORIDE 10 MG: 10 TABLET ORAL at 21:57

## 2025-02-28 RX ADMIN — SODIUM CHLORIDE 2 G: 1 TABLET ORAL at 13:26

## 2025-02-28 RX ADMIN — OXYCODONE HYDROCHLORIDE 10 MG: 10 TABLET ORAL at 17:18

## 2025-02-28 RX ADMIN — CLOPIDOGREL BISULFATE 75 MG: 75 TABLET ORAL at 08:14

## 2025-02-28 RX ADMIN — METOPROLOL TARTRATE 25 MG: 25 TABLET, FILM COATED ORAL at 22:04

## 2025-02-28 RX ADMIN — ACETAMINOPHEN 975 MG: 325 TABLET, FILM COATED ORAL at 06:06

## 2025-02-28 RX ADMIN — HYDROXYZINE HYDROCHLORIDE 10 MG: 10 TABLET ORAL at 21:54

## 2025-02-28 RX ADMIN — HEPARIN SODIUM 3600 UNITS: 1000 INJECTION INTRAVENOUS; SUBCUTANEOUS at 13:26

## 2025-02-28 RX ADMIN — PREGABALIN 50 MG: 50 CAPSULE ORAL at 21:54

## 2025-02-28 RX ADMIN — ACETAMINOPHEN 975 MG: 325 TABLET, FILM COATED ORAL at 21:54

## 2025-02-28 RX ADMIN — ASPIRIN 81 MG CHEWABLE TABLET 81 MG: 81 TABLET CHEWABLE at 08:14

## 2025-02-28 RX ADMIN — MIRTAZAPINE 15 MG: 15 TABLET, FILM COATED ORAL at 21:54

## 2025-02-28 RX ADMIN — SODIUM CHLORIDE 2 G: 1 TABLET ORAL at 08:14

## 2025-02-28 RX ADMIN — OXYCODONE HYDROCHLORIDE 10 MG: 10 TABLET ORAL at 11:00

## 2025-02-28 RX ADMIN — OXYCODONE 5 MG: 5 TABLET ORAL at 06:06

## 2025-02-28 RX ADMIN — HEPARIN SODIUM 18 UNITS/KG/HR: 10000 INJECTION, SOLUTION INTRAVENOUS at 13:28

## 2025-02-28 NOTE — PLAN OF CARE
Problem: PHYSICAL THERAPY ADULT  Goal: Performs mobility at highest level of function for planned discharge setting.  See evaluation for individualized goals.  Description: Treatment/Interventions: Functional transfer training, LE strengthening/ROM, Elevations, Therapeutic exercise, Patient/family training, Equipment eval/education, Bed mobility, Compensatory technique education, Gait training, Continued evaluation, Spoke to nursing          See flowsheet documentation for full assessment, interventions and recommendations.  Note: Prognosis: Good  Problem List: Impaired balance, Decreased mobility, Pain, Decreased skin integrity  Assessment: Ofelia Aguilar is a 84 y.o. female admitted to [facility] on 2/25/2025 for Atherosclerosis of native arteries of extremities with rest pain, bilateral legs (HCC). S/p R ax-bifemoral bypass. PT was consulted and pt was seen on 2/28/2025 for mobility assessment and d/c planning. Pt presents w high fall risk, telemetry, acute on chronic pain in BLE L>R. At baseline pt gets A for some ADLs and ambulates w RW only limited household distances. Pt is currently functioning at a min A to get OOB dt apparent weakness and effort. Otherwise S for transfers and ambulation w RW. Initially had moderate gait deviations including decreased WB on LLE however this improved w time. Defer further mobility dt tachycardia (via tele per RN). Encouraged pt to speak with family about providing increased support at home for a few days prn, versus potential need for rehab given fall risk at current LOF (pain limitations). Discussed w RN the same. Pt will benefit from continued skilled IP PT to address the above mentioned impairments  in order to maximize recovery and increase functional independence when completing mobility and ADLs.  Barriers to Discharge: None     Rehab Resource Intensity Level, PT: III (Minimum Resource Intensity) (w increased family support vs STR)    See flowsheet documentation for full  assessment.

## 2025-02-28 NOTE — DISCHARGE INSTR - AVS FIRST PAGE
DISCHARGE INSTRUCTIONS  AXILLARY FEMORAL BYPASS    ACTIVITY:   Limit your physical activity to slow walking for the first week and then increase your activity as tolerated.  If you become short of breath or tired, stop and rest.  You may require help with walking or feel more secure with something to lean on.  Walking up steps and normal activities may be resumed as you feel ready.  Most people tire easily for the first few weeks following aneurysm surgery.  This improves as conditioning returns.  Avoid strenuous activity, such as vigorous exercise.  Avoid heavy lifting (do not lift more than 15 pounds) for the first six weeks after surgery.  You should not drive a car for at least one week following discharge from the hospital and you are off all narcotic pain medication.  You may ride in a car.    DIET:  Resume your normal diet.  Good nutrition is important for healing of your incision.  Your appetite may be poor for several weeks following abdominal surgery and you may experience some weight loss.  A laxative or fleets enema may help regulate bowel movements in the postoperative period.  If you are discharged on narcotics for pain control, continue taking your stool softeners until you are having regular bowel movements.    INCISION:  You have mepilex dressings covering your incision sites. These dressings may stay in place for up to 7 days. On Tues, March 4th, remove mepilex dressings. You may shower with mepilex dressings as they may get wet. However, do not soak the dressings, and make a reasonable attempt to keep them dry. Once mepilex dressings are removed, you should shower daily.  Wash incision daily with soap and water, but do not rub or scrub the incision; rinse thoroughly and pat dry.  Do not bathe in a tub or swim for the first 4 week following surgery or if you have any open wounds.  It is normal to have swelling or discoloration around the incision.   If increasing redness or pain develops, call our  office immediately.  Numbness in the region of the incision may occur following the surgery.  This normally improves over six to twelve months.    If any of your incisions are open and require dressing changes, you will be given instructions for your daily incision care. If you are not able to change the dressings, a visiting nurse will be arranged.    DO NOT put any powders, creams, ointments, or lotions on your incision.    FOLLOW UP STUDIES:  Doppler ultrasound studies are very important for long-term management.  Your surgeon will arrange this at your first postoperative visit.     FOLLOW UP APPOINTMENTS:  Making and keeping follow up appointments and ultrasound tests are important to your recovery.  If you have difficulty making it to or keeping your follow up appointments, call the office.    If you have increased pain, fever >101.5, increased drainage, redness or a bad smell at your surgery site, new coldness/numbness of your arm or leg, please call us immediately and GO directly to the ER.    PLEASE CALL THE OFFICE IF YOU HAVE ANY QUESTIONS  [phone]  [phone] [address], [address], [place] [address]  [address] [place] [address]  [address] [place] [address]  [address] [facility], [place] [address]  [address] [facility], [address], [place] [address]  [address] [facility], [address], [place] [address]  [address] [facility], [facility], 2nd Floor, [place] [address]  [address] [facility], 1st Floor, [address], [place] [address]  [address] [place] [address]  [address] [facility], [address], [place] [address]

## 2025-02-28 NOTE — ASSESSMENT & PLAN NOTE
Blood pressure today 128/61; she has been hemodynamically stable  Currently on amlodipine 5 mg daily

## 2025-02-28 NOTE — ASSESSMENT & PLAN NOTE
CHAD2-VASc score - 4 (age, female, hypertension)  TSH- 8/20/2024- 2.17; 12/21/2020 24-4.961 with T4 at 0.74  Per patient, used to be on atenolol, lisinopril and amlodipine when she was in [place]  Has been on therapeutic anticoagulation for prophylaxis with heparin 5000 units every 8 hours  Started on heparin drip today  Atrial tachycardia with abberency      Plan:  - Start metoprolol tartrate 25 mg Q 8 hours  - Mag, Phos  - Serum potassium- 3.7; titrate to goal of 4.0  - 24 hour telemetry  - Monitor electrolytes closely  -Continue AC if no bleeding risks

## 2025-02-28 NOTE — PROGRESS NOTES
Progress Note - Vascular Surgery   Name: Ofelia Aguilar 84 y.o. female I MRN: [id number]  Unit/Bed#: E4 -01 I Date of Admission: 2/25/2025   Date of Service: 2/28/2025 I Hospital Day: 3    Assessment & Plan  Atherosclerosis of native arteries of extremities with rest pain, bilateral legs (HCC)  85 yo female ex-smoker w/ a hx of PAD, anxiety, depression, GERD, HTN and HLD presented to [facility] on 2/25/25 for an elective axillary bi-fem bypass. Pt has a hx of atherosclerosis w/ B/L short-distance claudication and rest pain, L >R. Pt underwent a R axillary to bi-fem bypass on 2/25/25. Immediate post-op course was uneventful.    POD #1:  Labs showed ABLA w/ Hgb decrease from 11.5 pre-op to 9.7 post-op. ABLA secondary to procedural and dilutional losses without any overt signs of bleeding.  Strong bypass signal.     POD #2:  Pt transferred to med-surg. Pt seen by OT w/ recommendations for STR vs HHC. Daughter opted for HHC.    POD #3: Pt remains hemodynamically stable w/ a slight decrease in Hgb to 8.4 w/ no overt signs of bleeding and stable VS. Pt having periods of afib w/ RVR on monitor. Cardiology consulted. R subclavian and B/L groin incision sites soft, no ecchymosis or swelling. R flank incision site w/ mild soft swelling and resolving ecchymosis. All incisions covered w/ mepilex which is clean, dry and intact w/ no strikethrough. Strong bypass signal. 2+ palpable DP B/L. R PT signal. 1+ palpable L PT. (+) motor/sensation in R foot. (+) motor w/ decreased sensation in toes on L foot.    Plan:  -Atherosclerosis w/ B/L short-distance claudication and rest pain, L >R  -S/P R axillary to bi-fem bypass on 2/25 (POD #3)  -Continue frequent neurovascular checks  -Diet as tolerated  -OOB as tolerated  -Continue ASA, plavix and lipitor  -Continue routine tylenol and oxycodone prn for post-op pain control  -New periods of afib w/ RVR; cardiology consult placed  -Heparin gtt started for afib  -If pt requires DOAC on discharge,  will D/C ASA and keep plavix monotherapy  -PT/OT recommends STR vs HHC; family opts for HHC  -Continue medical management per primary team  -Will continue to follow  -Will discuss w/ Dr. Loo    Subjective : Pt seen for exam while sitting upright in bed; NAD. Pt reports generalized soreness w/ movement and ongoing L heel and toe pain which continues to improve.     Objective :  Temp:  [96.9 °F (36.1 °C)-98 °F (36.7 °C)] 97.9 °F (36.6 °C)  HR:  [86-94] 86  BP: (109-137)/(53-63) 128/61  Resp:  [16-18] 18  SpO2:  [92 %-97 %] 97 %  O2 Device: None (Room air)     I/O         02/26 0701  02/27 0700 02/27 0701  02/28 0700 02/28 0701  03/01 0700    P.O. 720 1140 360    I.V. (mL/kg)       Total Intake(mL/kg) 720 (14.5) 1140 (23) 360 (7.3)    Urine (mL/kg/hr) 200 (0.2) 900 (0.8) 420 (1.1)    Total Output 200 900 420    Net +520 +240 -60                   Physical Exam  Vitals and nursing note reviewed.   Constitutional:       General: She is awake. She is not in acute distress.     Appearance: Normal appearance. She is well-developed.   HENT:      Head: Normocephalic and atraumatic.   Cardiovascular:      Rate and Rhythm: Normal rate and regular rhythm.      Pulses:           Dorsalis pedis pulses are 2+ on the right side and 2+ on the left side.        Posterior tibial pulses are detected w/ Doppler on the right side and 1+ on the left side.      Heart sounds: Normal heart sounds.      Comments: (+) bypass signal. L foot swelling  Pulmonary:      Effort: Pulmonary effort is normal. No respiratory distress.      Breath sounds: Normal breath sounds.   Abdominal:      General: There is no distension.      Palpations: Abdomen is soft.   Musculoskeletal:      Right lower leg: No edema.      Left lower leg: No edema.   Skin:     General: Skin is warm and dry.      Capillary Refill: Capillary refill takes less than 2 seconds.      Comments: R subclavian, R flank and B/L groin incisions   Neurological:      General: No focal deficit  present.      Mental Status: She is alert and oriented to person, place, and time.   Psychiatric:         Mood and Affect: Mood and affect normal.         Speech: Speech normal.         Behavior: Behavior normal. Behavior is cooperative.         Cognition and Memory: Cognition normal.       Wound/Incision:  R subclavian and B/L groin incision sites soft, no ecchymosis or swelling. R flank incision site w/ mild soft swelling and resolving ecchymosis. All incisions covered w/ mepilex which is clean, dry and intact w/ no strikethrough.       Lab Results: I have reviewed the following results:  CBC with diff:   Lab Results   Component Value Date    WBC 8.08 02/28/2025    HGB 8.4 (L) 02/28/2025    HCT 25.5 (L) 02/28/2025    MCV 94 02/28/2025     02/28/2025    RBC 2.71 (L) 02/28/2025    MCH 31.0 02/28/2025    MCHC 32.9 02/28/2025    RDW 14.6 02/28/2025    MPV 10.2 02/28/2025    NRBC 0 02/25/2025     BMP/CMP:  Lab Results   Component Value Date    SODIUM 140 02/28/2025    SODIUM 133 (L) 09/14/2024    K 3.7 02/28/2025    K 5.1 09/14/2024     (H) 02/28/2025    CL 99 09/14/2024    CO2 22 02/28/2025    CO2 22 09/14/2024    BUN 18 02/28/2025    BUN 14 09/14/2024    CREATININE 0.64 02/28/2025    CREATININE 0.7 09/14/2024    CALCIUM 8.3 (L) 02/28/2025    CALCIUM 9.7 09/14/2024    AST 21 12/21/2024    AST 23 09/14/2024    ALT 12 12/21/2024    ALT 16 09/14/2024    ALKPHOS 50 12/21/2024    ALKPHOS 66 09/14/2024    EGFR 83 02/28/2025    EGFR 85 09/14/2024     Coags:   Lab Results   Component Value Date    PTT 36 (H) 02/28/2025    INR 1.05 02/28/2025

## 2025-02-28 NOTE — ASSESSMENT & PLAN NOTE
Previous history of femorofemoral bypass in 2015  Quit smoking 1 month ago  Status post elective right axillary to bifemoral bypass for aortic occlusive disease 02/25/2025.    POD #3   Reports numbness on the left feet that has been ongoing prior to surgery but beginning to improve.    - Pregabalin 25 mg q Am and HS   - Plavix 75 mg daily  - Lipitor 40 mg daily  - Monitor NV fxn  - Care per primary team

## 2025-02-28 NOTE — ASSESSMENT & PLAN NOTE
Had colonoscopy in November 2024-precancerous polyp requiring repeat colonoscopy due to poor prep.  EGD around the same time revealed esophagitis, intestinal metaplasia with recommendation for Monique's surveillance.  Denies bloody stools  Baseline Hgb- 8.4-9.2  Hgb today- 8.4  Asymptomatic and stable

## 2025-02-28 NOTE — PLAN OF CARE
Problem: PAIN - ADULT  Goal: Verbalizes/displays adequate comfort level or baseline comfort level  Description: Interventions:  - Encourage patient to monitor pain and request assistance  - Assess pain using appropriate pain scale  - Administer analgesics based on type and severity of pain and evaluate response  - Implement non-pharmacological measures as appropriate and evaluate response  - Consider cultural and social influences on pain and pain management  - Notify physician/advanced practitioner if interventions unsuccessful or patient reports new pain  Outcome: Progressing     Problem: SAFETY ADULT  Goal: Patient will remain free of falls  Description: INTERVENTIONS:  - Educate patient/family on patient safety including physical limitations  - Instruct patient to call for assistance with activity   - Consult OT/PT to assist with strengthening/mobility   - Keep Call bell within reach  - Keep bed low and locked with side rails adjusted as appropriate  - Keep care items and personal belongings within reach  - Initiate and maintain comfort rounds  - Make Fall Risk Sign visible to staff  - Offer Toileting every 2 Hours, in advance of need  - Initiate/Maintain bed alarm  - Obtain necessary fall risk management equipment: walker  - Apply yellow socks and bracelet for high fall risk patients  - Consider moving patient to room near nurses station  Outcome: Progressing  Goal: Maintain or return to baseline ADL function  Description: INTERVENTIONS:  -  Assess patient's ability to carry out ADLs; assess patient's baseline for ADL function and identify physical deficits which impact ability to perform ADLs (bathing, care of mouth/teeth, toileting, grooming, dressing, etc.)  - Assess/evaluate cause of self-care deficits   - Assess range of motion  - Assess patient's mobility; develop plan if impaired  - Assess patient's need for assistive devices and provide as appropriate  - Encourage maximum independence but intervene  and supervise when necessary  - Involve family in performance of ADLs  - Assess for home care needs following discharge   - Consider OT consult to assist with ADL evaluation and planning for discharge  - Provide patient education as appropriate  Outcome: Progressing  Goal: Maintains/Returns to pre admission functional level  Description: INTERVENTIONS:  - Perform AM-PAC 6 Click Basic Mobility/ Daily Activity assessment daily.  - Set and communicate daily mobility goal to care team and patient/family/caregiver.   - Collaborate with rehabilitation services on mobility goals if consulted  - Perform Range of Motion 3 times a day.  - Reposition patient every 2 hours.  - Dangle patient 3 times a day  - Stand patient 3 times a day  - Ambulate patient 3 times a day  - Out of bed to chair 3 times a day   - Out of bed for meals 3 times a day  - Out of bed for toileting  - Record patient progress and toleration of activity level   Outcome: Progressing     Problem: DISCHARGE PLANNING  Goal: Discharge to home or other facility with appropriate resources  Description: INTERVENTIONS:  - Identify barriers to discharge w/patient and caregiver  - Arrange for needed discharge resources and transportation as appropriate  - Identify discharge learning needs (meds, wound care, etc.)  - Arrange for interpretive services to assist at discharge as needed  - Refer to Case Management Department for coordinating discharge planning if the patient needs post-hospital services based on physician/advanced practitioner order or complex needs related to functional status, cognitive ability, or social support system  Outcome: Progressing     Problem: Knowledge Deficit  Goal: Patient/family/caregiver demonstrates understanding of disease process, treatment plan, medications, and discharge instructions  Description: Complete learning assessment and assess knowledge base.  Interventions:  - Provide teaching at level of understanding  - Provide teaching  via preferred learning methods  Outcome: Progressing     Problem: Prexisting or High Potential for Compromised Skin Integrity  Goal: Skin integrity is maintained or improved  Description: INTERVENTIONS:  - Identify patients at risk for skin breakdown  - Assess and monitor skin integrity  - Assess and monitor nutrition and hydration status  - Monitor labs   - Assess for incontinence   - Turn and reposition patient  - Assist with mobility/ambulation  - Relieve pressure over bony prominences  - Avoid friction and shearing  - Provide appropriate hygiene as needed including keeping skin clean and dry  - Evaluate need for skin moisturizer/barrier cream  - Collaborate with interdisciplinary team   - Patient/family teaching  - Consider wound care consult   Outcome: Progressing     Problem: Nutrition/Hydration-ADULT  Goal: Nutrient/Hydration intake appropriate for improving, restoring or maintaining nutritional needs  Description: Monitor and assess patient's nutrition/hydration status for malnutrition. Collaborate with interdisciplinary team and initiate plan and interventions as ordered.  Monitor patient's weight and dietary intake as ordered or per policy. Utilize nutrition screening tool and intervene as necessary. Determine patient's food preferences and provide high-protein, high-caloric foods as appropriate.     INTERVENTIONS:  - Monitor oral intake, urinary output, labs, and treatment plans  - Assess nutrition and hydration status and recommend course of action  - Evaluate amount of meals eaten  - Assist patient with eating if necessary   - Allow adequate time for meals  - Recommend/ encourage appropriate diets, oral nutritional supplements, and vitamin/mineral supplements  - Order, calculate, and assess calorie counts as needed  - Recommend, monitor, and adjust tube feedings and TPN/PPN based on assessed needs  - Assess need for intravenous fluids  - Provide specific nutrition/hydration education as appropriate  -  Include patient/family/caregiver in decisions related to nutrition  Outcome: Progressing

## 2025-02-28 NOTE — PHYSICAL THERAPY NOTE
PHYSICAL THERAPY EVALUATION          Patient Name: Ofelia Aguilar  Today's Date: 2/28/2025 02/28/25 0832   PT Last Visit   PT Visit Date 02/28/25   Note Type   Note type Evaluation   Pain Assessment   Pain Assessment Tool 0-10   Pain Score 5   Pain Location/Orientation Orientation: Left;Location: Leg   Hospital Pain Intervention(s) Other (Comment);Emotional support;Rest  (RN notified)   Restrictions/Precautions   Other Precautions Chair Alarm;Bed Alarm;Telemetry;Fall Risk;Pain   Home Living   Type of Home House   Home Layout One level;Stairs to enter with rails   Bathroom Shower/Tub Tub/shower unit   Bathroom Toilet Standard   Bathroom Equipment Grab bars in shower;Shower chair   Home Equipment Walker   Additional Comments 5 JUSTIN   Prior Function   Level of [place] Independent with ADLs;Independent with functional mobility;Needs assistance with ADLs;Needs assistance with IADLS   Lives With Family  (dtr and MIGUEL)   Receives Help From Family   IADLs Family/Friend/Other provides transportation;Family/Friend/Other provides meals;Family/Friend/Other provides medication management   Falls in the last 6 months 0   Comments gets A for tub transfers as well as LBD. ambulates w RW. walks only limited household distances. both dtr and MIGUEL work but MIGUEL works on home property so is nearby   General   Additional Pertinent History pt admitted 2/25/25 for atherosclerosis of arteries w ble rest pain, now POD# R ax-bifemoral bypass. ambulate patient orders. PMHx significant for anxiety, depression, COPD   Cognition   Overall Cognitive Status WFL   Arousal/Participation Cooperative   Attention Within functional limits   Orientation Level Oriented to person;Oriented to situation   Memory Within functional limits   Following Commands Follows all commands and directions without difficulty   Comments labile moods   Bed Mobility   Supine to Sit 4   Minimal assistance   Additional items Assist x 1;HOB elevated;Bedrails;Increased time required;Other   Additional Comments use of pad to move forward and reposition hips to EOB   Transfers   Sit to Stand 5  Supervision   Additional items Increased time required;Other  (RW)   Stand to Sit 5  Supervision   Additional items Increased time required;Other  (RW)   Ambulation/Elevation   Gait pattern Improper Weight shift;Antalgic;Decreased L stance;Short stride;Step to;Excessively slow;Inconsistent leoncio   Gait Assistance 5  Supervision  (CGA progressing to S)   Additional items Assist x 1   Assistive Device Rolling walker   Distance 15'   Balance   Static Standing Fair   Dynamic Standing Fair -   Ambulatory Fair -   Endurance Deficit   Endurance Deficit No  (resting HR 100s and during amb 150s max)   Activity Tolerance   Activity Tolerance Patient limited by pain   Nurse Made Aware Elisha RIGGS   Assessment   Prognosis Good   Problem List Impaired balance;Decreased mobility;Pain;Decreased skin integrity   Assessment Ofelia Aguilar is a 84 y.o. female admitted to [facility] on [facility]/25/2025 for Atherosclerosis of native arteries of extremities with rest pain, bilateral legs (HCC). S/p R ax-bifemoral bypass. PT was consulted and pt was seen on 2/28/2025 for mobility assessment and d/c planning. Pt presents w high fall risk, telemetry, acute on chronic pain in BLE L>R. At baseline pt gets A for some ADLs and ambulates w RW only limited household distances. Pt is currently functioning at a min A to get OOB dt apparent weakness and effort. Otherwise S for transfers and ambulation w RW. Despite having BLE pain pta, her walking seems more antalgic and difficult than normal. Initially had moderate gait deviations including decreased WB on LLE however this did improve w time. Defer further mobility dt tachycardia (via tele per RN). Encouraged pt to speak with family about providing increased support at home for a few days prn, versus potential  need for rehab given fall risk at current LOF (pain limitations). Discussed w RN the same. Pt will benefit from continued skilled IP PT to address the above mentioned impairments  in order to maximize recovery and increase functional independence when completing mobility and ADLs.   Barriers to Discharge None   Goals   Patient Goals feel better   [facility] Expiration Date 03/10/25   Short Term Goal #1 1) Pt will perform bed mobility mod I demonstrating appropriate technique 100% of the time in order to improve function. 2) Pt will perform all transfers mod I demonstrating safe technique 100% of the time in order to improve ability to negotiate safely in home environment. 3) Amb with least restrictive AD > 25'x2 with S in order to demonstrate ability to negotiate in home environment. 4) Improve overall strength and balance 1/2 grade in order to optimize ability to perform functional tasks and reduce fall risk. 5) Improve am-pac by 2 to demonstrate functional progress and return to baseline function/reduced caregiver burden. 6) Negotiate stairs using most appropriate technique and S in order to be able to negotiate safely in home environment.   Plan   Treatment/Interventions Functional transfer training;LE strengthening/ROM;Elevations;Therapeutic exercise;Patient/family training;Equipment eval/education;Bed mobility;Compensatory technique education;Gait training;Continued evaluation;Spoke to nursing   PT Frequency Other (Comment)  (2-4x)   Discharge Recommendation   Rehab Resource Intensity Level, PT III (Minimum Resource Intensity)  (w increased family support vs STR)   AM-PAC Basic Mobility Inpatient   Turning in Flat Bed Without Bedrails 4   Lying on Back to Sitting on Edge of Flat Bed Without Bedrails 3   Moving Bed to Chair 3   Standing Up From Chair Using Arms 3   Walk in Room 3   Climb 3-5 Stairs With Railing 3   Basic Mobility Inpatient Raw Score 19   Basic Mobility Standardized Score 42.48   [facility]  Level Of Mobility   -HLM Goal 6: Walk 10 steps or more   -HLM Achieved 6: Walk 10 steps or more   End of Consult   Patient Position at End of Consult Supine;Bed/Chair alarm activated;All needs within reach   History: co - morbidities including age, social background,  behavior pattern, use of assistive device, assist for some adl's/ iadl;s, current experience including fall risk  Exam: impairments in systems including multiple body structures involved; neuromuscular (balance, gait, transfers, sensation), cognition; activity limitations  (difficulties executing an action); participation restrictions (problems associated w involvement in life situations), am-pac  Clinical: unstable/unpredictable  Complexity:high    Mikayla Padilla, PT

## 2025-03-01 LAB
APTT PPP: 117 SECONDS (ref 23–34)
MAGNESIUM SERPL-MCNC: 1.7 MG/DL (ref 1.9–2.7)
PHOSPHATE SERPL-MCNC: 3.5 MG/DL (ref 2.3–4.1)

## 2025-03-01 PROCEDURE — 99024 POSTOP FOLLOW-UP VISIT: CPT | Performed by: PHYSICIAN ASSISTANT

## 2025-03-01 PROCEDURE — 99232 SBSQ HOSP IP/OBS MODERATE 35: CPT | Performed by: INTERNAL MEDICINE

## 2025-03-01 PROCEDURE — 83735 ASSAY OF MAGNESIUM: CPT

## 2025-03-01 PROCEDURE — 85730 THROMBOPLASTIN TIME PARTIAL: CPT | Performed by: INTERNAL MEDICINE

## 2025-03-01 PROCEDURE — 84100 ASSAY OF PHOSPHORUS: CPT

## 2025-03-01 RX ORDER — HEPARIN SODIUM 5000 [USP'U]/ML
5000 INJECTION, SOLUTION INTRAVENOUS; SUBCUTANEOUS EVERY 8 HOURS SCHEDULED
Status: DISCONTINUED | OUTPATIENT
Start: 2025-03-01 | End: 2025-03-02 | Stop reason: HOSPADM

## 2025-03-01 RX ORDER — SODIUM CHLORIDE 1 G/1
1 TABLET ORAL
Status: DISCONTINUED | OUTPATIENT
Start: 2025-03-01 | End: 2025-03-02 | Stop reason: HOSPADM

## 2025-03-01 RX ORDER — METOPROLOL TARTRATE 25 MG/1
25 TABLET, FILM COATED ORAL 2 TIMES DAILY
Status: DISCONTINUED | OUTPATIENT
Start: 2025-03-01 | End: 2025-03-01

## 2025-03-01 RX ADMIN — HEPARIN SODIUM 5000 UNITS: 5000 INJECTION INTRAVENOUS; SUBCUTANEOUS at 22:37

## 2025-03-01 RX ADMIN — ACETAMINOPHEN 975 MG: 325 TABLET, FILM COATED ORAL at 15:10

## 2025-03-01 RX ADMIN — PREGABALIN 50 MG: 50 CAPSULE ORAL at 22:37

## 2025-03-01 RX ADMIN — SODIUM CHLORIDE 1 G: 1 TABLET ORAL at 17:16

## 2025-03-01 RX ADMIN — OXYCODONE 5 MG: 5 TABLET ORAL at 10:47

## 2025-03-01 RX ADMIN — METOPROLOL TARTRATE 37.5 MG: 25 TABLET, FILM COATED ORAL at 17:16

## 2025-03-01 RX ADMIN — SODIUM CHLORIDE 1 G: 1 TABLET ORAL at 15:11

## 2025-03-01 RX ADMIN — MIRTAZAPINE 15 MG: 15 TABLET, FILM COATED ORAL at 22:37

## 2025-03-01 RX ADMIN — HYDROXYZINE HYDROCHLORIDE 10 MG: 10 TABLET ORAL at 22:37

## 2025-03-01 RX ADMIN — PREGABALIN 25 MG: 25 CAPSULE ORAL at 10:44

## 2025-03-01 RX ADMIN — OXYCODONE 5 MG: 5 TABLET ORAL at 05:50

## 2025-03-01 RX ADMIN — METOPROLOL TARTRATE 25 MG: 25 TABLET, FILM COATED ORAL at 06:25

## 2025-03-01 RX ADMIN — ACETAMINOPHEN 975 MG: 325 TABLET, FILM COATED ORAL at 05:46

## 2025-03-01 RX ADMIN — ACETAMINOPHEN 975 MG: 325 TABLET, FILM COATED ORAL at 22:37

## 2025-03-01 RX ADMIN — SENNOSIDES 8.6 MG: 8.6 TABLET ORAL at 10:44

## 2025-03-01 RX ADMIN — OXYCODONE HYDROCHLORIDE 10 MG: 10 TABLET ORAL at 22:37

## 2025-03-01 RX ADMIN — HEPARIN SODIUM 5000 UNITS: 5000 INJECTION INTRAVENOUS; SUBCUTANEOUS at 15:10

## 2025-03-01 RX ADMIN — SODIUM CHLORIDE 2 G: 1 TABLET ORAL at 10:44

## 2025-03-01 RX ADMIN — ATORVASTATIN CALCIUM 40 MG: 40 TABLET, FILM COATED ORAL at 10:44

## 2025-03-01 RX ADMIN — ASPIRIN 81 MG CHEWABLE TABLET 81 MG: 81 TABLET CHEWABLE at 10:44

## 2025-03-01 RX ADMIN — CLOPIDOGREL BISULFATE 75 MG: 75 TABLET ORAL at 10:44

## 2025-03-01 NOTE — PROGRESS NOTES
Progress Note - Hospitalist   Name: Ofelia Aguilar 84 y.o. female I MRN: [id number]  Unit/Bed#: E4 -01 I Date of Admission: 2/25/2025   Date of Service: 3/1/2025 I Hospital Day: 4    Assessment & Plan  Atherosclerosis of native arteries of extremities with rest pain, bilateral legs (HCC)  Patient is a vascular surgery patient who underwent a right axillary bifemoral bypass on 2/25.  Vascular reports that on the procedure was uncomplicated, patient tolerated the procedure well.  Estimated blood loss described as minimal.  Postoperatively patient was found to have multiphasic signals of the lower extremities.  Home medication includes daily aspirin 81 milligrams, clopidogrel 75 mg, and atorvastatin 40 mg.     Continue ASA/Plavix/statin  Continue close neurovascular monitoring  Multimodal pain management  The patient is progressing steadily.  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)  Lab Results   Component Value Date    SODIUM 140 02/28/2025    SODIUM 139 02/27/2025    SODIUM 139 02/26/2025     The patient's sodium has been normal.  Her sodium chloride dose will be reduced.  Essential hypertension  Bp accetable  Continue POA amlodipine 5mg  Monitor  Anemia  Lab Results   Component Value Date    HGB 8.4 (L) 02/28/2025    HGB 8.7 (L) 02/27/2025    HGB 9.5 (L) 02/26/2025     Hgb 8.7 today, likely secondary to recent surgery  No signs of over bleeding  Monitor CBC  Hyperlipidemia  Continue statin  Atrial tachycardia (HCC)  The patient has atrial tachycardia.  Initially this was thought to be atrial fibrillation  Cardiology has evaluated her.  She  started on heparin but since she is not having atrial fibrillation, this has been stopped.  Her rate is being controlled with metoprolol.      The patient is progressing postop.  Her heart rhythm is adequately controlled.  Her sodium has normalized.  Her sodium chloride dose will be decreased and this will be rechecked tomorrow.  If these factors remain stable,  the patient may be suitable for discharge tomorrow.    Subjective:  The patient is feeling reasonably well.  Her pain is less.  Her walking is improving gradually.  She has no chest pain, shortness of breath, abdominal pain, nausea, or vomiting.    Physical Exam:   Temp:  [96.6 °F (35.9 °C)-99.2 °F (37.3 °C)] 98.1 °F (36.7 °C)  HR:  [61-77] 70  BP: (122-160)/(55-70) 130/60  Resp:  [18-20] 18  SpO2:  [94 %-96 %] 95 %  O2 Device: None (Room air)    Gen: Well-developed, well-nourished, in no distress.  Neck: Supple.  No lymphadenopathy or goiter.  Heart: Regular rhythm.  No murmur, gallop, or rub.  Lungs: Clear to auscultation and percussion.  No wheezing, rales, or rhonchi.  Abd: Soft with active bowel sounds.  No mass, tenderness, organomegaly.  Extremities: No clubbing, cyanosis, or edema.  No calf tenderness.  Neuro: Alert and oriented.  No focal sign.  Skin: Warm and dry.      LABS: No new labs.          VTE Pharmacologic Prophylaxis: Heparin  VTE Mechanical Prophylaxis: sequential compression device

## 2025-03-01 NOTE — ASSESSMENT & PLAN NOTE
Had colonoscopy in November 2024-precancerous polyp requiring repeat colonoscopy due to poor prep.  EGD around the same time revealed esophagitis, intestinal metaplasia with recommendation for Monique's surveillance.  Denies bloody stools  Baseline Hgb- 8.4-9.2  Hgb yesterday- 8.4  Needs follow-up H&H tomorrow.

## 2025-03-01 NOTE — ASSESSMENT & PLAN NOTE
The patient has developed atrial fibrillation.  Cardiology has evaluated her.  She has been started on heparin.  Her rate is being controlled with metoprolol.

## 2025-03-01 NOTE — PROGRESS NOTES
Progress Note - Vascular Surgery   Name: Ofelia Aguilar 84 y.o. female I MRN: [id number]  Unit/Bed#: E4 -01 I Date of Admission: 2/25/2025   Date of Service: 3/1/2025 I Hospital Day: 4    Assessment & Plan  Atherosclerosis of native arteries of extremities with rest pain, bilateral legs (HCC)  84 yoF ex-smoker w/ anxiety, depression, GERD, HTN, HLD PAD who presented to [facility] on 2/25/25 for an elective axillary bi-fem bypass. Pt has atherosclerosis w/ B/L short-distance claudication and rest pain, L >R. Pt underwent a R axillary to bi-fem bypass on 2/25/25. Immediate post-op course was uneventful.    POD #1:  Labs showed ABLA w/ Hgb decrease from 11.5 pre-op to 9.7 post-op. ABLA secondary to procedural and dilutional losses without any overt signs of bleeding.  Strong bypass signal.     POD #2:  Pt transferred to med-surg. Pt seen by OT w/ recommendations for STR vs HHC. Daughter opted for HHC.    POD #3: Pt remains hemodynamically stable w/ a slight decrease in Hgb to 8.4 w/ no overt signs of bleeding and stable VS. Pt having periods of afib w/ RVR on monitor. Cardiology consulted. R subclavian and B/L groin incision sites soft, no ecchymosis or swelling. R flank incision site w/ mild soft swelling and resolving ecchymosis. All incisions covered w/ mepilex which is clean, dry and intact w/ no strikethrough. Strong bypass signal. 2+ palpable DP B/L. R PT signal. 1+ palpable L PT. (+) motor/sensation in R foot. (+) motor w/ decreased sensation in toes on L foot.    POD#4:  Doing well. No new complaints. Noted possible parox atrial arrhythmias on telemetry 2/28 and placed on heparin infusion with cardiology consult. Admits to occasional palps. No CP or SOB. Reports L foot tenderness (predates surgery). R upper chest/subclavian, R abd/flank and bilateral groin incisions with surgical dressings in place which are c/d/I and without strikethrough. Incisions are soft. 2+ DP pulses bilaterally. Awaiting additional  cardiology input. Follow up HBG tomorrow. Eventual HHC.     Plan:  -S/P R axillary to bi-fem bypass on 2/25 (POD #4) for atherosclerosis with L >R short-distance claudication and rest pain. Overall progressing after bypass. Incisions are soft with surgical dressings in place. Bypass signals in the groin and 2+ DP pulses. Concern for parox atrial arrhythmia for which cardiology is consulted. Follow up HGB. Eventual HHC.  -R upper chest/subclavian, R flank and bilateral groin incisions appear stable with Mepilex in place and doppler signals present.   -Continue frequent neurovascular checks  -Diet as tolerated  -OOB as tolerated  -Continue ASA, clopidogrel and atorvastatin  -Continue routine Tylenol and oxycodone prn for post-op pain control  -(New) periods of atrial tachycardia v AF/AFL w/ rapid conduction; cardiology consult appreciated  -Heparin gtt started for atrial arrhythmias  -If pt requires DOAC on discharge, will D/C ASA and keep plavix monotherapy  -PT/OT recommends STR vs HHC; family opts for HHC  -Continue medical management per primary team  -Continued smoking cessation counseling   -Will continue to follow  -D/w Dr. Diaz    24 Hour Events : Placed on heparin for possible AF  Subjective : Doing well. No new complaints. L foot tenderness/ decreased sensation which pre-dates Ax -Bifem bypass. No CP, SOB. OOB as tolerated and PT.      AFeb, 122/58, HR 65  Hgb 8.4 (2/28) - continue to monitor  Echo ordered by cardiology but not yet completed    Objective :  Temp:  [96.6 °F (35.9 °C)-99.2 °F (37.3 °C)] 98.1 °F (36.7 °C)  HR:  [61-86] 65  BP: (122-160)/(55-70) 122/58  Resp:  [18-20] 18  SpO2:  [94 %-97 %] 95 %  O2 Device: None (Room air)     I/O         02/27 0701 02/28 0700 02/28 0701  03/01 0700 03/01 0701 03/02 0700    P.O. 1140 770     Total Intake(mL/kg) 1140 (23) 770 (15.5)     Urine (mL/kg/hr) 900 (0.8) 620 (0.5)     Total Output 900 620     Net +240 +150                  R upper chest/subclavian, R  abd/flank and bilateral groin incisions with surgical dressings in place which are c/d/I and without strikethrough. Incisions are soft. 2+ DP pulses bilaterally.     Physical Exam  Vitals and nursing note reviewed.   Constitutional:       Appearance: She is well-developed.   HENT:      Head: Normocephalic and atraumatic.   Eyes:      Pupils: Pupils are equal, round, and reactive to light.   Neck:      Vascular: No JVD.   Cardiovascular:      Rate and Rhythm: Normal rate and regular rhythm.      Pulses:           Radial pulses are 2+ on the right side and 2+ on the left side.        Dorsalis pedis pulses are 2+ on the right side and 2+ on the left side.      Heart sounds: Normal heart sounds, S1 normal and S2 normal. No murmur heard.     No friction rub. No gallop.   Pulmonary:      Effort: Pulmonary effort is normal. No accessory muscle usage or respiratory distress.      Breath sounds: Normal breath sounds. No wheezing or rales.   Abdominal:      General: Bowel sounds are normal. There is no distension.      Palpations: Abdomen is soft.      Tenderness: There is no abdominal tenderness.   Musculoskeletal:         General: No deformity. Normal range of motion.      Left lower leg: Edema (mild) present.   Skin:     General: Skin is warm and dry.      Findings: No lesion or rash.      Nails: There is no clubbing.   Neurological:      Mental Status: She is alert and oriented to person, place, and time.      Comments: Grossly normal    Psychiatric:         Behavior: Behavior is cooperative.           Lab Results: I have reviewed the following results:  CBC with diff:   Lab Results   Component Value Date    WBC 8.08 02/28/2025    HGB 8.4 (L) 02/28/2025    HCT 25.5 (L) 02/28/2025    MCV 94 02/28/2025     02/28/2025    RBC 2.71 (L) 02/28/2025    MCH 31.0 02/28/2025    MCHC 32.9 02/28/2025    RDW 14.6 02/28/2025    MPV 10.2 02/28/2025    NRBC 0 02/25/2025   ,   BMP/CMP:  Lab Results   Component Value Date    SODIUM  "140 02/28/2025    SODIUM 133 (L) 09/14/2024    K 3.7 02/28/2025    K 5.1 09/14/2024     (H) 02/28/2025    CL 99 09/14/2024    CO2 22 02/28/2025    CO2 22 09/14/2024    BUN 18 02/28/2025    BUN 14 09/14/2024    CREATININE 0.64 02/28/2025    CREATININE 0.7 09/14/2024    CALCIUM 8.3 (L) 02/28/2025    CALCIUM 9.7 09/14/2024    AST 21 12/21/2024    AST 23 09/14/2024    ALT 12 12/21/2024    ALT 16 09/14/2024    ALKPHOS 50 12/21/2024    ALKPHOS 66 09/14/2024    EGFR 83 02/28/2025    EGFR 85 09/14/2024   ,   Lipid Panel: No results found for: \"CHOL\",   Coags:   Lab Results   Component Value Date     (HH) 02/28/2025    INR 1.05 02/28/2025   ,   Blood Culture:   Lab Results   Component Value Date    BLOODCX No Growth After 5 Days. 11/25/2024   ,   Urinalysis:   Lab Results   Component Value Date    COLORU Light Yellow 11/25/2024    CLARITYU Clear 11/25/2024    SPECGRAV 1.026 11/25/2024    PHUR 5.5 11/25/2024    LEUKOCYTESUR Negative 11/25/2024    NITRITE Negative 11/25/2024    GLUCOSEU Negative 11/25/2024    KETONESU Negative 11/25/2024    BILIRUBINUR Negative 11/25/2024    BLOODU Negative 11/25/2024   ,   Urine Culture:   Lab Results   Component Value Date    URINECX 80,000-89,000 cfu/ml 08/25/2024   ,   Wound Culure: No results found for: \"WOUNDCULT\"    No new imaging.    VTE Prophylaxis: VTE covered by:  heparin (porcine), Intravenous, 15 Units/kg/hr at 02/28/25 2312  heparin (porcine), Intravenous  heparin (porcine), Intravenous     "

## 2025-03-01 NOTE — ASSESSMENT & PLAN NOTE
Lab Results   Component Value Date    HGB 8.4 (L) 02/28/2025    HGB 8.7 (L) 02/27/2025    HGB 9.5 (L) 02/26/2025     Hgb 8.7 today, likely secondary to recent surgery  No signs of over bleeding  Monitor CBC

## 2025-03-01 NOTE — ASSESSMENT & PLAN NOTE
The patient has atrial tachycardia.  Initially this was thought to be atrial fibrillation  Cardiology has evaluated her.  She  started on heparin but since she is not having atrial fibrillation, this has been stopped.  Her rate is being controlled with metoprolol.

## 2025-03-01 NOTE — ASSESSMENT & PLAN NOTE
BMP has been essentially stable  On 2 g salt tablet appears clinically dry..  Discussed with hospitalist physician regarding considering lowering the dose of sodium pills.

## 2025-03-01 NOTE — ASSESSMENT & PLAN NOTE
Patient is a vascular surgery patient who underwent a right axillary bifemoral bypass on 2/25.  Vascular reports that on the procedure was uncomplicated, patient tolerated the procedure well.  Estimated blood loss described as minimal.  Postoperatively patient was found to have multiphasic signals of the lower extremities.  Home medication includes daily aspirin 81 milligrams, clopidogrel 75 mg, and atorvastatin 40 mg.     Continue ASA/Plavix/statin  Continue close neurovascular monitoring  Multimodal pain management  The patient is progressing steadily.

## 2025-03-01 NOTE — ASSESSMENT & PLAN NOTE
Lab Results   Component Value Date    SODIUM 140 02/28/2025    SODIUM 139 02/27/2025    SODIUM 139 02/26/2025       Diagnosed on hospital admission from November 25 to November 29, 2024.  Outpatient regimen of sodium chloride tablets 1 g 3 times daily.  Baseline sodium of 135-140.     Plan:  NA tablet increased to 2G TID, NA stable  Continue to monitor BMP

## 2025-03-01 NOTE — PROGRESS NOTES
Progress Note - Cardiology   Name: Ofelia Aguilar 84 y.o. female I MRN: [id number]  Unit/Bed#: E4 -01 I Date of Admission: 2/25/2025   Date of Service: 3/1/2025 I Hospital Day: 4    Assessment & Plan  Atrial tachycardia (HCC)  CHAD2-VASc score - 4 (age, female, hypertension)  TSH- 8/20/2024- 2.17; 12/21/2020 24-4.961 with T4 at 0.74  Per patient, used to be on atenolol, lisinopril and amlodipine when she was in [place]  Has been on therapeutic anticoagulation for prophylaxis with heparin 5000 units every 8 hours  Started on heparin drip yesterday due to concern for possible atrial flutter/fibrillation.  Atrial tachycardia with abberency    TODAY (03/01/25): Maintaining consistent sinus rhythm.  Review of telemetry is from yesterday suggest likely atrial tachycardia rather than atrial flutter or atrial fibrillation.  Echocardiogram pending.  Clinically appears to be dry without evidence of pulmonary or peripheral vascular congestion.  Changing metoprolol to 37.5 mg twice daily.  Discontinuing heparin drip  Reinitiating prophylactic  regimen.  Continue dual antiplatelet therapy.  If systolic blood pressure is noted to be greater than 130 mmHg then recommend restarting amlodipine therapy at 5 mg daily.  Will follow-up on echocardiogram.  Repeat hemoglobin and hematocrit tomorrow.       Atherosclerosis of native arteries of extremities with rest pain, bilateral legs (HCC)  Previous history of femorofemoral bypass in 2015  Quit smoking 1 month ago  Status post elective right axillary to bifemoral bypass for aortic occlusive disease 02/25/2025.    POD #4   Reports improvement in lower extremity paresthesias.  Has postoperative anemia.    - Pregabalin 25 mg q Am and HS   - Plavix 75 mg daily  - Lipitor 40 mg daily  - Monitor NV fxn  - Care per primary team  Hyperlipidemia  Last lipid panel 10/26/2024-TC/TRIG/HDL/LDL  177/169/54/89  Takes Lipitor 40 mg daily  Essential hypertension  Blood pressure today 130/60; she has  been hemodynamically stable  Blood pressure reasonably controlled on metoprolol.    Anemia  Had colonoscopy in November 2024-precancerous polyp requiring repeat colonoscopy due to poor prep.  EGD around the same time revealed esophagitis, intestinal metaplasia with recommendation for Monique's surveillance.  Denies bloody stools  Baseline Hgb- 8.4-9.2  Hgb yesterday- 8.4  Needs follow-up H&H tomorrow.  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  Discussed with hospitalist physician regarding considering lowering the dose of sodium pills.    Subjective   Chief Complaint: For atrial tachycardia.  No acute events overnight.  Denies chest pain shortness of breath or palpitations.  Denies any pain in the groin or feet.    Objective :  Temp:  [96.6 °F (35.9 °C)-99.2 °F (37.3 °C)] 98.1 °F (36.7 °C)  HR:  [61-77] 65  BP: (122-160)/(55-70) 122/58  Resp:  [18-20] 18  SpO2:  [94 %-96 %] 95 %  O2 Device: None (Room air)    Vitals:    02/28/25 2255 03/01/25 0220 03/01/25 0545 03/01/25 0734   BP: 128/62 125/55 160/70 122/58   BP Location: Right arm Left arm Left arm Left arm   Pulse: 70 61 77 65   Resp: 20 18  18   Temp: 99.2 °F (37.3 °C) (!) 96.6 °F (35.9 °C)  98.1 °F (36.7 °C)   TempSrc: Temporal Temporal  Temporal   SpO2: 96% 96%  95%   Weight:       Height:             Physical Exam  Vitals reviewed.   Constitutional:       General: She is not in acute distress.     Appearance: She is underweight. She is not toxic-appearing or diaphoretic.   HENT:      Mouth/Throat:      Mouth: Mucous membranes are dry.   Cardiovascular:      Rate and Rhythm: Normal rate and regular rhythm.      Pulses: Normal pulses.      Heart sounds: No murmur heard.  Pulmonary:      Effort: Pulmonary effort is normal. No respiratory distress.      Breath sounds: Normal breath sounds. No wheezing or rales.   Abdominal:      Palpations: Abdomen is soft.  "  Musculoskeletal:      Cervical back: Neck supple.   Skin:     General: Skin is warm and dry.      Coloration: Skin is pale.      Comments: Surgical wounds on right upper chest and in the groin and lower extremities.  Covered.   Neurological:      Mental Status: She is alert and oriented to person, place, and time. Mental status is at baseline.   Psychiatric:         Mood and Affect: Mood normal.         Behavior: Behavior normal.         Thought Content: Thought content normal.         Lab Results: I have reviewed the following results:CBC/BMP:   .     03/01/25  0535   MG 1.7*   PHOS 3.5      Results from last 7 days   Lab Units 02/28/25  0559 02/27/25  0528 02/26/25  0603   WBC Thousand/uL 8.08 8.60 6.36   HEMOGLOBIN g/dL 8.4* 8.7* 9.5*   HEMATOCRIT % 25.5* 27.2* 28.9*   PLATELETS Thousands/uL 207 208 192     Results from last 7 days   Lab Units 02/28/25  0559 02/27/25  0528 02/26/25  0603   POTASSIUM mmol/L 3.7 4.1 3.4*   CHLORIDE mmol/L 114* 112* 112*   CO2 mmol/L 22 22 21   BUN mg/dL 18 15 16   CREATININE mg/dL 0.64 0.81 0.85   CALCIUM mg/dL 8.3* 8.4 8.2*     Results from last 7 days   Lab Units 02/28/25  2034 02/28/25  1259 02/25/25  0814   INR   --  1.05  --    PTT seconds 183* 36* 27     No results found for: \"HGBA1C\"  No results found for: \"CKTOTAL\", \"CKMB\", \"CKMBINDEX\", \"TROPONINI\"      Other Study Results Review: EKG was reviewed.  Other studies reviewed include: Telemetry shows no recurrence of tachycardia.    VTE Pharmacologic Prophylaxis: VTE covered by:  heparin (porcine), Intravenous, 15 Units/kg/hr at 02/28/25 2312  heparin (porcine), Intravenous  heparin (porcine), Intravenous         "

## 2025-03-01 NOTE — ASSESSMENT & PLAN NOTE
Lab Results   Component Value Date    SODIUM 140 02/28/2025    SODIUM 139 02/27/2025    SODIUM 139 02/26/2025     The patient's sodium has been normal.  Her sodium chloride dose will be reduced.

## 2025-03-01 NOTE — PROGRESS NOTES
Progress Note - Hospitalist   Name: Ofelia Aguilar 84 y.o. female I MRN: [id number]  Unit/Bed#: E4 -01 I Date of Admission: 2/25/2025   Date of Service: 3/1/2025 I Hospital Day: 4    Assessment & Plan  Atherosclerosis of native arteries of extremities with rest pain, bilateral legs (HCC)  Patient is a vascular surgery patient who underwent a right axillary bifemoral bypass on 2/25.  Vascular reports that on the procedure was uncomplicated, patient tolerated the procedure well.  Estimated blood loss described as minimal.  Postoperatively patient was found to have multiphasic signals of the lower extremities.  Home medication includes daily aspirin 81 milligrams, clopidogrel 75 mg, and atorvastatin 40 mg.     Plan:  POD #2  Now transferred from Critical Care to Med/Surg overnight  PT/OT eval  Tolerating Po diet  Continue ASA/Plavix/statin  Continue close neurovascular monitoring  Multimodal pain management  Remainder of care per primary service  SIADH (syndrome of inappropriate ADH production) ([facility])  Lab Results   Component Value Date    SODIUM 140 02/28/2025    SODIUM 139 02/27/2025    SODIUM 139 02/26/2025       Diagnosed on hospital admission from November 25 to November 29, 2024.  Outpatient regimen of sodium chloride tablets 1 g 3 times daily.  Baseline sodium of 135-140.     Plan:  NA tablet increased to 2G TID, NA stable  Continue to monitor BMP  Essential hypertension  Bp accetable  Continue POA amlodipine 5mg  Monitor  Anemia  Lab Results   Component Value Date    HGB 8.4 (L) 02/28/2025    HGB 8.7 (L) 02/27/2025    HGB 9.5 (L) 02/26/2025     Hgb 8.7 today, likely secondary to recent surgery  No signs of over bleeding  Monitor CBC  Hyperlipidemia  Continue statin  Atrial tachycardia (HCC)  The patient has developed atrial fibrillation.  Cardiology has evaluated her.  She has been started on heparin.  Her rate is being controlled with metoprolol.          Subjective:  The patient is feeling somewhat better.   Her pain is less.  She denies chest pain, shortness of breath, abdominal pain, nausea, or vomiting.  Her heart rate was noted to be very high during therapy.  She has been found to be in atrial fibrillation.    Physical Exam:   Temp:  [96.6 °F (35.9 °C)-99.2 °F (37.3 °C)] 96.6 °F (35.9 °C)  HR:  [61-94] 77  BP: (109-160)/(53-70) 160/70  Resp:  [16-20] 18  SpO2:  [94 %-97 %] 96 %  O2 Device: None (Room air)    Gen: Well-developed, in no distress.  Neck: Supple.  No lymphadenopathy or goiter.  Heart: Irregular tachycardia.  I heard no murmur or gallop.  Lungs: Clear to auscultation and percussion.  No wheezing, rales, or rhonchi.  Abd: Soft with active bowel sounds.  No mass or tenderness.  Extremities: No clubbing, cyanosis, or edema.  No calf tenderness.  Neuro: Alert and oriented.  No focal sign.  Skin: Warm and dry.        VTE Pharmacologic Prophylaxis: Heparin  VTE Mechanical Prophylaxis: reason for no mechanical VTE prophylaxis therapeutically anticoagulated

## 2025-03-01 NOTE — ASSESSMENT & PLAN NOTE
Blood pressure today 130/60; she has been hemodynamically stable  Blood pressure reasonably controlled on metoprolol.

## 2025-03-01 NOTE — ASSESSMENT & PLAN NOTE
Previous history of femorofemoral bypass in 2015  Quit smoking 1 month ago  Status post elective right axillary to bifemoral bypass for aortic occlusive disease 02/25/2025.    POD #4   Reports improvement in lower extremity paresthesias.  Has postoperative anemia.    - Pregabalin 25 mg q Am and HS   - Plavix 75 mg daily  - Lipitor 40 mg daily  - Monitor NV fxn  - Care per primary team

## 2025-03-01 NOTE — ASSESSMENT & PLAN NOTE
84 yoF ex-smoker w/ anxiety, depression, GERD, HTN, HLD PAD who presented to [facility] on 2/25/25 for an elective axillary bi-fem bypass. Pt has atherosclerosis w/ B/L short-distance claudication and rest pain, L >R. Pt underwent a R axillary to bi-fem bypass on 2/25/25. Immediate post-op course was uneventful.    POD #1:  Labs showed ABLA w/ Hgb decrease from 11.5 pre-op to 9.7 post-op. ABLA secondary to procedural and dilutional losses without any overt signs of bleeding.  Strong bypass signal.     POD #2:  Pt transferred to med-surg. Pt seen by OT w/ recommendations for STR vs HHC. Daughter opted for HHC.    POD #3: Pt remains hemodynamically stable w/ a slight decrease in Hgb to 8.4 w/ no overt signs of bleeding and stable VS. Pt having periods of afib w/ RVR on monitor. Cardiology consulted. R subclavian and B/L groin incision sites soft, no ecchymosis or swelling. R flank incision site w/ mild soft swelling and resolving ecchymosis. All incisions covered w/ mepilex which is clean, dry and intact w/ no strikethrough. Strong bypass signal. 2+ palpable DP B/L. R PT signal. 1+ palpable L PT. (+) motor/sensation in R foot. (+) motor w/ decreased sensation in toes on L foot.    POD#4:  Doing well. No new complaints. Noted possible parox atrial arrhythmias on telemetry 2/28 and placed on heparin infusion with cardiology consult. Admits to occasional palps. No CP or SOB. Reports L foot tenderness (predates surgery). R upper chest/subclavian, R abd/flank and bilateral groin incisions with surgical dressings in place which are c/d/I and without strikethrough. Incisions are soft. 2+ DP pulses bilaterally. Awaiting additional cardiology input. Follow up HBG tomorrow. Eventual HHC.     Plan:  -S/P R axillary to bi-fem bypass on 2/25 (POD #4) for atherosclerosis with L >R short-distance claudication and rest pain. Overall progressing after bypass. Incisions are soft with surgical dressings in place. Bypass signals in the groin  and 2+ DP pulses. Concern for parox atrial arrhythmia for which cardiology is consulted. Follow up HGB. Eventual HHC.  -R upper chest/subclavian, R flank and bilateral groin incisions appear stable with Mepilex in place and doppler signals present.   -Continue frequent neurovascular checks  -Diet as tolerated  -OOB as tolerated  -Continue ASA, clopidogrel and atorvastatin  -Continue routine Tylenol and oxycodone prn for post-op pain control  -(New) periods of atrial tachycardia v AF/AFL w/ rapid conduction; cardiology consult appreciated  -Heparin gtt started for atrial arrhythmias  -If pt requires DOAC on discharge, will D/C ASA and keep plavix monotherapy  -PT/OT recommends STR vs HHC; family opts for HHC  -Continue medical management per primary team  -Continued smoking cessation counseling   -Will continue to follow  -D/w Dr. Diaz

## 2025-03-01 NOTE — ASSESSMENT & PLAN NOTE
CHAD2-VASc score - 4 (age, female, hypertension)  TSH- 8/20/2024- 2.17; 12/21/2020 24-4.961 with T4 at 0.74  Per patient, used to be on atenolol, lisinopril and amlodipine when she was in [place]  Has been on therapeutic anticoagulation for prophylaxis with heparin 5000 units every 8 hours  Started on heparin drip yesterday due to concern for possible atrial flutter/fibrillation.  Atrial tachycardia with abberency    TODAY (03/01/25): Maintaining consistent sinus rhythm.  Review of telemetry is from yesterday suggest likely atrial tachycardia rather than atrial flutter or atrial fibrillation.  Echocardiogram pending.  Clinically appears to be dry without evidence of pulmonary or peripheral vascular congestion.  Changing metoprolol to 37.5 mg twice daily.  Discontinuing heparin drip  Reinitiating prophylactic  regimen.  Continue dual antiplatelet therapy.  If systolic blood pressure is noted to be greater than 130 mmHg then recommend restarting amlodipine therapy at 5 mg daily.  Will follow-up on echocardiogram.  Repeat hemoglobin and hematocrit tomorrow.

## 2025-03-02 ENCOUNTER — APPOINTMENT (INPATIENT)
Dept: NON INVASIVE DIAGNOSTICS | Facility: HOSPITAL | Age: 85
DRG: 253 | End: 2025-03-02
Payer: COMMERCIAL

## 2025-03-02 VITALS
TEMPERATURE: 98 F | WEIGHT: 109 LBS | HEIGHT: 64 IN | SYSTOLIC BLOOD PRESSURE: 109 MMHG | HEART RATE: 69 BPM | BODY MASS INDEX: 18.61 KG/M2 | DIASTOLIC BLOOD PRESSURE: 58 MMHG | OXYGEN SATURATION: 98 % | RESPIRATION RATE: 18 BRPM

## 2025-03-02 LAB
ANION GAP SERPL CALCULATED.3IONS-SCNC: 5 MMOL/L (ref 4–13)
AORTIC ROOT: 3.4 CM
AORTIC VALVE MEAN VELOCITY: 10 M/S
ASCENDING AORTA: 3.5 CM
AV AREA BY CONTINUOUS VTI: 2.3 CM2
AV AREA PEAK VELOCITY: 2.5 CM2
AV LVOT MEAN GRADIENT: 3 MMHG
AV LVOT PEAK GRADIENT: 5 MMHG
AV MEAN PRESS GRAD SYS DOP V1V2: 5 MMHG
AV ORIFICE AREA US: 2.27 CM2
AV PEAK GRADIENT: 8 MMHG
AV VELOCITY RATIO: 0.72
AV VMAX SYS DOP: 1.43 M/S
BSA FOR ECHO PROCEDURE: 1.51 M2
BUN SERPL-MCNC: 17 MG/DL (ref 5–25)
CALCIUM SERPL-MCNC: 7.9 MG/DL (ref 8.4–10.2)
CHLORIDE SERPL-SCNC: 114 MMOL/L (ref 96–108)
CO2 SERPL-SCNC: 22 MMOL/L (ref 21–32)
CREAT SERPL-MCNC: 0.72 MG/DL (ref 0.6–1.3)
DOP CALC AO VTI: 29.92 CM
DOP CALC LVOT AREA: 3.14 CM2
DOP CALC LVOT CARDIAC INDEX: 3.51 L/MIN/M2
DOP CALC LVOT CARDIAC OUTPUT: 5.31 L/MIN
DOP CALC LVOT DIAMETER: 2 CM
DOP CALC LVOT PEAK VEL VTI: 21.64 CM
DOP CALC LVOT PEAK VEL: 1.14 M/S
DOP CALC LVOT STROKE INDEX: 44.4 ML/M2
DOP CALC LVOT STROKE VOLUME: 67.95
E WAVE DECELERATION TIME: 214 MS
E/A RATIO: 0.86
ERYTHROCYTE [DISTWIDTH] IN BLOOD BY AUTOMATED COUNT: 14.5 % (ref 11.6–15.1)
FRACTIONAL SHORTENING: 24 (ref 28–44)
GFR SERPL CREATININE-BSD FRML MDRD: 78 ML/MIN/1.73SQ M
GLUCOSE SERPL-MCNC: 93 MG/DL (ref 65–140)
HCT VFR BLD AUTO: 25.8 % (ref 34.8–46.1)
HGB BLD-MCNC: 8.5 G/DL (ref 11.5–15.4)
INTERVENTRICULAR SEPTUM IN DIASTOLE (PARASTERNAL SHORT AXIS VIEW): 1.2 CM
INTERVENTRICULAR SEPTUM: 1.2 CM (ref 0.6–1.1)
LAAS-AP2: 22.6 CM2
LAAS-AP4: 23.4 CM2
LEFT ATRIUM SIZE: 2.7 CM
LEFT ATRIUM VOLUME (MOD BIPLANE): 71 ML
LEFT ATRIUM VOLUME INDEX (MOD BIPLANE): 47 ML/M2
LEFT INTERNAL DIMENSION IN SYSTOLE: 2.9 CM (ref 2.1–4)
LEFT VENTRICULAR INTERNAL DIMENSION IN DIASTOLE: 3.8 CM (ref 3.5–6)
LEFT VENTRICULAR POSTERIOR WALL IN END DIASTOLE: 1.2 CM
LEFT VENTRICULAR STROKE VOLUME: 31 ML
LV EF US.2D.A4C+ESTIMATED: 58 %
LVSV (TEICH): 31 ML
MCH RBC QN AUTO: 30.8 PG (ref 26.8–34.3)
MCHC RBC AUTO-ENTMCNC: 32.9 G/DL (ref 31.4–37.4)
MCV RBC AUTO: 94 FL (ref 82–98)
MV E'TISSUE VEL-LAT: 9 CM/S
MV E'TISSUE VEL-SEP: 6 CM/S
MV PEAK A VEL: 0.7 M/S
MV PEAK E VEL: 60 CM/S
PLATELET # BLD AUTO: 272 THOUSANDS/UL (ref 149–390)
PMV BLD AUTO: 10.1 FL (ref 8.9–12.7)
POTASSIUM SERPL-SCNC: 3.7 MMOL/L (ref 3.5–5.3)
RBC # BLD AUTO: 2.76 MILLION/UL (ref 3.81–5.12)
RIGHT ATRIUM AREA SYSTOLE A4C: 19 CM2
RIGHT VENTRICLE ID DIMENSION: 4.1 CM
SINOTUBULAR JUNCTION: 3 CM
SL CV LEFT ATRIUM LENGTH A2C: 5.8 CM
SL CV PED ECHO LEFT VENTRICLE DIASTOLIC VOLUME (MOD BIPLANE) 2D: 62 ML
SL CV PED ECHO LEFT VENTRICLE SYSTOLIC VOLUME (MOD BIPLANE) 2D: 31 ML
SL CV SINUS OF VALSALVA 2D: 3.3 CM
SODIUM SERPL-SCNC: 141 MMOL/L (ref 135–147)
STJ: 3 CM
TRICUSPID ANNULAR PLANE SYSTOLIC EXCURSION: 1.8 CM
WBC # BLD AUTO: 6.96 THOUSAND/UL (ref 4.31–10.16)

## 2025-03-02 PROCEDURE — 99232 SBSQ HOSP IP/OBS MODERATE 35: CPT | Performed by: INTERNAL MEDICINE

## 2025-03-02 PROCEDURE — 93306 TTE W/DOPPLER COMPLETE: CPT

## 2025-03-02 PROCEDURE — 85027 COMPLETE CBC AUTOMATED: CPT | Performed by: PHYSICIAN ASSISTANT

## 2025-03-02 PROCEDURE — 99239 HOSP IP/OBS DSCHRG MGMT >30: CPT | Performed by: INTERNAL MEDICINE

## 2025-03-02 PROCEDURE — 80048 BASIC METABOLIC PNL TOTAL CA: CPT | Performed by: PHYSICIAN ASSISTANT

## 2025-03-02 PROCEDURE — 99024 POSTOP FOLLOW-UP VISIT: CPT | Performed by: PHYSICIAN ASSISTANT

## 2025-03-02 PROCEDURE — 93306 TTE W/DOPPLER COMPLETE: CPT | Performed by: INTERNAL MEDICINE

## 2025-03-02 RX ORDER — METOPROLOL TARTRATE 37.5 MG/1
37.5 TABLET ORAL 2 TIMES DAILY
Qty: 60 TABLET | Refills: 1 | Status: SHIPPED | OUTPATIENT
Start: 2025-03-02

## 2025-03-02 RX ORDER — OXYCODONE HYDROCHLORIDE 5 MG/1
5 TABLET ORAL DAILY PRN
Qty: 7 TABLET | Refills: 0 | Status: SHIPPED | OUTPATIENT
Start: 2025-03-02 | End: 2025-03-12

## 2025-03-02 RX ORDER — MAGNESIUM SULFATE HEPTAHYDRATE 40 MG/ML
2 INJECTION, SOLUTION INTRAVENOUS ONCE
Status: COMPLETED | OUTPATIENT
Start: 2025-03-02 | End: 2025-03-02

## 2025-03-02 RX ORDER — POTASSIUM CHLORIDE 1500 MG/1
20 TABLET, EXTENDED RELEASE ORAL
Status: DISCONTINUED | OUTPATIENT
Start: 2025-03-02 | End: 2025-03-02 | Stop reason: HOSPADM

## 2025-03-02 RX ADMIN — HEPARIN SODIUM 5000 UNITS: 5000 INJECTION INTRAVENOUS; SUBCUTANEOUS at 13:48

## 2025-03-02 RX ADMIN — OXYCODONE HYDROCHLORIDE 10 MG: 10 TABLET ORAL at 07:33

## 2025-03-02 RX ADMIN — PREGABALIN 25 MG: 25 CAPSULE ORAL at 09:58

## 2025-03-02 RX ADMIN — SENNOSIDES 8.6 MG: 8.6 TABLET ORAL at 09:58

## 2025-03-02 RX ADMIN — CLOPIDOGREL BISULFATE 75 MG: 75 TABLET ORAL at 09:58

## 2025-03-02 RX ADMIN — POTASSIUM CHLORIDE 20 MEQ: 1500 TABLET, EXTENDED RELEASE ORAL at 11:09

## 2025-03-02 RX ADMIN — HEPARIN SODIUM 5000 UNITS: 5000 INJECTION INTRAVENOUS; SUBCUTANEOUS at 06:02

## 2025-03-02 RX ADMIN — MAGNESIUM SULFATE HEPTAHYDRATE 2 G: 40 INJECTION, SOLUTION INTRAVENOUS at 11:10

## 2025-03-02 RX ADMIN — SODIUM CHLORIDE 1 G: 1 TABLET ORAL at 09:58

## 2025-03-02 RX ADMIN — METOPROLOL TARTRATE 37.5 MG: 25 TABLET, FILM COATED ORAL at 09:58

## 2025-03-02 RX ADMIN — ACETAMINOPHEN 975 MG: 325 TABLET, FILM COATED ORAL at 06:02

## 2025-03-02 RX ADMIN — ASPIRIN 81 MG CHEWABLE TABLET 81 MG: 81 TABLET CHEWABLE at 09:58

## 2025-03-02 RX ADMIN — ACETAMINOPHEN 975 MG: 325 TABLET, FILM COATED ORAL at 13:49

## 2025-03-02 RX ADMIN — SODIUM CHLORIDE 1 G: 1 TABLET ORAL at 13:49

## 2025-03-02 RX ADMIN — ATORVASTATIN CALCIUM 40 MG: 40 TABLET, FILM COATED ORAL at 09:58

## 2025-03-02 NOTE — ASSESSMENT & PLAN NOTE
She was evaluated by cardiology.  Atrial fibrillation/flutter was ruled out.  Discussed with Dr. Smith and she is also cleared for DC from cardiology standpoint.  Continue metoprolol 37.5 mg twice daily for heart rate  and blood pressure control.  Will send new prescription to her pharmacy

## 2025-03-02 NOTE — ASSESSMENT & PLAN NOTE
Patient is a vascular surgery patient who underwent a right axillary bifemoral bypass on 2/25.  Vascular reports that on the procedure was uncomplicated, patient tolerated the procedure well.  Estimated blood loss described as minimal.  Postoperatively patient was found to have multiphasic signals of the lower extremities.     Continue ASA/Plavix/statin  Discussed with Fifi Oliver PA-C.  She is stable for discharge home with VNA.  He has a follow-up appointment on 3/10/2025.  Will prescribe limited amount of oxycodone for postoperative pain.  Discussed  with patient and with daughter whom she will be staying with.  Emphasized avoidance of alcohol while taking narcotics and Lyrica  PDMP reviewed

## 2025-03-02 NOTE — ASSESSMENT & PLAN NOTE
Lab Results   Component Value Date    HGB 8.5 (L) 03/02/2025    HGB 8.4 (L) 02/28/2025    HGB 8.7 (L) 02/27/2025     Hemoglobin stable at 8.5.

## 2025-03-02 NOTE — NURSING NOTE
Patient discharged today. IV removed. Education provided. No further questions.    Miguelito Burgess RN

## 2025-03-02 NOTE — PROGRESS NOTES
Progress Note - Vascular Surgery   Name: Ofelia Aguilar 84 y.o. female I MRN: [id number]  Unit/Bed#: E4 -01 I Date of Admission: 2/25/2025   Date of Service: 3/2/2025 I Hospital Day: 5    Assessment & Plan  Atherosclerosis of native arteries of extremities with rest pain, bilateral legs (HCC)  84 yoF ex-smoker w/ anxiety, depression, GERD, HTN, HLD PAD who presented to [facility] on 2/25/25 for an elective axillary bi-fem bypass. Pt has atherosclerosis w/ B/L short-distance claudication and rest pain, L >R. Pt underwent a R axillary to bi-fem bypass on 2/25/25. Immediate post-op course was uneventful.    POD #1:  Labs showed ABLA w/ Hgb decrease from 11.5 pre-op to 9.7 post-op. ABLA secondary to procedural and dilutional losses without any overt signs of bleeding.  Strong bypass signal.     POD #2:  Pt transferred to med-surg. Pt seen by OT w/ recommendations for STR vs HHC. Daughter opted for HHC.    POD #3: Pt remains hemodynamically stable w/ a slight decrease in Hgb to 8.4 w/ no overt signs of bleeding and stable VS. Pt having periods of afib w/ RVR on monitor. Cardiology consulted. R subclavian and B/L groin incision sites soft, no ecchymosis or swelling. R flank incision site w/ mild soft swelling and resolving ecchymosis. All incisions covered w/ mepilex which is clean, dry and intact w/ no strikethrough. Strong bypass signal. 2+ palpable DP B/L. R PT signal. 1+ palpable L PT. (+) motor/sensation in R foot. (+) motor w/ decreased sensation in toes on L foot.    POD#4:  Doing well. No new complaints. Noted possible parox atrial arrhythmias on telemetry 2/28 and placed on heparin infusion with cardiology consult. Admits to occasional palps. No CP or SOB. Reports L foot tenderness (predates surgery). R upper chest/subclavian, R abd/flank and bilateral groin incisions with surgical dressings in place which are c/d/I and without strikethrough. Incisions are soft. 2+ DP pulses bilaterally. Awaiting additional  cardiology input. Follow up HBG tomorrow. Eventual HHC.     POD #5: Getting echo. Nursing provider her oxycodone this am for L foot pain. She has a large bruise, H/T over the right back. Nursing to apply compresses. No incisional issues or pain. Surgical sites are soft with dressings in place which are c/d/I. Feet warm. 2+ DP pulses bilaterally. 1+ L foot edema.     Plan:  -S/P R axillary to bi-fem bypass on 2/25 (POD #5) for atherosclerosis with L >R short-distance claudication and rest pain. Overall progressing. L foot pain. R upper chest/ abdominal and bilateral groin incisions are soft and stable. Surgical dressings are dry and in place. Noted hematoma in the R upper back for which nursing will apply cool compresses and we will monitor. Hbg 8.5 - unchanged c/w 2/28. Eventual HHC.    -Cardiology finds no AF/FL and heparin gtt discontinued in favor of DVT prophylaxis.  -R upper chest/subclavian, R flank and bilateral groin incisions are soft with silver surgical dressings in place  -Continue frequent neurovascular checks  -Diet as tolerated  -OOB as tolerated  -Continue ASA, clopidogrel and atorvastatin  -Continue routine Tylenol and oxycodone prn for post-op pain control  -PT/OT recommends STR vs HHC; family opts for HHC  -Continue medical management per primary team  -Continued smoking cessation counseling   -Will continue to follow  -D/w Dr. Diaz    24 Hour Events : Cardiology input appreciated. Patient found to have Atrial Tachycardia and not AF/FL, thus a/c was discontinued by cardiology.     Subjective : L foot pain for which she received oxycodone this am by nursing. Recommend oob to chair and gentle activity.  No incisional issues. No abdominal pain. No chest pain, SOB. No palpitations.       VSS. Afebrile. HBG 8.5 (8.4); BUN/creat 17/0.72    Objective :  Temp:  [97 °F (36.1 °C)-98.6 °F (37 °C)] 98.6 °F (37 °C)  HR:  [70-79] 73  BP: (106-140)/(52-65) 140/65  Resp:  [18-20] 18  SpO2:  [95 %-96 %] 96 %  O2  Device: None (Room air)     I/O          0701   0700  0701   0700  0701   0700    P.O. 770 120 180    Total Intake(mL/kg) 770 (15.5) 120 (2.4) 180 (3.6)    Urine (mL/kg/hr) 620 (0.5)      Total Output 620      Net +150 +120 +180                 Chest, abdominal and B groin incisions covered with surgical dressings which are c/d/I. All incisions are soft. 2+ DP pulses.      Physical Exam  Vitals and nursing note reviewed.   Constitutional:       Appearance: She is well-developed.   HENT:      Head: Normocephalic and atraumatic.   Eyes:      Pupils: Pupils are equal, round, and reactive to light.   Neck:      Vascular: No JVD.   Cardiovascular:      Rate and Rhythm: Normal rate and regular rhythm.      Pulses:           Radial pulses are 2+ on the right side and 2+ on the left side.        Dorsalis pedis pulses are 2+ on the right side and 2+ on the left side.      Heart sounds: Normal heart sounds, S1 normal and S2 normal. No murmur heard.     No friction rub. No gallop.   Pulmonary:      Effort: Pulmonary effort is normal. No accessory muscle usage or respiratory distress.      Breath sounds: Normal breath sounds. No wheezing or rales.   Abdominal:      General: Bowel sounds are normal. There is no distension.      Palpations: Abdomen is soft.      Tenderness: There is no abdominal tenderness.   Musculoskeletal:         General: No deformity. Normal range of motion.      Left lower le+ Edema (mild L foot edema) present.   Skin:     General: Skin is warm and dry.      Findings: No lesion or rash.      Nails: There is no clubbing.   Neurological:      Mental Status: She is alert and oriented to person, place, and time.      Comments: Grossly normal    Psychiatric:         Behavior: Behavior is cooperative.           Lab Results: I have reviewed the following results:  CBC with diff:   Lab Results   Component Value Date    WBC 6.96 2025    HGB 8.5 (L) 2025    HCT 25.8 (L)  "03/02/2025    MCV 94 03/02/2025     03/02/2025    RBC 2.76 (L) 03/02/2025    MCH 30.8 03/02/2025    MCHC 32.9 03/02/2025    RDW 14.5 03/02/2025    MPV 10.1 03/02/2025    NRBC 0 02/25/2025   ,   BMP/CMP:  Lab Results   Component Value Date    SODIUM 141 03/02/2025    SODIUM 133 (L) 09/14/2024    K 3.7 03/02/2025    K 5.1 09/14/2024     (H) 03/02/2025    CL 99 09/14/2024    CO2 22 03/02/2025    CO2 22 09/14/2024    BUN 17 03/02/2025    BUN 14 09/14/2024    CREATININE 0.72 03/02/2025    CREATININE 0.7 09/14/2024    CALCIUM 7.9 (L) 03/02/2025    CALCIUM 9.7 09/14/2024    AST 21 12/21/2024    AST 23 09/14/2024    ALT 12 12/21/2024    ALT 16 09/14/2024    ALKPHOS 50 12/21/2024    ALKPHOS 66 09/14/2024    EGFR 78 03/02/2025    EGFR 85 09/14/2024   ,   Lipid Panel: No results found for: \"CHOL\",   Coags:   Lab Results   Component Value Date     (H) 03/01/2025    INR 1.05 02/28/2025   ,   Blood Culture:   Lab Results   Component Value Date    BLOODCX No Growth After 5 Days. 11/25/2024   ,   Urinalysis:   Lab Results   Component Value Date    COLORU Light Yellow 11/25/2024    CLARITYU Clear 11/25/2024    SPECGRAV 1.026 11/25/2024    PHUR 5.5 11/25/2024    LEUKOCYTESUR Negative 11/25/2024    NITRITE Negative 11/25/2024    GLUCOSEU Negative 11/25/2024    KETONESU Negative 11/25/2024    BILIRUBINUR Negative 11/25/2024    BLOODU Negative 11/25/2024   ,   Urine Culture:   Lab Results   Component Value Date    URINECX 80,000-89,000 cfu/ml 08/25/2024   ,   Wound Culure: No results found for: \"WOUNDCULT\"    Echocardiogram is currently being performed - result pending    VTE Prophylaxis: Heparin  "

## 2025-03-02 NOTE — ASSESSMENT & PLAN NOTE
Previous history of femorofemoral bypass in 2015  Quit smoking 1 month ago  Status post elective right axillary to bifemoral bypass for aortic occlusive disease 02/25/2025.    POD #5   Reports improvement in lower extremity paresthesias.  Has postoperative anemia.    - Pregabalin 25 mg q Am and HS   - Plavix 75 mg daily  - Lipitor 40 mg daily  - Monitor NV fxn  - Care per primary team

## 2025-03-02 NOTE — PROGRESS NOTES
Progress Note - Cardiology   Name: Ofelia Aguialr 84 y.o. female I MRN: [id number]  Unit/Bed#: E4 -01 I Date of Admission: 2/25/2025   Date of Service: 3/2/2025 I Hospital Day: 5    Assessment & Plan  Atrial tachycardia (HCC)  CHAD2-VASc score - 4 (age, female, hypertension)  TSH- 8/20/2024- 2.17; 12/21/2020 24-4.961 with T4 at 0.74  Per patient, used to be on atenolol, lisinopril and amlodipine when she was in [place]  Has been on therapeutic anticoagulation for prophylaxis with heparin 5000 units every 8 hours  Started on heparin drip yesterday due to concern for possible atrial flutter/fibrillation.  Discontinued yesterday.  Atrial tachycardia with abberency    TODAY (03/02/25): Noted to have intermittent premature beats quite frequently.  He is asymptomatic.  Has signs of anemia.  Echocardiogram performed today showed preserved systolic function, EF 58%, grade 1 diastolic dysfunction, mild concentric LVH, mild AI, MR and trace to mild TR, mild PI.  No obvious pulmonary hypertension noted.  Telemetry today shows sinus rhythm with occasional premature atrial beat.  Interventricular conduction delay noted.    Patient okay to be discharged from cardiac perspective on metoprolol to 37.5 mg twice daily.  Continue dual antiplatelet therapy.  Needs outpatient follow-up blood work including hematocrit and hemoglobin within 1 week and then subsequent follow-up with primary physician and vascular surgery.  Patient advised to seek immediate help if she has symptoms related to her anemia including symptoms of severe fatigue, lightheadedness dizziness palpitations or any other concerning symptom.  Will also arrange cardiology follow-up within the next few weeks.       Atherosclerosis of native arteries of extremities with rest pain, bilateral legs (HCC)  Previous history of femorofemoral bypass in 2015  Quit smoking 1 month ago  Status post elective right axillary to bifemoral bypass for aortic occlusive disease  02/25/2025.    POD #5   Reports improvement in lower extremity paresthesias.  Has postoperative anemia.    - Pregabalin 25 mg q Am and HS   - Plavix 75 mg daily  - Lipitor 40 mg daily  - Monitor NV fxn  - Care per primary team  Hyperlipidemia  Last lipid panel 10/26/2024-TC/TRIG/HDL/LDL  177/169/54/89  Takes Lipitor 40 mg daily  Essential hypertension  Blood pressure today 130/60; she has been hemodynamically stable  Blood pressure reasonably controlled on metoprolol.    Anemia  Had colonoscopy in November 2024-precancerous polyp requiring repeat colonoscopy due to poor prep.  EGD around the same time revealed esophagitis, intestinal metaplasia with recommendation for Monique's surveillance.  Denies bloody stools  Baseline Hgb- 8.4-9.2  Hgb yesterday- 8.4  Needs follow-up H&H tomorrow.  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  Discussed with hospitalist physician regarding considering lowering the dose of sodium pills.    Subjective   Chief Complaint: For atrial tachycardia.  No acute events overnight.  Denies chest pain shortness of breath or palpitations.  Has left foot pain and some groin discomfort.    Objective :  Temp:  [97 °F (36.1 °C)-98.6 °F (37 °C)] 98.6 °F (37 °C)  HR:  [70-79] 73  BP: (106-140)/(52-65) 140/65  Resp:  [18-20] 18  SpO2:  [95 %-96 %] 96 %  O2 Device: None (Room air)    Vitals:    03/01/25 1858 03/01/25 2316 03/02/25 0322 03/02/25 0739   BP: 116/56 106/52 120/56 140/65   BP Location: Left arm Left arm Left arm Left arm   Pulse: 70 70 74 73   Resp: 19 19 18 18   Temp: 97.7 °F (36.5 °C) 98.4 °F (36.9 °C) 98.5 °F (36.9 °C) 98.6 °F (37 °C)   TempSrc: Temporal Temporal Temporal Temporal   SpO2: 95% 95% 95% 96%   Weight:       Height:             Physical Exam  Vitals reviewed.   Constitutional:       General: She is not in acute distress.     Appearance: She is underweight. She is not toxic-appearing or  "diaphoretic.   HENT:      Mouth/Throat:      Mouth: Mucous membranes are dry.   Cardiovascular:      Rate and Rhythm: Normal rate and regular rhythm.      Pulses: Normal pulses.      Heart sounds: No murmur heard.  Pulmonary:      Effort: Pulmonary effort is normal. No respiratory distress.      Breath sounds: Normal breath sounds. No wheezing or rales.   Chest:      Comments: Subcutaneous hematoma noted in right anterior lateral chest.  Abdominal:      Palpations: Abdomen is soft.   Musculoskeletal:      Cervical back: Neck supple.   Skin:     General: Skin is warm and dry.      Coloration: Skin is pale.      Comments: Surgical wounds on right upper chest and in the groin and lower extremities.  Covered.   Neurological:      Mental Status: She is alert and oriented to person, place, and time. Mental status is at baseline.   Psychiatric:         Mood and Affect: Mood normal.         Behavior: Behavior normal.         Thought Content: Thought content normal.         Lab Results: I have reviewed the following results:CBC/BMP:   .     03/02/25  0603   WBC 6.96   HGB 8.5*   HCT 25.8*      SODIUM 141   K 3.7   *   CO2 22   BUN 17   CREATININE 0.72   GLUC 93      Results from last 7 days   Lab Units 03/02/25  0603 02/28/25  0559 02/27/25  0528   WBC Thousand/uL 6.96 8.08 8.60   HEMOGLOBIN g/dL 8.5* 8.4* 8.7*   HEMATOCRIT % 25.8* 25.5* 27.2*   PLATELETS Thousands/uL 272 207 208     Results from last 7 days   Lab Units 03/02/25  0603 02/28/25  0559 02/27/25  0528   POTASSIUM mmol/L 3.7 3.7 4.1   CHLORIDE mmol/L 114* 114* 112*   CO2 mmol/L 22 22 22   BUN mg/dL 17 18 15   CREATININE mg/dL 0.72 0.64 0.81   CALCIUM mg/dL 7.9* 8.3* 8.4     Results from last 7 days   Lab Units 03/01/25  1029 02/28/25  2034 02/28/25  1259   INR   --   --  1.05   PTT seconds 117* 183* 36*     No results found for: \"HGBA1C\"  No results found for: \"CKTOTAL\", \"CKMB\", \"CKMBINDEX\", \"TROPONINI\"      Other Study Results Review: EKG was " reviewed.  Other studies reviewed include: Telemetry shows no recurrence of tachycardia.    VTE Pharmacologic Prophylaxis: VTE covered by:  heparin (porcine), Subcutaneous, 5,000 Units at 03/02/25 0602

## 2025-03-02 NOTE — ASSESSMENT & PLAN NOTE
CHAD2-VASc score - 4 (age, female, hypertension)  TSH- 8/20/2024- 2.17; 12/21/2020 24-4.961 with T4 at 0.74  Per patient, used to be on atenolol, lisinopril and amlodipine when she was in [place]  Has been on therapeutic anticoagulation for prophylaxis with heparin 5000 units every 8 hours  Started on heparin drip yesterday due to concern for possible atrial flutter/fibrillation.  Discontinued yesterday.  Atrial tachycardia with abberency    TODAY (03/02/25): Noted to have intermittent premature beats quite frequently.  He is asymptomatic.  Has signs of anemia.  Echocardiogram performed today showed preserved systolic function, EF 58%, grade 1 diastolic dysfunction, mild concentric LVH, mild AI, MR and trace to mild TR, mild PI.  No obvious pulmonary hypertension noted.  Telemetry today shows sinus rhythm with occasional premature atrial beat.  Interventricular conduction delay noted.    Patient okay to be discharged from cardiac perspective on metoprolol to 37.5 mg twice daily.  Continue dual antiplatelet therapy.  Needs outpatient follow-up blood work including hematocrit and hemoglobin within 1 week and then subsequent follow-up with primary physician and vascular surgery.  Patient advised to seek immediate help if she has symptoms related to her anemia including symptoms of severe fatigue, lightheadedness dizziness palpitations or any other concerning symptom.  Will also arrange cardiology follow-up within the next few weeks.

## 2025-03-02 NOTE — ASSESSMENT & PLAN NOTE
Patient was started on metoprolol 37.5 mg twice daily for atrial tachycardia.  Continue metoprolol on discharge  DC Norvasc

## 2025-03-02 NOTE — ASSESSMENT & PLAN NOTE
84 yoF ex-smoker w/ anxiety, depression, GERD, HTN, HLD PAD who presented to [facility] on 2/25/25 for an elective axillary bi-fem bypass. Pt has atherosclerosis w/ B/L short-distance claudication and rest pain, L >R. Pt underwent a R axillary to bi-fem bypass on 2/25/25. Immediate post-op course was uneventful.    POD #1:  Labs showed ABLA w/ Hgb decrease from 11.5 pre-op to 9.7 post-op. ABLA secondary to procedural and dilutional losses without any overt signs of bleeding.  Strong bypass signal.     POD #2:  Pt transferred to med-surg. Pt seen by OT w/ recommendations for STR vs HHC. Daughter opted for HHC.    POD #3: Pt remains hemodynamically stable w/ a slight decrease in Hgb to 8.4 w/ no overt signs of bleeding and stable VS. Pt having periods of afib w/ RVR on monitor. Cardiology consulted. R subclavian and B/L groin incision sites soft, no ecchymosis or swelling. R flank incision site w/ mild soft swelling and resolving ecchymosis. All incisions covered w/ mepilex which is clean, dry and intact w/ no strikethrough. Strong bypass signal. 2+ palpable DP B/L. R PT signal. 1+ palpable L PT. (+) motor/sensation in R foot. (+) motor w/ decreased sensation in toes on L foot.    POD#4:  Doing well. No new complaints. Noted possible parox atrial arrhythmias on telemetry 2/28 and placed on heparin infusion with cardiology consult. Admits to occasional palps. No CP or SOB. Reports L foot tenderness (predates surgery). R upper chest/subclavian, R abd/flank and bilateral groin incisions with surgical dressings in place which are c/d/I and without strikethrough. Incisions are soft. 2+ DP pulses bilaterally. Awaiting additional cardiology input. Follow up HBG tomorrow. Eventual HHC.     POD #5: Getting echo. Nursing provider her oxycodone this am for L foot pain. She has a large bruise, H/T over the right back. Nursing to apply compresses. No incisional issues or pain. Surgical sites are soft with dressings in place which are  c/d/I. Feet warm. 2+ DP pulses bilaterally. 1+ L foot edema.     Plan:  -S/P R axillary to bi-fem bypass on 2/25 (POD #5) for atherosclerosis with L >R short-distance claudication and rest pain. Overall progressing. L foot pain. R upper chest/ abdominal and bilateral groin incisions are soft and stable. Surgical dressings are dry and in place. Noted hematoma in the R upper back for which nursing will apply cool compresses and we will monitor. Hbg 8.5 - unchanged c/w 2/28. Eventual HHC.    -Cardiology finds no AF/FL and heparin gtt discontinued in favor of DVT prophylaxis.  -R upper chest/subclavian, R flank and bilateral groin incisions are soft with silver surgical dressings in place  -Continue frequent neurovascular checks  -Diet as tolerated  -OOB as tolerated  -Continue ASA, clopidogrel and atorvastatin  -Continue routine Tylenol and oxycodone prn for post-op pain control  -PT/OT recommends STR vs HHC; family opts for HHC  -Continue medical management per primary team  -Continued smoking cessation counseling   -Will continue to follow  -D/w Dr. Diaz

## 2025-03-02 NOTE — ASSESSMENT & PLAN NOTE
Lab Results   Component Value Date    SODIUM 141 03/02/2025    SODIUM 140 02/28/2025    SODIUM 139 02/27/2025     The patient's sodium has been normal.  Her sodium chloride dose will be reduced.

## 2025-03-02 NOTE — DISCHARGE SUMMARY
Discharge Summary - Hospitalist   Name: Ofelia Aguilar 84 y.o. female I MRN: [id number]  Unit/Bed#: E4 -01 I Date of Admission: 2/25/2025   Date of Service: 3/2/2025 I Hospital Day: 5     Assessment & Plan  Atherosclerosis of native arteries of extremities with rest pain, bilateral legs (HCC)  Patient is a vascular surgery patient who underwent a right axillary bifemoral bypass on 2/25.  Vascular reports that on the procedure was uncomplicated, patient tolerated the procedure well.  Estimated blood loss described as minimal.  Postoperatively patient was found to have multiphasic signals of the lower extremities.     Continue ASA/Plavix/statin  Discussed with Fifi Oliver PA-C.  She is stable for discharge home with VNA.  He has a follow-up appointment on 3/10/2025.  Will prescribe limited amount of oxycodone for postoperative pain.  Discussed  with patient and with daughter whom she will be staying with.  Emphasized avoidance of alcohol while taking narcotics and Lyrica  PDMP reviewed  SIADH (syndrome of inappropriate ADH production) ([facility])  Lab Results   Component Value Date    SODIUM 141 03/02/2025    SODIUM 140 02/28/2025    SODIUM 139 02/27/2025     The patient's sodium has been normal.  Her sodium chloride dose will be reduced.  Essential hypertension  Patient was started on metoprolol 37.5 mg twice daily for atrial tachycardia.  Continue metoprolol on discharge  DC Norvas  Anemia  Lab Results   Component Value Date    HGB 8.5 (L) 03/02/2025    HGB 8.4 (L) 02/28/2025    HGB 8.7 (L) 02/27/2025     Hemoglobin stable at 8.5.    Hyperlipidemia  Continue  Lipitor 40 mg daily  Atrial tachycardia (HCC)  She was evaluated by cardiology.  Atrial fibrillation/flutter was ruled out.  Discussed with Dr. Smith and she is also cleared for DC from cardiology standpoint.  Continue metoprolol 37.5 mg twice daily for heart rate  and blood pressure control.  Will send new prescription to her pharmacy     Medical Problems        Resolved Problems  Date Reviewed: 2/5/2025   None       Discharging Physician / Practitioner: Eamon Sims MD  PCP: Madalyn Zendejas PA-C  Admission Date:   Admission Orders (From admission, onward)       Ordered        02/25/25 0821  Inpatient Admission  Once                          Discharge Date: 03/02/25    Consultations During Hospital Stay:  Vascular surgery  Cardiology    Procedures Performed:   Axillofemoral bypass 2/25/2025    Significant Findings / Test Results:   Echo complete w/ contrast if indicated  Result Date: 3/2/2025  Narrative: Technically fair quality, adequate transthoracic echocardiogram study. Mild concentric left ventricular hypertrophy, normal left ventricular systolic function, normal regional wall motion.  Ejection fraction is estimated as around 58%.  Compensated grade 1 diastolic dysfunction. Normal right ventricle size and systolic function. Moderate left atrial cavity enlargement.  Normal right atrial cavity size. Minimal aortic valve sclerosis, mild aortic valve regurgitation. Mild mitral valve annular calcification.  Mild mitral valve leaflet sclerosis, mild mitral valve regurgitation. Trace to mild tricuspid valve and mild pulmonic valve regurgitation. No obvious pulmonary hypertension. No pericardial effusion. No previous echocardiogram is available for comparison.        Incidental Findings:   None    Test Results Pending at Discharge (will require follow up):   none     Outpatient Tests Requested:  none    Complications:  none    Reason for Admission: Pain    Hospital Course:   Ofelia Aguilar is a 84 y.o. female patient who originally presented to the hospital on 2/25/2025 due to claudication and rest pain.  She underwent an elective lateral axillofemoral bypass by vascular surgery on 2/25/2025.  After surgery she was monitored closely in ICU.  She had mild anemia  from her surgery but did not require blood transfusion.  She was evaluated by cardiology due to  "tachycardia.  Initially thought to be atrial flutter but after thorough evaluation this was due to paroxysmal atrial tachycardia.  Atrial flutter was ruled out and she did not require further anticoagulation.  She was started on metoprolol 37.5 mg twice daily with good response.  Repeat was continued in place of the metoprolol.    Please see above list of diagnoses and related plan for additional information.     Condition at Discharge: good    Discharge Day Visit / Exam:   Subjective: Feels better overall except for intermittent leg pain.  She requested oxycodone as needed just to get her through the night for the next few days  Vitals: Blood Pressure: 109/58 (03/02/25 1144)  Pulse: 69 (03/02/25 1144)  Temperature: 98 °F (36.7 °C) (03/02/25 1144)  Temp Source: Temporal (03/02/25 1144)  Respirations: 18 (03/02/25 1144)  Height: 5' 4\" (162.6 cm) (03/02/25 0845)  Weight - Scale: 49.4 kg (109 lb) (03/02/25 0845)  SpO2: 98 % (03/02/25 1144)  Physical Exam  Vitals reviewed.   HENT:      Head: Normocephalic and atraumatic.      Nose: No congestion or rhinorrhea.   Eyes:      General: No scleral icterus.  Cardiovascular:      Rate and Rhythm: Normal rate and regular rhythm.   Pulmonary:      Breath sounds: No wheezing or rhonchi.   Abdominal:      Palpations: Abdomen is soft.      Tenderness: There is no abdominal tenderness. There is no guarding.   Musculoskeletal:      Right lower leg: No edema.      Left lower leg: No edema.   Skin:     General: Skin is warm and dry.   Neurological:      Motor: No weakness.      Coordination: Coordination normal.   Psychiatric:         Behavior: Behavior normal.          Discussion with Family: Updated  (daughter) via phone.    Discharge instructions/Information to patient and family:   See after visit summary for information provided to patient and family.      Provisions for Follow-Up Care:  See after visit summary for information related to follow-up care and any " pertinent home health orders.       Disposition:   Home with VNA Services (Reminder: Complete face to face encounter)    Planned Readmission: no    Discharge Medications:  See after visit summary for reconciled discharge medications provided to patient and/or family.      Administrative Statements   Discharge Statement:  I have spent a total time of >30 minutes in caring for this patient on the day of the visit/encounter. .    **Please Note: This note may have been constructed using a voice recognition system**

## 2025-03-03 ENCOUNTER — TELEPHONE (OUTPATIENT)
Dept: CARDIOLOGY CLINIC | Facility: CLINIC | Age: 85
End: 2025-03-03

## 2025-03-03 ENCOUNTER — TRANSITIONAL CARE MANAGEMENT (OUTPATIENT)
Dept: FAMILY MEDICINE CLINIC | Facility: CLINIC | Age: 85
End: 2025-03-03

## 2025-03-03 NOTE — TELEPHONE ENCOUNTER
----- Message from Jasmina VILLAGOMEZ sent at 3/3/2025 10:31 AM EST -----    ----- Message -----  From: Carol Smith MD  Sent: 3/2/2025  10:09 PM EST  To: Cardiology [facility]    Patient needs posthospital visit.  Will be following with vascular surgery and we should try to schedule it the same day as vascular surgery so that she does not have to make extra trips.  Can be accommodated into PA or my schedule.  Can be seen in 2 weeks or so.  Earlier if she is going to come for vascular surgery appointment.  Thanks.    Carlo Smith MD

## 2025-03-03 NOTE — UTILIZATION REVIEW
NOTIFICATION OF ADMISSION DISCHARGE   This is a Notification of Discharge from [facility]. Please be advised that this patient has been discharge from our facility. Below you will find the admission and discharge date and time including the patient’s disposition.   UTILIZATION REVIEW CONTACT:  Kelin Arreola MA  Utilization   Network Utilization Review Department  Phone: [phone] x carefully listen to the prompts. All voicemails are confidential.  Email: [email or web address]     ADMISSION INFORMATION  PRESENTATION DATE: 2/25/2025  7:38 AM  OBERVATION ADMISSION DATE: N/A  INPATIENT ADMISSION DATE: 2/25/25  8:21 AM   DISCHARGE DATE: 3/2/2025  2:42 PM   DISPOSITION:Home with Home Health Care    Network Utilization Review Department  ATTENTION: Please call with any questions or concerns to [phone] and carefully listen to the prompts so that you are directed to the right person. All voicemails are confidential.   For Discharge needs, contact Care Management DC Support Team at [phone] opt. 2  Send all requests for admission clinical reviews, approved or denied determinations and any other requests to dedicated fax number below belonging to the campus where the patient is receiving treatment. List of dedicated fax numbers for the Facilities:  FACILITY NAME UR FAX NUMBER   ADMISSION DENIALS (Administrative/Medical Necessity) [phone]   DISCHARGE SUPPORT TEAM ([facility]) [phone]   PARENT CHILD HEALTH (Maternity/NICU/Pediatrics) [phone]   [facility] [phone]   [facility] [phone]   [facility] [phone]   [facility] [phone]   [facility] [phone]   [facility] [phone]   [facility] [phone]   [facility]  [place] [phone]   [facility] [phone]   [facility] [phone]   [facility] [phone]   [facility] [phone]

## 2025-03-04 ENCOUNTER — TELEPHONE (OUTPATIENT)
Dept: VASCULAR SURGERY | Facility: CLINIC | Age: 85
End: 2025-03-04

## 2025-03-04 NOTE — TELEPHONE ENCOUNTER
Vascular Nurse Navigator Post Op Call    Procedure: Bilateral - BYPASS AXILLO-FEMORAL     Date of Procedure: 2/25/25    Surgeon:   * Meghan Diaz MD - Primary     * Linwood Reyes DO - Fellow    Discharge Date: 3/2/25    Discharge Disposition: Home with [facility]    Leg Weakness?: No    Leg Swelling?: No    Leg Numbness?: No    Chest Pain?: No    Shortness of Breath?: No    Orthopnea?: No    Anticoagulation pt was discharged on post op?: Aspirin and Clopidogrel (Plavix)    Statin pt was discharged on post op?:  Lipitor (atorvastatin)    Bleeding?: No    Uncontrolled Pain?: No    Incision Concerns?: No    Fever or Chills?: No      Reviewed discharge instructions and incision care with patient.      NEXT OFFICE VISIT SCHEDULED:  3/10/25 at 2 pm with SARAY Greenwood at [facility]    Transportation Confirmed?: Yes      Any further questions/concerns?  Spoke with patient's daughter Ramirez in regards to above.  She stated that patient is doing good since discharge.  She stated that she has swelling in her left foot and some discomfort, but not like it was before the surgery.  She stated that she did not hear from [facility] as of yet.  Advised her to contact them to inquire about when patient's first visit would be.  Reviewed removal of Mepilex.  She is is a little leary of removing them as she does not want to pull on the incision.  Reviewed removal technique with her.  Reviewed incision care with her - wash daily with soap and water and no powders, lotions, or ointments on incisions.  Advised her that she can place a clean dry gauze over the incisions in her groins.  Reviewed discharge medications - Aspirin and Plavix.  Offered her to send a picture of patient's incisions once she removes the Meiplex via [employer] for provider to review and she was agreeable to same and felt more comfortable for this option to ensure patient's incision look ok.  All questions answered.

## 2025-03-06 DIAGNOSIS — I10 HTN, GOAL BELOW 140/90: ICD-10-CM

## 2025-03-06 RX ORDER — AMLODIPINE BESYLATE 5 MG/1
5 TABLET ORAL DAILY
Qty: 90 TABLET | Refills: 0 | OUTPATIENT
Start: 2025-03-06 | End: 2025-06-04

## 2025-03-06 NOTE — PROGRESS NOTES
Name: Ofelia Aguilar      : 1940      MRN: [id number]  Encounter Provider: SARAY Stokes  Encounter Date: 3/10/2025   Enc[facility]: [facility]  :  Assessment & Plan  Atherosclerosis of native arteries of extremities with rest pain, bilateral legs (HCC)  85 yo female ex-smoker w/ HTN, HLD, COPD, spinal stenosis, anxiety, GERD, malnutrition, aortoiliac occlusive disease with BLE rest pain L>R s/p R ax-bifemoral bypass 2025 ([facility]) presents for postop follow-up.    -Presents without complaints other than LLE postoperative swelling.  -POD #13 Doing well postoperatively  -Left foot pain significantly improved. Dry scabbed wound L great toe  -LLE 1-2+ edema, warm, motor intact.  Sensation deficit at baseline.  -Right clavicle/chest incision CDI with surgical glue  -Right flank, right groin and left groin incision CDI with surgical glue.  -All incisions well-approximated.  No dehiscence, drainage, bleeding, hematoma or signs of infection.  -Right foot biphasic/triphasic signals  -Left DP/PT signals appreciated  -ABLA Hgb 8.5 (3/2/2025)    Plan:  -Incision care reviewed.  Wash daily soap and water, pat dry.  No soaking or submerging.  Continue to monitor for dehiscence, erythema, or signs infection  -Pain well-controlled.  Continue Tylenol as needed  -LEAD in 3 months with return office visit with Dr. Diaz  -Continue daily aspirin and Plavix  -Continue statin  -ABLA, patient has follow-up with PCP and cardiology within the next 2 weeks  -Encourage ambulation as tolerated  -Periodic leg elevation, ankle exercises and Tubigrip size E donned in office today for LLE postoperative edema  -Monitor feet for wounds or breakdown  -cc Dr Diaz    Orders:    VAS ARTERIAL DUPLEX- LOWER LIMB BILATERAL; Future        History of Present Illness     Patient is s/p ax-bifemoral bypass on 25 ([facility]) and presents today for post-op visit.     HPI  Ofelia Aguilar is a 84 y.o. female who  "presents for postop follow-up s/p R ax bifemoral bypass 2025    Patient doing well postoperatively.  Reports LLE symptoms significantly improved. No \"sharp shooting\" pain  She does have LLE postoperative swelling.  Instructions given    Pain well-controlled.  Has not used oxy in the last 3 days, Tylenol only.  She is ambulating without assistance,  No difficulty with eating or BM,  No signs of bleeding  Incisions CDI with surgical glue.  Instructions given    And statin  History obtained from: patient    Review of Systems   Constitutional: Negative.    HENT: Negative.     Eyes: Negative.    Respiratory: Negative.     Cardiovascular: Negative.    Gastrointestinal: Negative.    Endocrine: Negative.    Genitourinary: Negative.    Musculoskeletal: Negative.    Skin: Negative.    Allergic/Immunologic: Negative.    Neurological: Negative.    Hematological: Negative.    Psychiatric/Behavioral: Negative.       Medical History Reviewed by provider this encounter:  Tobacco  Allergies  Meds  Problems  Med Hx  Surg Hx  Fam Hx     .  Past Medical History   Past Medical History:   Diagnosis Date    Anxiety     Atherosclerosis of native arteries of extremities with rest pain, bilateral legs (HCC) 2024    Cigarette nicotine dependence with nicotine-induced disorder 10/29/2024    Colon polyp     Diverticulitis     GERD (gastroesophageal reflux disease)     Hyperlipidemia     Hypertension      Past Surgical History:   Procedure Laterality Date    APPENDECTOMY       SECTION      COLONOSCOPY      FEMORAL BYPASS Right     2017    PERIPHERAL ANGIOGRAM      IN BYPASS W/VEIN AXILLARY-FEMORAL Bilateral 2025    Procedure: BYPASS AXILLO-FEMORAL;  Surgeon: Meghan Diaz MD;  Location: [facility] OR;  Service: Vascular     History reviewed. No pertinent family history.   reports that she has quit smoking. Her smoking use included cigarettes. She has a 20 pack-year smoking history. She has never been exposed to " tobacco smoke. She has never used smokeless tobacco. She reports that she does not currently use drugs. She reports that she does not drink alcohol.  Current Outpatient Medications   Medication Instructions    acetaminophen (TYLENOL) 650 mg, Oral, Every 8 hours PRN    aspirin 81 mg chewable tablet 1 tablet, Oral, Daily    atorvastatin (LIPITOR) 40 mg, Oral, Daily    clopidogrel (PLAVIX) 75 mg, Oral, Daily    hydrOXYzine HCL (ATARAX) 10 mg, Oral, Daily at bedtime    Metoprolol Tartrate 37.5 mg, Oral, 2 times daily    mirtazapine (REMERON) 15 mg, Oral, Daily at bedtime    Multiple Vitamin (multivitamin) tablet 1 tablet, Oral, Daily    ondansetron (ZOFRAN) 4 mg, Oral, 3 times daily PRN    oxyCODONE (ROXICODONE) 5 mg, Oral, Daily PRN    pregabalin (LYRICA) 25 mg capsule Take 1 capsule (25 mg total) by mouth daily AND 2 capsules (50 mg total) daily at bedtime.    sodium chloride 2 g, Oral, 3 times daily with meals     Allergies   Allergen Reactions    Penicillins Anaphylaxis     PO keflex in past without reaction (but did not work for UTI)    Aspirin GI Intolerance      Current Outpatient Medications on File Prior to Visit   Medication Sig Dispense Refill    acetaminophen (TYLENOL) 650 mg CR tablet Take 1 tablet (650 mg total) by mouth every 8 (eight) hours as needed for mild pain 30 tablet 0    aspirin 81 mg chewable tablet Chew 1 tablet daily      atorvastatin (LIPITOR) 40 mg tablet Take 1 tablet (40 mg total) by mouth daily 90 tablet 0    clopidogrel (PLAVIX) 75 mg tablet TAKE 1 TABLET BY MOUTH EVERY DAY 90 tablet 1    metoprolol tartrate 37.5 MG TABS Take 1 tablet (37.5 mg total) by mouth 2 (two) times a day 60 tablet 1    mirtazapine (REMERON) 15 mg tablet TAKE 1 TABLET BY MOUTH DAILY AT BEDTIME 90 tablet 1    Multiple Vitamin (multivitamin) tablet Take 1 tablet by mouth daily      ondansetron (ZOFRAN) 4 mg tablet Take 1 tablet (4 mg total) by mouth 3 (three) times a day as needed for nausea or vomiting 20 tablet 0  "   oxyCODONE (ROXICODONE) 5 immediate release tablet Take 1 tablet (5 mg total) by mouth daily as needed for severe pain for up to 10 days Max Daily Amount: 5 mg 7 tablet 0    pregabalin (LYRICA) 25 mg capsule Take 1 capsule (25 mg total) by mouth daily AND 2 capsules (50 mg total) daily at bedtime. 90 capsule 0    sodium chloride 1 g tablet Take 2 tablets (2 g total) by mouth 3 (three) times a day with meals 180 tablet 5    hydrOXYzine HCL (ATARAX) 10 mg tablet TAKE 1 TABLET BY MOUTH DAILY AT BEDTIME (Patient not taking: Reported on 3/10/2025) 30 tablet 5     No current facility-administered medications on file prior to visit.      Social History     Tobacco Use    Smoking status: Former     Current packs/day: 1.00     Average packs/day: 1 pack/day for 20.0 years (20.0 ttl pk-yrs)     Types: Cigarettes     Passive exposure: Never    Smokeless tobacco: Never   Vaping Use    Vaping status: Never Used   Substance and Sexual Activity    Alcohol use: Never    Drug use: Not Currently    Sexual activity: Not on file        Objective   /78 (BP Location: Left arm, Patient Position: Sitting)   Pulse 60   Ht 5' 4\" (1.626 m)   Wt 51.7 kg (114 lb)   LMP  (LMP Unknown)   BMI 19.57 kg/m²      Physical Exam  Vitals reviewed.   Constitutional:       General: She is not in acute distress.  Cardiovascular:      Rate and Rhythm: Normal rate.      Pulses:           Dorsalis pedis pulses are detected w/ Doppler on the right side and detected w/ Doppler on the left side.        Posterior tibial pulses are detected w/ Doppler on the right side and detected w/ Doppler on the left side.      Comments: Biphasic signals on right  Pulmonary:      Effort: Pulmonary effort is normal. No respiratory distress.   Musculoskeletal:      Left lower le+ Edema present.   Skin:     General: Skin is warm and dry.      Capillary Refill: Capillary refill takes less than 2 seconds.      Comments: Superficial scabbed area left great " toe    Right clavicle, right flank, right and left groin CDI with surgical glue.  No dehiscence, drainage, bleeding, erythema, or signs infection.   Neurological:      General: No focal deficit present.      Mental Status: She is alert and oriented to person, place, and time.      Cranial Nerves: No cranial nerve deficit.      Sensory: Sensory deficit present.      Motor: No weakness.   Psychiatric:         Behavior: Behavior normal.             Right    Left    Left  Administrative Statements   I have spent a total time of 20 minutes in caring for this patient on the day of the visit/encounter including Prognosis, Instructions for management, Patient and family education, Importance of tx compliance, Impressions, Documenting in the medical record, Reviewing/placing orders in the medical record (including tests, medications, and/or procedures), and Obtaining or reviewing history  .

## 2025-03-10 ENCOUNTER — OFFICE VISIT (OUTPATIENT)
Dept: VASCULAR SURGERY | Facility: CLINIC | Age: 85
End: 2025-03-10

## 2025-03-10 VITALS
HEIGHT: 64 IN | DIASTOLIC BLOOD PRESSURE: 78 MMHG | WEIGHT: 114 LBS | HEART RATE: 60 BPM | BODY MASS INDEX: 19.46 KG/M2 | SYSTOLIC BLOOD PRESSURE: 138 MMHG

## 2025-03-10 DIAGNOSIS — I70.223 ATHEROSCLEROSIS OF NATIVE ARTERIES OF EXTREMITIES WITH REST PAIN, BILATERAL LEGS (HCC): Primary | ICD-10-CM

## 2025-03-10 PROCEDURE — 99024 POSTOP FOLLOW-UP VISIT: CPT | Performed by: NURSE PRACTITIONER

## 2025-03-10 NOTE — ASSESSMENT & PLAN NOTE
83 yo female ex-smoker w/ HTN, HLD, COPD, spinal stenosis, anxiety, GERD, malnutrition, aortoiliac occlusive disease with BLE rest pain L>R s/p R ax-bifemoral bypass 2/25/2025 ([facility]) presents for postop follow-up.    -Presents without complaints other than LLE postoperative swelling.  -POD #13 Doing well postoperatively  -Left foot pain significantly improved. Dry scabbed wound L great toe  -LLE 1-2+ edema, warm, motor intact.  Sensation deficit at baseline.  -Right clavicle/chest incision CDI with surgical glue  -Right flank, right groin and left groin incision CDI with surgical glue.  -All incisions well-approximated.  No dehiscence, drainage, bleeding, hematoma or signs of infection.  -Right foot biphasic/triphasic signals  -Left DP/PT signals appreciated  -ABLA Hgb 8.5 (3/2/2025)    Plan:  -Incision care reviewed.  Wash daily soap and water, pat dry.  No soaking or submerging.  Continue to monitor for dehiscence, erythema, or signs infection  -Pain well-controlled.  Continue Tylenol as needed  -LEAD in 3 months with return office visit with Dr. Diaz  -Continue daily aspirin and Plavix  -Continue statin  -ABLA, patient has follow-up with PCP and cardiology within the next 2 weeks  -Encourage ambulation as tolerated  -Periodic leg elevation, ankle exercises and Tubigrip size E donned in office today for LLE postoperative edema  -Monitor feet for wounds or breakdown  -cc Dr Diaz    Orders:    VAS ARTERIAL DUPLEX- LOWER LIMB BILATERAL; Future

## 2025-03-10 NOTE — PATIENT INSTRUCTIONS
-continue postoperative incisional care.  Clean incisions daily with soap and water.  No lotions, ointments or creams to incision.  No soaking or submerging incision x4 weeks.  No swimming x4 weeks.   Surgical glue will start to fall off naturally  -elevate leg for symptomatic relief of postoperative swelling, ankle exercises as discussed, ambulation as tolerated  -continue aspirin, plavix and statin therapy  -we will schedule routine lower extremity ultrasound per postoperative protocol in 3 months.  - Return to office with Dr Diaz after imaging in 3 months   -please contact the office with new symptoms, concerns, changes in incisions, or leg pain

## 2025-03-10 NOTE — LETTER
March 10, 2025     eMghan Diaz MD  [address] [facility]  [address] [place] [address]    Patient: Ofelia Aguilar   YOB: 1940   Date of Visit: 3/10/2025       Dear Dr. Diaz:    Thank you for referring Ofelia Aguilar to me for evaluation. Below are my notes for this consultation.    If you have questions, please do not hesitate to call me. I look forward to following your patient along with you.         Sincerely,        SARAY Stokes        CC: No Recipients    SARAY Stokes  3/10/2025  2:44 PM  Cosign Needed  Name: Ofelia Aguilar      : 1940      MRN: [id number]  Encounter Provider: SARAY Stokes  Encounter Date: 3/10/2025   Encounter department: [facility] [place]  :  Assessment & Plan  Atherosclerosis of native arteries of extremities with rest pain, bilateral legs (HCC)  83 yo female ex-smoker w/ HTN, HLD, COPD, spinal stenosis, anxiety, GERD, malnutrition, aortoiliac occlusive disease with BLE rest pain L>R s/p R ax-bifemoral bypass 2025 ([facility]) presents for postop follow-up.    -Presents without complaints other than LLE postoperative swelling.  -POD #13 Doing well postoperatively  -Left foot pain significantly improved. Dry scabbed wound L great toe  -LLE 1-2+ edema, warm, motor intact.  Sensation deficit at baseline.  -Right clavicle/chest incision CDI with surgical glue  -Right flank, right groin and left groin incision CDI with surgical glue.  -All incisions well-approximated.  No dehiscence, drainage, bleeding, hematoma or signs of infection.  -Right foot biphasic/triphasic signals  -Left DP/PT signals appreciated  -ABLA Hgb 8.5 (3/2/2025)    Plan:  -Incision care reviewed.  Wash daily soap and water, pat dry.  No soaking or submerging.  Continue to monitor for dehiscence, erythema, or signs infection  -Pain well-controlled.  Continue Tylenol as needed  -LEAD in 3 months with return office visit with Dr. Diaz  -Continue daily aspirin and  "Plavix  -Continue statin  -ABLA, patient has follow-up with PCP and cardiology within the next 2 weeks  -Encourage ambulation as tolerated  -Periodic leg elevation, ankle exercises and Tubigrip size E donned in office today for LLE postoperative edema  -Monitor feet for wounds or breakdown  -cc Dr Diaz    Orders:  •  VAS ARTERIAL DUPLEX- LOWER LIMB BILATERAL; Future        History of Present Illness    Patient is s/p ax-bifemoral bypass on 2/25/25 ([facility]) and presents today for post-op visit.     HPI  Ofelia Aguilar is a 84 y.o. female who presents for postop follow-up s/p R ax bifemoral bypass 2/25/2025    Patient doing well postoperatively.  Reports LLE symptoms significantly improved. No \"sharp shooting\" pain  She does have LLE postoperative swelling.  Instructions given    Pain well-controlled.  Has not used oxy in the last 3 days, Tylenol only.  She is ambulating without assistance,  No difficulty with eating or BM,  No signs of bleeding  Incisions CDI with surgical glue.  Instructions given    And statin  History obtained from: patient    Review of Systems   Constitutional: Negative.    HENT: Negative.     Eyes: Negative.    Respiratory: Negative.     Cardiovascular: Negative.    Gastrointestinal: Negative.    Endocrine: Negative.    Genitourinary: Negative.    Musculoskeletal: Negative.    Skin: Negative.    Allergic/Immunologic: Negative.    Neurological: Negative.    Hematological: Negative.    Psychiatric/Behavioral: Negative.       Medical History Reviewed by provider this encounter:  Tobacco  Allergies  Meds  Problems  Med Hx  Surg Hx  Fam Hx     .  Past Medical History  Past Medical History:   Diagnosis Date   • Anxiety    • Atherosclerosis of native arteries of extremities with rest pain, bilateral legs (HCC) 08/25/2024   • Cigarette nicotine dependence with nicotine-induced disorder 10/29/2024   • Colon polyp    • Diverticulitis    • GERD (gastroesophageal reflux disease)    • Hyperlipidemia    • " Hypertension      Past Surgical History:   Procedure Laterality Date   • APPENDECTOMY     •  SECTION     • COLONOSCOPY     • FEMORAL BYPASS Right     2017   • PERIPHERAL ANGIOGRAM     • [place] BYPASS W/VEIN AXILLARY-FEMORAL Bilateral 2025    Procedure: BYPASS AXILLO-FEMORAL;  Surgeon: Meghan Diaz MD;  Location: [facility];  Service: Vascular     History reviewed. No pertinent family history.   reports that she has quit smoking. Her smoking use included cigarettes. She has a 20 pack-year smoking history. She has never been exposed to tobacco smoke. She has never used smokeless tobacco. She reports that she does not currently use drugs. She reports that she does not drink alcohol.  Current Outpatient Medications   Medication Instructions   • acetaminophen (TYLENOL) 650 mg, Oral, Every 8 hours PRN   • aspirin 81 mg chewable tablet 1 tablet, Oral, Daily   • atorvastatin (LIPITOR) 40 mg, Oral, Daily   • clopidogrel (PLAVIX) 75 mg, Oral, Daily   • hydrOXYzine HCL (ATARAX) 10 mg, Oral, Daily at bedtime   • Metoprolol Tartrate 37.5 mg, Oral, 2 times daily   • mirtazapine (REMERON) 15 mg, Oral, Daily at bedtime   • Multiple Vitamin (multivitamin) tablet 1 tablet, Oral, Daily   • ondansetron (ZOFRAN) 4 mg, Oral, 3 times daily PRN   • oxyCODONE (ROXICODONE) 5 mg, Oral, Daily PRN   • pregabalin (LYRICA) 25 mg capsule Take 1 capsule (25 mg total) by mouth daily AND 2 capsules (50 mg total) daily at bedtime.   • sodium chloride 2 g, Oral, 3 times daily with meals     Allergies   Allergen Reactions   • Penicillins Anaphylaxis     PO keflex in past without reaction (but did not work for UTI)   • Aspirin GI Intolerance      Current Outpatient Medications on File Prior to Visit   Medication Sig Dispense Refill   • acetaminophen (TYLENOL) 650 mg CR tablet Take 1 tablet (650 mg total) by mouth every 8 (eight) hours as needed for mild pain 30 tablet 0   • aspirin 81 mg chewable tablet Chew 1 tablet daily     •  "atorvastatin (LIPITOR) 40 mg tablet Take 1 tablet (40 mg total) by mouth daily 90 tablet 0   • clopidogrel (PLAVIX) 75 mg tablet TAKE 1 TABLET BY MOUTH EVERY DAY 90 tablet 1   • metoprolol tartrate 37.5 MG TABS Take 1 tablet (37.5 mg total) by mouth 2 (two) times a day 60 tablet 1   • mirtazapine (REMERON) 15 mg tablet TAKE 1 TABLET BY MOUTH DAILY AT BEDTIME 90 tablet 1   • Multiple Vitamin (multivitamin) tablet Take 1 tablet by mouth daily     • ondansetron (ZOFRAN) 4 mg tablet Take 1 tablet (4 mg total) by mouth 3 (three) times a day as needed for nausea or vomiting 20 tablet 0   • oxyCODONE (ROXICODONE) 5 immediate release tablet Take 1 tablet (5 mg total) by mouth daily as needed for severe pain for up to 10 days Max Daily Amount: 5 mg 7 tablet 0   • pregabalin (LYRICA) 25 mg capsule Take 1 capsule (25 mg total) by mouth daily AND 2 capsules (50 mg total) daily at bedtime. 90 capsule 0   • sodium chloride 1 g tablet Take 2 tablets (2 g total) by mouth 3 (three) times a day with meals 180 tablet 5   • hydrOXYzine HCL (ATARAX) 10 mg tablet TAKE 1 TABLET BY MOUTH DAILY AT BEDTIME (Patient not taking: Reported on 3/10/2025) 30 tablet 5     No current facility-administered medications on file prior to visit.      Social History     Tobacco Use   • Smoking status: Former     Current packs/day: 1.00     Average packs/day: 1 pack/day for 20.0 years (20.0 ttl pk-yrs)     Types: Cigarettes     Passive exposure: Never   • Smokeless tobacco: Never   Vaping Use   • Vaping status: Never Used   Substance and Sexual Activity   • Alcohol use: Never   • Drug use: Not Currently   • Sexual activity: Not on file        Objective  /78 (BP Location: Left arm, Patient Position: Sitting)   Pulse 60   Ht 5' 4\" (1.626 m)   Wt 51.7 kg (114 lb)   LMP  (LMP Unknown)   BMI 19.57 kg/m²      Physical Exam  Vitals reviewed.   Constitutional:       General: She is not in acute distress.  Cardiovascular:      Rate and Rhythm: Normal " rate.      Pulses:           Dorsalis pedis pulses are detected w/ Doppler on the right side and detected w/ Doppler on the left side.        Posterior tibial pulses are detected w/ Doppler on the right side and detected w/ Doppler on the left side.      Comments: Biphasic signals on right  Pulmonary:      Effort: Pulmonary effort is normal. No respiratory distress.   Musculoskeletal:      Left lower le+ Edema present.   Skin:     General: Skin is warm and dry.      Capillary Refill: Capillary refill takes less than 2 seconds.      Comments: Superficial scabbed area left great toe    Right clavicle, right flank, right and left groin CDI with surgical glue.  No dehiscence, drainage, bleeding, erythema, or signs infection.   Neurological:      General: No focal deficit present.      Mental Status: She is alert and oriented to person, place, and time.      Cranial Nerves: No cranial nerve deficit.      Sensory: Sensory deficit present.      Motor: No weakness.   Psychiatric:         Behavior: Behavior normal.             Right    Left    Left  Administrative Statements  I have spent a total time of 20 minutes in caring for this patient on the day of the visit/encounter including Prognosis, Instructions for management, Patient and family education, Importance of tx compliance, Impressions, Documenting in the medical record, Reviewing/placing orders in the medical record (including tests, medications, and/or procedures), and Obtaining or reviewing history  .

## 2025-03-18 ENCOUNTER — OFFICE VISIT (OUTPATIENT)
Dept: CARDIOLOGY CLINIC | Facility: CLINIC | Age: 85
End: 2025-03-18
Payer: COMMERCIAL

## 2025-03-18 VITALS
OXYGEN SATURATION: 98 % | SYSTOLIC BLOOD PRESSURE: 148 MMHG | HEART RATE: 61 BPM | RESPIRATION RATE: 16 BRPM | HEIGHT: 64 IN | DIASTOLIC BLOOD PRESSURE: 68 MMHG | BODY MASS INDEX: 18.44 KG/M2 | WEIGHT: 108 LBS

## 2025-03-18 DIAGNOSIS — I47.19 ATRIAL TACHYCARDIA (HCC): Primary | ICD-10-CM

## 2025-03-18 DIAGNOSIS — I10 ESSENTIAL HYPERTENSION: ICD-10-CM

## 2025-03-18 DIAGNOSIS — E78.2 MIXED HYPERLIPIDEMIA: ICD-10-CM

## 2025-03-18 DIAGNOSIS — I73.9 PAD (PERIPHERAL ARTERY DISEASE) (HCC): ICD-10-CM

## 2025-03-18 PROCEDURE — 99214 OFFICE O/P EST MOD 30 MIN: CPT

## 2025-03-18 NOTE — ASSESSMENT & PLAN NOTE
BP is mildly elevated today, however predominantly well-controlled.  Continue metoprolol tartrate.  Encourage ambulatory monitoring.  Advised to notify our office for abnormal BP readings.

## 2025-03-18 NOTE — ASSESSMENT & PLAN NOTE
HR is well-controlled, continue metoprolol tartrate.  Denies palpations.  Advised to notify our office for any new/worsening symptoms.

## 2025-03-18 NOTE — PROGRESS NOTES
[facility] Cardiology   Office Visit    Ofelia Aguilar 84 y.o. female MRN: [id number]    03/18/25      Assessment & Plan  Atrial tachycardia (HCC)  HR is well-controlled, continue metoprolol tartrate.  Denies palpations.  Advised to notify our office for any new/worsening symptoms.  Essential hypertension  BP is mildly elevated today, however predominantly well-controlled.  Continue metoprolol tartrate.  Encourage ambulatory monitoring.  Advised to notify our office for abnormal BP readings.  Mixed hyperlipidemia  Continue Lipitor, dietary control, and periodic surveillance.  PAD s/p R ax-bifemoral bypass (2/25/2025); H/O femorofemoral bypass (2015)  Follows with vascular surgery.  On ASA, Plavix, and Lipitor.        Interval history: Ofelia Aguilar is a very pleasant 84 y.o. year old female with history of atrial tachycardia, hypertension, hyperlipidemia, PAD s/p intervention, COPD, SIADH, and anemia who presents for office visit with daughter.  Patient was evaluated by cardiology for concern of atrial flutter during admission for axillofemoral bypass.  Cardiology determined this was actually paroxysmal atrial tachycardia upon review of telemetry.  Patient states she has been well overall from a cardiac standpoint since time of discharge.  She has been gradually increasing her exercise tolerance and denies associated chest pain/anginal complaint.  Endorses strict compliance to all medications.  Denies adverse effects to current medications.  Family history significant for siblings with heart disease, however exact details unknown.  Former history of tobacco use noted.  Patient follows closely with vascular surgery.      Review of Systems:  Review of Systems   Constitutional:  Negative for chills and fever.   HENT:  Negative for ear pain and sore throat.    Eyes:  Negative for pain and visual disturbance.   Respiratory:  Negative for cough and shortness of breath.    Cardiovascular:  Negative for chest pain, palpitations  "and leg swelling.   Gastrointestinal:  Negative for abdominal pain and vomiting.   Genitourinary:  Negative for dysuria and hematuria.   Musculoskeletal:  Negative for arthralgias and back pain.   Skin:  Negative for color change and rash.   Neurological:  Negative for seizures and syncope.   All other systems reviewed and are negative.      PHYSICAL EXAM:  Vitals:   Vitals:    03/18/25 1543   BP: 148/68   BP Location: Left arm   Patient Position: Sitting   Cuff Size: Standard   Pulse: 61   Resp: 16   SpO2: 98%   Weight: 49 kg (108 lb)   Height: 5' 4\" (1.626 m)        Physical Exam:  GEN: Alert and oriented x 3, in no acute distress.  Well appearing and well nourished.   HEENT: Sclera anicteric, conjunctivae pink, mucous membranes moist. Oropharynx clear.   NECK: Supple, no carotid bruits, no significant JVD. Trachea midline, no thyromegaly.   HEART: Regular rhythm, normal S1 and S2, no murmurs, clicks, gallops or rubs. PMI nondisplaced, no thrills.   LUNGS: Clear to auscultation bilaterally; no wheezes, rales, or rhonchi. No increased work of breathing or signs of respiratory distress.   ABDOMEN: Soft, nontender, nondistended, normoactive bowel sounds.   EXTREMITIES: Skin warm and well perfused, no clubbing or cyanosis, no edema.  NEURO: No focal findings. Normal speech. Mood and affect normal.   SKIN: Normal without suspicious lesions on exposed skin.    Follow up: 4 months or sooner as needed    Allergies   Allergen Reactions    Penicillins Anaphylaxis     PO keflex in past without reaction (but did not work for UTI)    Aspirin GI Intolerance         Current Outpatient Medications:     acetaminophen (TYLENOL) 650 mg CR tablet, Take 1 tablet (650 mg total) by mouth every 8 (eight) hours as needed for mild pain, Disp: 30 tablet, Rfl: 0    aspirin 81 mg chewable tablet, Chew 1 tablet daily, Disp: , Rfl:     atorvastatin (LIPITOR) 40 mg tablet, Take 1 tablet (40 mg total) by mouth daily, Disp: 90 tablet, Rfl: 0    " clopidogrel (PLAVIX) 75 mg tablet, TAKE 1 TABLET BY MOUTH EVERY DAY, Disp: 90 tablet, Rfl: 1    metoprolol tartrate 37.5 MG TABS, Take 1 tablet (37.5 mg total) by mouth 2 (two) times a day, Disp: 60 tablet, Rfl: 1    mirtazapine (REMERON) 15 mg tablet, TAKE 1 TABLET BY MOUTH DAILY AT BEDTIME, Disp: 90 tablet, Rfl: 1    Multiple Vitamin (multivitamin) tablet, Take 1 tablet by mouth daily, Disp: , Rfl:     ondansetron (ZOFRAN) 4 mg tablet, Take 1 tablet (4 mg total) by mouth 3 (three) times a day as needed for nausea or vomiting, Disp: 20 tablet, Rfl: 0    pregabalin (LYRICA) 25 mg capsule, Take 1 capsule (25 mg total) by mouth daily AND 2 capsules (50 mg total) daily at bedtime., Disp: 90 capsule, Rfl: 0    sodium chloride 1 g tablet, Take 2 tablets (2 g total) by mouth 3 (three) times a day with meals, Disp: 180 tablet, Rfl: 5    hydrOXYzine HCL (ATARAX) 10 mg tablet, TAKE 1 TABLET BY MOUTH DAILY AT BEDTIME (Patient not taking: Reported on 3/10/2025), Disp: 30 tablet, Rfl: 5    Past Medical History:   Diagnosis Date    Anxiety     Atherosclerosis of native arteries of extremities with rest pain, bilateral legs (HCC) 2024    Cigarette nicotine dependence with nicotine-induced disorder 10/29/2024    Colon polyp     Diverticulitis     GERD (gastroesophageal reflux disease)     Hyperlipidemia     Hypertension        History reviewed. No pertinent family history.    Past Medical History:   Diagnosis Date    Anxiety     Atherosclerosis of native arteries of extremities with rest pain, bilateral legs (HCC) 2024    Cigarette nicotine dependence with nicotine-induced disorder 10/29/2024    Colon polyp     Diverticulitis     GERD (gastroesophageal reflux disease)     Hyperlipidemia     Hypertension        Past Surgical History:   Procedure Laterality Date    APPENDECTOMY       SECTION      COLONOSCOPY      FEMORAL BYPASS Right         PERIPHERAL ANGIOGRAM      CO BYPASS W/VEIN AXILLARY-FEMORAL  Bilateral 2/25/2025    Procedure: BYPASS AXILLO-FEMORAL;  Surgeon: Meghan Diaz MD;  Location: [facility];  Service: Vascular       Social History     Socioeconomic History    Marital status:      Spouse name: Not on file    Number of children: Not on file    Years of education: Not on file    Highest education level: Not on file   Occupational History    Not on file   Tobacco Use    Smoking status: Former     Current packs/day: 1.00     Average packs/day: 1 pack/day for 20.0 years (20.0 ttl pk-yrs)     Types: Cigarettes     Passive exposure: Never    Smokeless tobacco: Never   Vaping Use    Vaping status: Never Used   Substance and Sexual Activity    Alcohol use: Never    Drug use: Not Currently    Sexual activity: Not on file   Other Topics Concern    Not on file   Social History Narrative    Not on file     Social Drivers of Health     Financial Resource Strain: Not on file   Food Insecurity: No Food Insecurity (2/26/2025)    Hunger Vital Sign     Worried About Running Out of Food in the Last Year: Never true     Ran Out of Food in the Last Year: Never true   Transportation Needs: No Transportation Needs (2/26/2025)    PRAPARE - Transportation     Lack of Transportation (Medical): No     Lack of Transportation (Non-Medical): No   Physical Activity: Inactive (8/7/2023)    Received from [employer]    Physical Activity     (Care [facility]) On average, how many days per week do you engage in moderate to strenuous exercise (like a brisk walk)? (RETIRED): 0 days     (Care [employer]) On average, how many minutes do you engage in exercise at this level? (RETIRED): 0 min   Stress: Not on file   Social Connections: Unknown (6/18/2024)    Received from [employer]    Social Connections     How often do you feel lonely or isolated from those around you? (Adult - for ages 18 years and over): Not on file   Intimate Partner Violence: Unknown (2/25/2025)    Nursing IPS     Feels Physically and Emotionally Safe: Not on file     " Physically Hurt by Someone: Not on file     Humiliated or Emotionally Abused by Someone: Not on file     Physically Hurt by Someone: No     Hurt or Threatened by Someone: No   Housing Stability: Low Risk  (2/26/2025)    Housing Stability Vital Sign     Unable to Pay for Housing in the Last Year: No     Number of Times Moved in the Last Year: 0     Homeless in the Last Year: No         LABORATORY RESULTS:    Lab Results   Component Value Date    WBC 6.96 03/02/2025    HGB 8.5 (L) 03/02/2025    HCT 25.8 (L) 03/02/2025    MCV 94 03/02/2025     03/02/2025     Lab Results   Component Value Date    CALCIUM 7.9 (L) 03/02/2025    K 3.7 03/02/2025    CO2 22 03/02/2025     (H) 03/02/2025    BUN 17 03/02/2025    CREATININE 0.72 03/02/2025     No results found for: \"HGBA1C\"    Lipid Profile:   No results found for: \"CHOL\"  Lab Results   Component Value Date    HDL 54 10/26/2024     Lab Results   Component Value Date    LDLCALC 89 10/26/2024     Lab Results   Component Value Date    TRIG 169 (H) 10/26/2024       The ASCVD Risk score (Dandy DK, et al., 2019) failed to calculate for the following reasons:    The 2019 ASCVD risk score is only valid for ages 40 to 79    1. Atrial tachycardia (HCC)        2. Essential hypertension        3. Mixed hyperlipidemia        4. PAD s/p R ax-bifemoral bypass (2/25/2025); H/O femorofemoral bypass (2015)            Imaging: I have reviewed all pertinent imaging studies.    Recommend aggressive risk factor modification and therapeutic lifestyle changes.  Low-salt, low-calorie, low-fat, low-cholesterol diet with regular exercise and to optimize weight.    Discussed concepts of atherosclerosis, including signs and symptoms of cardiac disease.    Medications reviewed and possible side effects discussed.  Previous studies were reviewed.    Safety measures were reviewed.  All questions and concerns addressed.  Patient was advised to report any problems requiring medical attention.  "   Follow-up with PCP and appropriate specialist and lab work as discussed.    Return for follow up visit as scheduled or earlier, if needed.  Thank you for allowing me to participate in the care and evaluation of your patient.  Should you have any questions, please feel free to contact me.    Thiago Lai PA-C  3/18/2025,4:49 PM

## 2025-03-20 ENCOUNTER — TELEPHONE (OUTPATIENT)
Age: 85
End: 2025-03-20

## 2025-03-20 NOTE — TELEPHONE ENCOUNTER
Not able to get a hold of patient so he might not be able to see her today. Please call Sridhar with any questions.

## 2025-04-02 ENCOUNTER — TELEPHONE (OUTPATIENT)
Dept: GASTROENTEROLOGY | Facility: MEDICAL CENTER | Age: 85
End: 2025-04-02

## 2025-04-02 NOTE — TELEPHONE ENCOUNTER
Patients GI provider:  Dr. Loo    Number to return call: [phone]    Reason for call: Saniya from [facility] called in regard to pt being scheduled for a colonoscopy on 4/4/2025. Saniya can be reached at the above number.    Scheduled procedure/appointment date if applicable: N/A

## 2025-04-02 NOTE — TELEPHONE ENCOUNTER
Called patient to reschedule follow up from 02/25/2025, but message says wireless caller your trying to call is unavailable, try again later

## 2025-04-02 NOTE — TELEPHONE ENCOUNTER
Spoke with Saniya.  Most recent documentation was from 01/17/25 that a recall letter for a colonoscopy was mailed to pt.  Will forward message to Silas  who sent letter to see if she knows anything.  Colonoscopy ordered and is sitting in the GI depot with a procedure date of 04/04/25.

## 2025-04-08 DIAGNOSIS — E78.5 HYPERLIPIDEMIA, UNSPECIFIED HYPERLIPIDEMIA TYPE: ICD-10-CM

## 2025-04-09 RX ORDER — ATORVASTATIN CALCIUM 40 MG/1
40 TABLET, FILM COATED ORAL DAILY
Qty: 90 TABLET | Refills: 1 | Status: SHIPPED | OUTPATIENT
Start: 2025-04-09

## 2025-04-21 DIAGNOSIS — M79.605 PAIN IN BOTH LOWER EXTREMITIES: ICD-10-CM

## 2025-04-21 DIAGNOSIS — M48.061 SPINAL STENOSIS OF LUMBAR REGION, UNSPECIFIED WHETHER NEUROGENIC CLAUDICATION PRESENT: ICD-10-CM

## 2025-04-21 DIAGNOSIS — M79.604 PAIN IN BOTH LOWER EXTREMITIES: ICD-10-CM

## 2025-04-22 NOTE — TELEPHONE ENCOUNTER
Patients daughter called Rx refill line and     Lyrica 25mg 1 tab in am and 25 at bedtime   No side effects   Med working well       Patient called to request a refill for their Lyrica advised a refill was requested on 4/21/25  and is pending approval. Patient verbalized understanding and is in agreement.     Does the patient have enough for 3 days?   [] Yes   [x] No - Send as HP to POD

## 2025-04-24 RX ORDER — PREGABALIN 25 MG/1
25 CAPSULE ORAL 2 TIMES DAILY
Qty: 60 CAPSULE | Refills: 0 | Status: SHIPPED | OUTPATIENT
Start: 2025-04-24

## 2025-04-24 NOTE — TELEPHONE ENCOUNTER
S/w pt's daughter, she is requesting a RF of Lyrica 25 mg BID. Pt doing well on medication and no s/e. Can you send a 30 day supply to [facility] on file? RN made pt a f/u for further refills with MG on 5/15.

## 2025-04-29 DIAGNOSIS — I47.19 ATRIAL TACHYCARDIA (HCC): ICD-10-CM

## 2025-04-29 RX ORDER — METOPROLOL TARTRATE 37.5 MG/1
37.5 TABLET ORAL 2 TIMES DAILY
Qty: 60 TABLET | Refills: 1 | Status: SHIPPED | OUTPATIENT
Start: 2025-04-29

## 2025-04-29 NOTE — TELEPHONE ENCOUNTER
Medication: Metoprolol     Dose/Frequency: 37.5mg BID     Quantity: 60    Pharmacy: [facility]    Office:   [] PCP/Provider -   [x] Speciality/Provider - Eamon Sims MD     Does the patient have enough for 3 days?   [] Yes   [x] No - Send as HP to POD

## 2025-05-04 DIAGNOSIS — I70.223 ATHEROSCLEROSIS OF NATIVE ARTERIES OF EXTREMITIES WITH REST PAIN, BILATERAL LEGS (HCC): ICD-10-CM

## 2025-05-05 DIAGNOSIS — E87.1 HYPONATREMIA: ICD-10-CM

## 2025-05-05 RX ORDER — CLOPIDOGREL BISULFATE 75 MG/1
75 TABLET ORAL DAILY
Qty: 90 TABLET | Refills: 0 | Status: SHIPPED | OUTPATIENT
Start: 2025-05-05

## 2025-05-06 DIAGNOSIS — E87.1 HYPONATREMIA: Primary | ICD-10-CM

## 2025-05-06 RX ORDER — SODIUM CHLORIDE 1 G/1
2 TABLET ORAL
Qty: 180 TABLET | Refills: 0 | Status: SHIPPED | OUTPATIENT
Start: 2025-05-06 | End: 2025-06-05

## 2025-05-06 NOTE — TELEPHONE ENCOUNTER
"RN was made aware that patient is consuming fluids regardless of being NPO status post barium swallow. This RN educated patient on being NPO due to provider's orders and patient stated, \"I'm not eating but I have to drink fluids, and the doctor is aware\". RN notified Dr. Abdulaziz Ring. Provider stated they have made patient aware to be NPO.   " Patient's daughter Tena called back to the medication refill line, advised of prior note that patient needs updated BMP.   Tena advised patient is taking the Sodium Chloride three times daily and will schedule blood work appointment as soon as possible.  Asked if a prescription could be sent in even if it is for 14 days until the results are back from the blood work.

## 2025-05-15 ENCOUNTER — OFFICE VISIT (OUTPATIENT)
Dept: PAIN MEDICINE | Facility: CLINIC | Age: 85
End: 2025-05-15
Payer: COMMERCIAL

## 2025-05-15 VITALS — HEIGHT: 64 IN | WEIGHT: 108 LBS | BODY MASS INDEX: 18.44 KG/M2

## 2025-05-15 DIAGNOSIS — M48.061 SPINAL STENOSIS OF LUMBAR REGION, UNSPECIFIED WHETHER NEUROGENIC CLAUDICATION PRESENT: ICD-10-CM

## 2025-05-15 DIAGNOSIS — I73.9 PAD (PERIPHERAL ARTERY DISEASE) (HCC): ICD-10-CM

## 2025-05-15 DIAGNOSIS — G62.9 NEUROPATHY: Primary | ICD-10-CM

## 2025-05-15 PROCEDURE — 99214 OFFICE O/P EST MOD 30 MIN: CPT

## 2025-05-15 RX ORDER — PREGABALIN 25 MG/1
25 CAPSULE ORAL 2 TIMES DAILY
Qty: 180 CAPSULE | Refills: 0 | Status: SHIPPED | OUTPATIENT
Start: 2025-05-15

## 2025-05-15 NOTE — PROGRESS NOTES
Pain Medicine Follow-Up Note    Assessment:  1. Neuropathy    2. Spinal stenosis of lumbar region, unspecified whether neurogenic claudication present    3. PAD s/p R ax-bifemoral bypass (2/25/2025); H/O femorofemoral bypass (2015)        Plan:    New Medications Ordered This Visit   Medications   • pregabalin (LYRICA) 25 mg capsule     Sig: Take 1 capsule (25 mg total) by mouth 2 (two) times a day     Dispense:  180 capsule     Refill:  0       My impressions and treatment recommendations were discussed in detail with the patient who verbalized understanding and had no further questions.      Patient returns to the office after initiating pregabalin 25 mg twice daily in October 2024.  Patient reports that her pain symptoms are better and currently has a pain score of 4 out of 10 on the verbal numeric pain scale.  Patient reports having vascular surgery on 2/25/2025 in her left lower leg which has improved her symptoms, but continues to have significant nerve pain at bedtime therefore she has been using pregabalin 25 mg capsule by taking both capsules at bedtime.  Patient states that she stopped taking the daytime dose due to it causing her to feel off and prefers to only use it at bedtime.  Patient denies any significant side effects using pregabalin and reports significant pain relief therefore I will continue the patient on pregabalin 25 mg capsule patient may take 2 capsules at bedtime.    [place] Prescription Drug Monitoring Program report was reviewed and was appropriate     Follow-up is planned in 3 months time or sooner as warranted.  Discharge instructions were provided. I personally saw and examined the patient and I agree with the above discussed plan of care.    History of Present Illness:    Ofelia Aguilar is a 84 y.o. female who presents to [facility] for interval re-evaluation of the above stated pain complaints. The patient has a past medical and chronic pain history as  outlined in the assessment section. She was last seen on 10/14/2020 for.    At today's visit patient states that their pain symptoms are better with a pain score of 4/10 on the verbal numeric pain scale.  The patient's pain is worse at night.  The patient's pain is occasional in nature.  And the quality of the patient's pain is described as burning, sharp, throbbing, and shooting.  The patient's pain is located in the left lower leg.  Patient reports that pregabalin does provide her significant pain relief in her left lower leg, patient does not experience any side effects using pregabalin.    Other than as stated above, the patient denies any interval changes in medications, medical condition, mental condition, symptoms, or allergies since the last office visit.         Review of Systems:    Review of Systems   Respiratory:  Negative for shortness of breath.    Cardiovascular:  Negative for chest pain.   Gastrointestinal:  Negative for constipation, diarrhea, nausea and vomiting.   Musculoskeletal:  Positive for back pain and joint swelling. Negative for arthralgias, gait problem and myalgias.   Skin:  Negative for rash.   Neurological:  Positive for dizziness. Negative for seizures and weakness.   All other systems reviewed and are negative.        Past Medical History:   Diagnosis Date   • Anxiety    • Atherosclerosis of native arteries of extremities with rest pain, bilateral legs (HCC) 2024   • Cigarette nicotine dependence with nicotine-induced disorder 10/29/2024   • Colon polyp    • Diverticulitis    • GERD (gastroesophageal reflux disease)    • Hyperlipidemia    • Hypertension        Past Surgical History:   Procedure Laterality Date   • APPENDECTOMY     •  SECTION     • COLONOSCOPY     • FEMORAL BYPASS Right        • PERIPHERAL ANGIOGRAM     • CO BYPASS W/VEIN AXILLARY-FEMORAL Bilateral 2025    Procedure: BYPASS AXILLO-FEMORAL;  Surgeon: Meghan Diaz MD;  Location: [facility]  "OR;  Service: Vascular       History reviewed. No pertinent family history.    Social History     Occupational History   • Not on file   Tobacco Use   • Smoking status: Former     Current packs/day: 1.00     Average packs/day: 1 pack/day for 20.0 years (20.0 ttl pk-yrs)     Types: Cigarettes     Passive exposure: Never   • Smokeless tobacco: Never   Vaping Use   • Vaping status: Never Used   Substance and Sexual Activity   • Alcohol use: Never   • Drug use: Not Currently   • Sexual activity: Not on file       Current Medications[1]    Allergies[2]    Physical Exam:    Ht 5' 4\" (1.626 m)   Wt 49 kg (108 lb)   LMP  (LMP Unknown)   BMI 18.54 kg/m²     Constitutional:normal, well developed, well nourished, alert, in no distress and non-toxic and no overt pain behavior.  Eyes:anicteric  HEENT:grossly intact  Neck:supple, symmetric, trachea midline and no masses   Pulmonary:even and unlabored  Cardiovascular:No edema or pitting edema present  Skin:Normal without rashes or lesions and well hydrated  Psychiatric:Mood and affect appropriate  Neurologic:Cranial Nerves II-XII grossly intact  Musculoskeletal:antalgic gait    This document was created using speech voice recognition software.   Grammatical errors, random word insertions, pronoun errors, and incomplete sentences are an occasional consequence of this system due to software limitations, ambient noise, and hardware issues.   Any formal questions or concerns about content, text, or information contained within the body of this dictation should be directly addressed to the provider for clarification.           [1]    Current Outpatient Medications:   •  acetaminophen (TYLENOL) 650 mg CR tablet, Take 1 tablet (650 mg total) by mouth every 8 (eight) hours as needed for mild pain, Disp: 30 tablet, Rfl: 0  •  aspirin 81 mg chewable tablet, Chew 1 tablet in the morning., Disp: , Rfl:   •  atorvastatin (LIPITOR) 40 mg tablet, TAKE 1 TABLET BY MOUTH EVERY DAY, Disp: 90 " tablet, Rfl: 1  •  clopidogrel (PLAVIX) 75 mg tablet, Take 1 tablet (75 mg total) by mouth daily, Disp: 90 tablet, Rfl: 0  •  Metoprolol Tartrate 37.5 MG TABS, Take 1 tablet (37.5 mg total) by mouth 2 (two) times a day, Disp: 60 tablet, Rfl: 1  •  mirtazapine (REMERON) 15 mg tablet, TAKE 1 TABLET BY MOUTH DAILY AT BEDTIME, Disp: 90 tablet, Rfl: 1  •  Multiple Vitamin (multivitamin) tablet, Take 1 tablet by mouth in the morning., Disp: , Rfl:   •  ondansetron (ZOFRAN) 4 mg tablet, Take 1 tablet (4 mg total) by mouth 3 (three) times a day as needed for nausea or vomiting, Disp: 20 tablet, Rfl: 0  •  pregabalin (LYRICA) 25 mg capsule, Take 1 capsule (25 mg total) by mouth 2 (two) times a day, Disp: 180 capsule, Rfl: 0  •  sodium chloride 1 g tablet, Take 2 tablets (2 g total) by mouth 3 (three) times a day with meals, Disp: 180 tablet, Rfl: 0  •  hydrOXYzine HCL (ATARAX) 10 mg tablet, TAKE 1 TABLET BY MOUTH DAILY AT BEDTIME (Patient not taking: Reported on 3/10/2025), Disp: 30 tablet, Rfl: 5[2]  Allergies  Allergen Reactions   • Penicillins Anaphylaxis     PO keflex in past without reaction (but did not work for UTI)   • Aspirin GI Intolerance

## 2025-05-15 NOTE — PATIENT INSTRUCTIONS
Magnesium glycinate 200 mg nightly if tolerating can go up to 400 mg.     Patient Education     Pregabalin (pre RAMÓN a lisa)   Brand Names: US Lyrica; Lyrica CR   Brand Names: Arturo ACH-Pregabalin; AG-Pregabalin; APO-Pregabalin; Auro-Pregabalin; DOM-Pregabalin; JAMP-Pregabalin; Lyrica; M-Pregabalin; Mar-Pregabalin; MINT-Pregabalin; MOON-Pregabalin; NRA-Pregabalin; PMS-Pregabalin; JOSÉ-Pregabalin; SANDOZ Pregabalin; TARO-Pregabalin; TEVA-Pregabalin   What is this drug used for?   It is used to help control certain kinds of seizures.  It is used to treat painful nerve diseases.  It is used to treat fibromyalgia.  It may be given to you for other reasons. Talk with the doctor.  What do I need to tell my doctor BEFORE I take this drug?   If you are allergic to this drug; any part of this drug; or any other drugs, foods, or substances. Tell your doctor about the allergy and what signs you had.  If you have kidney disease.  If you are breast-feeding. Do not breast-feed while you take this drug.  This is not a list of all drugs or health problems that interact with this drug.  Tell your doctor and pharmacist about all of your drugs (prescription or OTC, natural products, vitamins) and health problems. You must check to make sure that it is safe for you to take this drug with all of your drugs and health problems. Do not start, stop, or change the dose of any drug without checking with your doctor.  What are some things I need to know or do while I take this drug?   Tell all of your health care providers that you take this drug. This includes your doctors, nurses, pharmacists, and dentists.  Avoid driving and doing other tasks or actions that call for you to be alert or have clear eyesight until you see how this drug affects you.  If seizures are different or worse after starting this drug, talk with the doctor.  Do not stop taking this drug all of a sudden without calling your doctor. You may have a greater risk of side  effects. If you need to stop this drug, you will want to slowly stop it as ordered by your doctor.  Avoid drinking alcohol while taking this drug.  Talk with your doctor before you use marijuana, other forms of cannabis, or prescription or OTC drugs that may slow your actions.  A very bad reaction called angioedema has happened with this drug. Sometimes, this may be life-threatening. Signs may include swelling of the hands, face, lips, eyes, tongue, or throat; trouble breathing; trouble swallowing; or unusual hoarseness. Get medical help right away if you have any of these signs.  Severe breathing problems have happened with this drug in people taking certain other drugs (like opioid pain drugs). This has also happened in people who already have lung or breathing problems. The risk may also be greater in people who are older than 65. Sometimes, breathing problems have been deadly. If you have questions, talk with the doctor.  If you are 65 or older, use this drug with care. You could have more side effects.  Talk with your doctor if you plan to father a child. This drug made male animals less fertile and caused sperm changes. Birth defects also happened in the young of male animals treated with this drug. It is not known if these problems happen in humans.  Tell your doctor if you are pregnant or plan on getting pregnant. You will need to talk about the benefits and risks of using this drug while you are pregnant.  What are some side effects that I need to call my doctor about right away?   WARNING/CAUTION: Even though it may be rare, some people may have very bad and sometimes deadly side effects when taking a drug. Tell your doctor or get medical help right away if you have any of the following signs or symptoms that may be related to a very bad side effect:  Signs of an allergic reaction, like rash; hives; itching; red, swollen, blistered, or peeling skin with or without fever; wheezing; tightness in the chest or  throat; trouble breathing, swallowing, or talking; unusual hoarseness; or swelling of the mouth, face, lips, tongue, or throat.  Change in eyesight.  Muscle pain or weakness.  Change in balance.  Feeling confused.  Shakiness.  Trouble breathing, slow breathing, or shallow breathing.  Blue or gray color of the skin, lips, nail beds, fingers, or toes.  Memory problems or loss.  Shortness of breath, a big weight gain, or swelling in the arms or legs.  Fast or abnormal heartbeat.  Fever, chills, or sore throat.  Skin sores.  Any skin change.  Trouble speaking.  Trouble sleeping.  Trouble walking.  Feeling high (easy laughing and feeling good).  Twitching.  Get medical help right away if you feel very sleepy, very dizzy, or if you pass out. Caregivers or others need to get medical help right away if the patient does not respond, does not answer or react like normal, or will not wake up.  Like other drugs that may be used for seizures, this drug may rarely raise the risk of suicidal thoughts or actions. The risk may be higher in people who have had suicidal thoughts or actions in the past. Call the doctor right away about any new or worse signs like depression; feeling nervous, restless, or grouchy; panic attacks; or other changes in mood or behavior. Call the doctor right away if any suicidal thoughts or actions occur.  Low platelet counts have rarely happened with this drug. This may lead to a higher chance of bleeding. Call your doctor right away if you have any unexplained bruising or bleeding.  What are some other side effects of this drug?   All drugs may cause side effects. However, many people have no side effects or only have minor side effects. Call your doctor or get medical help if any of these side effects or any other side effects bother you or do not go away:  Feeling dizzy, sleepy, tired, or weak.  Weight gain.  Not able to focus.  Headache.  Dry mouth.  Constipation.  Increased appetite.  Upset  stomach.  Joint pain.  Nose or throat irritation.  These are not all of the side effects that may occur. If you have questions about side effects, call your doctor. Call your doctor for medical advice about side effects.  You may report side effects to your national health agency.  You may report side effects to the FDA at 1-567.104.8140. You may also report side effects at https://www.fda.gov/medwatch.  How is this drug best taken?   Use this drug as ordered by your doctor. Read all information given to you. Follow all instructions closely.  Extended-release tablets:   Take after the evening meal if taking once daily.  Swallow whole. Do not chew, break, or crush.  Keep taking this drug as you have been told by your doctor or other health care provider, even if you feel well.  Capsules and oral solution:   Take with or without food.  Keep taking this drug as you have been told by your doctor or other health care provider, even if you feel well.  Oral solution:   Measure liquid doses carefully. Use the measuring device that comes with this drug. If there is none, ask the pharmacist for a device to measure this drug.  What do I do if I miss a dose?   Extended-release tablets:   Take a missed dose just before bedtime after eating a snack or after the next day's morning meal.  If you miss taking the missed dose after the next day's morning meal, skip the missed dose and go back to your normal time.  Do not take 2 doses at the same time or extra doses.  Capsules and oral solution:   Take a missed dose as soon as you think about it.  If it is close to the time for your next dose, skip the missed dose and go back to your normal time.  Do not take 2 doses at the same time or extra doses.  How do I store and/or throw out this drug?   Store in the original container at room temperature.  Store in a dry place. Do not store in a bathroom.  Store this drug in a safe place where children cannot see or reach it, and where other  people cannot get to it. A locked box or area may help keep this drug safe. Keep all drugs away from pets.  Throw away unused or  drugs. Do not flush down a toilet or pour down a drain unless you are told to do so. Check with your pharmacist if you have questions about the best way to throw out drugs. There may be drug take-back programs in your area.  General drug facts   If your symptoms or health problems do not get better or if they become worse, call your doctor.  Do not share your drugs with others and do not take anyone else's drugs.  Some drugs may have another patient information leaflet. If you have any questions about this drug, please talk with your doctor, nurse, pharmacist, or other health care provider.  This drug comes with an extra patient fact sheet called a Medication Guide. Read it with care. Read it again each time this drug is refilled. If you have any questions about this drug, please talk with the doctor, pharmacist, or other health care provider.  If you think there has been an overdose, call your poison control center or get medical care right away. Be ready to tell or show what was taken, how much, and when it happened.  Consumer Information Use and Disclaimer   This generalized information is a limited summary of diagnosis, treatment, and/or medication information. It is not meant to be comprehensive and should be used as a tool to help the user understand and/or assess potential diagnostic and treatment options. It does NOT include all information about conditions, treatments, medications, side effects, or risks that may apply to a specific patient. It is not intended to be medical advice or a substitute for the medical advice, diagnosis, or treatment of a health care provider based on the health care provider's examination and assessment of a patient's specific and unique circumstances. Patients must speak with a health care provider for complete information about their health,  medical questions, and treatment options, including any risks or benefits regarding use of medications. This information does not endorse any treatments or medications as safe, effective, or approved for treating a specific patient. Up[employer] Inc[employer] and its affiliates disclaim any warranty or liability relating to this information or the use thereof. The use of this information is governed by the Terms of Use, available at https://www.[employer].com/en/know/clinical-effectiveness-terms.  Last Reviewed Date   2023-12-21  Copyright   © 2024 [employer], Inc[employer] and its affiliates and/or licensors. All rights reserved.

## 2025-05-20 DIAGNOSIS — G47.00 INSOMNIA, UNSPECIFIED TYPE: ICD-10-CM

## 2025-05-20 DIAGNOSIS — F32.A DEPRESSION, UNSPECIFIED DEPRESSION TYPE: ICD-10-CM

## 2025-05-20 DIAGNOSIS — M48.061 SPINAL STENOSIS OF LUMBAR REGION, UNSPECIFIED WHETHER NEUROGENIC CLAUDICATION PRESENT: ICD-10-CM

## 2025-05-20 RX ORDER — PREGABALIN 25 MG/1
25 CAPSULE ORAL 2 TIMES DAILY
Qty: 180 CAPSULE | Refills: 0 | Status: CANCELLED | OUTPATIENT
Start: 2025-05-20

## 2025-05-21 RX ORDER — MIRTAZAPINE 15 MG/1
15 TABLET, FILM COATED ORAL
Qty: 90 TABLET | Refills: 0 | Status: SHIPPED | OUTPATIENT
Start: 2025-05-21

## 2025-06-10 ENCOUNTER — HOSPITAL ENCOUNTER (OUTPATIENT)
Dept: VASCULAR ULTRASOUND | Facility: HOSPITAL | Age: 85
Discharge: HOME | End: 2025-06-10
Payer: COMMERCIAL

## 2025-06-10 DIAGNOSIS — D64.9 ANEMIA, UNSPECIFIED TYPE: Primary | ICD-10-CM

## 2025-06-10 DIAGNOSIS — I70.223 ATHEROSCLEROSIS OF NATIVE ARTERIES OF EXTREMITIES WITH REST PAIN, BILATERAL LEGS (HCC): ICD-10-CM

## 2025-06-10 PROCEDURE — 93923 UPR/LXTR ART STDY 3+ LVLS: CPT

## 2025-06-10 PROCEDURE — 93925 LOWER EXTREMITY STUDY: CPT

## 2025-06-11 ENCOUNTER — RESULTS FOLLOW-UP (OUTPATIENT)
Dept: VASCULAR SURGERY | Facility: CLINIC | Age: 85
End: 2025-06-11

## 2025-06-11 PROCEDURE — 93922 UPR/L XTREMITY ART 2 LEVELS: CPT | Performed by: SURGERY

## 2025-06-11 PROCEDURE — 93925 LOWER EXTREMITY STUDY: CPT | Performed by: SURGERY

## 2025-06-17 ENCOUNTER — OFFICE VISIT (OUTPATIENT)
Dept: VASCULAR SURGERY | Facility: CLINIC | Age: 85
End: 2025-06-17
Payer: COMMERCIAL

## 2025-06-17 VITALS
WEIGHT: 115 LBS | DIASTOLIC BLOOD PRESSURE: 80 MMHG | HEART RATE: 68 BPM | BODY MASS INDEX: 19.63 KG/M2 | HEIGHT: 64 IN | SYSTOLIC BLOOD PRESSURE: 142 MMHG

## 2025-06-17 DIAGNOSIS — I70.223 ATHEROSCLEROSIS OF NATIVE ARTERIES OF EXTREMITIES WITH REST PAIN, BILATERAL LEGS (HCC): ICD-10-CM

## 2025-06-17 DIAGNOSIS — I73.9 PAD (PERIPHERAL ARTERY DISEASE) (HCC): Primary | ICD-10-CM

## 2025-06-17 PROCEDURE — 99213 OFFICE O/P EST LOW 20 MIN: CPT | Performed by: SURGERY

## 2025-06-17 RX ORDER — CLOPIDOGREL BISULFATE 75 MG/1
75 TABLET ORAL DAILY
Qty: 90 TABLET | Refills: 1 | Status: SHIPPED | OUTPATIENT
Start: 2025-06-17

## 2025-06-17 NOTE — PATIENT INSTRUCTIONS
Right Ax bifem in Feb 2025, doing well overall. She has chronic neuropathy in both feet, some improvement over time can be expected.    We will continue long term aspirin and clopidgrel.    Doppler test was reviewed, the BARRON is normalized and arterial flow is excellent and normal in both feet/ legs.    We will order test in 6 months to monitor the health and patency of bypass.    Daily walking is good for you.

## 2025-06-17 NOTE — ASSESSMENT & PLAN NOTE
Right Ax bifem in Feb 2025, doing well overall. She has chronic neuropathy in both feet, some improvement over time can be expected.    We will continue long term aspirin and clopidgrel.    Doppler test was reviewed, the BARRON is normalized and arterial flow is excellent.    We will order test in 6 months to monitor the health and patency of bypass.    Daily walking is good for you.    She is walking much better, no need for wheelchair or cane.      Orders:  •  VAS abdominal aorta/iliac duplex; Future

## 2025-06-28 DIAGNOSIS — I47.19 ATRIAL TACHYCARDIA (HCC): ICD-10-CM

## 2025-06-30 RX ORDER — METOPROLOL TARTRATE 37.5 MG/1
1 TABLET ORAL 2 TIMES DAILY
Qty: 60 TABLET | Refills: 1 | Status: SHIPPED | OUTPATIENT
Start: 2025-06-30

## 2025-07-08 ENCOUNTER — RA CDI HCC (OUTPATIENT)
Dept: OTHER | Facility: HOSPITAL | Age: 85
End: 2025-07-08

## 2025-07-11 ENCOUNTER — APPOINTMENT (OUTPATIENT)
Dept: LAB | Facility: CLINIC | Age: 85
End: 2025-07-11
Payer: COMMERCIAL

## 2025-07-11 DIAGNOSIS — D64.9 ANEMIA, UNSPECIFIED TYPE: ICD-10-CM

## 2025-07-11 DIAGNOSIS — H49.23 SIXTH NERVE PALSY OF BOTH EYES: ICD-10-CM

## 2025-07-11 DIAGNOSIS — H25.813 COMBINED FORMS OF AGE-RELATED CATARACT OF BOTH EYES: ICD-10-CM

## 2025-07-11 DIAGNOSIS — H04.123 DRY EYES: ICD-10-CM

## 2025-07-11 DIAGNOSIS — E87.1 HYPONATREMIA: ICD-10-CM

## 2025-07-11 DIAGNOSIS — H53.2 DIPLOPIA: ICD-10-CM

## 2025-07-11 LAB
ANION GAP SERPL CALCULATED.3IONS-SCNC: 11 MMOL/L (ref 4–13)
BASOPHILS # BLD AUTO: 0.04 THOUSANDS/ÂΜL (ref 0–0.1)
BASOPHILS NFR BLD AUTO: 1 % (ref 0–1)
BUN SERPL-MCNC: 12 MG/DL (ref 5–25)
CALCIUM SERPL-MCNC: 9.2 MG/DL (ref 8.4–10.2)
CHLORIDE SERPL-SCNC: 106 MMOL/L (ref 96–108)
CO2 SERPL-SCNC: 23 MMOL/L (ref 21–32)
CREAT SERPL-MCNC: 0.87 MG/DL (ref 0.6–1.3)
EOSINOPHIL # BLD AUTO: 0.14 THOUSAND/ÂΜL (ref 0–0.61)
EOSINOPHIL NFR BLD AUTO: 3 % (ref 0–6)
ERYTHROCYTE [DISTWIDTH] IN BLOOD BY AUTOMATED COUNT: 13.4 % (ref 11.6–15.1)
GFR SERPL CREATININE-BSD FRML MDRD: 62 ML/MIN/1.73SQ M
GLUCOSE P FAST SERPL-MCNC: 82 MG/DL (ref 65–99)
HCT VFR BLD AUTO: 36.3 % (ref 34.8–46.1)
HGB BLD-MCNC: 12.1 G/DL (ref 11.5–15.4)
IMM GRANULOCYTES # BLD AUTO: 0.01 THOUSAND/UL (ref 0–0.2)
IMM GRANULOCYTES NFR BLD AUTO: 0 % (ref 0–2)
LYMPHOCYTES # BLD AUTO: 1.91 THOUSANDS/ÂΜL (ref 0.6–4.47)
LYMPHOCYTES NFR BLD AUTO: 34 % (ref 14–44)
MCH RBC QN AUTO: 30.6 PG (ref 26.8–34.3)
MCHC RBC AUTO-ENTMCNC: 33.3 G/DL (ref 31.4–37.4)
MCV RBC AUTO: 92 FL (ref 82–98)
MONOCYTES # BLD AUTO: 0.55 THOUSAND/ÂΜL (ref 0.17–1.22)
MONOCYTES NFR BLD AUTO: 10 % (ref 4–12)
NEUTROPHILS # BLD AUTO: 3.05 THOUSANDS/ÂΜL (ref 1.85–7.62)
NEUTS SEG NFR BLD AUTO: 52 % (ref 43–75)
NRBC BLD AUTO-RTO: 0 /100 WBCS
PLATELET # BLD AUTO: 251 THOUSANDS/UL (ref 149–390)
PMV BLD AUTO: 11 FL (ref 8.9–12.7)
POTASSIUM SERPL-SCNC: 4.2 MMOL/L (ref 3.5–5.3)
RBC # BLD AUTO: 3.95 MILLION/UL (ref 3.81–5.12)
SODIUM SERPL-SCNC: 140 MMOL/L (ref 135–147)
WBC # BLD AUTO: 5.7 THOUSAND/UL (ref 4.31–10.16)

## 2025-07-11 PROCEDURE — 85025 COMPLETE CBC W/AUTO DIFF WBC: CPT

## 2025-07-11 PROCEDURE — 80048 BASIC METABOLIC PNL TOTAL CA: CPT

## 2025-07-11 PROCEDURE — 36415 COLL VENOUS BLD VENIPUNCTURE: CPT

## 2025-07-15 ENCOUNTER — OFFICE VISIT (OUTPATIENT)
Dept: FAMILY MEDICINE CLINIC | Facility: CLINIC | Age: 85
End: 2025-07-15
Payer: COMMERCIAL

## 2025-07-15 VITALS
HEART RATE: 62 BPM | DIASTOLIC BLOOD PRESSURE: 68 MMHG | WEIGHT: 118 LBS | SYSTOLIC BLOOD PRESSURE: 130 MMHG | OXYGEN SATURATION: 96 % | TEMPERATURE: 97.8 F | HEIGHT: 64 IN | BODY MASS INDEX: 20.14 KG/M2

## 2025-07-15 DIAGNOSIS — F32.2 SEVERE MAJOR DEPRESSIVE DISORDER (HCC): ICD-10-CM

## 2025-07-15 DIAGNOSIS — I73.9 PAD (PERIPHERAL ARTERY DISEASE) (HCC): ICD-10-CM

## 2025-07-15 DIAGNOSIS — E22.2 SIADH (SYNDROME OF INAPPROPRIATE ADH PRODUCTION) (HCC): ICD-10-CM

## 2025-07-15 DIAGNOSIS — K21.9 GASTROESOPHAGEAL REFLUX DISEASE WITHOUT ESOPHAGITIS: Primary | ICD-10-CM

## 2025-07-15 DIAGNOSIS — I47.19 ATRIAL TACHYCARDIA (HCC): ICD-10-CM

## 2025-07-15 PROCEDURE — 99214 OFFICE O/P EST MOD 30 MIN: CPT

## 2025-07-15 PROCEDURE — G2211 COMPLEX E/M VISIT ADD ON: HCPCS

## 2025-07-15 RX ORDER — PANTOPRAZOLE SODIUM 20 MG/1
20 TABLET, DELAYED RELEASE ORAL
Qty: 30 TABLET | Refills: 0 | Status: SHIPPED | OUTPATIENT
Start: 2025-07-15 | End: 2025-08-14

## 2025-07-24 DIAGNOSIS — E87.1 HYPONATREMIA: ICD-10-CM

## 2025-07-28 RX ORDER — SODIUM CHLORIDE 1 G/1
2 TABLET ORAL
Qty: 180 TABLET | Refills: 0 | Status: SHIPPED | OUTPATIENT
Start: 2025-07-28 | End: 2025-08-27

## 2025-08-01 DIAGNOSIS — F32.A DEPRESSION, UNSPECIFIED DEPRESSION TYPE: ICD-10-CM

## 2025-08-01 DIAGNOSIS — I10 HTN, GOAL BELOW 140/90: ICD-10-CM

## 2025-08-01 DIAGNOSIS — E78.5 HYPERLIPIDEMIA, UNSPECIFIED HYPERLIPIDEMIA TYPE: ICD-10-CM

## 2025-08-01 DIAGNOSIS — R11.2 NAUSEA AND VOMITING, UNSPECIFIED VOMITING TYPE: ICD-10-CM

## 2025-08-01 DIAGNOSIS — K21.9 GASTROESOPHAGEAL REFLUX DISEASE WITHOUT ESOPHAGITIS: ICD-10-CM

## 2025-08-01 DIAGNOSIS — G47.00 INSOMNIA, UNSPECIFIED TYPE: ICD-10-CM

## 2025-08-04 RX ORDER — ATORVASTATIN CALCIUM 40 MG/1
40 TABLET, FILM COATED ORAL DAILY
Qty: 90 TABLET | Refills: 1 | Status: SHIPPED | OUTPATIENT
Start: 2025-08-04

## 2025-08-04 RX ORDER — PANTOPRAZOLE SODIUM 20 MG/1
20 TABLET, DELAYED RELEASE ORAL
Qty: 30 TABLET | Refills: 0 | Status: SHIPPED | OUTPATIENT
Start: 2025-08-04

## 2025-08-04 RX ORDER — MIRTAZAPINE 15 MG/1
15 TABLET, FILM COATED ORAL
Qty: 90 TABLET | Refills: 0 | Status: SHIPPED | OUTPATIENT
Start: 2025-08-04

## 2025-08-04 RX ORDER — AMLODIPINE BESYLATE 5 MG/1
5 TABLET ORAL DAILY
Qty: 90 TABLET | Refills: 1 | Status: SHIPPED | OUTPATIENT
Start: 2025-08-04

## 2025-08-06 RX ORDER — ONDANSETRON 4 MG/1
4 TABLET, FILM COATED ORAL 3 TIMES DAILY PRN
Qty: 20 TABLET | Refills: 0 | OUTPATIENT
Start: 2025-08-06

## 2025-08-13 ENCOUNTER — CONSULT (OUTPATIENT)
Dept: FAMILY MEDICINE CLINIC | Facility: CLINIC | Age: 85
End: 2025-08-13
Payer: COMMERCIAL

## 2025-08-13 PROBLEM — E46 MALNUTRITION (HCC): Status: RESOLVED | Noted: 2024-11-26 | Resolved: 2025-08-13

## 2025-08-13 PROBLEM — E44.0 MODERATE PROTEIN-CALORIE MALNUTRITION (HCC): Status: RESOLVED | Noted: 2024-08-27 | Resolved: 2025-08-13

## 2025-08-14 ENCOUNTER — TELEMEDICINE (OUTPATIENT)
Dept: PAIN MEDICINE | Facility: CLINIC | Age: 85
End: 2025-08-14
Payer: COMMERCIAL

## (undated) DEVICE — PENCIL ELECTROSURG E-Z CLEAN -0035H

## (undated) DEVICE — SURGICEL 4 X 8IN

## (undated) DEVICE — CHLORAPREP HI-LITE 26ML ORANGE

## (undated) DEVICE — EXOFIN PRECISION PEN HIGH VISCOSITY TOPICAL SKIN ADHESIVE: Brand: EXOFIN PRECISION PEN, 1G

## (undated) DEVICE — VESSEL LOOPS X-RAY DETECTABLE: Brand: DEROYAL

## (undated) DEVICE — 450 ML BOTTLE OF 0.05% CHLORHEXIDINE GLUCONATE IN 99.95% STERILE WATER FOR IRRIGATION, USP AND APPLICATOR.: Brand: IRRISEPT ANTIMICROBIAL WOUND LAVAGE

## (undated) DEVICE — SUT SILK 4-0 30 IN A303H

## (undated) DEVICE — DECANTER: Brand: UNBRANDED

## (undated) DEVICE — SUT PROLENE 5-0 C-1/C-1 36 IN 8720ZH

## (undated) DEVICE — SUT PROLENE 6-0 BV130 30 IN 8709H

## (undated) DEVICE — SUT MONOCRYL 3-0 SH 27 IN Y416H

## (undated) DEVICE — VESSEL CANNULA

## (undated) DEVICE — SUT SILK 2-0 30 IN A305H

## (undated) DEVICE — DRAPE SHEET THREE QUARTER

## (undated) DEVICE — SURGIFOAM 8.5 X 12.5

## (undated) DEVICE — PACK UNIVERSAL DRAPES SUB-Q ICD

## (undated) DEVICE — 40529 DERMAPROX PAD 11'' X 15'' X 1'': Brand: 40529 DERMAPROX PAD 11'' X 15'' X 1''

## (undated) DEVICE — TRAY FOLEY 16FR URIMETER SURESTEP

## (undated) DEVICE — PETRI DISH STERILE

## (undated) DEVICE — GLOVE INDICATOR PI UNDERGLOVE SZ 8 BLUE

## (undated) DEVICE — GLOVE SRG BIOGEL 7.5

## (undated) DEVICE — DRESSING MEPILEX AG BORDER POST-OP 4 X 8 IN

## (undated) DEVICE — DRESSING MEPILEX AG BORDER POST-OP 4 X 6 IN

## (undated) DEVICE — SUT SILK 0 30 IN A306H

## (undated) DEVICE — X-RAY DETECTABLE SPONGES,16 PLY: Brand: VISTEC

## (undated) DEVICE — SUT SILK 2-0 SH 30 IN K833H

## (undated) DEVICE — STERILE BETHLEHEM FEM POP PACK: Brand: CARDINAL HEALTH

## (undated) DEVICE — SUT MONOCRYL 2-0 CT-1 27 IN Y339H